# Patient Record
Sex: FEMALE | Race: WHITE | NOT HISPANIC OR LATINO | Employment: OTHER | URBAN - METROPOLITAN AREA
[De-identification: names, ages, dates, MRNs, and addresses within clinical notes are randomized per-mention and may not be internally consistent; named-entity substitution may affect disease eponyms.]

---

## 2017-01-04 ENCOUNTER — TRANSCRIBE ORDERS (OUTPATIENT)
Dept: ADMINISTRATIVE | Facility: HOSPITAL | Age: 67
End: 2017-01-04

## 2017-01-04 DIAGNOSIS — N95.9 POST MENOPAUSAL PROBLEMS: Primary | ICD-10-CM

## 2017-01-24 ENCOUNTER — APPOINTMENT (OUTPATIENT)
Dept: BONE DENSITY | Facility: HOSPITAL | Age: 67
End: 2017-01-24
Attending: INTERNAL MEDICINE

## 2017-01-26 ENCOUNTER — TRANSCRIBE ORDERS (OUTPATIENT)
Dept: ADMINISTRATIVE | Facility: HOSPITAL | Age: 67
End: 2017-01-26

## 2017-01-26 DIAGNOSIS — Z12.31 VISIT FOR SCREENING MAMMOGRAM: Primary | ICD-10-CM

## 2017-01-31 ENCOUNTER — HOSPITAL ENCOUNTER (OUTPATIENT)
Dept: BONE DENSITY | Facility: HOSPITAL | Age: 67
Discharge: HOME | End: 2017-01-31
Attending: INTERNAL MEDICINE | Admitting: INTERNAL MEDICINE

## 2017-01-31 DIAGNOSIS — N95.9 POST MENOPAUSAL PROBLEMS: ICD-10-CM

## 2017-01-31 PROCEDURE — 77080 DXA BONE DENSITY AXIAL: CPT

## 2017-01-31 PROCEDURE — 77080 DXA BONE DENSITY AXIAL: CPT | Performed by: RADIOLOGY

## 2017-03-17 ENCOUNTER — HOSPITAL ENCOUNTER (OUTPATIENT)
Dept: GENERAL RADIOLOGY | Facility: HOSPITAL | Age: 67
Discharge: HOME | End: 2017-03-17
Attending: INTERNAL MEDICINE | Admitting: INTERNAL MEDICINE

## 2017-03-17 ENCOUNTER — TRANSCRIBE ORDERS (OUTPATIENT)
Dept: GENERAL RADIOLOGY | Facility: HOSPITAL | Age: 67
End: 2017-03-17

## 2017-03-17 DIAGNOSIS — H32: ICD-10-CM

## 2017-03-17 DIAGNOSIS — M35.3 POLYMYALGIA RHEUMATICA (HCC): ICD-10-CM

## 2017-03-17 DIAGNOSIS — Z79.52 LONG TERM CURRENT USE OF SYSTEMIC STEROIDS: ICD-10-CM

## 2017-03-17 DIAGNOSIS — B39.1 HISTOPLASMOSIS, CHRONIC CAVITARY (HCC): ICD-10-CM

## 2017-03-17 DIAGNOSIS — M31.6 GIANT CELL ARTERITIS (HCC): ICD-10-CM

## 2017-03-17 DIAGNOSIS — B39.4: ICD-10-CM

## 2017-03-17 DIAGNOSIS — H32: Primary | ICD-10-CM

## 2017-03-17 DIAGNOSIS — B39.4: Primary | ICD-10-CM

## 2017-03-17 PROCEDURE — 71020 HC CHEST PA AND LATERAL: CPT

## 2017-05-08 ENCOUNTER — APPOINTMENT (OUTPATIENT)
Dept: GENERAL RADIOLOGY | Facility: HOSPITAL | Age: 67
End: 2017-05-08

## 2017-05-08 ENCOUNTER — HOSPITAL ENCOUNTER (EMERGENCY)
Facility: HOSPITAL | Age: 67
Discharge: HOME | End: 2017-05-08
Attending: EMERGENCY MEDICINE | Admitting: EMERGENCY MEDICINE

## 2017-05-08 VITALS
BODY MASS INDEX: 29.07 KG/M2 | TEMPERATURE: 97.6 F | HEIGHT: 61 IN | OXYGEN SATURATION: 94 % | WEIGHT: 154 LBS | RESPIRATION RATE: 18 BRPM | SYSTOLIC BLOOD PRESSURE: 134 MMHG | DIASTOLIC BLOOD PRESSURE: 69 MMHG | HEART RATE: 78 BPM
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
## 2017-05-08 DIAGNOSIS — S42.034A CLOSED NONDISPLACED FRACTURE OF ACROMIAL END OF RIGHT CLAVICLE, INITIAL ENCOUNTER: ICD-10-CM

## 2017-05-08 PROCEDURE — 73030 X-RAY EXAM OF SHOULDER: CPT

## 2017-05-08 PROCEDURE — 99283 EMERGENCY DEPT VISIT LOW MDM: CPT

## 2017-05-08 RX ORDER — ONDANSETRON 4 MG/1
4 TABLET, ORALLY DISINTEGRATING ORAL EVERY 8 HOURS PRN
Qty: 12 TABLET | Refills: 0 | Status: SHIPPED | OUTPATIENT
Start: 2017-05-08 | End: 2020-07-16

## 2017-05-08 RX ORDER — ONDANSETRON 4 MG/1
4 TABLET, ORALLY DISINTEGRATING ORAL ONCE
Status: COMPLETED | OUTPATIENT
Start: 2017-05-08 | End: 2017-05-08

## 2017-05-08 RX ORDER — HYDROCODONE BITARTRATE AND ACETAMINOPHEN 5; 325 MG/1; MG/1
1 TABLET ORAL ONCE
Status: COMPLETED | OUTPATIENT
Start: 2017-05-08 | End: 2017-05-08

## 2017-05-08 RX ORDER — HYDROCODONE BITARTRATE AND ACETAMINOPHEN 5; 325 MG/1; MG/1
1 TABLET ORAL EVERY 6 HOURS PRN
Qty: 15 TABLET | Refills: 0 | Status: SHIPPED | OUTPATIENT
Start: 2017-05-08 | End: 2019-03-05

## 2017-05-08 RX ADMIN — ONDANSETRON 4 MG: 4 TABLET, ORALLY DISINTEGRATING ORAL at 10:04

## 2017-05-08 RX ADMIN — HYDROCODONE BITARTRATE AND ACETAMINOPHEN 1 TABLET: 5; 325 TABLET ORAL at 10:05

## 2017-07-27 ENCOUNTER — TRANSCRIBE ORDERS (OUTPATIENT)
Dept: ADMINISTRATIVE | Facility: HOSPITAL | Age: 67
End: 2017-07-27

## 2017-07-27 DIAGNOSIS — Z12.31 VISIT FOR SCREENING MAMMOGRAM: Primary | ICD-10-CM

## 2017-08-11 ENCOUNTER — HOSPITAL ENCOUNTER (OUTPATIENT)
Dept: MAMMOGRAPHY | Facility: HOSPITAL | Age: 67
Discharge: HOME | End: 2017-08-11
Attending: INTERNAL MEDICINE | Admitting: INTERNAL MEDICINE

## 2017-08-11 DIAGNOSIS — Z12.31 VISIT FOR SCREENING MAMMOGRAM: ICD-10-CM

## 2017-08-11 PROCEDURE — G0202 SCR MAMMO BI INCL CAD: HCPCS

## 2017-08-11 PROCEDURE — 77063 BREAST TOMOSYNTHESIS BI: CPT

## 2017-08-11 PROCEDURE — G0202 SCR MAMMO BI INCL CAD: HCPCS | Performed by: RADIOLOGY

## 2017-08-11 PROCEDURE — 77063 BREAST TOMOSYNTHESIS BI: CPT | Performed by: RADIOLOGY

## 2018-10-15 ENCOUNTER — TRANSCRIBE ORDERS (OUTPATIENT)
Dept: ADMINISTRATIVE | Facility: HOSPITAL | Age: 68
End: 2018-10-15

## 2018-10-15 DIAGNOSIS — Z12.31 VISIT FOR SCREENING MAMMOGRAM: Primary | ICD-10-CM

## 2018-12-17 ENCOUNTER — HOSPITAL ENCOUNTER (OUTPATIENT)
Dept: MAMMOGRAPHY | Facility: HOSPITAL | Age: 68
Discharge: HOME | End: 2018-12-17
Attending: INTERNAL MEDICINE | Admitting: INTERNAL MEDICINE

## 2018-12-17 DIAGNOSIS — Z12.31 VISIT FOR SCREENING MAMMOGRAM: ICD-10-CM

## 2018-12-17 PROCEDURE — 77063 BREAST TOMOSYNTHESIS BI: CPT | Performed by: RADIOLOGY

## 2018-12-17 PROCEDURE — 77067 SCR MAMMO BI INCL CAD: CPT | Performed by: RADIOLOGY

## 2018-12-17 PROCEDURE — 77063 BREAST TOMOSYNTHESIS BI: CPT

## 2018-12-17 PROCEDURE — 77067 SCR MAMMO BI INCL CAD: CPT

## 2018-12-29 PROBLEM — F41.1 GENERALIZED ANXIETY DISORDER: Status: ACTIVE | Noted: 2018-01-24

## 2018-12-31 ENCOUNTER — LAB REQUISITION (OUTPATIENT)
Dept: LAB | Facility: HOSPITAL | Age: 68
End: 2018-12-31

## 2018-12-31 DIAGNOSIS — Z00.00 ROUTINE GENERAL MEDICAL EXAMINATION AT A HEALTH CARE FACILITY: ICD-10-CM

## 2018-12-31 LAB
BACTERIA UR QL AUTO: NORMAL /HPF
BILIRUB UR QL STRIP: NEGATIVE
CLARITY UR: CLEAR
COLOR UR: YELLOW
GLUCOSE UR STRIP-MCNC: NEGATIVE MG/DL
HGB UR QL STRIP.AUTO: NEGATIVE
HYALINE CASTS UR QL AUTO: NORMAL /LPF
KETONES UR QL STRIP: NEGATIVE
LEUKOCYTE ESTERASE UR QL STRIP.AUTO: NEGATIVE
NITRITE UR QL STRIP: NEGATIVE
PH UR STRIP.AUTO: 5.5 [PH] (ref 5–8)
PROT UR QL STRIP: NEGATIVE
RBC # UR: NORMAL /HPF
REF LAB TEST METHOD: NORMAL
SP GR UR STRIP: 1.02 (ref 1–1.03)
SQUAMOUS #/AREA URNS HPF: NORMAL /HPF
UROBILINOGEN UR QL STRIP: NORMAL
WBC UR QL AUTO: NORMAL /HPF

## 2018-12-31 PROCEDURE — 81001 URINALYSIS AUTO W/SCOPE: CPT | Performed by: NURSE PRACTITIONER

## 2018-12-31 PROCEDURE — 36415 COLL VENOUS BLD VENIPUNCTURE: CPT | Performed by: NURSE PRACTITIONER

## 2019-01-08 ENCOUNTER — LAB (OUTPATIENT)
Dept: LAB | Facility: HOSPITAL | Age: 69
End: 2019-01-08

## 2019-01-08 ENCOUNTER — OFFICE VISIT (OUTPATIENT)
Dept: INTERNAL MEDICINE | Facility: CLINIC | Age: 69
End: 2019-01-08

## 2019-01-08 VITALS
WEIGHT: 163 LBS | DIASTOLIC BLOOD PRESSURE: 70 MMHG | BODY MASS INDEX: 30 KG/M2 | HEIGHT: 62 IN | TEMPERATURE: 98.7 F | HEART RATE: 80 BPM | SYSTOLIC BLOOD PRESSURE: 144 MMHG

## 2019-01-08 DIAGNOSIS — E78.2 MIXED HYPERLIPIDEMIA: Primary | ICD-10-CM

## 2019-01-08 DIAGNOSIS — E55.9 VITAMIN D DEFICIENCY: ICD-10-CM

## 2019-01-08 DIAGNOSIS — N39.0 URINARY TRACT INFECTION WITHOUT HEMATURIA, SITE UNSPECIFIED: ICD-10-CM

## 2019-01-08 DIAGNOSIS — F41.1 GENERALIZED ANXIETY DISORDER: ICD-10-CM

## 2019-01-08 DIAGNOSIS — M81.0 SENILE OSTEOPOROSIS: ICD-10-CM

## 2019-01-08 DIAGNOSIS — E55.9 VITAMIN D DEFICIENCY: Chronic | ICD-10-CM

## 2019-01-08 DIAGNOSIS — E78.2 MIXED HYPERLIPIDEMIA: ICD-10-CM

## 2019-01-08 LAB
ALBUMIN SERPL-MCNC: 4.85 G/DL (ref 3.2–4.8)
ALBUMIN/GLOB SERPL: 3.6 G/DL (ref 1.5–2.5)
ALP SERPL-CCNC: 55 U/L (ref 25–100)
ALT SERPL W P-5'-P-CCNC: 31 U/L (ref 7–40)
ANION GAP SERPL CALCULATED.3IONS-SCNC: 7 MMOL/L (ref 3–11)
ARTICHOKE IGE QN: 150 MG/DL (ref 0–130)
AST SERPL-CCNC: 26 U/L (ref 0–33)
BASOPHILS # BLD AUTO: 0.02 10*3/MM3 (ref 0–0.2)
BASOPHILS NFR BLD AUTO: 0.2 % (ref 0–1)
BILIRUB SERPL-MCNC: 0.7 MG/DL (ref 0.3–1.2)
BILIRUB UR QL STRIP: NEGATIVE
BUN BLD-MCNC: 22 MG/DL (ref 9–23)
BUN/CREAT SERPL: 22.9 (ref 7–25)
CALCIUM SPEC-SCNC: 9.6 MG/DL (ref 8.7–10.4)
CHLORIDE SERPL-SCNC: 100 MMOL/L (ref 99–109)
CHOLEST SERPL-MCNC: 220 MG/DL (ref 0–200)
CLARITY UR: CLEAR
CO2 SERPL-SCNC: 32 MMOL/L (ref 20–31)
COLOR UR: YELLOW
CREAT BLD-MCNC: 0.96 MG/DL (ref 0.6–1.3)
DEPRECATED RDW RBC AUTO: 42.8 FL (ref 37–54)
EOSINOPHIL # BLD AUTO: 0.09 10*3/MM3 (ref 0–0.3)
EOSINOPHIL NFR BLD AUTO: 1 % (ref 0–3)
ERYTHROCYTE [DISTWIDTH] IN BLOOD BY AUTOMATED COUNT: 12.1 % (ref 11.3–14.5)
ERYTHROCYTE [SEDIMENTATION RATE] IN BLOOD: <1 MM/HR (ref 0–30)
GFR SERPL CREATININE-BSD FRML MDRD: 58 ML/MIN/1.73
GLOBULIN UR ELPH-MCNC: 1.4 GM/DL
GLUCOSE BLD-MCNC: 94 MG/DL (ref 70–100)
GLUCOSE UR STRIP-MCNC: NEGATIVE MG/DL
HCT VFR BLD AUTO: 44 % (ref 34.5–44)
HDLC SERPL-MCNC: 51 MG/DL (ref 40–60)
HGB BLD-MCNC: 15 G/DL (ref 11.5–15.5)
HGB UR QL STRIP.AUTO: NEGATIVE
IMM GRANULOCYTES # BLD AUTO: 0.02 10*3/MM3 (ref 0–0.03)
IMM GRANULOCYTES NFR BLD AUTO: 0.2 % (ref 0–0.6)
KETONES UR QL STRIP: ABNORMAL
LEUKOCYTE ESTERASE UR QL STRIP.AUTO: NEGATIVE
LYMPHOCYTES # BLD AUTO: 2.84 10*3/MM3 (ref 0.6–4.8)
LYMPHOCYTES NFR BLD AUTO: 32.3 % (ref 24–44)
MCH RBC QN AUTO: 33.3 PG (ref 27–31)
MCHC RBC AUTO-ENTMCNC: 34.1 G/DL (ref 32–36)
MCV RBC AUTO: 97.8 FL (ref 80–99)
MONOCYTES # BLD AUTO: 0.65 10*3/MM3 (ref 0–1)
MONOCYTES NFR BLD AUTO: 7.4 % (ref 0–12)
NEUTROPHILS # BLD AUTO: 5.18 10*3/MM3 (ref 1.5–8.3)
NEUTROPHILS NFR BLD AUTO: 59.1 % (ref 41–71)
NITRITE UR QL STRIP: NEGATIVE
PH UR STRIP.AUTO: 6.5 [PH] (ref 5–8)
PLATELET # BLD AUTO: 194 10*3/MM3 (ref 150–450)
PMV BLD AUTO: 10.9 FL (ref 6–12)
POTASSIUM BLD-SCNC: 2.9 MMOL/L (ref 3.5–5.5)
PROT SERPL-MCNC: 6.2 G/DL (ref 5.7–8.2)
PROT UR QL STRIP: NEGATIVE
RBC # BLD AUTO: 4.5 10*6/MM3 (ref 3.89–5.14)
SODIUM BLD-SCNC: 139 MMOL/L (ref 132–146)
SP GR UR STRIP: 1.02 (ref 1–1.03)
TRIGL SERPL-MCNC: 192 MG/DL (ref 0–150)
TSH SERPL DL<=0.05 MIU/L-ACNC: 1.28 MIU/ML (ref 0.35–5.35)
UROBILINOGEN UR QL STRIP: ABNORMAL
WBC NRBC COR # BLD: 8.78 10*3/MM3 (ref 3.5–10.8)

## 2019-01-08 PROCEDURE — 80053 COMPREHEN METABOLIC PANEL: CPT

## 2019-01-08 PROCEDURE — 85652 RBC SED RATE AUTOMATED: CPT

## 2019-01-08 PROCEDURE — G0439 PPPS, SUBSEQ VISIT: HCPCS | Performed by: PHYSICIAN ASSISTANT

## 2019-01-08 PROCEDURE — 85025 COMPLETE CBC W/AUTO DIFF WBC: CPT

## 2019-01-08 PROCEDURE — 81003 URINALYSIS AUTO W/O SCOPE: CPT

## 2019-01-08 PROCEDURE — 80061 LIPID PANEL: CPT

## 2019-01-08 PROCEDURE — 96160 PT-FOCUSED HLTH RISK ASSMT: CPT | Performed by: PHYSICIAN ASSISTANT

## 2019-01-08 PROCEDURE — 84443 ASSAY THYROID STIM HORMONE: CPT

## 2019-01-08 PROCEDURE — 36415 COLL VENOUS BLD VENIPUNCTURE: CPT

## 2019-01-08 RX ORDER — BENZONATATE 200 MG/1
CAPSULE ORAL
Refills: 0 | COMMUNITY
Start: 2018-10-03 | End: 2019-01-16 | Stop reason: SDUPTHER

## 2019-01-08 RX ORDER — AZELASTINE HYDROCHLORIDE, FLUTICASONE PROPIONATE 137; 50 UG/1; UG/1
2 SPRAY, METERED NASAL EVERY 12 HOURS SCHEDULED
COMMUNITY
Start: 2018-09-24

## 2019-01-08 RX ORDER — PRAVASTATIN SODIUM 80 MG/1
80 TABLET ORAL DAILY
COMMUNITY
Start: 2018-05-30 | End: 2021-02-01 | Stop reason: SDUPTHER

## 2019-01-08 RX ORDER — MAGNESIUM GLUCONATE 30 MG(550)
1 TABLET ORAL DAILY
COMMUNITY
End: 2022-08-03

## 2019-01-08 RX ORDER — RANITIDINE 150 MG/1
CAPSULE ORAL
COMMUNITY
Start: 2018-10-30 | End: 2019-11-08 | Stop reason: SDUPTHER

## 2019-01-08 RX ORDER — ALBUTEROL SULFATE 90 UG/1
AEROSOL, METERED RESPIRATORY (INHALATION)
COMMUNITY
Start: 2016-11-07 | End: 2019-11-16 | Stop reason: SDUPTHER

## 2019-01-08 RX ORDER — MELOXICAM 7.5 MG/1
TABLET ORAL
Refills: 1 | COMMUNITY
Start: 2018-11-10 | End: 2019-12-27

## 2019-01-08 RX ORDER — HYDROCHLOROTHIAZIDE 25 MG/1
25 TABLET ORAL DAILY
Refills: 0 | COMMUNITY
Start: 2018-12-11 | End: 2019-05-24 | Stop reason: SDUPTHER

## 2019-01-08 RX ORDER — ACYCLOVIR 400 MG/1
400 TABLET ORAL AS NEEDED
COMMUNITY
Start: 2018-01-05

## 2019-01-08 RX ORDER — ACETAMINOPHEN 160 MG
2000 TABLET,DISINTEGRATING ORAL DAILY
COMMUNITY
Start: 2015-11-25 | End: 2021-02-06

## 2019-01-08 RX ORDER — DEXLANSOPRAZOLE 60 MG/1
CAPSULE, DELAYED RELEASE ORAL
Refills: 0 | COMMUNITY
Start: 2018-12-09 | End: 2019-11-16 | Stop reason: SDUPTHER

## 2019-01-08 RX ORDER — PREDNISONE 1 MG/1
TABLET ORAL
Refills: 0 | COMMUNITY
Start: 2018-12-11 | End: 2019-07-19 | Stop reason: SDUPTHER

## 2019-01-08 RX ORDER — BUSPIRONE HYDROCHLORIDE 5 MG/1
TABLET ORAL
Refills: 0 | COMMUNITY
Start: 2019-01-02 | End: 2019-07-19 | Stop reason: SDUPTHER

## 2019-01-08 NOTE — PROGRESS NOTES
QUICK REFERENCE INFORMATION:  The ABCs of the Annual Wellness Visit    Subsequent Medicare Wellness Visit    HEALTH RISK ASSESSMENT    1950    Recent Hospitalizations:  No hospitalization(s) within the last year..        Current Medical Providers:  Patient Care Team:  Eve Navarrete MD as PCP - General (Internal Medicine)        Smoking Status:  Social History     Tobacco Use   Smoking Status Current Every Day Smoker   • Packs/day: 0.50   • Types: Cigarettes   Smokeless Tobacco Never Used   Tobacco Comment    patient refused smoking literature       Alcohol Consumption:  Social History     Substance and Sexual Activity   Alcohol Use Yes    Comment: 4 -5 days a week       Depression Screen:   PHQ-2/PHQ-9 Depression Screening 1/8/2019   Little interest or pleasure in doing things 0   Feeling down, depressed, or hopeless 0   Total Score 0       Health Habits and Functional and Cognitive Screening:  Functional & Cognitive Status 1/8/2019   Do you have difficulty preparing food and eating? No   Do you have difficulty bathing yourself, getting dressed or grooming yourself? No   Do you have difficulty using the toilet? No   Do you have difficulty moving around from place to place? No   Do you have trouble with steps or getting out of a bed or a chair? No   In the past year have you fallen or experienced a near fall? No   Current Diet Well Balanced Diet   Dental Exam Up to date   Eye Exam Up to date   Exercise (times per week) 4 times per week   Current Exercise Activities Include Walking   Do you need help using the phone?  No   Are you deaf or do you have serious difficulty hearing?  No   Do you need help with transportation? No   Do you need help shopping? No   Do you need help preparing meals?  No   Do you need help with housework?  No   Do you need help with laundry? No   Do you need help taking your medications? No   Do you need help managing money? No   Do you ever drive or ride in a car without wearing a  seat belt? No           Does the patient have evidence of cognitive impairment? No    Aspirin use counseling: Does not need ASA (and currently is not on it)      Recent Lab Results:  CMP:     Lipid Panel:     HbA1c:       Visual Acuity:  No exam data present    Age-appropriate Screening Schedule:  Refer to the list below for future screening recommendations based on patient's age, sex and/or medical conditions. Orders for these recommended tests are listed in the plan section. The patient has been provided with a written plan.    Health Maintenance   Topic Date Due   • ZOSTER VACCINE (2 of 3) 04/08/2013   • LIPID PANEL  01/08/2019   • DXA SCAN  01/31/2019   • MAMMOGRAM  12/17/2020   • TDAP/TD VACCINES (2 - Td) 04/11/2022   • COLONOSCOPY  03/09/2025   • INFLUENZA VACCINE  Completed   • PNEUMOCOCCAL VACCINES (65+ LOW/MEDIUM RISK)  Completed        Subjective   History of Present Illness    Irma Pedraza is a 68 y.o. female who presents for an Subsequent Wellness Visit.    The following portions of the patient's history were reviewed and updated as appropriate: allergies, current medications, past family history, past medical history, past social history, past surgical history and problem list.    Outpatient Medications Prior to Visit   Medication Sig Dispense Refill   • acyclovir (ZOVIRAX) 400 MG tablet Take  by mouth.     • albuterol sulfate HFA (VENTOLIN HFA) 108 (90 Base) MCG/ACT inhaler Inhale.     • Azelastine-Fluticasone 137-50 MCG/ACT suspension 2 sprays into the nostril(s) as directed by provider Every 12 (Twelve) Hours.     • Cholecalciferol (VITAMIN D3) 2000 units capsule Take  by mouth Daily.     • Coenzyme Q10 (COQ-10) 200 MG capsule Take 1 tablet by mouth Daily.     • Fluticasone Furoate-Vilanterol (BREO ELLIPTA) 200-25 MCG/INH aerosol powder  inhaler Inhale 1 puff Every 12 (Twelve) Hours.     • HYDROcodone-acetaminophen (NORCO) 5-325 MG per tablet Take 1 tablet by mouth Every 6 (Six) Hours As Needed  for Severe Pain (7-10). 15 tablet 0   • pravastatin (PRAVACHOL) 80 MG tablet Take  by mouth.     • Probiotic Product ([employer] PO) Take 1 capsule by mouth Daily.     • ranitidine (ZANTAC) 150 MG capsule Take  by mouth.     • tocilizumab (ACTEMRA) 200 MG/10ML solution injection Infuse 8 mg/kg into a venous catheter.     • benzonatate (TESSALON) 200 MG capsule   0   • busPIRone (BUSPAR) 5 MG tablet Take as needed  0   • DEXILANT 60 MG capsule   0   • hydrochlorothiazide (HYDRODIURIL) 25 MG tablet Take 25 mg by mouth Daily.  0   • Lactobacillus-Inulin ([employer] PO) Take  by mouth. Take one daily     • Magnesium Gluconate 550 MG tablet Take  by mouth.     • meloxicam (MOBIC) 7.5 MG tablet   1   • ondansetron ODT (ZOFRAN-ODT) 4 MG disintegrating tablet Take 1 tablet by mouth Every 8 (Eight) Hours As Needed for Nausea. 12 tablet 0   • predniSONE (DELTASONE) 5 MG tablet   0     No facility-administered medications prior to visit.        Patient Active Problem List   Diagnosis   • Cervicalgia   • Panic disorder   • Generalized anxiety disorder   • Mixed hyperlipidemia   • Mitral valve disorder   • Tinnitus   • Tobacco use disorder   • Vitamin D deficiency   • Senile osteoporosis   • Dyspnea       Advance Care Planning:  has NO advance directive - not interested in additional information    Identification of Risk Factors:  Risk factors include: weight .    Review of Systems   Constitutional: Negative for chills, fatigue and fever.   Respiratory: Negative for cough, shortness of breath and wheezing.    Cardiovascular: Negative for chest pain and palpitations.   Gastrointestinal: Negative for abdominal pain.       Compared to one year ago, the patient feels her physical health is the same.  Compared to one year ago, the patient feels her mental health is the same.    Objective     Physical Exam    Vitals:    01/08/19 0910   BP: 144/70   BP Location: Left arm   Patient Position: Sitting   Cuff  "Size: Adult   Pulse: 80   Temp: 98.7 °F (37.1 °C)   TempSrc: Temporal   Weight: 73.9 kg (163 lb)   Height: 157 cm (61.81\")   PainSc: 0-No pain       Patient's Body mass index is 30 kg/m². BMI is above normal parameters. Recommendations include: nutrition counseling.      Assessment/Plan   Patient Self-Management and Personalized Health Advice  The patient has been provided with information about: weight management and preventive services including:   · Counseling for cardiovascular disease risk reduction.    Visit Diagnoses:  No diagnosis found.    No orders of the defined types were placed in this encounter.      Outpatient Encounter Medications as of 1/8/2019   Medication Sig Dispense Refill   • acyclovir (ZOVIRAX) 400 MG tablet Take  by mouth.     • albuterol sulfate HFA (VENTOLIN HFA) 108 (90 Base) MCG/ACT inhaler Inhale.     • Azelastine-Fluticasone 137-50 MCG/ACT suspension 2 sprays into the nostril(s) as directed by provider Every 12 (Twelve) Hours.     • Cholecalciferol (VITAMIN D3) 2000 units capsule Take  by mouth Daily.     • Coenzyme Q10 (COQ-10) 200 MG capsule Take 1 tablet by mouth Daily.     • Fluticasone Furoate-Vilanterol (BREO ELLIPTA) 200-25 MCG/INH aerosol powder  inhaler Inhale 1 puff Every 12 (Twelve) Hours.     • HYDROcodone-acetaminophen (NORCO) 5-325 MG per tablet Take 1 tablet by mouth Every 6 (Six) Hours As Needed for Severe Pain (7-10). 15 tablet 0   • pravastatin (PRAVACHOL) 80 MG tablet Take  by mouth.     • Probiotic Product ([employer] PO) Take 1 capsule by mouth Daily.     • ranitidine (ZANTAC) 150 MG capsule Take  by mouth.     • tocilizumab (ACTEMRA) 200 MG/10ML solution injection Infuse 8 mg/kg into a venous catheter.     • benzonatate (TESSALON) 200 MG capsule   0   • busPIRone (BUSPAR) 5 MG tablet Take as needed  0   • DEXILANT 60 MG capsule   0   • hydrochlorothiazide (HYDRODIURIL) 25 MG tablet Take 25 mg by mouth Daily.  0   • Lactobacillus-Inulin ([facility] " [facility] PO) Take  by mouth. Take one daily     • Magnesium Gluconate 550 MG tablet Take  by mouth.     • meloxicam (MOBIC) 7.5 MG tablet   1   • ondansetron ODT (ZOFRAN-ODT) 4 MG disintegrating tablet Take 1 tablet by mouth Every 8 (Eight) Hours As Needed for Nausea. 12 tablet 0   • predniSONE (DELTASONE) 5 MG tablet   0     No facility-administered encounter medications on file as of 1/8/2019.        Reviewed use of high risk medication in the elderly: no  Reviewed for potential of harmful drug interactions in the elderly: yes    Follow Up:  No Follow-up on file.     An After Visit Summary and PPPS with all of these plans were given to the patient.

## 2019-01-14 ENCOUNTER — OFFICE VISIT (OUTPATIENT)
Dept: INTERNAL MEDICINE | Facility: CLINIC | Age: 69
End: 2019-01-14

## 2019-01-14 VITALS
TEMPERATURE: 98 F | WEIGHT: 164 LBS | DIASTOLIC BLOOD PRESSURE: 78 MMHG | SYSTOLIC BLOOD PRESSURE: 142 MMHG | HEART RATE: 70 BPM | HEIGHT: 62 IN | BODY MASS INDEX: 30.18 KG/M2

## 2019-01-14 DIAGNOSIS — E87.6 DRUG-INDUCED HYPOKALEMIA: ICD-10-CM

## 2019-01-14 DIAGNOSIS — M35.3 POLYMYALGIA RHEUMATICA (HCC): ICD-10-CM

## 2019-01-14 DIAGNOSIS — T50.905A DRUG-INDUCED HYPOKALEMIA: ICD-10-CM

## 2019-01-14 DIAGNOSIS — F17.200 TOBACCO USE DISORDER: Chronic | ICD-10-CM

## 2019-01-14 DIAGNOSIS — F41.1 GENERALIZED ANXIETY DISORDER: ICD-10-CM

## 2019-01-14 DIAGNOSIS — F51.01 PRIMARY INSOMNIA: ICD-10-CM

## 2019-01-14 DIAGNOSIS — M54.9 CHRONIC UPPER BACK PAIN: ICD-10-CM

## 2019-01-14 DIAGNOSIS — R03.0 ELEVATED HEART RATE WITH ELEVATED BLOOD PRESSURE WITHOUT DIAGNOSIS OF HYPERTENSION: ICD-10-CM

## 2019-01-14 DIAGNOSIS — G89.29 CHRONIC PAIN OF BOTH SHOULDERS: ICD-10-CM

## 2019-01-14 DIAGNOSIS — G89.29 CHRONIC UPPER BACK PAIN: ICD-10-CM

## 2019-01-14 DIAGNOSIS — R00.9 ELEVATED HEART RATE WITH ELEVATED BLOOD PRESSURE WITHOUT DIAGNOSIS OF HYPERTENSION: ICD-10-CM

## 2019-01-14 DIAGNOSIS — M81.0 SENILE OSTEOPOROSIS: ICD-10-CM

## 2019-01-14 DIAGNOSIS — M54.2 CERVICALGIA: Chronic | ICD-10-CM

## 2019-01-14 DIAGNOSIS — L81.9 ATYPICAL PIGMENTED SKIN LESION: ICD-10-CM

## 2019-01-14 DIAGNOSIS — M25.511 CHRONIC PAIN OF BOTH SHOULDERS: ICD-10-CM

## 2019-01-14 DIAGNOSIS — M25.512 CHRONIC PAIN OF BOTH SHOULDERS: ICD-10-CM

## 2019-01-14 DIAGNOSIS — E78.2 MIXED HYPERLIPIDEMIA: Primary | ICD-10-CM

## 2019-01-14 DIAGNOSIS — E55.9 VITAMIN D DEFICIENCY: Chronic | ICD-10-CM

## 2019-01-14 PROCEDURE — 94060 EVALUATION OF WHEEZING: CPT | Performed by: INTERNAL MEDICINE

## 2019-01-14 PROCEDURE — 99215 OFFICE O/P EST HI 40 MIN: CPT | Performed by: INTERNAL MEDICINE

## 2019-01-14 RX ORDER — POTASSIUM CHLORIDE 20 MEQ/1
20 TABLET, EXTENDED RELEASE ORAL 2 TIMES DAILY
Qty: 28 TABLET | Refills: 0 | Status: SHIPPED | OUTPATIENT
Start: 2019-01-14 | End: 2019-01-28

## 2019-01-14 NOTE — PROGRESS NOTES
[facility]     Irma Pedraza  1950   [id number]      Patient Care Team:  Eve Navarrete MD as PCP - General (Internal Medicine)    Chief Complaint;:   Chief Complaint   Patient presents with   • Annual Exam      CC:pain upper back and shoulders,hypertension, smoking,RA,sleep.      HPI  Patient is a 68 y.o. female presents for f/u hypertension,RA,sleep, smoking.    Also presents with pain in upper back and shoulder for years. She feels it is related to very heavy large breasts. Better with rest. worse with standing, working.She does not take anything for pain.     CHRONIC CONDITIONS  Smokes about 1/2 pack per day. She has tried to quit in past.She is trying to eat smaller portions and less fats and carbs and lose weight.Trying to walk some.  BPs wellcontrolled. Taking meds as prescribed. No side effects.  Sees rheumatologist regularly and actemra is helping joint pains.  Takes meloxicam once a day and 5 mg prednisone, too. Occasionally takes hydrocodone GERD controlled by current meds.   She has decreased number of cigarettes per day.  Trouble going to sleep and staying asleep.    Past Medical History:   Diagnosis Date   • Adenomyomatosis of gallbladder 2009    by ultrasound, Dr. 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   • PMR (polymyalgia rheumatica) (CMS/[facility])        Past Surgical History:   Procedure Laterality Date   • COLONOSCOPY  02/2009   • COSMETIC SURGERY     • ELBOW PROCEDURE         Family History   Problem Relation Age of Onset   • Breast cancer Maternal Grandmother 90   • Osteoporosis Mother    • Osteoporosis Father    • Migraines Father    • Brain cancer Sister    • Stroke Brother    • Stroke  Paternal Grandmother    • Ovarian cancer Neg Hx    • Endometrial cancer Neg Hx        Social History     Socioeconomic History   • Marital status:      Spouse name: Not on file   • Number of children: Not on file   • Years of education: Not on file   • Highest education level: Not on file   Social Needs   • Financial resource strain: Not on file   • Food insecurity - worry: Not on file   • Food insecurity - inability: Not on file   • Transportation needs - medical: Not on file   • Transportation needs - non-medical: Not on file   Occupational History   • Not on file   Tobacco Use   • Smoking status: Current Every Day Smoker     Packs/day: 0.50     Types: Cigarettes   • Smokeless tobacco: Never Used   • Tobacco comment: patient refused smoking literature   Substance and Sexual Activity   • Alcohol use: Yes     Comment: 4 -5 days a week   • Drug use: No   • Sexual activity: Defer   Other Topics Concern   • Not on file   Social History Narrative   • Not on file       Allergies   Allergen Reactions   • Atorvastatin Unknown (See Comments)     Lipitor     • Bupropion Hcl [Bupropion] Unknown (See Comments)     wellbutrin   • Calcium Unknown (See Comments)     unknown       Review of Systems:     Review of Systems   Constitutional: Negative for chills, fatigue, fever, unexpected weight gain and unexpected weight loss.   HENT: Negative for sore throat and trouble swallowing.    Eyes: Negative for visual disturbance.   Respiratory: Positive for cough, shortness of breath and wheezing.    Cardiovascular: Negative for chest pain, palpitations and leg swelling.   Gastrointestinal: Negative for abdominal pain, blood in stool and diarrhea.   Endocrine: Negative for cold intolerance and heat intolerance.   Genitourinary: Negative for urinary incontinence and dysuria.   Musculoskeletal: Positive for arthralgias and back pain. Negative for gait problem and joint swelling.   Skin: Negative for rash.   Neurological: Negative for  "dizziness and headache.   Hematological: Negative for adenopathy. Does not bruise/bleed easily.   Psychiatric/Behavioral: Negative for sleep disturbance and depressed mood.       Vital Signs  Vitals:    01/14/19 1255   BP: 142/78   BP Location: Left arm   Patient Position: Sitting   Cuff Size: Adult   Pulse: 70   Temp: 98 °F (36.7 °C)   TempSrc: Temporal   Weight: 74.4 kg (164 lb)   Height: 157 cm (61.81\")   Body mass index is 30.18 kg/m².      ECG 12 Lead  Date/Time: 1/19/2019 2:13 PM  Performed by: Eve Navarrete MD  Authorized by: Eve Navarrete MD   Comparison: compared with previous ECG from 1/19/2018  Similar to previous ECG  Rhythm: sinus rhythm  Rate: normal  BPM: 69  Conduction: conduction normal  ST Segments: ST segments normal  T Waves: T waves normal  Clinical impression: normal ECG        PFTs today show normal volumes. FEV1/FVC is 66 and does improve to 75 after bronchodilator.  FEF 25-75 also improved from 49% to 79% after bronchodilator. Mild airway obstruction improved after bronchodilator.    Current Outpatient Medications:   •  acyclovir (ZOVIRAX) 400 MG tablet, Take  by mouth., Disp: , Rfl:   •  albuterol sulfate HFA (VENTOLIN HFA) 108 (90 Base) MCG/ACT inhaler, Inhale., Disp: , Rfl:   •  Azelastine-Fluticasone 137-50 MCG/ACT suspension, 2 sprays into the nostril(s) as directed by provider Every 12 (Twelve) Hours., Disp: , Rfl:   •  benzonatate (TESSALON) 200 MG capsule, take 1 capsule by mouth three times a day if needed, Disp: 30 capsule, Rfl: 2  •  busPIRone (BUSPAR) 5 MG tablet, Take as needed, Disp: , Rfl: 0  •  Cholecalciferol (VITAMIN D3) 2000 units capsule, Take  by mouth Daily., Disp: , Rfl:   •  Coenzyme Q10 (COQ-10) 200 MG capsule, Take 1 tablet by mouth Daily., Disp: , Rfl:   •  DEXILANT 60 MG capsule, , Disp: , Rfl: 0  •  Fluticasone Furoate-Vilanterol (BREO ELLIPTA) 200-25 MCG/INH aerosol powder  inhaler, Inhale 1 puff Every 12 (Twelve) Hours., Disp: , Rfl:   •  " hydrochlorothiazide (HYDRODIURIL) 25 MG tablet, Take 25 mg by mouth Daily., Disp: , Rfl: 0  •  HYDROcodone-acetaminophen (NORCO) 5-325 MG per tablet, Take 1 tablet by mouth Every 6 (Six) Hours As Needed for Severe Pain (7-10)., Disp: 15 tablet, Rfl: 0  •  Lactobacillus-Inulin ([facility] & "[employer] PO), Take  by mouth. Take one daily, Disp: , Rfl:   •  Magnesium Gluconate 550 MG tablet, Take  by mouth., Disp: , Rfl:   •  meloxicam (MOBIC) 7.5 MG tablet, , Disp: , Rfl: 1  •  ondansetron ODT (ZOFRAN-ODT) 4 MG disintegrating tablet, Take 1 tablet by mouth Every 8 (Eight) Hours As Needed for Nausea., Disp: 12 tablet, Rfl: 0  •  potassium chloride (K-DUR,KLOR-CON) 20 MEQ CR tablet, Take 1 tablet by mouth 2 (Two) Times a Day for 14 days., Disp: 28 tablet, Rfl: 0  •  pravastatin (PRAVACHOL) 80 MG tablet, Take  by mouth., Disp: , Rfl:   •  predniSONE (DELTASONE) 5 MG tablet, , Disp: , Rfl: 0  •  Probiotic Product ([employer] PO), Take 1 capsule by mouth Daily., Disp: , Rfl:   •  ranitidine (ZANTAC) 150 MG capsule, Take  by mouth., Disp: , Rfl:   •  tocilizumab (ACTEMRA) 200 MG/10ML solution injection, Infuse 8 mg/kg into a venous catheter., Disp: , Rfl:     Physical Exam:    Physical Exam   Constitutional: She is oriented to person, place, and time. She appears well-developed and well-nourished. She is obese.  HENT:   Head: Normocephalic.   Eyes: Conjunctivae and EOM are normal. Pupils are equal, round, and reactive to light.   Neck: Normal range of motion. Neck supple. No hepatojugular reflux present. Carotid bruit is not present. No thyromegaly present.   Cardiovascular: Normal rate, regular rhythm, normal heart sounds and intact distal pulses.   Pulmonary/Chest: Effort normal. She has wheezes. Right breast exhibits no inverted nipple, no mass, no nipple discharge, no skin change and no tenderness. Left breast exhibits no inverted nipple, no mass, no nipple discharge, no skin change and no tenderness.  "  Mild expiratory wheezes.  Bilateral breaSts are large and pendulous.   Abdominal: Soft. Bowel sounds are normal. There is no hepatosplenomegaly.   Musculoskeletal: Normal range of motion. She exhibits no edema.        Cervical back: She exhibits spasm.   \"Dowager's hump\". Cervical muscle spasms bilateral. Some bilateral upper thoracic back spasm.   Lymphadenopathy:     She has no cervical adenopathy.   Neurological: She is alert and oriented to person, place, and time.   Skin: Skin is warm and dry.   Psychiatric: She has a normal mood and affect. Judgment and thought content normal.   Nursing note and vitals reviewed.       Results Review:     None  She did have EGD by Dr. Flores and we will obtain report.    Medication Review: Medications reviewed and noted    Assessment/Plan:    [place] was seen today for annual exam.    Diagnoses and all orders for this visit:    Mixed hyperlipidemia  Comments:  Take continue your low fat diet.    Vitamin D deficiency    Cervicalgia  Comments:  Exacerbated by large pendulous heavy breasts.    Senile osteoporosis    Chronic upper back pain    Chronic pain of both shoulders    Generalized anxiety disorder    Tobacco use disorder    Atypical pigmented skin lesion  -     Ambulatory Referral to Dermatology    Primary insomnia    Elevated heart rate with elevated blood pressure without diagnosis of hypertension    Polymyalgia rheumatica (CMS/HCC)    Drug-induced hypokalemia    Other orders  -     potassium chloride (K-DUR,KLOR-CON) 20 MEQ CR tablet; Take 1 tablet by mouth 2 (Two) Times a Day for 14 days.  -     ECG 12 Lead      She will gradually decrease cigarettes to 9 per day then to 8 per day then 7 pr day ,etc. She will try to keep her hands busy.  Bilateral upper back and neck pain and spasm is due to large pendulous breasts. I answered her questions about  recommended breast reduction and she would like to proceed. She will continue moist heat to relax muscles and tylenol or " meloxicam for pan. Continue hydrocodone for severe pain. She will do neck stretches.    She asked for phone numbers for plastic surgeons and the numbers for Dr. Shahla Fields and Dr. Shoaib Wilde were given.    Continue to improve low fat diet and get more exercise and walking. Continue working on weight loss.    Continue current inhalers.  Continue current medications for depression/anxiety.Continue getting out and doing fun things with friends and family.   She will contact her dermatologist to look at warty mole on her forearm    Low potassium after she took bactrim for a recent UTI. We will avoid using that in the future.    Plan of care reviewed with patient at the conclusion of today's visit. Education was provided regarding diagnosis, management, and any prescribed or recommended OTC medications.Patient verbalizes understanding of and agreement with management plan.         Eve Navarrete MD

## 2019-01-16 RX ORDER — BENZONATATE 200 MG/1
CAPSULE ORAL
Qty: 30 CAPSULE | Refills: 2 | Status: SHIPPED | OUTPATIENT
Start: 2019-01-16 | End: 2019-11-16 | Stop reason: SDUPTHER

## 2019-01-19 PROCEDURE — 93000 ELECTROCARDIOGRAM COMPLETE: CPT | Performed by: INTERNAL MEDICINE

## 2019-02-28 PROBLEM — M31.6 TEMPORAL ARTERITIS (HCC): Status: ACTIVE | Noted: 2019-02-28

## 2019-02-28 PROBLEM — M62.838 MUSCLE SPASMS OF BOTH LOWER EXTREMITIES: Status: ACTIVE | Noted: 2019-02-28

## 2019-02-28 PROBLEM — K57.92 DIVERTICULITIS: Status: ACTIVE | Noted: 2019-02-28

## 2019-02-28 PROBLEM — J44.9 CHRONIC OBSTRUCTIVE LUNG DISEASE: Status: ACTIVE | Noted: 2019-02-28

## 2019-02-28 PROBLEM — J02.9 PHARYNGITIS: Status: ACTIVE | Noted: 2019-02-28

## 2019-02-28 PROBLEM — M54.50 CHRONIC LOW BACK PAIN: Status: ACTIVE | Noted: 2019-02-28

## 2019-02-28 PROBLEM — H81.10 BENIGN PAROXYSMAL POSITIONAL VERTIGO: Status: ACTIVE | Noted: 2019-02-28

## 2019-02-28 PROBLEM — R39.15 URINARY URGENCY: Status: ACTIVE | Noted: 2019-02-28

## 2019-02-28 PROBLEM — I10 BENIGN ESSENTIAL HYPERTENSION: Status: ACTIVE | Noted: 2019-02-28

## 2019-02-28 PROBLEM — R14.0 ABDOMINAL BLOATING: Status: ACTIVE | Noted: 2019-02-28

## 2019-02-28 PROBLEM — F41.0 PANIC DISORDER: Status: ACTIVE | Noted: 2019-02-28

## 2019-02-28 PROBLEM — F17.200 TOBACCO DEPENDENCE SYNDROME: Status: ACTIVE | Noted: 2019-02-28

## 2019-02-28 PROBLEM — G89.29 CHRONIC LOW BACK PAIN: Status: ACTIVE | Noted: 2019-02-28

## 2019-02-28 PROBLEM — K21.9 GERD WITHOUT ESOPHAGITIS: Status: ACTIVE | Noted: 2019-02-28

## 2019-03-05 ENCOUNTER — HOSPITAL ENCOUNTER (OUTPATIENT)
Dept: GENERAL RADIOLOGY | Facility: HOSPITAL | Age: 69
Discharge: HOME | End: 2019-03-05
Admitting: INTERNAL MEDICINE

## 2019-03-05 ENCOUNTER — OFFICE VISIT (OUTPATIENT)
Dept: INTERNAL MEDICINE | Facility: CLINIC | Age: 69
End: 2019-03-05

## 2019-03-05 VITALS
BODY MASS INDEX: 30.36 KG/M2 | WEIGHT: 165 LBS | TEMPERATURE: 98.4 F | DIASTOLIC BLOOD PRESSURE: 72 MMHG | HEART RATE: 74 BPM | SYSTOLIC BLOOD PRESSURE: 112 MMHG | HEIGHT: 62 IN

## 2019-03-05 DIAGNOSIS — K76.0 FATTY LIVER: ICD-10-CM

## 2019-03-05 DIAGNOSIS — R06.02 SHORTNESS OF BREATH: ICD-10-CM

## 2019-03-05 DIAGNOSIS — R05.9 COUGH: ICD-10-CM

## 2019-03-05 DIAGNOSIS — F17.200 TOBACCO USE DISORDER: Chronic | ICD-10-CM

## 2019-03-05 DIAGNOSIS — E87.6 HYPOKALEMIA: ICD-10-CM

## 2019-03-05 DIAGNOSIS — M62.830 SPASM OF THORACIC BACK MUSCLE: ICD-10-CM

## 2019-03-05 DIAGNOSIS — J44.1 CHRONIC OBSTRUCTIVE PULMONARY DISEASE WITH ACUTE EXACERBATION (HCC): ICD-10-CM

## 2019-03-05 PROCEDURE — 71046 X-RAY EXAM CHEST 2 VIEWS: CPT

## 2019-03-05 PROCEDURE — 99214 OFFICE O/P EST MOD 30 MIN: CPT | Performed by: INTERNAL MEDICINE

## 2019-03-05 PROCEDURE — 96372 THER/PROPH/DIAG INJ SC/IM: CPT | Performed by: INTERNAL MEDICINE

## 2019-03-05 PROCEDURE — 36415 COLL VENOUS BLD VENIPUNCTURE: CPT | Performed by: INTERNAL MEDICINE

## 2019-03-05 RX ORDER — TRIAMCINOLONE ACETONIDE 40 MG/ML
80 INJECTION, SUSPENSION INTRA-ARTICULAR; INTRAMUSCULAR ONCE
Status: COMPLETED | OUTPATIENT
Start: 2019-03-05 | End: 2019-03-05

## 2019-03-05 RX ORDER — TRIAMCINOLONE ACETONIDE 40 MG/ML
40 INJECTION, SUSPENSION INTRA-ARTICULAR; INTRAMUSCULAR ONCE
Status: DISCONTINUED | OUTPATIENT
Start: 2019-03-05 | End: 2019-03-05

## 2019-03-05 RX ADMIN — TRIAMCINOLONE ACETONIDE 80 MG: 40 INJECTION, SUSPENSION INTRA-ARTICULAR; INTRAMUSCULAR at 16:20

## 2019-03-05 NOTE — PATIENT INSTRUCTIONS
For cough and shortness of breath, this is most likely an acute exacerbation of COPD and bronchitis.  Will do a chest x-ray today to rule out pneumonia.  Check labs today to see if the white blood cell count is elevated.  Kenalog shot is given in the office today to decrease inflammation and wheezing.  She will take Augmentin antibiotic twice a day for 7 days.  She will continue her Brio inhaler twice a day and continue using the albuterol inhaler as needed for cough, wheeze, congestion.    She recently had hypokalemia and decrease in renal function after taking Bactrim, she was again noted to have hypokalemia at the ER on 227.  She took 2 days of replacement.  We will recheck the potassium level and the kidney function today.    For the spasm of the thoracic back muscle, she will use moist heat and do back stretches every day.  Walking may also help.    She will continue trying to decrease the number of cigarettes smoked per day.  She will gradually wean off hopefully over the next few months.    We discussed the treatment for fatty liver which is weight loss.  She will continue to improve her low-fat diet and lose more weight.

## 2019-03-05 NOTE — PROGRESS NOTES
[facility]     Irma Pedraza  1950   [id number]      Patient Care Team:  Eve Navarrete MD as PCP - General (Internal Medicine)    Chief Complaint;:   Chief Complaint   Patient presents with   • URI     chest congestion   • Follow-up     Was at [facility] ER Friday a week ago            HPI  Patient is a 69 y.o. female presents with cough. Onset of symptoms was gradual starting 1 week ago.  Chronicity acute. Severity moderate to severe.  Symptoms are associated with shortness of breath,yellow sputum, chest congestion,wheezing.. Pertinent negatives no fever or chills, no head congestion, no nausea or diarrhea.   Symptoms are aggravated by lying down.   Symptoms improve with albuterol rescue inhaler helps some.  Context she has a long history of COPD and chronic bronchitis and is still smoking.    HPI #2  Right flank pain was the reason for her visit to the [facility] ER on 227.  She had a CT of the abdomen and kidney stone was ruled out.  She was found to have fatty liver.  Her potassium was low again at 3.1.  GFR was 49.  Her blood pressure was elevated at 175/78.  The pain was determined to be muscular spasm.  The pain is now more centered in the low thoracic area on the right.  It is improving but still hurts.  It is made worse by moving and bending.  It is helped by lying down and resting.    HPI #3  The patient was found to have fatty liver at ER on CT scan.  She asked questions about the diagnosis and its significance.      CHRONIC CONDITIONS  For COPD, she is taking Brio inhaler twice a day and albuterol inhaler as needed.    She is still smoking every day.  She has not decreased any recently.    Past Medical History:   Diagnosis Date   • Adenomyomatosis of gallbladder 2009    by ultrasound, Dr. 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   • PMR (polymyalgia rheumatica) (CMS/[facility])        Past Surgical History:   Procedure Laterality Date   • COLONOSCOPY  02/2009   • COSMETIC SURGERY  1994    face lift   • COSMETIC SURGERY  1993    TUMMY TUCK   • COSMETIC SURGERY  1980    Birth kenny removal   • ELBOW PROCEDURE Left     x2   • THROAT SURGERY      remote removal of growth from throat       Family History   Problem Relation Age of Onset   • Breast cancer Maternal Grandmother 90   • Osteoporosis Mother    • Osteoporosis Father    • Migraines Father    • Brain cancer Sister    • Stroke Brother    • Stroke Paternal Grandmother    • Ovarian cancer Neg Hx    • Endometrial cancer Neg Hx        Social History     Socioeconomic History   • Marital status:      Spouse name: Not on file   • Number of children: Not on file   • Years of education: Not on file   • Highest education level: Not on file   Social Needs   • Financial resource strain: Not on file   • Food insecurity - worry: Not on file   • Food insecurity - inability: Not on file   • Transportation needs - medical: Not on file   • Transportation needs - non-medical: Not on file   Occupational History   • Not on file   Tobacco Use   • Smoking status: Current Every Day Smoker     Packs/day: 0.50     Types: Cigarettes   • Smokeless tobacco: Never Used   • Tobacco comment: patient refused smoking literature   Substance and Sexual Activity   • Alcohol use: Yes     Comment: 4 -5 days a week   • Drug use: No   • Sexual activity: Defer   Other Topics Concern   • Not on file   Social History Narrative   • Not on file       Allergies   Allergen Reactions   • Bactrim [Sulfamethoxazole-Trimethoprim] Other (See Comments)     Hypokalemia, hyponatremia   • Atorvastatin Unknown (See Comments)     Lipitor     • Bupropion Hcl [Bupropion] Unknown (See Comments)      "wellbutrin   • Calcium Unknown (See Comments)     unknown       Review of Systems:     Review of Systems   Constitutional: Positive for fatigue. Negative for chills and fever.   HENT: Negative for congestion, postnasal drip and sinus pressure.    Respiratory: Positive for cough, chest tightness, shortness of breath and wheezing.    Cardiovascular: Negative for chest pain, palpitations and leg swelling.   Gastrointestinal: Negative for abdominal pain, diarrhea, nausea and GERD.   Endocrine: Negative for cold intolerance and heat intolerance.   Genitourinary: Negative for dysuria and frequency.   Musculoskeletal: Positive for arthralgias and back pain.   Skin: Negative for color change and rash.   Allergic/Immunologic: Positive for environmental allergies. Negative for food allergies and immunocompromised state.       Vital Signs  Vitals:    03/05/19 1501   BP: 112/72   BP Location: Left arm   Patient Position: Sitting   Cuff Size: Adult   Pulse: 74   Temp: 98.4 °F (36.9 °C)   TempSrc: Temporal   Weight: 74.8 kg (165 lb)   Height: 157 cm (61.81\")         Current Outpatient Medications:   •  acyclovir (ZOVIRAX) 400 MG tablet, Take  by mouth., Disp: , Rfl:   •  albuterol sulfate HFA (VENTOLIN HFA) 108 (90 Base) MCG/ACT inhaler, Inhale., Disp: , Rfl:   •  Azelastine-Fluticasone 137-50 MCG/ACT suspension, 2 sprays into the nostril(s) as directed by provider Every 12 (Twelve) Hours., Disp: , Rfl:   •  benzonatate (TESSALON) 200 MG capsule, take 1 capsule by mouth three times a day if needed, Disp: 30 capsule, Rfl: 2  •  busPIRone (BUSPAR) 5 MG tablet, Take as needed, Disp: , Rfl: 0  •  Cholecalciferol (VITAMIN D3) 2000 units capsule, Take  by mouth Daily., Disp: , Rfl:   •  Coenzyme Q10 (COQ-10) 200 MG capsule, Take 1 tablet by mouth Daily., Disp: , Rfl:   •  DEXILANT 60 MG capsule, , Disp: , Rfl: 0  •  Fluticasone Furoate-Vilanterol (BREO ELLIPTA) 200-25 MCG/INH aerosol powder  inhaler, Inhale 1 puff Every 12 (Twelve) " Hours., Disp: , Rfl:   •  hydrochlorothiazide (HYDRODIURIL) 25 MG tablet, Take 25 mg by mouth Daily., Disp: , Rfl: 0  •  Lactobacillus-Inulin ([employer] PO), Take  by mouth. Take one daily, Disp: , Rfl:   •  Magnesium Gluconate 550 MG tablet, Take  by mouth., Disp: , Rfl:   •  meloxicam (MOBIC) 7.5 MG tablet, , Disp: , Rfl: 1  •  mupirocin (BACTROBAN) 2 % ointment, APPLY TO AFFECTED AREA ON THE BIOPSY SITE TWICE DAILY, USE IN PLACE OF VASELINE WITH WOUND CARE, Disp: , Rfl: 0  •  ondansetron ODT (ZOFRAN-ODT) 4 MG disintegrating tablet, Take 1 tablet by mouth Every 8 (Eight) Hours As Needed for Nausea., Disp: 12 tablet, Rfl: 0  •  pravastatin (PRAVACHOL) 80 MG tablet, Take  by mouth., Disp: , Rfl:   •  predniSONE (DELTASONE) 5 MG tablet, , Disp: , Rfl: 0  •  Probiotic Product ([employer] PO), Take 1 capsule by mouth Daily., Disp: , Rfl:   •  ranitidine (ZANTAC) 150 MG capsule, Take  by mouth., Disp: , Rfl:   •  tocilizumab (ACTEMRA) 200 MG/10ML solution injection, Infuse 8 mg/kg into a venous catheter., Disp: , Rfl:     Current Facility-Administered Medications:   •  triamcinolone acetonide (KENALOG) injection 80 mg, 80 mg, Intramuscular, Once, Eve Navarrete MD    Physical Exam:    Physical Exam   Constitutional: She is oriented to person, place, and time. She appears well-developed and well-nourished.   HENT:   Head: Normocephalic.   Eyes: Conjunctivae and EOM are normal. Pupils are equal, round, and reactive to light.   Neck: Normal range of motion. Neck supple. No thyromegaly present.   Cardiovascular: Normal rate, regular rhythm, normal heart sounds and intact distal pulses.   Pulmonary/Chest: Effort normal. She has wheezes. She has no rales. She exhibits no tenderness.   Musculoskeletal: Normal range of motion. She exhibits no edema.        Thoracic back: She exhibits tenderness and spasm.   Tender muscle spasm in the lower right thoracic back area.   Lymphadenopathy:     She  has no cervical adenopathy.   Neurological: She is alert and oriented to person, place, and time.   Psychiatric: She has a normal mood and affect. Thought content normal.   Nursing note and vitals reviewed.       Results Review:    I reviewed the patient's new clinical results.  I reviewed the patient's new imaging results and agree with the interpretation.    CMP:  Lab Results   Component Value Date    BUN 22 01/08/2019    CREATININE 0.96 01/08/2019    EGFRIFNONA 58 (L) 01/08/2019    BCR 22.9 01/08/2019     01/08/2019    K 2.9 (L) 01/08/2019    CO2 32.0 (H) 01/08/2019    CALCIUM 9.6 01/08/2019    ALBUMIN 4.85 (H) 01/08/2019    BILITOT 0.7 01/08/2019    ALKPHOS 55 01/08/2019    AST 26 01/08/2019    ALT 31 01/08/2019     HbA1c:  No results found for: HGBA1C  Microalbumin:  No results found for: MICROALBUR, POCMALB, POCCREAT  Lipid Panel  Lab Results   Component Value Date    CHOL 220 (H) 01/08/2019    TRIG 192 (H) 01/08/2019    HDL 51 01/08/2019     (H) 01/08/2019    AST 26 01/08/2019    ALT 31 01/08/2019       Medication Review: Medications reviewed and noted    Assessment/Plan:    [place] was seen today for uri and follow-up.    Diagnoses and all orders for this visit:    Cough  -     XR Chest PA & Lateral; Future  -     CBC & Differential; Future  -     Comprehensive Metabolic Panel; Future  -     triamcinolone acetonide (KENALOG) injection 80 mg; Inject 8 mL into the appropriate muscle as directed by prescriber 1 (One) Time.    Chronic obstructive pulmonary disease with acute exacerbation (CMS/HCC)    Shortness of breath  -     XR Chest PA & Lateral; Future  -     CBC & Differential; Future  -     Comprehensive Metabolic Panel; Future  -     triamcinolone acetonide (KENALOG) injection 80 mg; Inject 8 mL into the appropriate muscle as directed by prescriber 1 (One) Time.    Hypokalemia  -     Comprehensive Metabolic Panel; Future    Spasm of thoracic back muscle    Tobacco use disorder    Fatty  liver        Patient Instructions   For cough and shortness of breath, this is most likely an acute exacerbation of COPD and bronchitis.  Will do a chest x-ray today to rule out pneumonia.  Check labs today to see if the white blood cell count is elevated.  Kenalog shot is given in the office today to decrease inflammation and wheezing.  She will take Augmentin antibiotic twice a day for 7 days.  She will continue her Brio inhaler twice a day and continue using the albuterol inhaler as needed for cough, wheeze, congestion.    She recently had hypokalemia and decrease in renal function after taking Bactrim, she was again noted to have hypokalemia at the ER on 227.  She took 2 days of replacement.  We will recheck the potassium level and the kidney function today.    For the spasm of the thoracic back muscle, she will use moist heat and do back stretches every day.  Walking may also help.    She will continue trying to decrease the number of cigarettes smoked per day.  She will gradually wean off hopefully over the next few months.    We discussed the treatment for fatty liver which is weight loss.  She will continue to improve her low-fat diet and lose more weight.      Plan of care reviewed with patient at the conclusion of today's visit. Education was provided regarding diagnosis, management, and any prescribed or recommended OTC medications.Patient verbalizes understanding of and agreement with management plan.         Eve Navarrete MD

## 2019-03-07 LAB
ALBUMIN SERPL-MCNC: 5.14 G/DL (ref 3.2–4.8)
ALBUMIN/GLOB SERPL: 3.1 G/DL (ref 1.5–2.5)
ALP SERPL-CCNC: 55 U/L (ref 25–100)
ALT SERPL-CCNC: 36 U/L (ref 7–40)
AST SERPL-CCNC: 22 U/L (ref 0–33)
BASOPHILS # BLD AUTO: 0.02 10*3/MM3 (ref 0–0.2)
BASOPHILS NFR BLD AUTO: 0.3 % (ref 0–1)
BILIRUB SERPL-MCNC: 0.9 MG/DL (ref 0.3–1.2)
BUN SERPL-MCNC: 18 MG/DL (ref 9–23)
BUN/CREAT SERPL: 17 (ref 7–25)
CALCIUM SERPL-MCNC: 10.3 MG/DL (ref 8.7–10.4)
CHLORIDE SERPL-SCNC: 102 MMOL/L (ref 99–109)
CO2 SERPL-SCNC: 28 MMOL/L (ref 20–31)
CREAT SERPL-MCNC: 1.06 MG/DL (ref 0.6–1.3)
EOSINOPHIL # BLD AUTO: 0.11 10*3/MM3 (ref 0–0.3)
EOSINOPHIL NFR BLD AUTO: 1.6 % (ref 0–3)
ERYTHROCYTE [DISTWIDTH] IN BLOOD BY AUTOMATED COUNT: 14 % (ref 11.3–14.5)
GLOBULIN SER CALC-MCNC: 1.7 GM/DL
GLUCOSE SERPL-MCNC: 96 MG/DL (ref 70–100)
HCT VFR BLD AUTO: 54.1 % (ref 34.5–44)
HGB BLD-MCNC: 17 G/DL (ref 11.5–15.5)
IMM GRANULOCYTES # BLD AUTO: 0.01 10*3/MM3 (ref 0–0.05)
IMM GRANULOCYTES NFR BLD AUTO: 0.1 % (ref 0–0.6)
LYMPHOCYTES # BLD AUTO: 2.38 10*3/MM3 (ref 0.6–4.8)
LYMPHOCYTES NFR BLD AUTO: 33.7 % (ref 24–44)
MCH RBC QN AUTO: 33.3 PG (ref 27–31)
MCHC RBC AUTO-ENTMCNC: 31.4 G/DL (ref 32–36)
MCV RBC AUTO: 105.9 FL (ref 80–99)
MONOCYTES # BLD AUTO: 0.54 10*3/MM3 (ref 0–1)
MONOCYTES NFR BLD AUTO: 7.6 % (ref 0–12)
NEUTROPHILS # BLD AUTO: 4.02 10*3/MM3 (ref 1.5–8.3)
NEUTROPHILS NFR BLD AUTO: 56.8 % (ref 41–71)
PLATELET # BLD AUTO: 220 10*3/MM3 (ref 150–450)
POTASSIUM SERPL-SCNC: 4 MMOL/L (ref 3.5–5.5)
PROT SERPL-MCNC: 6.8 G/DL (ref 5.7–8.2)
RBC # BLD AUTO: 5.11 10*6/MM3 (ref 3.89–5.14)
SODIUM SERPL-SCNC: 141 MMOL/L (ref 132–146)
WBC # BLD AUTO: 7.07 10*3/MM3 (ref 3.5–10.8)

## 2019-03-08 ENCOUNTER — TELEPHONE (OUTPATIENT)
Dept: INTERNAL MEDICINE | Facility: CLINIC | Age: 69
End: 2019-03-08

## 2019-03-08 RX ORDER — CYCLOBENZAPRINE HCL 10 MG
10 TABLET ORAL 2 TIMES DAILY PRN
Qty: 30 TABLET | Refills: 1 | Status: SHIPPED | OUTPATIENT
Start: 2019-03-08 | End: 2023-01-20 | Stop reason: SDUPTHER

## 2019-03-08 NOTE — TELEPHONE ENCOUNTER
Reason for Call:  HIP PAIN     Symptoms:    PAIN IN LEFT HIP     Onset (when it began):  IT WAS LIKE THIS   THIS MORNING  WHEN SHE WOKE UP     Have they tried anything?  HEATING PAD AND ICE PACK     Other pertinent info:   PT STATES SHE HAS WEDDING THINGS TO DO TONIGHT, TOMORROW AND Sunday, AND IN SO MUCH PAIN SHE CAN'T GET OFF THE COUCH TO COME IN TO SEE YOU  TODAY WANTS TO KNOW IF YOU'LL SEND IN A MUSCLE RELAXANT

## 2019-03-08 NOTE — TELEPHONE ENCOUNTER
I sent Flexeril to take twice a day as needed.  Warned her that it probably will make her very sleepy.  She may take 2 of her Mobic today instead of 1.  Tell her that she could also take 10 mg of prednisone today and go back to 5 mg tomorrow.  Also alternate heat and cold.

## 2019-04-02 ENCOUNTER — TELEPHONE (OUTPATIENT)
Dept: INTERNAL MEDICINE | Facility: CLINIC | Age: 69
End: 2019-04-02

## 2019-04-02 NOTE — TELEPHONE ENCOUNTER
CALLED STATING YOU HAD GIVEN YOU A FEMALE NAME FOR BREAST REDUCTION A COUPLE OF MOS. AGO  BUT CAN'T REMEMBER WHAT IT WAS I LOOKED IN YOUR PAST NOTES AND FOUND IT AND INFORMED HER OF THE NAME THAT YOU HAD GIVEN

## 2019-05-24 RX ORDER — HYDROCHLOROTHIAZIDE 25 MG/1
TABLET ORAL
Qty: 90 TABLET | Refills: 1 | Status: SHIPPED | OUTPATIENT
Start: 2019-05-24 | End: 2020-03-20

## 2019-07-19 ENCOUNTER — OFFICE VISIT (OUTPATIENT)
Dept: INTERNAL MEDICINE | Facility: CLINIC | Age: 69
End: 2019-07-19

## 2019-07-19 VITALS
BODY MASS INDEX: 28.16 KG/M2 | WEIGHT: 153 LBS | SYSTOLIC BLOOD PRESSURE: 132 MMHG | HEART RATE: 74 BPM | HEIGHT: 62 IN | DIASTOLIC BLOOD PRESSURE: 74 MMHG

## 2019-07-19 DIAGNOSIS — F17.200 TOBACCO USE DISORDER: Chronic | ICD-10-CM

## 2019-07-19 DIAGNOSIS — K21.9 GERD WITHOUT ESOPHAGITIS: Chronic | ICD-10-CM

## 2019-07-19 DIAGNOSIS — M35.3 POLYMYALGIA RHEUMATICA (HCC): Chronic | ICD-10-CM

## 2019-07-19 DIAGNOSIS — M81.0 SENILE OSTEOPOROSIS: Chronic | ICD-10-CM

## 2019-07-19 DIAGNOSIS — M62.838 MUSCLE SPASMS OF BOTH LOWER EXTREMITIES: Chronic | ICD-10-CM

## 2019-07-19 DIAGNOSIS — E78.2 MIXED HYPERLIPIDEMIA: Primary | Chronic | ICD-10-CM

## 2019-07-19 DIAGNOSIS — I10 BENIGN ESSENTIAL HYPERTENSION: Chronic | ICD-10-CM

## 2019-07-19 DIAGNOSIS — F41.1 GENERALIZED ANXIETY DISORDER: Chronic | ICD-10-CM

## 2019-07-19 DIAGNOSIS — K76.0 FATTY LIVER: Chronic | ICD-10-CM

## 2019-07-19 DIAGNOSIS — M31.6 TEMPORAL ARTERITIS (HCC): Chronic | ICD-10-CM

## 2019-07-19 PROCEDURE — 99214 OFFICE O/P EST MOD 30 MIN: CPT | Performed by: INTERNAL MEDICINE

## 2019-07-19 RX ORDER — PREDNISOLONE ACETATE 10 MG/ML
SUSPENSION/ DROPS OPHTHALMIC
Refills: 0 | COMMUNITY
Start: 2019-07-15 | End: 2022-02-03

## 2019-07-19 RX ORDER — PREDNISONE 2.5 MG
2.5 TABLET ORAL DAILY
Refills: 0 | COMMUNITY
Start: 2019-06-20 | End: 2019-11-18 | Stop reason: SDUPTHER

## 2019-07-19 RX ORDER — BUSPIRONE HYDROCHLORIDE 5 MG/1
5 TABLET ORAL 3 TIMES DAILY
Qty: 90 TABLET | Refills: 5 | Status: SHIPPED | OUTPATIENT
Start: 2019-07-19 | End: 2020-01-16 | Stop reason: SDUPTHER

## 2019-07-19 NOTE — PROGRESS NOTES
[facility]     Irma Pedraza  1950   [id number]      Patient Care Team:  Eve Navarrete MD as PCP - General (Internal Medicine)  Gold Swift DO as Consulting Physician (Rheumatology)  Sabrina Brownlee MD as Consulting Physician (Plastic Surgery)    Chief Complaint;:   Chief Complaint   Patient presents with   • Hypertension     f/u   • Hyperlipidemia            HPI  Patient is a 69 y.o. female presents with follow-up of hypertension and hyperlipidemia      CHRONIC CONDITIONS    Blood pressures are controlled at home.  She takes her medications regularly.    We reviewed her lipid results from Dr. Swift's labs recently.  Her numbers have improved with her weight loss and healthy diet.    Cough has improved.  She is taking Dexilant for GERD symptoms and acid reflux.  She saw Dr. Pedersen for follow-up recently.  He feels she is doing well.      PMR. Saw eye doctor recently with red eyes and was told it was from PMR with temporal arteritis. Given steroid eye drops to use. Has decreased oral prednisone to 2.5mg daily and joint pains still doing well.     Lost 12 lb over last 4 months by exercising more and eating less.    Trying to quit smoking.  Still smoking 1/2 to 1 pack daily. Does not smoke in car or house.    She saw Dr. Brownlee about breast reduction but she has to stop smoking and lose weight before Dr. Brownlee will consider surgery.  She is doing well with the weight part of it.    Past Medical History:   Diagnosis Date   • Adenomyomatosis of gallbladder 2009    by ultrasound, Dr. Yomi Tai 2009   • Arthritis    • Arthritis of lumbosacral spine    • Asthma    • Chronic hoarseness    • Closed displaced fracture of shaft of right clavicle with malunion    • GCA (giant cell arteritis) (CMS/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)         Past Surgical History:   Procedure Laterality Date   • COLONOSCOPY  02/2009   • COSMETIC SURGERY  1994    face lift   • COSMETIC SURGERY  1993    TUMMY TUCK   • COSMETIC SURGERY  1980    Birth kenny removal   • ELBOW PROCEDURE Left     x2   • THROAT SURGERY      remote removal of growth from throat       Family History   Problem Relation Age of Onset   • Breast cancer Maternal Grandmother 90   • Osteoporosis Mother    • Osteoporosis Father    • Migraines Father    • Brain cancer Sister    • Stroke Brother    • Stroke Paternal Grandmother    • Ovarian cancer Neg Hx    • Endometrial cancer Neg Hx        Social History     Socioeconomic History   • Marital status:      Spouse name: Not on file   • Number of children: Not on file   • Years of education: Not on file   • Highest education level: Not on file   Tobacco Use   • Smoking status: Current Every Day Smoker     Packs/day: 0.50     Types: Cigarettes   • Smokeless tobacco: Never Used   • Tobacco comment: patient refused smoking literature   Substance and Sexual Activity   • Alcohol use: Yes     Comment: 4 -5 days a week   • Drug use: No   • Sexual activity: Defer       Allergies   Allergen Reactions   • Bactrim [Sulfamethoxazole-Trimethoprim] Other (See Comments)     Hypokalemia, hyponatremia   • Atorvastatin Unknown (See Comments)     Lipitor     • Bupropion Hcl [Bupropion] Unknown (See Comments)     wellbutrin   • Calcium Unknown (See Comments)     unknown       Review of Systems:     Review of Systems   Constitutional: Negative for chills and fever.   Respiratory: Negative for cough, shortness of breath and wheezing.    Cardiovascular: Negative for chest pain, palpitations and leg swelling.   Gastrointestinal: Negative for abdominal pain, blood in stool, constipation and diarrhea.   Genitourinary: Negative for dysuria and frequency.   Musculoskeletal: Positive for arthralgias and myalgias. Negative for gait problem.        Muscle cramps lower  "extremities   Neurological: Negative for dizziness and headache.   Psychiatric/Behavioral: Negative for sleep disturbance. The patient is not nervous/anxious.        Vital Signs  Vitals:    07/19/19 1015   BP: 132/74   BP Location: Left arm   Patient Position: Sitting   Cuff Size: Adult   Pulse: 74   Weight: 69.4 kg (153 lb)   Height: 157 cm (61.81\")   PainSc: 0-No pain     Body mass index is 28.16 kg/m².      Current Outpatient Medications:   •  acyclovir (ZOVIRAX) 400 MG tablet, Take  by mouth., Disp: , Rfl:   •  albuterol sulfate HFA (VENTOLIN HFA) 108 (90 Base) MCG/ACT inhaler, Inhale., Disp: , Rfl:   •  Azelastine-Fluticasone 137-50 MCG/ACT suspension, 2 sprays into the nostril(s) as directed by provider Every 12 (Twelve) Hours., Disp: , Rfl:   •  benzonatate (TESSALON) 200 MG capsule, take 1 capsule by mouth three times a day if needed, Disp: 30 capsule, Rfl: 2  •  busPIRone (BUSPAR) 5 MG tablet, Take 1 tablet by mouth 3 (Three) Times a Day. Take as needed, Disp: 90 tablet, Rfl: 5  •  Cholecalciferol (VITAMIN D3) 2000 units capsule, Take  by mouth Daily., Disp: , Rfl:   •  Coenzyme Q10 (COQ-10) 200 MG capsule, Take 1 tablet by mouth Daily., Disp: , Rfl:   •  cyclobenzaprine (FLEXERIL) 10 MG tablet, Take 1 tablet by mouth 2 (Two) Times a Day As Needed for Muscle Spasms., Disp: 30 tablet, Rfl: 1  •  DEXILANT 60 MG capsule, , Disp: , Rfl: 0  •  Fluticasone Furoate-Vilanterol (BREO ELLIPTA) 200-25 MCG/INH aerosol powder  inhaler, Inhale 1 puff Every 12 (Twelve) Hours., Disp: , Rfl:   •  hydrochlorothiazide (HYDRODIURIL) 25 MG tablet, take 1 tablet by mouth once daily, Disp: 90 tablet, Rfl: 1  •  Lactobacillus-Inulin ([facility] [employer] PO), Take  by mouth. Take one daily, Disp: , Rfl:   •  Magnesium Gluconate 550 MG tablet, Take  by mouth., Disp: , Rfl:   •  meloxicam (MOBIC) 7.5 MG tablet, , Disp: , Rfl: 1  •  mupirocin (BACTROBAN) 2 % ointment, APPLY TO AFFECTED AREA ON THE BIOPSY SITE TWICE DAILY, USE " IN PLACE OF VASELINE WITH WOUND CARE, Disp: , Rfl: 0  •  ondansetron ODT (ZOFRAN-ODT) 4 MG disintegrating tablet, Take 1 tablet by mouth Every 8 (Eight) Hours As Needed for Nausea., Disp: 12 tablet, Rfl: 0  •  pravastatin (PRAVACHOL) 80 MG tablet, Take  by mouth., Disp: , Rfl:   •  prednisoLONE acetate (PRED FORTE) 1 % ophthalmic suspension, APPLY 1 DROP INTO BOTH EYES ONCE A DAY AS DIRECTED, Disp: , Rfl: 0  •  predniSONE (DELTASONE) 2.5 MG tablet, Take 2.5 mg by mouth Daily., Disp: , Rfl: 0  •  Probiotic Product ([employer] PO), Take 1 capsule by mouth Daily., Disp: , Rfl:   •  ranitidine (ZANTAC) 150 MG capsule, Take  by mouth., Disp: , Rfl:   •  tocilizumab (ACTEMRA) 200 MG/10ML solution injection, Infuse 8 mg/kg into a venous catheter., Disp: , Rfl:     Physical Exam:    Physical Exam   Constitutional: She is oriented to person, place, and time. She appears well-developed and well-nourished. She appears overweight.   HENT:   Head: Normocephalic.   Eyes: Conjunctivae and EOM are normal. Pupils are equal, round, and reactive to light.   Neck: Normal range of motion. Neck supple. No thyromegaly present.   Cardiovascular: Normal rate, regular rhythm, normal heart sounds and intact distal pulses.   Pulmonary/Chest: Effort normal and breath sounds normal.   Musculoskeletal: Normal range of motion. She exhibits no edema.   Lymphadenopathy:     She has no cervical adenopathy.   Neurological: She is alert and oriented to person, place, and time.   Psychiatric: She has a normal mood and affect. Thought content normal.   Nursing note and vitals reviewed.       ACE III MINI        Results Review:    We reviewed her recent labs ordered by Dr. Swift.  LDL (bad cholesterol) is now 136.  It was 150.  Triglycerides are now 186.  Were 192.    CMP:  Lab Results   Component Value Date    GLU 96 03/05/2019    BUN 18 03/05/2019    CREATININE 1.06 03/05/2019    EGFRIFNONA 51 (L) 03/05/2019    EGFRIFAFRI 62 03/05/2019     BCR 17.0 03/05/2019     03/05/2019    K 4.0 03/05/2019    CO2 28.0 03/05/2019    CALCIUM 10.3 03/05/2019    PROTENTOTREF 6.8 03/05/2019    ALBUMIN 5.14 (H) 03/05/2019    LABGLOBREF 1.7 03/05/2019    LABIL2 3.1 (H) 03/05/2019    BILITOT 0.9 03/05/2019    ALKPHOS 55 03/05/2019    AST 22 03/05/2019    ALT 36 03/05/2019     HbA1c:  No results found for: HGBA1C  Microalbumin:  No results found for: MICROALBUR, POCMALB, POCCREAT  Lipid Panel  Lab Results   Component Value Date    CHOL 220 (H) 01/08/2019    TRIG 192 (H) 01/08/2019    HDL 51 01/08/2019     (H) 01/08/2019    AST 22 03/05/2019    ALT 36 03/05/2019       Medication Review: Medications reviewed and noted    Problem List Items Addressed This Visit        Cardiovascular and Mediastinum    Mixed hyperlipidemia - Primary    Relevant Medications    pravastatin (PRAVACHOL) 80 MG tablet    Benign essential hypertension    Relevant Medications    hydrochlorothiazide (HYDRODIURIL) 25 MG tablet    Temporal arteritis (CMS/HCC)       Digestive    GERD without esophagitis    Relevant Medications    DEXILANT 60 MG capsule    ranitidine (ZANTAC) 150 MG capsule    Fatty liver       Nervous and Auditory    Polymyalgia rheumatica (CMS/HCC)       Musculoskeletal and Integument    Senile osteoporosis    Overview     Improved on 4/12 DEXA.  She has been on fosamax several years         Muscle spasms of both lower extremities       Other    Tobacco use disorder (Chronic)    Generalized anxiety disorder    Relevant Medications    busPIRone (BUSPAR) 5 MG tablet            Diagnosis Plan   1. Mixed hyperlipidemia      LDL (bad cholesterol) improved from 150-136.  Much better!  Continue low-fat diet and regular exercise and walking.  Continue pravastatin every evening.   2. Benign essential hypertension      Continue taking hydrochlorothiazide daily.  Avoid salt in the diet.  Continue weight loss and exercise.   3. Tobacco use disorder      Make a list of reasons you want  to quit.  Make a list of things to do instead of smoking at particular times of the day.  Stay busy.  Keep your hands busy.   4. Generalized anxiety disorder  busPIRone (BUSPAR) 5 MG tablet    Increase buspirone to 1 tablet 3 times a day to decrease overall stress and anxiety level.  I think that will help you quit smoking as well.Exercise also helps.   5. Muscle spasms of both lower extremities      Drink more fluids, add 1 glass of Gatorade or fruit juice or V8 juice with electrolytes once a day.   6. Senile osteoporosis      Continue regular weightbearing exercises.  Continue calcium and vitamin D.   7. GERD without esophagitis      Continue taking Dexilant daily.Continue ranitidine.Continue to avoid eating close to bedtime and avoid large meals.Stopping smoking also decreases acid reflux   8. Polymyalgia rheumatica (CMS/HCC)      Last sed rate (inflammatory marker) was normal at 2.  Continue prednisone and Actemra and follow-up with Dr. Swift as planned.   9. Temporal arteritis (CMS/HCC)      See above.  Continue close follow-up with Dr. Rogers.   10. Fatty liver      Thanks continue your weight loss.  That decreases fat cells in the liver as well!       Patient Instructions   Health Risks of Smoking  Smoking cigarettes is very bad for your health. Tobacco smoke has over 200 known poisons in it. It contains the poisonous gases nitrogen oxide and carbon monoxide. There are over 60 chemicals in tobacco smoke that cause cancer.  Smoking is difficult to quit because a chemical in tobacco, called nicotine, causes addiction or dependence. When you smoke and inhale, nicotine is absorbed rapidly into the bloodstream through your lungs. Both inhaled and non-inhaled nicotine may be addictive.  What are the risks of cigarette smoke?  Cigarette smokers have an increased risk of many serious medical problems, including:  · Lung cancer.  · Lung disease, such as pneumonia, bronchitis, and emphysema.  · Chest pain (angina)  and heart attack because the heart is not getting enough oxygen.  · Heart disease and peripheral blood vessel disease.  · High blood pressure (hypertension).  · Stroke.  · Oral cancer, including cancer of the lip, mouth, or voice box.  · Bladder cancer.  · Pancreatic cancer.  · Cervical cancer.  · Pregnancy complications, including premature birth.  · Stillbirths and smaller  babies, birth defects, and genetic damage to sperm.  · Early menopause.  · Lower estrogen level for women.  · Infertility.  · Facial wrinkles.  · Blindness.  · Increased risk of broken bones (fractures).  · Senile dementia.  · Stomach ulcers and internal bleeding.  · Delayed wound healing and increased risk of complications during surgery.  · Even smoking lightly shortens your life expectancy by several years.    Because of secondhand smoke exposure, children of smokers have an increased risk of the following:  · Sudden infant death syndrome (SIDS).  · Respiratory infections.  · Lung cancer.  · Heart disease.  · Ear infections.    What are the benefits of quitting?  There are many health benefits of quitting smoking. Here are some of them:  · Within days of quitting smoking, your risk of having a heart attack decreases, your blood flow improves, and your lung capacity improves. Blood pressure, pulse rate, and breathing patterns start returning to normal soon after quitting.  · Within months, your lungs may clear up completely.  · Quitting for 10 years reduces your risk of developing lung cancer and heart disease to almost that of a nonsmoker.  · People who quit may see an improvement in their overall quality of life.    How do I quit smoking?  Smoking is an addiction with both physical and psychological effects, and longtime habits can be hard to change. Your health care provider can recommend:  · Programs and community resources, which may include group support, education, or talk therapy.  · Prescription medicines to help reduce  cravings.  · Nicotine replacement products, such as patches, gum, and nasal sprays. Use these products only as directed. Do not replace cigarette smoking with electronic cigarettes, which are commonly called e-cigarettes. The safety of e-cigarettes is not known, and some may contain harmful chemicals.  · A combination of two or more of these methods.    Where to find more information  · American Lung Association: www.lung.org  · American Cancer Society: www.cancer.org  Summary  · Smoking cigarettes is very bad for your health. Cigarette smokers have an increased risk of many serious medical problems, including several cancers, heart disease, and stroke.  · Smoking is an addiction with both physical and psychological effects, and longtime habits can be hard to change.  · By stopping right away, you can greatly reduce the risk of medical problems for you and your family.  · To help you quit smoking, your health care provider can recommend programs, community resources, prescription medicines, and nicotine replacement products such as patches, gum, and nasal sprays.  This information is not intended to replace advice given to you by your health care provider. Make sure you discuss any questions you have with your health care provider.  Document Released: 01/25/2006 Document Revised: 12/22/2017 Document Reviewed: 12/22/2017  [employer] Interactive Patient Education © 2019 [employer]        Plan of care reviewed with patient at the conclusion of today's visit. Education was provided regarding diagnosis, management, and any prescribed or recommended OTC medications.Patient verbalizes understanding of and agreement with management plan.         Eve Navarrete MD

## 2019-07-19 NOTE — PATIENT INSTRUCTIONS
Health Risks of Smoking  Smoking cigarettes is very bad for your health. Tobacco smoke has over 200 known poisons in it. It contains the poisonous gases nitrogen oxide and carbon monoxide. There are over 60 chemicals in tobacco smoke that cause cancer.  Smoking is difficult to quit because a chemical in tobacco, called nicotine, causes addiction or dependence. When you smoke and inhale, nicotine is absorbed rapidly into the bloodstream through your lungs. Both inhaled and non-inhaled nicotine may be addictive.  What are the risks of cigarette smoke?  Cigarette smokers have an increased risk of many serious medical problems, including:  · Lung cancer.  · Lung disease, such as pneumonia, bronchitis, and emphysema.  · Chest pain (angina) and heart attack because the heart is not getting enough oxygen.  · Heart disease and peripheral blood vessel disease.  · High blood pressure (hypertension).  · Stroke.  · Oral cancer, including cancer of the lip, mouth, or voice box.  · Bladder cancer.  · Pancreatic cancer.  · Cervical cancer.  · Pregnancy complications, including premature birth.  · Stillbirths and smaller  babies, birth defects, and genetic damage to sperm.  · Early menopause.  · Lower estrogen level for women.  · Infertility.  · Facial wrinkles.  · Blindness.  · Increased risk of broken bones (fractures).  · Senile dementia.  · Stomach ulcers and internal bleeding.  · Delayed wound healing and increased risk of complications during surgery.  · Even smoking lightly shortens your life expectancy by several years.    Because of secondhand smoke exposure, children of smokers have an increased risk of the following:  · Sudden infant death syndrome (SIDS).  · Respiratory infections.  · Lung cancer.  · Heart disease.  · Ear infections.    What are the benefits of quitting?  There are many health benefits of quitting smoking. Here are some of them:  · Within days of quitting smoking, your risk of having a heart  attack decreases, your blood flow improves, and your lung capacity improves. Blood pressure, pulse rate, and breathing patterns start returning to normal soon after quitting.  · Within months, your lungs may clear up completely.  · Quitting for 10 years reduces your risk of developing lung cancer and heart disease to almost that of a nonsmoker.  · People who quit may see an improvement in their overall quality of life.    How do I quit smoking?  Smoking is an addiction with both physical and psychological effects, and longtime habits can be hard to change. Your health care provider can recommend:  · Programs and community resources, which may include group support, education, or talk therapy.  · Prescription medicines to help reduce cravings.  · Nicotine replacement products, such as patches, gum, and nasal sprays. Use these products only as directed. Do not replace cigarette smoking with electronic cigarettes, which are commonly called e-cigarettes. The safety of e-cigarettes is not known, and some may contain harmful chemicals.  · A combination of two or more of these methods.    Where to find more information  · American Lung Association: www.lung.org  · American Cancer Society: www.cancer.org  Summary  · Smoking cigarettes is very bad for your health. Cigarette smokers have an increased risk of many serious medical problems, including several cancers, heart disease, and stroke.  · Smoking is an addiction with both physical and psychological effects, and longtime habits can be hard to change.  · By stopping right away, you can greatly reduce the risk of medical problems for you and your family.  · To help you quit smoking, your health care provider can recommend programs, community resources, prescription medicines, and nicotine replacement products such as patches, gum, and nasal sprays.  This information is not intended to replace advice given to you by your health care provider. Make sure you discuss any  questions you have with your health care provider.  Document Released: 01/25/2006 Document Revised: 12/22/2017 Document Reviewed: 12/22/2017  [employer] Interactive Patient Education © 2019 [employer]

## 2019-11-08 RX ORDER — RANITIDINE 150 MG/1
CAPSULE ORAL
Qty: 360 CAPSULE | Refills: 0 | Status: SHIPPED | OUTPATIENT
Start: 2019-11-08 | End: 2020-01-13

## 2019-11-18 ENCOUNTER — OFFICE VISIT (OUTPATIENT)
Dept: INTERNAL MEDICINE | Facility: CLINIC | Age: 69
End: 2019-11-18

## 2019-11-18 ENCOUNTER — HOSPITAL ENCOUNTER (OUTPATIENT)
Dept: GENERAL RADIOLOGY | Facility: HOSPITAL | Age: 69
Discharge: HOME | End: 2019-11-18
Admitting: INTERNAL MEDICINE

## 2019-11-18 VITALS
HEIGHT: 62 IN | SYSTOLIC BLOOD PRESSURE: 120 MMHG | HEART RATE: 70 BPM | DIASTOLIC BLOOD PRESSURE: 74 MMHG | WEIGHT: 151 LBS | BODY MASS INDEX: 27.79 KG/M2 | TEMPERATURE: 98.2 F

## 2019-11-18 DIAGNOSIS — R07.81 PLEURITIC PAIN: ICD-10-CM

## 2019-11-18 DIAGNOSIS — B37.31 YEAST VAGINITIS: ICD-10-CM

## 2019-11-18 DIAGNOSIS — I10 BENIGN ESSENTIAL HYPERTENSION: ICD-10-CM

## 2019-11-18 DIAGNOSIS — R06.2 INSPIRATORY WHEEZE ON EXAMINATION: ICD-10-CM

## 2019-11-18 DIAGNOSIS — R05.8 COUGH WITH SPUTUM: ICD-10-CM

## 2019-11-18 DIAGNOSIS — R05.8 COUGH WITH SPUTUM: Primary | ICD-10-CM

## 2019-11-18 PROBLEM — J02.9 THROAT SORENESS: Status: ACTIVE | Noted: 2019-11-18

## 2019-11-18 PROCEDURE — 99214 OFFICE O/P EST MOD 30 MIN: CPT | Performed by: INTERNAL MEDICINE

## 2019-11-18 PROCEDURE — 71046 X-RAY EXAM CHEST 2 VIEWS: CPT

## 2019-11-18 RX ORDER — PREDNISONE 1 MG/1
0.5 TABLET ORAL DAILY
Qty: 30 TABLET | Refills: 0
Start: 2019-11-18 | End: 2020-01-16 | Stop reason: SDUPTHER

## 2019-11-18 RX ORDER — ZOLEDRONIC ACID 5 MG/100ML
INJECTION, SOLUTION INTRAVENOUS
COMMUNITY
Start: 2019-06-20

## 2019-11-18 RX ORDER — BENZONATATE 200 MG/1
CAPSULE ORAL
Qty: 30 CAPSULE | Refills: 2 | Status: SHIPPED | OUTPATIENT
Start: 2019-11-18 | End: 2020-01-16 | Stop reason: SDUPTHER

## 2019-11-18 RX ORDER — AMOXICILLIN AND CLAVULANATE POTASSIUM 875; 125 MG/1; MG/1
1 TABLET, FILM COATED ORAL 2 TIMES DAILY
Qty: 14 TABLET | Refills: 0 | Status: SHIPPED | OUTPATIENT
Start: 2019-11-18 | End: 2019-12-27

## 2019-11-18 RX ORDER — DEXLANSOPRAZOLE 60 MG/1
CAPSULE, DELAYED RELEASE ORAL
Qty: 90 CAPSULE | Refills: 3 | Status: SHIPPED | OUTPATIENT
Start: 2019-11-18 | End: 2021-01-05 | Stop reason: SDUPTHER

## 2019-11-18 RX ORDER — FLUCONAZOLE 150 MG/1
150 TABLET ORAL ONCE
Qty: 1 TABLET | Refills: 0 | Status: SHIPPED | OUTPATIENT
Start: 2019-11-18 | End: 2019-11-18

## 2019-11-18 RX ORDER — ALBUTEROL SULFATE 90 UG/1
AEROSOL, METERED RESPIRATORY (INHALATION)
Qty: 18 G | Refills: 5 | Status: SHIPPED | OUTPATIENT
Start: 2019-11-18 | End: 2021-02-01 | Stop reason: SDUPTHER

## 2019-11-18 NOTE — PROGRESS NOTES
[facility]     Irma Pedraza  1950   [id number]      Patient Care Team:  Eve Navarrete MD as PCP - General (Internal Medicine)  Gold Swift DO as Consulting Physician (Rheumatology)  Sabrina Angelo MD as Consulting Physician (Plastic Surgery)    Chief Complaint   Patient presents with   • Cough     started Thurs or Fri last week   • Sore Throat   • Pain     L side in lung, may be pleursy or pneumonia worse when she inhales            HPI  Patient is a 69 y.o. female presents with cough. Onset of symptoms was abrupt starting 4 days ago.  Chronicity acute. Severity moderate to severe.  Symptoms are associated with Left upper back pain, worse with deep breath, scratchy throat. Pertinent negatives no ear pain,sinus congestion,wheezing, fever chills.   Symptoms are aggravated by lying down.   Symptoms improve with nothing.  Context no known sick contacts.    Out of breo inhaler for awhile      CHRONIC CONDITIONS  BP's well controlled.    Past Medical History:   Diagnosis Date   • Adenomyomatosis of gallbladder 2009    by ultrasound, Dr. 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   • PMR (polymyalgia rheumatica) (CMS/[facility])        Past Surgical History:   Procedure Laterality Date   • COLONOSCOPY  02/2009   • COSMETIC SURGERY  1994    face lift   • COSMETIC SURGERY  1993    DONYA TOBIAS   • COSMETIC SURGERY  1980    Birth kenny removal   • ELBOW PROCEDURE Left     x2   • THROAT SURGERY      remote removal of growth from throat       Family History   Problem Relation Age of Onset   • Breast cancer Maternal Grandmother 90   • Osteoporosis Mother    • Osteoporosis Father    •  "Migraines Father    • Brain cancer Sister    • Stroke Brother    • Stroke Paternal Grandmother    • Ovarian cancer Neg Hx    • Endometrial cancer Neg Hx        Social History     Socioeconomic History   • Marital status:      Spouse name: Not on file   • Number of children: Not on file   • Years of education: Not on file   • Highest education level: Not on file   Tobacco Use   • Smoking status: Current Every Day Smoker     Packs/day: 0.50     Types: Cigarettes   • Smokeless tobacco: Never Used   • Tobacco comment: patient refused smoking literature   Substance and Sexual Activity   • Alcohol use: Yes     Comment: 4 -5 days a week   • Drug use: No   • Sexual activity: Defer       Allergies   Allergen Reactions   • Bactrim [Sulfamethoxazole-Trimethoprim] Other (See Comments)     Hypokalemia, hyponatremia   • Atorvastatin Unknown (See Comments)     Lipitor     • Bupropion Hcl [Bupropion] Unknown (See Comments)     wellbutrin   • Calcium Unknown (See Comments)     unknown       Review of Systems:     Review of Systems   Constitutional: Positive for fatigue. Negative for chills and fever.   HENT: Positive for sore throat. Negative for ear pain, sinus pressure and swollen glands.    Respiratory: Positive for cough. Negative for shortness of breath and wheezing.    Cardiovascular: Negative for chest pain, palpitations and leg swelling.   Gastrointestinal: Negative for abdominal pain, blood in stool, constipation and diarrhea.   Musculoskeletal: Positive for back pain.       Vital Signs  Vitals:    11/18/19 1209   BP: 120/74   BP Location: Left arm   Patient Position: Sitting   Cuff Size: Adult   Pulse: 70   Temp: 98.2 °F (36.8 °C)   TempSrc: Temporal   Weight: 68.5 kg (151 lb)   Height: 157 cm (61.81\")   PainSc:   7     Body mass index is 27.79 kg/m².      Current Outpatient Medications:   •  acyclovir (ZOVIRAX) 400 MG tablet, Take  by mouth., Disp: , Rfl:   •  Azelastine-Fluticasone 137-50 MCG/ACT suspension, 2 " sprays into the nostril(s) as directed by provider Every 12 (Twelve) Hours., Disp: , Rfl:   •  benzonatate (TESSALON) 200 MG capsule, take 1 capsule by mouth three times a day if needed, Disp: 30 capsule, Rfl: 2  •  busPIRone (BUSPAR) 5 MG tablet, Take 1 tablet by mouth 3 (Three) Times a Day. Take as needed, Disp: 90 tablet, Rfl: 5  •  Cholecalciferol (VITAMIN D3) 2000 units capsule, Take  by mouth Daily., Disp: , Rfl:   •  Coenzyme Q10 (COQ-10) 200 MG capsule, Take 1 tablet by mouth Daily., Disp: , Rfl:   •  cyclobenzaprine (FLEXERIL) 10 MG tablet, Take 1 tablet by mouth 2 (Two) Times a Day As Needed for Muscle Spasms., Disp: 30 tablet, Rfl: 1  •  DEXILANT 60 MG capsule, take 1 capsule by mouth once daily, Disp: 90 capsule, Rfl: 3  •  Fluticasone Furoate-Vilanterol (BREO ELLIPTA) 200-25 MCG/INH inhaler, Inhale 1 puff 2 (Two) Times a Day., Disp: 60 each, Rfl: 11  •  hydrochlorothiazide (HYDRODIURIL) 25 MG tablet, take 1 tablet by mouth once daily, Disp: 90 tablet, Rfl: 1  •  Lactobacillus-Inulin ([facility] PO), Take  by mouth. Take one daily, Disp: , Rfl:   •  Magnesium Gluconate 550 MG tablet, Take  by mouth., Disp: , Rfl:   •  meloxicam (MOBIC) 7.5 MG tablet, , Disp: , Rfl: 1  •  mupirocin (BACTROBAN) 2 % ointment, APPLY TO AFFECTED AREA ON THE BIOPSY SITE TWICE DAILY, USE IN PLACE OF VASELINE WITH WOUND CARE, Disp: , Rfl: 0  •  ondansetron ODT (ZOFRAN-ODT) 4 MG disintegrating tablet, Take 1 tablet by mouth Every 8 (Eight) Hours As Needed for Nausea., Disp: 12 tablet, Rfl: 0  •  pravastatin (PRAVACHOL) 80 MG tablet, Take  by mouth., Disp: , Rfl:   •  prednisoLONE acetate (PRED FORTE) 1 % ophthalmic suspension, APPLY 1 DROP INTO BOTH EYES ONCE A DAY AS DIRECTED, Disp: , Rfl: 0  •  predniSONE (DELTASONE) 1 MG tablet, Take 0.5 tablets by mouth Daily., Disp: 30 tablet, Rfl: 0  •  Probiotic Product (Cover PO), Take 1 capsule by mouth Daily., Disp: , Rfl:   •  raNITIdine (ZANTAC) 150  MG capsule, take 2 capsules by mouth every evening, Disp: 360 capsule, Rfl: 0  •  tocilizumab (ACTEMRA) 200 MG/10ML solution injection, Infuse 8 mg/kg into a venous catheter., Disp: , Rfl:   •  VENTOLIN  (90 Base) MCG/ACT inhaler, INHALE 2 PUFFS EVERY 4 HOURS TO 6 HOURS AS NEEDED, Disp: 18 g, Rfl: 5  •  zoledronic acid (RECLAST) 5 MG/100ML solution infusion, Administer ZOLEDRONIC ACID 5mg IV once annually, Disp: , Rfl:   •  amoxicillin-clavulanate (AUGMENTIN) 875-125 MG per tablet, Take 1 tablet by mouth 2 (Two) Times a Day., Disp: 14 tablet, Rfl: 0    Physical Exam:    Physical Exam   Constitutional: She is oriented to person, place, and time. She appears well-developed and well-nourished. She appears ill.   HENT:   Head: Normocephalic.   Right Ear: Tympanic membrane, external ear and ear canal normal.   Left Ear: Tympanic membrane, external ear and ear canal normal.   Nose: Mucosal edema present.   Mouth/Throat: Uvula is midline and oropharynx is clear and moist.   Eyes: Conjunctivae and EOM are normal. Pupils are equal, round, and reactive to light.   Neck: Normal range of motion. Neck supple. No thyromegaly present.   Cardiovascular: Normal rate, regular rhythm, normal heart sounds and intact distal pulses.   Pulmonary/Chest: Effort normal. She has wheezes in the left upper field. She has rhonchi in the left upper field.   Musculoskeletal: Normal range of motion. She exhibits no edema.   Lymphadenopathy:     She has no cervical adenopathy.   Neurological: She is alert and oriented to person, place, and time.   Psychiatric: She has a normal mood and affect. Thought content normal.   Nursing note and vitals reviewed.       ACE III MINI        Results Review:    None    CMP:  Lab Results   Component Value Date    GLU 96 03/05/2019    BUN 18 03/05/2019    CREATININE 1.06 03/05/2019    EGFRIFNONA 51 (L) 03/05/2019    EGFRIFAFRI 62 03/05/2019    BCR 17.0 03/05/2019     03/05/2019    K 4.0 03/05/2019     CO2 28.0 03/05/2019    CALCIUM 10.3 03/05/2019    PROTENTOTREF 6.8 03/05/2019    ALBUMIN 5.14 (H) 03/05/2019    LABGLOBREF 1.7 03/05/2019    LABIL2 3.1 (H) 03/05/2019    BILITOT 0.9 03/05/2019    ALKPHOS 55 03/05/2019    AST 22 03/05/2019    ALT 36 03/05/2019     HbA1c:  No results found for: HGBA1C  Microalbumin:  No results found for: MICROALBUR, POCMALB, POCCREAT  Lipid Panel  Lab Results   Component Value Date    CHOL 220 (H) 01/08/2019    TRIG 192 (H) 01/08/2019    HDL 51 01/08/2019     (H) 01/08/2019    AST 22 03/05/2019    ALT 36 03/05/2019       Medication Review: Medications reviewed and noted  Patient Instructions   Problem List Items Addressed This Visit        Cardiovascular and Mediastinum    Benign essential hypertension    Overview     11/18/2019 Eve Navarrete MD    Continue hydrochlorothiazide daily.  Continue to avoid salt in the diet.         Relevant Medications    hydrochlorothiazide (HYDRODIURIL) 25 MG tablet      Other Visit Diagnoses     Cough with sputum    -  Primary    Chest x-ray today.  Take Augmentin twice a day.  Prednisone taper.  Resume Brio Ellipta inhaler twice daily.  Use rescue inhaler as needed.    Relevant Orders    XR Chest PA & Lateral (Completed)    Pleuritic pain        May take Tylenol as needed for pain.  Prednisone taper.    Relevant Orders    XR Chest PA & Lateral (Completed)    Inspiratory wheeze on examination        Chest x-ray today.  Take Augmentin twice a day.  Prednisone taper.  Resume Brio Ellipta inhaler twice daily.  Use rescue inhaler as needed.    Relevant Orders    XR Chest PA & Lateral (Completed)    Yeast vaginitis        Take fluconazole tablet as needed.             Diagnosis Plan   1. Cough with sputum  XR Chest PA & Lateral    Chest x-ray today.  Take Augmentin twice a day.  Prednisone taper.  Resume Brio Ellipta inhaler twice daily.  Use rescue inhaler as needed.   2. Benign essential hypertension     3. Pleuritic pain  XR Chest PA &  Lateral    May take Tylenol as needed for pain.  Prednisone taper.   4. Inspiratory wheeze on examination  XR Chest PA & Lateral    Chest x-ray today.  Take Augmentin twice a day.  Prednisone taper.  Resume Brio Ellipta inhaler twice daily.  Use rescue inhaler as needed.   5. Yeast vaginitis  fluconazole (DIFLUCAN) 150 MG tablet    Take fluconazole tablet as needed.       Patient Instructions     Patient Instructions   Problem List Items Addressed This Visit        Cardiovascular and Mediastinum    Benign essential hypertension    Overview     11/18/2019 Eve Navarrete MD    Continue hydrochlorothiazide daily.  Continue to avoid salt in the diet.         Relevant Medications    hydrochlorothiazide (HYDRODIURIL) 25 MG tablet      Other Visit Diagnoses     Cough with sputum    -  Primary    Chest x-ray today.  Take Augmentin twice a day.  Prednisone taper.  Resume Brio Ellipta inhaler twice daily.  Use rescue inhaler as needed.    Relevant Orders    XR Chest PA & Lateral (Completed)    Pleuritic pain        May take Tylenol as needed for pain.  Prednisone taper.    Relevant Orders    XR Chest PA & Lateral (Completed)    Inspiratory wheeze on examination        Chest x-ray today.  Take Augmentin twice a day.  Prednisone taper.  Resume Brio Ellipta inhaler twice daily.  Use rescue inhaler as needed.    Relevant Orders    XR Chest PA & Lateral (Completed)    Yeast vaginitis        Take fluconazole tablet as needed.                 Plan of care reviewed with patient at the conclusion of today's visit. Education was provided regarding diagnosis, management, and any prescribed or recommended OTC medications.Patient verbalizes understanding of and agreement with management plan.         Eve Navarrete MD

## 2019-11-19 ENCOUNTER — TELEPHONE (OUTPATIENT)
Dept: INTERNAL MEDICINE | Facility: CLINIC | Age: 69
End: 2019-11-19

## 2019-11-19 NOTE — TELEPHONE ENCOUNTER
Let her know there was no pneumonia on chest x-ray.  So probably bronchitis.  Finish all of the antibiotic.

## 2019-11-19 NOTE — TELEPHONE ENCOUNTER
Pt requesting a call back when chest xray results are ready.    Please call [phone] cell

## 2019-11-29 NOTE — PATIENT INSTRUCTIONS
Patient Instructions   Problem List Items Addressed This Visit        Cardiovascular and Mediastinum    Benign essential hypertension    Overview     11/18/2019 Eve Navarrete MD    Continue hydrochlorothiazide daily.  Continue to avoid salt in the diet.         Relevant Medications    hydrochlorothiazide (HYDRODIURIL) 25 MG tablet      Other Visit Diagnoses     Cough with sputum    -  Primary    Chest x-ray today.  Take Augmentin twice a day.  Prednisone taper.  Resume Brio Ellipta inhaler twice daily.  Use rescue inhaler as needed.    Relevant Orders    XR Chest PA & Lateral (Completed)    Pleuritic pain        May take Tylenol as needed for pain.  Prednisone taper.    Relevant Orders    XR Chest PA & Lateral (Completed)    Inspiratory wheeze on examination        Chest x-ray today.  Take Augmentin twice a day.  Prednisone taper.  Resume Brio Ellipta inhaler twice daily.  Use rescue inhaler as needed.    Relevant Orders    XR Chest PA & Lateral (Completed)    Yeast vaginitis        Take fluconazole tablet as needed.

## 2019-12-27 ENCOUNTER — OFFICE VISIT (OUTPATIENT)
Dept: INTERNAL MEDICINE | Facility: CLINIC | Age: 69
End: 2019-12-27

## 2019-12-27 ENCOUNTER — LAB (OUTPATIENT)
Dept: LAB | Facility: HOSPITAL | Age: 69
End: 2019-12-27

## 2019-12-27 VITALS
BODY MASS INDEX: 27.6 KG/M2 | TEMPERATURE: 96.8 F | WEIGHT: 150 LBS | DIASTOLIC BLOOD PRESSURE: 78 MMHG | SYSTOLIC BLOOD PRESSURE: 126 MMHG | HEART RATE: 68 BPM | HEIGHT: 62 IN

## 2019-12-27 DIAGNOSIS — N89.8 VAGINAL IRRITATION: ICD-10-CM

## 2019-12-27 DIAGNOSIS — R30.0 DYSURIA: ICD-10-CM

## 2019-12-27 DIAGNOSIS — R30.0 DYSURIA: Primary | ICD-10-CM

## 2019-12-27 LAB
BILIRUB BLD-MCNC: NEGATIVE MG/DL
CLARITY, POC: ABNORMAL
COLOR UR: YELLOW
GLUCOSE UR STRIP-MCNC: NEGATIVE MG/DL
KETONES UR QL: ABNORMAL
LEUKOCYTE EST, POC: ABNORMAL
NITRITE UR-MCNC: NEGATIVE MG/ML
PH UR: 6 [PH] (ref 5–8)
PROT UR STRIP-MCNC: ABNORMAL MG/DL
RBC # UR STRIP: ABNORMAL /UL
SP GR UR: 1.03 (ref 1–1.03)
UROBILINOGEN UR QL: NORMAL

## 2019-12-27 PROCEDURE — 87077 CULTURE AEROBIC IDENTIFY: CPT

## 2019-12-27 PROCEDURE — 87086 URINE CULTURE/COLONY COUNT: CPT

## 2019-12-27 PROCEDURE — 81003 URINALYSIS AUTO W/O SCOPE: CPT | Performed by: NURSE PRACTITIONER

## 2019-12-27 PROCEDURE — 99213 OFFICE O/P EST LOW 20 MIN: CPT | Performed by: NURSE PRACTITIONER

## 2019-12-27 PROCEDURE — 87186 SC STD MICRODIL/AGAR DIL: CPT

## 2019-12-27 RX ORDER — NITROFURANTOIN 25; 75 MG/1; MG/1
100 CAPSULE ORAL 2 TIMES DAILY
Qty: 10 CAPSULE | Refills: 0 | Status: SHIPPED | OUTPATIENT
Start: 2019-12-27 | End: 2020-01-13

## 2019-12-27 NOTE — PROGRESS NOTES
Irma Pedraza  1950  [id number]  Patient Care Team:  Eve Navarrete MD as PCP - General (Internal Medicine)  Gold Swift DO as Consulting Physician (Rheumatology)  Sabrina Angelo MD as Consulting Physician (Plastic Surgery)    Irma Pedraza is a pleasant 69 y.o. female who presents for evaluation of Urinary Tract Infection    Chief Complaint   Patient presents with   • Urinary Tract Infection       HPI:   Dysuria and external burning x 1 week. No hematuria. No abd pain, fever, night sweats, n/v. No vag d/c.  Uses acyclovir for herpes simplex 1, no genital sores.  Last abx Nov with abx.  Past Medical History:   Diagnosis Date   • Adenomyomatosis of gallbladder 2009    by ultrasound, Dr. 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   • PMR (polymyalgia rheumatica) (CMS/[facility])      Past Surgical History:   Procedure Laterality Date   • COLONOSCOPY  02/2009   • COSMETIC SURGERY  1994    face lift   • COSMETIC SURGERY  1993    TUMMY TUCK   • COSMETIC SURGERY  1980    Birth kenny removal   • ELBOW PROCEDURE Left     x2   • THROAT SURGERY      remote removal of growth from throat     Family History   Problem Relation Age of Onset   • Breast cancer Maternal Grandmother 90   • Osteoporosis Mother    • Osteoporosis Father    • Migraines Father    • Brain cancer Sister    • Stroke Brother    • Stroke Paternal Grandmother    • Ovarian cancer Neg Hx    • Endometrial cancer Neg Hx      Social History     Tobacco Use   Smoking Status Current Every Day Smoker   • Packs/day: 0.50   • Types: Cigarettes   Smokeless Tobacco Never Used   Tobacco Comment    patient refused smoking literature     Allergies    Allergen Reactions   • Bactrim [Sulfamethoxazole-Trimethoprim] Other (See Comments)     Hypokalemia, hyponatremia   • Atorvastatin Unknown (See Comments)     Lipitor     • Bupropion Hcl [Bupropion] Unknown (See Comments)     wellbutrin   • Calcium Unknown (See Comments)     unknown       Current Outpatient Medications:   •  acyclovir (ZOVIRAX) 400 MG tablet, Take 400 mg by mouth As Needed., Disp: , Rfl:   •  Azelastine-Fluticasone 137-50 MCG/ACT suspension, 2 sprays into the nostril(s) as directed by provider Every 12 (Twelve) Hours., Disp: , Rfl:   •  benzonatate (TESSALON) 200 MG capsule, take 1 capsule by mouth three times a day if needed, Disp: 30 capsule, Rfl: 2  •  busPIRone (BUSPAR) 5 MG tablet, Take 1 tablet by mouth 3 (Three) Times a Day. Take as needed (Patient taking differently: Take 5 mg by mouth 3 (Three) Times a Day As Needed.), Disp: 90 tablet, Rfl: 5  •  Cholecalciferol (VITAMIN D3) 2000 units capsule, Take 2,000 Units by mouth Daily., Disp: , Rfl:   •  Coenzyme Q10 (COQ-10) 200 MG capsule, Take 1 tablet by mouth Daily., Disp: , Rfl:   •  cyclobenzaprine (FLEXERIL) 10 MG tablet, Take 1 tablet by mouth 2 (Two) Times a Day As Needed for Muscle Spasms., Disp: 30 tablet, Rfl: 1  •  DEXILANT 60 MG capsule, take 1 capsule by mouth once daily, Disp: 90 capsule, Rfl: 3  •  Fluticasone Furoate-Vilanterol (BREO ELLIPTA) 200-25 MCG/INH inhaler, Inhale 1 puff 2 (Two) Times a Day., Disp: 60 each, Rfl: 11  •  hydrochlorothiazide (HYDRODIURIL) 25 MG tablet, take 1 tablet by mouth once daily, Disp: 90 tablet, Rfl: 1  •  Lactobacillus-Inulin ([facility] PO), Take 1 capsule by mouth Daily., Disp: , Rfl:   •  Magnesium Gluconate 550 MG tablet, Take 1 tablet by mouth Daily., Disp: , Rfl:   •  ondansetron ODT (ZOFRAN-ODT) 4 MG disintegrating tablet, Take 1 tablet by mouth Every 8 (Eight) Hours As Needed for Nausea., Disp: 12 tablet, Rfl: 0  •  pravastatin (PRAVACHOL) 80 MG tablet, Take 80 mg by mouth  "Daily., Disp: , Rfl:   •  prednisoLONE acetate (PRED FORTE) 1 % ophthalmic suspension, APPLY 1 DROP INTO BOTH EYES ONCE A DAY AS DIRECTED, Disp: , Rfl: 0  •  predniSONE (DELTASONE) 1 MG tablet, Take 0.5 tablets by mouth Daily. (Patient taking differently: Take 2.5 mg by mouth Daily.), Disp: 30 tablet, Rfl: 0  •  Probiotic Product ([facility] PO), Take 1 capsule by mouth Daily., Disp: , Rfl:   •  raNITIdine (ZANTAC) 150 MG capsule, take 2 capsules by mouth every evening, Disp: 360 capsule, Rfl: 0  •  tocilizumab (ACTEMRA) 200 MG/10ML solution injection, Infuse 8 mg/kg into a venous catheter., Disp: , Rfl:   •  VENTOLIN  (90 Base) MCG/ACT inhaler, INHALE 2 PUFFS EVERY 4 HOURS TO 6 HOURS AS NEEDED, Disp: 18 g, Rfl: 5  •  zoledronic acid (RECLAST) 5 MG/100ML solution infusion, Administer ZOLEDRONIC ACID 5mg IV once annually, Disp: , Rfl:   •  nitrofurantoin, macrocrystal-monohydrate, (MACROBID) 100 MG capsule, Take 1 capsule by mouth 2 (Two) Times a Day., Disp: 10 capsule, Rfl: 0    Review of Systems   Constitutional: Negative for chills, fatigue and fever.   HENT: Negative for congestion, ear pain and sinus pressure.    Respiratory: Negative for cough, chest tightness, shortness of breath and wheezing.    Cardiovascular: Negative for chest pain and palpitations.   Gastrointestinal: Negative for abdominal pain, blood in stool and constipation.   Skin: Negative for color change.   Allergic/Immunologic: Negative for environmental allergies.   Neurological: Negative for dizziness, speech difficulty and headache.   Psychiatric/Behavioral: Negative for decreased concentration. The patient is not nervous/anxious.      /78 (BP Location: Left arm, Patient Position: Sitting, Cuff Size: Adult)   Pulse 68   Temp 96.8 °F (36 °C) (Temporal)   Ht 157 cm (61.81\")   Wt 68 kg (150 lb)   BMI 27.60 kg/m²     Physical Exam   Constitutional: She appears well-developed and well-nourished.   Pulmonary/Chest: " Effort normal.   Genitourinary:   Genitourinary Comments: Atrophic vaginitis, microtears at posterior fourchette   Skin: Skin is warm and dry.   Psychiatric: She has a normal mood and affect. Her behavior is normal.   Vitals reviewed.      Results Review:  I reviewed the patient's new clinical results.    Assessment/Plan:  [place] was seen today for urinary tract infection.    Diagnoses and all orders for this visit:    Dysuria  Comments:  Macrobid twice daily x5, sending for culture.  Increase fluids  Orders:  -     POC Urinalysis Dipstick, Automated  -     Urine Culture - Urine, Urine, Clean Catch; Future  -     nitrofurantoin, macrocrystal-monohydrate, (MACROBID) 100 MG capsule; Take 1 capsule by mouth 2 (Two) Times a Day.    Vaginal irritation  Comments:  1 swab collected and sent  Orders:  -     OneSwab - Kit, Vagina; Future       Patient Instructions   Urinary Tract Infection, Adult  A urinary tract infection (UTI) is an infection of any part of the urinary tract. The urinary tract includes:  · The kidneys.  · The ureters.  · The bladder.  · The urethra.  These organs make, store, and get rid of pee (urine) in the body.  What are the causes?  This is caused by germs (bacteria) in your genital area. These germs grow and cause swelling (inflammation) of your urinary tract.  What increases the risk?  You are more likely to develop this condition if:  · You have a small, thin tube (catheter) to drain pee.  · You cannot control when you pee or poop (incontinence).  · You are female, and:  ? You use these methods to prevent pregnancy:  ? A medicine that kills sperm (spermicide).  ? A device that blocks sperm (diaphragm).  ? You have low levels of a female hormone (estrogen).  ? You are pregnant.  · You have genes that add to your risk.  · You are sexually active.  · You take antibiotic medicines.  · You have trouble peeing because of:  ? A prostate that is bigger than normal, if you are male.  ? A blockage in the  part of your body that drains pee from the bladder (urethra).  ? A kidney stone.  ? A nerve condition that affects your bladder (neurogenic bladder).  ? Not getting enough to drink.  ? Not peeing often enough.  · You have other conditions, such as:  ? Diabetes.  ? A weak disease-fighting system (immune system).  ? Sickle cell disease.  ? Gout.  ? Injury of the spine.  What are the signs or symptoms?  Symptoms of this condition include:  · Needing to pee right away (urgently).  · Peeing often.  · Peeing small amounts often.  · Pain or burning when peeing.  · Blood in the pee.  · Pee that smells bad or not like normal.  · Trouble peeing.  · Pee that is cloudy.  · Fluid coming from the vagina, if you are female.  · Pain in the belly or lower back.  Other symptoms include:  · Throwing up (vomiting).  · No urge to eat.  · Feeling mixed up (confused).  · Being tired and grouchy (irritable).  · A fever.  · Watery poop (diarrhea).  How is this treated?  This condition may be treated with:  · Antibiotic medicine.  · Other medicines.  · Drinking enough water.  Follow these instructions at home:    Medicines  · Take over-the-counter and prescription medicines only as told by your doctor.  · If you were prescribed an antibiotic medicine, take it as told by your doctor. Do not stop taking it even if you start to feel better.  General instructions  · Make sure you:  ? Pee until your bladder is empty.   ? Do not hold pee for a long time.  ? Empty your bladder after sex.  ? Wipe from front to back after pooping if you are a female. Use each tissue one time when you wipe.  · Drink enough fluid to keep your pee pale yellow.  · Keep all follow-up visits as told by your doctor. This is important.  Contact a doctor if:  · You do not get better after 1-2 days.  · Your symptoms go away and then come back.  Get help right away if:  · You have very bad back pain.  · You have very bad pain in your lower belly.  · You have a fever.  · You are  sick to your stomach (nauseous).  · You are throwing up.  Summary  · A urinary tract infection (UTI) is an infection of any part of the urinary tract.  · This condition is caused by germs in your genital area.  · There are many risk factors for a UTI. These include having a small, thin tube to drain pee and not being able to control when you pee or poop.  · Treatment includes antibiotic medicines for germs.  · Drink enough fluid to keep your pee pale yellow.  This information is not intended to replace advice given to you by your health care provider. Make sure you discuss any questions you have with your health care provider.  Document Released: 06/05/2009 Document Revised: 06/27/2019 Document Reviewed: 06/27/2019  [employer] Interactive Patient Education © 2019 [employer]      Plan of care reviewed with patient at the conclusion of today's visit. Education was provided regarding diagnosis, management and any prescribed or recommended OTC medications.  Patient verbalizes understanding of and agreement with management plan.    Return if symptoms worsen or fail to improve.    *Note that portions of this note were completed with a voice recognition program.  Efforts were made to edit the dictation but occasionally words are transcribed.    DEVI Bell

## 2019-12-29 LAB — BACTERIA SPEC AEROBE CULT: ABNORMAL

## 2019-12-29 RX ORDER — LEVOFLOXACIN 500 MG/1
500 TABLET, FILM COATED ORAL DAILY
Qty: 5 TABLET | Refills: 0 | Status: SHIPPED | OUTPATIENT
Start: 2019-12-29 | End: 2020-01-13

## 2019-12-31 ENCOUNTER — TELEPHONE (OUTPATIENT)
Dept: INTERNAL MEDICINE | Facility: CLINIC | Age: 69
End: 2019-12-31

## 2019-12-31 NOTE — TELEPHONE ENCOUNTER
Call and let her know swab was negative for infection.  If she feeling better with the UTI symptoms after starting antibiotics?

## 2019-12-31 NOTE — TELEPHONE ENCOUNTER
Called and advised patient. She started new RX yesterday and is feeling better. She will call if symptoms do not continue to improve.

## 2020-01-07 DIAGNOSIS — N89.8 VAGINAL IRRITATION: ICD-10-CM

## 2020-01-13 ENCOUNTER — OFFICE VISIT (OUTPATIENT)
Dept: INTERNAL MEDICINE | Facility: CLINIC | Age: 70
End: 2020-01-13

## 2020-01-13 VITALS
HEART RATE: 76 BPM | DIASTOLIC BLOOD PRESSURE: 76 MMHG | SYSTOLIC BLOOD PRESSURE: 116 MMHG | WEIGHT: 154 LBS | HEIGHT: 62 IN | BODY MASS INDEX: 28.34 KG/M2

## 2020-01-13 DIAGNOSIS — E78.2 MIXED HYPERLIPIDEMIA: Primary | ICD-10-CM

## 2020-01-13 DIAGNOSIS — I10 BENIGN ESSENTIAL HYPERTENSION: ICD-10-CM

## 2020-01-13 DIAGNOSIS — K21.9 GERD WITHOUT ESOPHAGITIS: ICD-10-CM

## 2020-01-13 DIAGNOSIS — Z12.11 SCREENING FOR COLON CANCER: ICD-10-CM

## 2020-01-13 DIAGNOSIS — J44.1 CHRONIC OBSTRUCTIVE PULMONARY DISEASE WITH ACUTE EXACERBATION (HCC): ICD-10-CM

## 2020-01-13 DIAGNOSIS — Z12.31 BREAST CANCER SCREENING BY MAMMOGRAM: ICD-10-CM

## 2020-01-13 DIAGNOSIS — M35.3 POLYMYALGIA RHEUMATICA (HCC): ICD-10-CM

## 2020-01-13 PROCEDURE — 96160 PT-FOCUSED HLTH RISK ASSMT: CPT | Performed by: PHYSICIAN ASSISTANT

## 2020-01-13 PROCEDURE — G0439 PPPS, SUBSEQ VISIT: HCPCS | Performed by: PHYSICIAN ASSISTANT

## 2020-01-13 PROCEDURE — G0008 ADMIN INFLUENZA VIRUS VAC: HCPCS | Performed by: PHYSICIAN ASSISTANT

## 2020-01-13 PROCEDURE — 90653 IIV ADJUVANT VACCINE IM: CPT | Performed by: PHYSICIAN ASSISTANT

## 2020-01-13 RX ORDER — FAMOTIDINE 20 MG/1
20 TABLET, FILM COATED ORAL 2 TIMES DAILY
Qty: 60 TABLET | Refills: 1 | Status: SHIPPED | OUTPATIENT
Start: 2020-01-13 | End: 2020-03-20

## 2020-01-13 RX ORDER — FAMOTIDINE 20 MG/1
TABLET, FILM COATED ORAL
Qty: 180 TABLET | OUTPATIENT
Start: 2020-01-13

## 2020-01-13 NOTE — PATIENT INSTRUCTIONS
"BMI for Adults    Body mass index (BMI) is a number that is calculated from a person's weight and height. BMI may help to estimate how much of a person's weight is composed of fat. BMI can help identify those who may be at higher risk for certain medical problems.  How is BMI used with adults?  BMI is used as a screening tool to identify possible weight problems. It is used to check whether a person is obese, overweight, healthy weight, or underweight.  How is BMI calculated?  BMI measures your weight and compares it to your height. This can be done either in English (U.S[place] or metric measurements. Note that charts are available to help you find your BMI quickly and easily without having to do these calculations yourself.  To calculate your BMI in English (U.S.) measurements, your health care provider will:  1. Measure your weight in pounds (lb).  2. Multiply the number of pounds by 703.  ? For example, for a person who weighs 180 lb, multiply that number by 703, which equals 126,540.  3. Measure your height in inches (in). Then multiply that number by itself to get a measurement called \"inches squared.\"  ? For example, for a person who is 70 in tall, the \"inches squared\" measurement is 70 in x 70 in, which equals 4900 inches squared.  4. Divide the total from Step 2 (number of lb x 703) by the total from Step 3 (inches squared): 126,540 ÷ 4900 = 25.8. This is your BMI.  To calculate your BMI in metric measurements, your health care provider will:  1. Measure your weight in kilograms (kg).  2. Measure your height in meters (m). Then multiply that number by itself to get a measurement called \"meters squared.\"  ? For example, for a person who is 1.75 m tall, the \"meters squared\" measurement is 1.75 m x 1.75 m, which is equal to 3.1 meters squared.  3. Divide the number of kilograms (your weight) by the meters squared number. In this example: 70 ÷ 3.1 = 22.6. This is your BMI.  How is BMI interpreted?  To interpret your " results, your health care provider will use BMI charts to identify whether you are underweight, normal weight, overweight, or obese. The following guidelines will be used:  · Underweight: BMI less than 18.5.  · Normal weight: BMI between 18.5 and 24.9.  · Overweight: BMI between 25 and 29.9.  · Obese: BMI of 30 and above.  Please note:  · Weight includes both fat and muscle, so someone with a muscular build, such as an athlete, may have a BMI that is higher than 24.9. In cases like these, BMI is not an accurate measure of body fat.  · To determine if excess body fat is the cause of a BMI of 25 or higher, further assessments may need to be done by a health care provider.  · BMI is usually interpreted in the same way for men and women.  Why is BMI a useful tool?  BMI is useful in two ways:  · Identifying a weight problem that may be related to a medical condition, or that may increase the risk for medical problems.  · Promoting lifestyle and diet changes in order to reach a healthy weight.  Summary  · Body mass index (BMI) is a number that is calculated from a person's weight and height.  · BMI may help to estimate how much of a person's weight is composed of fat. BMI can help identify those who may be at higher risk for certain medical problems.  · BMI can be measured using English measurements or metric measurements.  · To interpret your results, your health care provider will use BMI charts to identify whether you are underweight, normal weight, overweight, or obese.  This information is not intended to replace advice given to you by your health care provider. Make sure you discuss any questions you have with your health care provider.  Document Released: 08/29/2005 Document Revised: 10/31/2018 Document Reviewed: 10/31/2018  [employer] Interactive Patient Education © 2019 [employer]  Steps to Quit Smoking    Smoking tobacco can be harmful to your health and can affect almost every organ in your body. Smoking puts  you, and those around you, at risk for developing many serious chronic diseases. Quitting smoking is difficult, but it is one of the best things that you can do for your health. It is never too late to quit.  What are the benefits of quitting smoking?  When you quit smoking, you lower your risk of developing serious diseases and conditions, such as:  · Lung cancer or lung disease, such as COPD.  · Heart disease.  · Stroke.  · Heart attack.  · Infertility.  · Osteoporosis and bone fractures.  Additionally, symptoms such as coughing, wheezing, and shortness of breath may get better when you quit. You may also find that you get sick less often because your body is stronger at fighting off colds and infections. If you are pregnant, quitting smoking can help to reduce your chances of having a baby of low birth weight.  How do I get ready to quit?  When you decide to quit smoking, create a plan to make sure that you are successful. Before you quit:  · Pick a date to quit. Set a date within the next two weeks to give you time to prepare.  · Write down the reasons why you are quitting. Keep this list in places where you will see it often, such as on your bathroom mirror or in your car or wallet.  · Identify the people, places, things, and activities that make you want to smoke (triggers) and avoid them. Make sure to take these actions:  ? Throw away all cigarettes at home, at work, and in your car.  ? Throw away smoking accessories, such as ashtrays and lighters.  ? Clean your car and make sure to empty the ashtray.  ? Clean your home, including curtains and carpets.  · Tell your family, friends, and coworkers that you are quitting. Support from your loved ones can make quitting easier.  · Talk with your health care provider about your options for quitting smoking.  · Find out what treatment options are covered by your health insurance.  What strategies can I use to quit smoking?  Talk with your healthcare provider about  different strategies to quit smoking. Some strategies include:  · Quitting smoking altogether instead of gradually lessening how much you smoke over a period of time. Research shows that quitting “cold turkey” is more successful than gradually quitting.  · Attending in-person counseling to help you build problem-solving skills. You are more likely to have success in quitting if you attend several counseling sessions. Even short sessions of 10 minutes can be effective.  · Finding resources and support systems that can help you to quit smoking and remain smoke-free after you quit. These resources are most helpful when you use them often. They can include:  ? Online chats with a counselor.  ? Telephone quitlines.  ? Printed self-help materials.  ? Support groups or group counseling.  ? Text messaging programs.  ? Mobile phone applications.  · Taking medicines to help you quit smoking. (If you are pregnant or breastfeeding, talk with your health care provider first.) Some medicines contain nicotine and some do not. Both types of medicines help with cravings, but the medicines that include nicotine help to relieve withdrawal symptoms. Your health care provider may recommend:  ? Nicotine patches, gum, or lozenges.  ? Nicotine inhalers or sprays.  ? Non-nicotine medicine that is taken by mouth.  Talk with your health care provider about combining strategies, such as taking medicines while you are also receiving in-person counseling. Using these two strategies together makes you more likely to succeed in quitting than if you used either strategy on its own.  If you are pregnant or breastfeeding, talk with your health care provider about finding counseling or other support strategies to quit smoking. Do not take medicine to help you quit smoking unless told to do so by your health care provider.  What things can I do to make it easier to quit?  Quitting smoking might feel overwhelming at first, but there is a lot that you  can do to make it easier. Take these important actions:  · Reach out to your family and friends and ask that they support and encourage you during this time. Call telephone quitlines, reach out to support groups, or work with a counselor for support.  · Ask people who smoke to avoid smoking around you.  · Avoid places that trigger you to smoke, such as bars, parties, or smoke-break areas at work.  · Spend time around people who do not smoke.  · Lessen stress in your life, because stress can be a smoking trigger for some people. To lessen stress, try:  ? Exercising regularly.  ? Deep-breathing exercises.  ? Yoga.  ? Meditating.  ? Performing a body scan. This involves closing your eyes, scanning your body from head to toe, and noticing which parts of your body are particularly tense. Purposefully relax the muscles in those areas.  · Download or purchase mobile phone or tablet apps (applications) that can help you stick to your quit plan by providing reminders, tips, and encouragement. There are many free apps, such as QuitGuide from the CDC (Centers for Disease Control and Prevention). You can find other support for quitting smoking (smoking cessation) through smokefree.gov and other websites.  How will I feel when I quit smoking?  Within the first 24 hours of quitting smoking, you may start to feel some withdrawal symptoms. These symptoms are usually most noticeable 2-3 days after quitting, but they usually do not last beyond 2-3 weeks. Changes or symptoms that you might experience include:  · Mood swings.  · Restlessness, anxiety, or irritation.  · Difficulty concentrating.  · Dizziness.  · Strong cravings for sugary foods in addition to nicotine.  · Mild weight gain.  · Constipation.  · Nausea.  · Coughing or a sore throat.  · Changes in how your medicines work in your body.  · A depressed mood.  · Difficulty sleeping (insomnia).  After the first 2-3 weeks of quitting, you may start to notice more positive  results, such as:  · Improved sense of smell and taste.  · Decreased coughing and sore throat.  · Slower heart rate.  · Lower blood pressure.  · Clearer skin.  · The ability to breathe more easily.  · Fewer sick days.  Quitting smoking is very challenging for most people. Do not get discouraged if you are not successful the first time. Some people need to make many attempts to quit before they achieve long-term success. Do your best to stick to your quit plan, and talk with your health care provider if you have any questions or concerns.  This information is not intended to replace advice given to you by your health care provider. Make sure you discuss any questions you have with your health care provider.  Document Released: 2002 Document Revised: 2018 Document Reviewed: 2016  [employer] Interactive Patient Education © 2019 [employer]    Medicare Wellness  Personal Prevention Plan of Service     Date of Office Visit:  2020  Encounter Provider:  Jamilah Toledo PA-C  Place of Service:  [facility]  Patient Name: Irma Pedraza  :  1950    As part of the Medicare Wellness portion of your visit today, we are providing you with this personalized preventive plan of services (PPPS). This plan is based upon recommendations of the [place] Preventive Services Task Force (USPSTF) and the Advisory Committee on Immunization Practices (ACIP).    This lists the preventive care services that should be considered, and provides dates of when you are due. Items listed as completed are up-to-date and do not require any further intervention.    Health Maintenance   Topic Date Due   • ZOSTER VACCINE (2 of 3) 2013   • HEPATITIS C SCREENING  2017   • INFLUENZA VACCINE  2019   • MEDICARE ANNUAL WELLNESS  2020   • LIPID PANEL  2020   • COLONOSCOPY  2020   • MAMMOGRAM  2020   • DXA SCAN  2021   • TDAP/TD VACCINES  (2 - Td) 04/11/2022   • Pneumococcal Vaccine Once at 65 Years Old  Completed       Orders Placed This Encounter   Procedures   • Mammo Screening Digital Tomosynthesis Bilateral With CAD     Standing Status:   Future     Standing Expiration Date:   1/13/2021     Order Specific Question:   Reason for Exam:     Answer:   breast cancer screening   • Fluad Tri 65yr+   • Lipid Panel     Standing Status:   Future   • Comprehensive Metabolic Panel     Standing Status:   Future   • MicroAlbumin, Urine, Random - Urine, Clean Catch     Standing Status:   Future   • Ambulatory Referral For Screening Colonoscopy     Referral Priority:   Routine     Referral Type:   Diagnostic Medical     Referral Reason:   Specialty Services Required     Referred to Provider:   Kayy Flores MD     Requested Specialty:   Gastroenterology     Number of Visits Requested:   1   • CBC & Differential     Standing Status:   Future     Order Specific Question:   Manual Differential     Answer:   No       Return for Recheck.

## 2020-01-13 NOTE — PROGRESS NOTES
The ABCs of the Annual Wellness Visit  Subsequent Medicare Wellness Visit    Chief Complaint   Patient presents with   • Medicare Wellness-subsequent     She is not fasting       Subjective   History of Present Illness:  Irma Pedraza is a 69 y.o. female who presents for a Subsequent Medicare Wellness Visit.    HEALTH RISK ASSESSMENT    Recent Hospitalizations:  No hospitalization(s) within the last year.    Current Medical Providers:  Patient Care Team:  Eve Navarrete MD as PCP - General (Internal Medicine)  Gold Swift DO as Consulting Physician (Rheumatology)  Sabrina Angelo MD as Consulting Physician (Plastic Surgery)  Juarez Pedersen MD as Consulting Physician (Otolaryngology)    Smoking Status:  Social History     Tobacco Use   Smoking Status Current Every Day Smoker   • Packs/day: 0.50   • Types: Cigarettes   Smokeless Tobacco Never Used   Tobacco Comment    patient refused smoking literature       Alcohol Consumption:  Social History     Substance and Sexual Activity   Alcohol Use Yes    Comment: 4 -5 days a week       Depression Screen:   PHQ-2/PHQ-9 Depression Screening 1/13/2020   Little interest or pleasure in doing things 0   Feeling down, depressed, or hopeless 0   Total Score 0       Fall Risk Screen:  STEADI Fall Risk Assessment was completed, and patient is at LOW risk for falls.Assessment completed on:1/13/2020    Health Habits and Functional and Cognitive Screening:  Functional & Cognitive Status 1/13/2020   Do you have difficulty preparing food and eating? No   Do you have difficulty bathing yourself, getting dressed or grooming yourself? No   Do you have difficulty using the toilet? No   Do you have difficulty moving around from place to place? No   Do you have trouble with steps or getting out of a bed or a chair? No   Current Diet Well Balanced Diet   Dental Exam Up to date   Eye Exam Up to date   Exercise (times per week) 0 times per week   Current Exercise  Activities Include None   Do you need help using the phone?  No   Are you deaf or do you have serious difficulty hearing?  No   Do you need help with transportation? No   Do you need help shopping? No   Do you need help preparing meals?  No   Do you need help with housework?  No   Do you need help with laundry? No   Do you need help taking your medications? No   Do you need help managing money? No   Do you ever drive or ride in a car without wearing a seat belt? No   Have you felt unusual stress, anger or loneliness in the last month? No   Who do you live with? Spouse   If you need help, do you have trouble finding someone available to you? No   Have you been bothered in the last four weeks by sexual problems? No   Do you have difficulty concentrating, remembering or making decisions? No         Does the patient have evidence of cognitive impairment? No    Asprin use counseling:Does not need ASA (and currently is not on it)    Age-appropriate Screening Schedule:  Refer to the list below for future screening recommendations based on patient's age, sex and/or medical conditions. Orders for these recommended tests are listed in the plan section. The patient has been provided with a written plan.    Health Maintenance   Topic Date Due   • ZOSTER VACCINE (2 of 3) 04/08/2013   • INFLUENZA VACCINE  08/01/2019   • LIPID PANEL  01/08/2020   • COLONOSCOPY  03/09/2020   • MAMMOGRAM  12/17/2020   • DXA SCAN  02/13/2021   • TDAP/TD VACCINES (2 - Td) 04/11/2022          The following portions of the patient's history were reviewed and updated as appropriate: allergies, current medications, past family history, past medical history, past social history, past surgical history and problem list.    Outpatient Medications Prior to Visit   Medication Sig Dispense Refill   • acyclovir (ZOVIRAX) 400 MG tablet Take 400 mg by mouth As Needed.     • Azelastine-Fluticasone 137-50 MCG/ACT suspension 2 sprays into the nostril(s) as directed by  provider Every 12 (Twelve) Hours.     • benzonatate (TESSALON) 200 MG capsule take 1 capsule by mouth three times a day if needed 30 capsule 2   • busPIRone (BUSPAR) 5 MG tablet Take 1 tablet by mouth 3 (Three) Times a Day. Take as needed (Patient taking differently: Take 5 mg by mouth 3 (Three) Times a Day As Needed.) 90 tablet 5   • Cholecalciferol (VITAMIN D3) 2000 units capsule Take 2,000 Units by mouth Daily.     • Coenzyme Q10 (COQ-10) 200 MG capsule Take 1 tablet by mouth Daily.     • cyclobenzaprine (FLEXERIL) 10 MG tablet Take 1 tablet by mouth 2 (Two) Times a Day As Needed for Muscle Spasms. 30 tablet 1   • DEXILANT 60 MG capsule take 1 capsule by mouth once daily 90 capsule 3   • Fluticasone Furoate-Vilanterol (BREO ELLIPTA) 200-25 MCG/INH inhaler Inhale 1 puff 2 (Two) Times a Day. 60 each 11   • hydrochlorothiazide (HYDRODIURIL) 25 MG tablet take 1 tablet by mouth once daily 90 tablet 1   • Lactobacillus-Inulin (C[employer] PO) Take 1 capsule by mouth Daily.     • Magnesium Gluconate 550 MG tablet Take 1 tablet by mouth Daily.     • ondansetron ODT (ZOFRAN-ODT) 4 MG disintegrating tablet Take 1 tablet by mouth Every 8 (Eight) Hours As Needed for Nausea. 12 tablet 0   • pravastatin (PRAVACHOL) 80 MG tablet Take 80 mg by mouth Daily.     • prednisoLONE acetate (PRED FORTE) 1 % ophthalmic suspension APPLY 1 DROP INTO BOTH EYES ONCE A DAY AS DIRECTED  0   • predniSONE (DELTASONE) 1 MG tablet Take 0.5 tablets by mouth Daily. (Patient taking differently: Take 2.5 mg by mouth Daily.) 30 tablet 0   • Probiotic Product ([employer] PO) Take 1 capsule by mouth Daily.     • tocilizumab (ACTEMRA) 200 MG/10ML solution injection Infuse 8 mg/kg into a venous catheter.     • VENTOLIN  (90 Base) MCG/ACT inhaler INHALE 2 PUFFS EVERY 4 HOURS TO 6 HOURS AS NEEDED 18 g 5   • zoledronic acid (RECLAST) 5 MG/100ML solution infusion Administer ZOLEDRONIC ACID 5mg IV once annually     •  levoFLOXacin (LEVAQUIN) 500 MG tablet Take 1 tablet by mouth Daily. 5 tablet 0   • nitrofurantoin, macrocrystal-monohydrate, (MACROBID) 100 MG capsule Take 1 capsule by mouth 2 (Two) Times a Day. 10 capsule 0   • raNITIdine (ZANTAC) 150 MG capsule take 2 capsules by mouth every evening 360 capsule 0     No facility-administered medications prior to visit.        Patient Active Problem List   Diagnosis   • Cervicalgia   • Generalized anxiety disorder   • Mixed hyperlipidemia   • Mitral valve disorder   • Tinnitus   • Tobacco use disorder   • Vitamin D deficiency   • Senile osteoporosis   • Dyspnea   • Polymyalgia rheumatica (CMS/HCC)   • Primary insomnia   • Chronic pain of both shoulders   • Chronic upper back pain   • Drug-induced hypokalemia   • Abdominal bloating   • Benign essential hypertension   • Benign paroxysmal positional vertigo   • Chronic low back pain   • Chronic obstructive lung disease (CMS/HCC)   • GERD without esophagitis   • Diverticulitis   • Muscle spasms of both lower extremities   • Panic disorder   • Pharyngitis   • Temporal arteritis (CMS/HCC)   • Tobacco dependence syndrome   • Urinary urgency   • Fatty liver   • Spasm of thoracic back muscle   • Throat soreness       Advanced Care Planning:  Patient does not have an advance directive - not interested in additional information    Review of Systems   Constitutional: Negative for chills, fatigue and fever.   HENT: Negative for congestion, ear pain and sinus pressure.    Respiratory: Negative for cough, chest tightness, shortness of breath and wheezing.    Cardiovascular: Negative for chest pain and palpitations.   Gastrointestinal: Negative for abdominal pain, blood in stool and constipation.   Skin: Negative for color change.   Allergic/Immunologic: Negative for environmental allergies.   Neurological: Negative for dizziness, speech difficulty and headaches.   Psychiatric/Behavioral: Negative for confusion. The patient is not  "nervous/anxious.        Compared to one year ago, the patient feels her physical health is the same.  Compared to one year ago, the patient feels her mental health is the same.    Reviewed chart for potential of high risk medication in the elderly: yes  Reviewed chart for potential of harmful drug interactions in the elderly:yes    Objective         Vitals:    01/13/20 1406   BP: 116/76   BP Location: Left arm   Patient Position: Sitting   Cuff Size: Adult   Pulse: 76   Weight: 69.9 kg (154 lb)   Height: 157 cm (61.81\")   PainSc:   4   PainLoc: Throat       Body mass index is 28.34 kg/m².  Discussed the patient's BMI with her. The BMI is above average; BMI management plan is completed.    Physical Exam          Assessment/Plan   Medicare Risks and Personalized Health Plan  CMS Preventative Services Quick Reference  Inactivity/Sedentary    The above risks/problems have been discussed with the patient.  Pertinent information has been shared with the patient in the After Visit Summary.  Follow up plans and orders are seen below in the Assessment/Plan Section.    Diagnoses and all orders for this visit:    1. Mixed hyperlipidemia (Primary)  -     Lipid Panel; Future  -     Comprehensive Metabolic Panel; Future    2. Benign essential hypertension  -     CBC & Differential; Future  -     MicroAlbumin, Urine, Random - Urine, Clean Catch; Future    3. Screening for colon cancer  -     Ambulatory Referral For Screening Colonoscopy    4. Breast cancer screening by mammogram  -     Mammo Screening Digital Tomosynthesis Bilateral With CAD; Future    5. Polymyalgia rheumatica (CMS/HCC)  Comments:  Managed by Dr. Swift.    6. Chronic obstructive pulmonary disease with acute exacerbation (CMS/formerly [facility])  Assessment & Plan:  COPD is unchanged.  STOP SMOKING!          7. GERD without esophagitis  -     famotidine (PEPCID) 20 MG tablet; Take 1 tablet by mouth 2 (Two) Times a Day.  Dispense: 60 tablet; Refill: 1    Other orders  -     " Lisa Jackson 65yr+    Follow Up:  Return for Annual physical with Dr. Navarrete.     An After Visit Summary and PPPS were given to the patient.

## 2020-01-14 ENCOUNTER — TELEPHONE (OUTPATIENT)
Dept: INTERNAL MEDICINE | Facility: CLINIC | Age: 70
End: 2020-01-14

## 2020-01-14 NOTE — TELEPHONE ENCOUNTER
Rep from [facility] calling, states that the referral to Kayy Flores for Gastro was sent to the wrong office and she went ahead and forwarded it to the appropriate office. She states that the person you when need to speak with when calling back is Johanna at [phone]. She is in Dr. Flores' office at the [facility] office.

## 2020-01-16 ENCOUNTER — OFFICE VISIT (OUTPATIENT)
Dept: INTERNAL MEDICINE | Facility: CLINIC | Age: 70
End: 2020-01-16

## 2020-01-16 VITALS
WEIGHT: 154 LBS | HEART RATE: 80 BPM | TEMPERATURE: 98.1 F | SYSTOLIC BLOOD PRESSURE: 136 MMHG | DIASTOLIC BLOOD PRESSURE: 74 MMHG | BODY MASS INDEX: 28.34 KG/M2 | HEIGHT: 62 IN

## 2020-01-16 DIAGNOSIS — I10 BENIGN ESSENTIAL HYPERTENSION: Primary | ICD-10-CM

## 2020-01-16 DIAGNOSIS — M35.3 POLYMYALGIA RHEUMATICA (HCC): ICD-10-CM

## 2020-01-16 DIAGNOSIS — K21.9 GERD WITHOUT ESOPHAGITIS: ICD-10-CM

## 2020-01-16 DIAGNOSIS — K76.0 FATTY LIVER: ICD-10-CM

## 2020-01-16 DIAGNOSIS — R06.02 SHORTNESS OF BREATH: Chronic | ICD-10-CM

## 2020-01-16 DIAGNOSIS — E78.2 MIXED HYPERLIPIDEMIA: ICD-10-CM

## 2020-01-16 DIAGNOSIS — J41.0 SIMPLE CHRONIC BRONCHITIS (HCC): ICD-10-CM

## 2020-01-16 DIAGNOSIS — F41.1 GENERALIZED ANXIETY DISORDER: Chronic | ICD-10-CM

## 2020-01-16 DIAGNOSIS — E66.3 OVERWEIGHT (BMI 25.0-29.9): Chronic | ICD-10-CM

## 2020-01-16 DIAGNOSIS — F17.200 TOBACCO DEPENDENCE SYNDROME: ICD-10-CM

## 2020-01-16 PROBLEM — F41.0 PANIC DISORDER: Status: RESOLVED | Noted: 2019-02-28 | Resolved: 2020-01-16

## 2020-01-16 PROBLEM — K57.92 DIVERTICULITIS: Status: RESOLVED | Noted: 2019-02-28 | Resolved: 2020-01-16

## 2020-01-16 PROCEDURE — 94060 EVALUATION OF WHEEZING: CPT | Performed by: INTERNAL MEDICINE

## 2020-01-16 PROCEDURE — 99214 OFFICE O/P EST MOD 30 MIN: CPT | Performed by: INTERNAL MEDICINE

## 2020-01-16 PROCEDURE — 93000 ELECTROCARDIOGRAM COMPLETE: CPT | Performed by: INTERNAL MEDICINE

## 2020-01-16 RX ORDER — BUSPIRONE HYDROCHLORIDE 5 MG/1
5 TABLET ORAL 3 TIMES DAILY PRN
Qty: 90 TABLET | Refills: 1
Start: 2020-01-16 | End: 2020-01-21 | Stop reason: SDUPTHER

## 2020-01-16 RX ORDER — PREDNISONE 2.5 MG
2.5 TABLET ORAL DAILY
Qty: 90 TABLET | Refills: 1
Start: 2020-01-16 | End: 2022-02-03 | Stop reason: SDUPTHER

## 2020-01-16 RX ORDER — BENZONATATE 200 MG/1
200 CAPSULE ORAL 3 TIMES DAILY PRN
Qty: 30 CAPSULE | Refills: 2 | Status: SHIPPED | OUTPATIENT
Start: 2020-01-16 | End: 2020-07-16 | Stop reason: SDUPTHER

## 2020-01-16 NOTE — PROGRESS NOTES
[facility]     Irma Pedraza  1950   [id number]      Patient Care Team:  Eve Navarrete MD as PCP - General (Internal Medicine)  Gold Swift DO as Consulting Physician (Rheumatology)  Sabrina Angelo MD as Consulting Physician (Plastic Surgery)  Juarez Pedersen MD as Consulting Physician (Otolaryngology)    Chief Complaint   Patient presents with   • Hyperlipidemia   • Hypertension            HPI  Patient is a 69 y.o. female presents with follow up hypertension and hyperlipidemia      CHRONIC CONDITIONS  Some wheezing and shortness of breath with exertion although it is much better than when she had bronchitis about 2 months ago.  She has not been using the Brio Ellipta inhaler.  She ran out of it and had not replaced it.  She is taking her rescue inhaler 1-2 times every day.  It does help.    BP's at home run 120/70's.  Taking medication regularly.    Hyperlipidemia -takes pravastatin.    GERD has not been controlled on Nexium since she stopped ranitidine in the evenings.    Past Medical History:   Diagnosis Date   • Adenomyomatosis of gallbladder 2009    by ultrasound, Dr. 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   • Panic disorder 2/28/2019   • PMR (polymyalgia rheumatica) (CMS/Formerly [facility])        Past Surgical History:   Procedure Laterality Date   • COLONOSCOPY  02/2009   • COSMETIC SURGERY  1994    face lift   • COSMETIC SURGERY  1993    DONYA TOBIAS   • COSMETIC SURGERY  1980    Birth kenny removal   • ELBOW PROCEDURE Left     x2   • THROAT SURGERY      remote removal of growth from throat       Family History   Problem Relation Age of Onset    • Breast cancer Maternal Grandmother 90   • Osteoporosis Mother    • Osteoporosis Father    • Migraines Father    • Brain cancer Sister    • Stroke Brother    • Stroke Paternal Grandmother    • Ovarian cancer Neg Hx    • Endometrial cancer Neg Hx        Social History     Socioeconomic History   • Marital status:      Spouse name: Not on file   • Number of children: Not on file   • Years of education: Not on file   • Highest education level: Not on file   Tobacco Use   • Smoking status: Current Every Day Smoker     Packs/day: 0.50     Types: Cigarettes   • Smokeless tobacco: Never Used   • Tobacco comment: patient refused smoking literature   Substance and Sexual Activity   • Alcohol use: Yes     Comment: 4 -5 days a week   • Drug use: No   • Sexual activity: Defer       Allergies   Allergen Reactions   • Bactrim [Sulfamethoxazole-Trimethoprim] Other (See Comments)     Hypokalemia, hyponatremia   • Atorvastatin Unknown (See Comments)     Lipitor     • Bupropion Hcl [Bupropion] Unknown (See Comments)     wellbutrin   • Calcium Unknown (See Comments)     unknown       Review of Systems:     Review of Systems   Constitutional: Negative for chills, fatigue and fever.   HENT: Negative for congestion, ear pain and sinus pressure.    Eyes: Negative for visual disturbance.   Respiratory: Positive for cough, shortness of breath and wheezing. Negative for chest tightness.    Cardiovascular: Negative for chest pain and palpitations.   Gastrointestinal: Negative for abdominal pain, blood in stool and constipation.   Endocrine: Negative for cold intolerance and heat intolerance.   Genitourinary: Negative for dysuria and frequency.   Musculoskeletal: Positive for arthralgias. Negative for back pain and gait problem.   Skin: Negative for color change.   Allergic/Immunologic: Negative for environmental allergies.   Neurological: Negative for dizziness, speech difficulty and headache.   Psychiatric/Behavioral: Negative for  "decreased concentration and depressed mood. The patient is not nervous/anxious.        Vital Signs  Vitals:    01/16/20 1006   BP: 136/74   BP Location: Left arm   Patient Position: Sitting   Cuff Size: Adult   Pulse: 80   Temp: 98.1 °F (36.7 °C)   TempSrc: Temporal   Weight: 69.9 kg (154 lb)   Height: 157 cm (61.81\")   PainSc:   3   PainLoc: Back  Comment: L shoulder blade     Body mass index is 28.34 kg/m².      Current Outpatient Medications:   •  acyclovir (ZOVIRAX) 400 MG tablet, Take 400 mg by mouth As Needed., Disp: , Rfl:   •  Azelastine-Fluticasone 137-50 MCG/ACT suspension, 2 sprays into the nostril(s) as directed by provider Every 12 (Twelve) Hours., Disp: , Rfl:   •  benzonatate (TESSALON) 200 MG capsule, Take 1 capsule by mouth 3 (Three) Times a Day As Needed for Cough., Disp: 30 capsule, Rfl: 2  •  busPIRone (BUSPAR) 5 MG tablet, Take 1 tablet by mouth 3 (Three) Times a Day As Needed (anxiety)., Disp: 90 tablet, Rfl: 1  •  Cholecalciferol (VITAMIN D3) 2000 units capsule, Take 2,000 Units by mouth Daily., Disp: , Rfl:   •  Coenzyme Q10 (COQ-10) 200 MG capsule, Take 1 tablet by mouth Daily., Disp: , Rfl:   •  cyclobenzaprine (FLEXERIL) 10 MG tablet, Take 1 tablet by mouth 2 (Two) Times a Day As Needed for Muscle Spasms., Disp: 30 tablet, Rfl: 1  •  DEXILANT 60 MG capsule, take 1 capsule by mouth once daily, Disp: 90 capsule, Rfl: 3  •  famotidine (PEPCID) 20 MG tablet, Take 1 tablet by mouth 2 (Two) Times a Day., Disp: 60 tablet, Rfl: 1  •  hydrochlorothiazide (HYDRODIURIL) 25 MG tablet, take 1 tablet by mouth once daily, Disp: 90 tablet, Rfl: 1  •  Lactobacillus-Inulin ([facility] PO), Take 1 capsule by mouth Daily., Disp: , Rfl:   •  Magnesium Gluconate 550 MG tablet, Take 1 tablet by mouth Daily., Disp: , Rfl:   •  ondansetron ODT (ZOFRAN-ODT) 4 MG disintegrating tablet, Take 1 tablet by mouth Every 8 (Eight) Hours As Needed for Nausea., Disp: 12 tablet, Rfl: 0  •  pravastatin " (PRAVACHOL) 80 MG tablet, Take 80 mg by mouth Daily., Disp: , Rfl:   •  prednisoLONE acetate (PRED FORTE) 1 % ophthalmic suspension, APPLY 1 DROP INTO BOTH EYES ONCE A DAY AS DIRECTED, Disp: , Rfl: 0  •  predniSONE (DELTASONE) 2.5 MG tablet, Take 1 tablet by mouth Daily., Disp: 90 tablet, Rfl: 1  •  Probiotic Product ([employer] PO), Take 1 capsule by mouth Daily., Disp: , Rfl:   •  tocilizumab (ACTEMRA) 200 MG/10ML solution injection, Infuse 8 mg/kg into a venous catheter., Disp: , Rfl:   •  VENTOLIN  (90 Base) MCG/ACT inhaler, INHALE 2 PUFFS EVERY 4 HOURS TO 6 HOURS AS NEEDED, Disp: 18 g, Rfl: 5  •  zoledronic acid (RECLAST) 5 MG/100ML solution infusion, Administer ZOLEDRONIC ACID 5mg IV once annually, Disp: , Rfl:   •  Fluticasone-Umeclidin-Vilant (TRELEGY ELLIPTA) 100-62.5-25 MCG/INH aerosol powder , Inhale 1 puff Daily., Disp: 1 each, Rfl: 5    Physical Exam:    Physical Exam   Constitutional: She is oriented to person, place, and time. She appears well-developed and well-nourished. She appears overweight.   Eyes: Pupils are equal, round, and reactive to light. Conjunctivae and EOM are normal.   Neck: Normal range of motion. Neck supple. No thyromegaly present.   Cardiovascular: Normal rate, regular rhythm, normal heart sounds and intact distal pulses.   No murmur heard.  Pulmonary/Chest: Effort normal and breath sounds normal. She has no wheezes. Right breast exhibits no inverted nipple, no mass, no nipple discharge, no skin change and no tenderness. Left breast exhibits no inverted nipple, no mass, no nipple discharge, no skin change and no tenderness.   Abdominal: Soft. Bowel sounds are normal. She exhibits no distension and no mass. There is no tenderness.   Musculoskeletal: Normal range of motion. She exhibits no edema or tenderness.   Lymphadenopathy:     She has no cervical adenopathy.     She has no axillary adenopathy.   Neurological: She is alert and oriented to person, place, and  time. She has normal strength. No cranial nerve deficit or sensory deficit. Coordination and gait normal.   Skin: Skin is warm and dry. No rash noted.   Psychiatric: She has a normal mood and affect. Her speech is normal and behavior is normal. Judgment and thought content normal. Cognition and memory are normal.   Nursing note and vitals reviewed.           ECG 12 Lead  Date/Time: 1/16/2020 2:56 PM  Performed by: Eve Navarrete MD  Authorized by: Eve Navarrete MD   Comparison: compared with previous ECG   Similar to previous ECG  Rhythm: sinus rhythm  Rate: normal  BPM: 67  Conduction: conduction normal  ST Segments: ST segments normal  T Waves: T waves normal  QRS axis: normal    Clinical impression: normal ECG        Spirometry today shows  FVC prebronchodilator 70% predicted and postbronchodilator 69% predicted.  FEV1  prebronchodilator 60% predicted and postbronchodilator 61% predicted.  FEV1 over FVC prebronchodilator 85% predicted and postbronchodilator 87% predicted.  FEF 25-75 prebronchodilator was 41% predicted and post bronchodilator was 41% predicted  This is consistent with moderate obstruction without improvement after bronchodilator.    ACE III MINI        Results Review:    None    CMP:  Lab Results   Component Value Date    GLU 96 03/05/2019    BUN 18 03/05/2019    CREATININE 1.06 03/05/2019    EGFRIFNONA 51 (L) 03/05/2019    EGFRIFAFRI 62 03/05/2019    BCR 17.0 03/05/2019     03/05/2019    K 4.0 03/05/2019    CO2 28.0 03/05/2019    CALCIUM 10.3 03/05/2019    PROTENTOTREF 6.8 03/05/2019    ALBUMIN 5.14 (H) 03/05/2019    LABGLOBREF 1.7 03/05/2019    LABIL2 3.1 (H) 03/05/2019    BILITOT 0.9 03/05/2019    ALKPHOS 55 03/05/2019    AST 22 03/05/2019    ALT 36 03/05/2019     HbA1c:  No results found for: HGBA1C  Microalbumin:  No results found for: MICROALBUR, POCMALB, POCCREAT  Lipid Panel  Lab Results   Component Value Date    CHOL 220 (H) 01/08/2019    TRIG 192 (H) 01/08/2019    HDL 51  01/08/2019     (H) 01/08/2019    AST 22 03/05/2019    ALT 36 03/05/2019       Medication Review: Medications reviewed and noted  Patient Instructions   Problem List Items Addressed This Visit        Cardiovascular and Mediastinum    Mixed hyperlipidemia    Overview     1/16/2020 Eve Navarrete MD    LDL (bad cholesterol) is elevated at 152.  Goal is less than 100.  Triglycerides are elevated at 244.  Goal is less than 150.    Continue pravastatin 80 mg daily.  Increase exercise.  Go to the gym 3 days a week.  Also may try water exercise classes.      Continue to improve low-fat diet and eat smaller portions.    Continue to weigh at home to monitor progress.  Try to lose about 8 more pounds over the next 6 months.         Relevant Medications    pravastatin (PRAVACHOL) 80 MG tablet    Benign essential hypertension - Primary    Overview     1/16/2020 Eve Navarrete MD    Continue hydrochlorothiazide daily.  Continue to avoid salt in the diet.         Relevant Medications    hydrochlorothiazide (HYDRODIURIL) 25 MG tablet       Respiratory    Dyspnea (Chronic)    Overview     1/16/2020 Eve Navarrete MD    Use long-acting inhaler at all times.  Prescription for Trelegy sent to the pharmacy.  Continue short acting inhaler as needed.    Getting some regular exercise and physical activity also improves lung function.         Chronic obstructive lung disease (CMS/HCC)    Overview     1/16/2020 Eve Navarrete MD    Spirometry today shows moderate obstruction without improvement after bronchodilator.    We will try Trelegy Ellipta inhaler (3 medications in one inhaler) 1 puff daily.         Relevant Medications    Azelastine-Fluticasone 137-50 MCG/ACT suspension    VENTOLIN  (90 Base) MCG/ACT inhaler    predniSONE (DELTASONE) 2.5 MG tablet    Fluticasone-Umeclidin-Vilant (TRELEGY ELLIPTA) 100-62.5-25 MCG/INH aerosol powder     benzonatate (TESSALON) 200 MG capsule       Digestive    GERD without  esophagitis    Overview     1/16/2020 Eve Navarrete MD    Continue Dexilant daily.  Continue famotidine twice a day as needed.    Continue to avoid eating close to bedtime and avoid large meals.  Weight loss also helps.         Relevant Medications    DEXILANT 60 MG capsule    famotidine (PEPCID) 20 MG tablet    Fatty liver    Overview     1/16/2020 Eve Navarrete MD    Continue to improve low-fat diet and lose more weight.            Nervous and Auditory    Polymyalgia rheumatica (CMS/HCC)    Overview     1/16/2020 Eve Navarrete MD    Continue low-dose prednisone daily.  May take Tylenol as needed for pain.      Try to get some mild to moderate exercise and physical activity every day.            Other    Overweight (BMI 25.0-29.9) (Chronic)    Overview     1/16/2020 Eve Navarrete MD    BMI is now 28.34.  Goal is 25.    Continue pravastatin 80 mg daily.  Increase exercise.  Go to the gym 3 days a week.  Also may try water exercise classes.      Continue to improve low-fat diet and eat smaller portions.    Continue to weigh at home to monitor progress.  Try to lose about 8 more pounds over the next 6 months.         Generalized anxiety disorder    Overview     1/16/2020 Eve Navarrete MD    Continue buspirone 3 times a day as needed.  Exercise and physical activity also help.         Relevant Medications    busPIRone (BUSPAR) 5 MG tablet    Tobacco dependence syndrome    Overview     1/16/2020 Eve Navarrete MD    We discussed mamta smoking cessation strategies.                  Diagnosis Plan   1. Benign essential hypertension     2. Mixed hyperlipidemia     3. Shortness of breath     4. Simple chronic bronchitis (CMS/HCC)     5. GERD without esophagitis     6. Fatty liver     7. Polymyalgia rheumatica (CMS/HCC)     8. Tobacco dependence syndrome     9. Generalized anxiety disorder  busPIRone (BUSPAR) 5 MG tablet    Increase buspirone to 1 tablet 3 times a day to decrease overall stress and  anxiety level.  I think that will help you quit smoking as well.Exercise also helps.   10. Overweight (BMI 25.0-29.9)         Patient Instructions   Coping with Quitting Smoking    Quitting smoking is a physical and mental challenge. You will face cravings, withdrawal symptoms, and temptation. Before quitting, work with your health care provider to make a plan that can help you cope. Preparation can help you quit and keep you from giving in.  How can I cope with cravings?  Cravings usually last for 5-10 minutes. If you get through it, the craving will pass. Consider taking the following actions to help you cope with cravings:  · Keep your mouth busy:  ? Chew sugar-free gum.  ? Suck on hard candies or a straw.  ? Brush your teeth.  · Keep your hands and body busy:  ? Immediately change to a different activity when you feel a craving.  ? Squeeze or play with a ball.  ? Do an activity or a hobby, like making bead jewelry, practicing needlepoint, or working with wood.  ? Mix up your normal routine.  ? Take a short exercise break. Go for a quick walk or run up and down stairs.  ? Spend time in public places where smoking is not allowed.  · Focus on doing something kind or helpful for someone else.  · Call a friend or family member to talk during a craving.  · Join a support group.  · Call a quit line, such as [employer].  · Talk with your health care provider about medicines that might help you cope with cravings and make quitting easier for you.  How can I deal with withdrawal symptoms?  Your body may experience negative effects as it tries to get used to not having nicotine in the system. These effects are called withdrawal symptoms. They may include:  · Feeling hungrier than normal.  · Trouble concentrating.  · Irritability.  · Trouble sleeping.  · Feeling depressed.  · Restlessness and agitation.  · Craving a cigarette.  To manage withdrawal symptoms:  · Avoid places, people, and activities that trigger your  cravings.  · Remember why you want to quit.  · Get plenty of sleep.  · Avoid coffee and other caffeinated drinks. These may worsen some of your symptoms.  How can I handle social situations?  Social situations can be difficult when you are quitting smoking, especially in the first few weeks. To manage this, you can:  · Avoid parties, bars, and other social situations where people might be smoking.  · Avoid alcohol.  · Leave right away if you have the urge to smoke.  · Explain to your family and friends that you are quitting smoking. Ask for understanding and support.  · Plan activities with friends or family where smoking is not an option.  What are some ways I can cope with stress?  Wanting to smoke may cause stress, and stress can make you want to smoke. Find ways to manage your stress. Relaxation techniques can help. For example:  · Breathe slowly and deeply, in through your nose and out through your mouth.  · Listen to soothing, relaxing music.  · Talk with a family member or friend about your stress.  · Light a candle.  · Soak in a bath or take a shower.  · Think about a peaceful place.  What are some ways I can prevent weight gain?  Be aware that many people gain weight after they quit smoking. However, not everyone does. To keep from gaining weight, have a plan in place before you quit and stick to the plan after you quit. Your plan should include:  · Having healthy snacks. When you have a craving, it may help to:  ? Eat plain popcorn, crunchy carrots, celery, or other cut vegetables.  ? Chew sugar-free gum.  · Changing how you eat:  ? Eat small portion sizes at meals.  ? Eat 4-6 small meals throughout the day instead of 1-2 large meals a day.  ? Be mindful when you eat. Do not watch television or do other things that might distract you as you eat.  · Exercising regularly:  ? Make time to exercise each day. If you do not have time for a long workout, do short bouts of exercise for 5-10 minutes several times a  day.  ? Do some form of strengthening exercise, like weight lifting, and some form of aerobic exercise, like running or swimming.  · Drinking plenty of water or other low-calorie or no-calorie drinks. Drink 6-8 glasses of water daily, or as much as instructed by your health care provider.  Summary  · Quitting smoking is a physical and mental challenge. You will face cravings, withdrawal symptoms, and temptation to smoke again. Preparation can help you as you go through these challenges.  · You can cope with cravings by keeping your mouth busy (such as by chewing gum), keeping your body and hands busy, and making calls to family, friends, or a helpline for people who want to quit smoking.  · You can cope with withdrawal symptoms by avoiding places where people smoke, avoiding drinks with caffeine, and getting plenty of rest.  · Ask your health care provider about the different ways to prevent weight gain, avoid stress, and handle social situations.  This information is not intended to replace advice given to you by your health care provider. Make sure you discuss any questions you have with your health care provider.  Document Released: 12/15/2017 Document Revised: 12/15/2017 Document Reviewed: 12/15/2017  [employer] Interactive Patient Education © 2019 [employer]        Plan of care reviewed with patient at the conclusion of today's visit. Education was provided regarding diagnosis, management, and any prescribed or recommended OTC medications.Patient verbalizes understanding of and agreement with management plan.         Eve Navarrete MD

## 2020-01-16 NOTE — PATIENT INSTRUCTIONS
Coping with Quitting Smoking    Quitting smoking is a physical and mental challenge. You will face cravings, withdrawal symptoms, and temptation. Before quitting, work with your health care provider to make a plan that can help you cope. Preparation can help you quit and keep you from giving in.  How can I cope with cravings?  Cravings usually last for 5-10 minutes. If you get through it, the craving will pass. Consider taking the following actions to help you cope with cravings:  · Keep your mouth busy:  ? Chew sugar-free gum.  ? Suck on hard candies or a straw.  ? Brush your teeth.  · Keep your hands and body busy:  ? Immediately change to a different activity when you feel a craving.  ? Squeeze or play with a ball.  ? Do an activity or a hobby, like making bead jewelry, practicing needlepoint, or working with wood.  ? Mix up your normal routine.  ? Take a short exercise break. Go for a quick walk or run up and down stairs.  ? Spend time in public places where smoking is not allowed.  · Focus on doing something kind or helpful for someone else.  · Call a friend or family member to talk during a craving.  · Join a support group.  · Call a quit line, such as [employer]inContactQUITinContactNOW.  · Talk with your health care provider about medicines that might help you cope with cravings and make quitting easier for you.  How can I deal with withdrawal symptoms?  Your body may experience negative effects as it tries to get used to not having nicotine in the system. These effects are called withdrawal symptoms. They may include:  · Feeling hungrier than normal.  · Trouble concentrating.  · Irritability.  · Trouble sleeping.  · Feeling depressed.  · Restlessness and agitation.  · Craving a cigarette.  To manage withdrawal symptoms:  · Avoid places, people, and activities that trigger your cravings.  · Remember why you want to quit.  · Get plenty of sleep.  · Avoid coffee and other caffeinated drinks. These may worsen some of your  symptoms.  How can I handle social situations?  Social situations can be difficult when you are quitting smoking, especially in the first few weeks. To manage this, you can:  · Avoid parties, bars, and other social situations where people might be smoking.  · Avoid alcohol.  · Leave right away if you have the urge to smoke.  · Explain to your family and friends that you are quitting smoking. Ask for understanding and support.  · Plan activities with friends or family where smoking is not an option.  What are some ways I can cope with stress?  Wanting to smoke may cause stress, and stress can make you want to smoke. Find ways to manage your stress. Relaxation techniques can help. For example:  · Breathe slowly and deeply, in through your nose and out through your mouth.  · Listen to soothing, relaxing music.  · Talk with a family member or friend about your stress.  · Light a candle.  · Soak in a bath or take a shower.  · Think about a peaceful place.  What are some ways I can prevent weight gain?  Be aware that many people gain weight after they quit smoking. However, not everyone does. To keep from gaining weight, have a plan in place before you quit and stick to the plan after you quit. Your plan should include:  · Having healthy snacks. When you have a craving, it may help to:  ? Eat plain popcorn, crunchy carrots, celery, or other cut vegetables.  ? Chew sugar-free gum.  · Changing how you eat:  ? Eat small portion sizes at meals.  ? Eat 4-6 small meals throughout the day instead of 1-2 large meals a day.  ? Be mindful when you eat. Do not watch television or do other things that might distract you as you eat.  · Exercising regularly:  ? Make time to exercise each day. If you do not have time for a long workout, do short bouts of exercise for 5-10 minutes several times a day.  ? Do some form of strengthening exercise, like weight lifting, and some form of aerobic exercise, like running or swimming.  · Drinking  plenty of water or other low-calorie or no-calorie drinks. Drink 6-8 glasses of water daily, or as much as instructed by your health care provider.  Summary  · Quitting smoking is a physical and mental challenge. You will face cravings, withdrawal symptoms, and temptation to smoke again. Preparation can help you as you go through these challenges.  · You can cope with cravings by keeping your mouth busy (such as by chewing gum), keeping your body and hands busy, and making calls to family, friends, or a helpline for people who want to quit smoking.  · You can cope with withdrawal symptoms by avoiding places where people smoke, avoiding drinks with caffeine, and getting plenty of rest.  · Ask your health care provider about the different ways to prevent weight gain, avoid stress, and handle social situations.  This information is not intended to replace advice given to you by your health care provider. Make sure you discuss any questions you have with your health care provider.  Document Released: 12/15/2017 Document Revised: 12/15/2017 Document Reviewed: 12/15/2017  [employer] Interactive Patient Education © 2019 [employer]

## 2020-01-20 ENCOUNTER — HOSPITAL ENCOUNTER (OUTPATIENT)
Dept: MAMMOGRAPHY | Facility: HOSPITAL | Age: 70
Discharge: HOME | End: 2020-01-20
Admitting: PHYSICIAN ASSISTANT

## 2020-01-20 DIAGNOSIS — Z12.31 BREAST CANCER SCREENING BY MAMMOGRAM: ICD-10-CM

## 2020-01-20 PROCEDURE — 77067 SCR MAMMO BI INCL CAD: CPT | Performed by: RADIOLOGY

## 2020-01-20 PROCEDURE — 77063 BREAST TOMOSYNTHESIS BI: CPT | Performed by: RADIOLOGY

## 2020-01-20 PROCEDURE — 77067 SCR MAMMO BI INCL CAD: CPT

## 2020-01-20 PROCEDURE — 77063 BREAST TOMOSYNTHESIS BI: CPT

## 2020-01-21 DIAGNOSIS — F41.1 GENERALIZED ANXIETY DISORDER: Chronic | ICD-10-CM

## 2020-01-21 RX ORDER — BUSPIRONE HYDROCHLORIDE 5 MG/1
5 TABLET ORAL 3 TIMES DAILY PRN
Qty: 90 TABLET | Refills: 1
Start: 2020-01-21 | End: 2020-07-16 | Stop reason: SDUPTHER

## 2020-01-27 ENCOUNTER — TELEPHONE (OUTPATIENT)
Dept: INTERNAL MEDICINE | Facility: CLINIC | Age: 70
End: 2020-01-27

## 2020-01-27 DIAGNOSIS — N89.8 VAGINAL IRRITATION: Primary | ICD-10-CM

## 2020-01-27 NOTE — TELEPHONE ENCOUNTER
PT CALLED AND REQUESTED TO GET A CALL BACK FROM [facility] OR HER NURSE IN REGARDS TO HER 12/27/2019 APPOINTMENT SHE HAD WITH HER . PT STATES THAT SHE IS STILL HAVING SOME OF THE SAME ISSUES THAT SHE WAS HAVING AT THAT APPOINTMENT (BURNING WHILE URINATING) AND WOULD LIKE TO GET A CALL BACK [phone].

## 2020-01-27 NOTE — TELEPHONE ENCOUNTER
Pt advised per Dr. Navarrete, she verbalized understanding. She is requesting a referral to a GYN and would like it to be a women, asap.

## 2020-01-27 NOTE — TELEPHONE ENCOUNTER
"I reviewed the urine culture from 12/27/2019.  It grew Klebsiella.  It was appropriate to switch the antibiotic from Macrobid to Levaquin.  I assume she finished all of the Levaquin.  The vaginal swab done that day was negative for bacteria and for yeast.    For the vaginal discomfort and \"white patches\", the best thing at this point is to see the gynecologist.  If she does not have 1 to call, we can do a referral.    To address the urinary discomfort, she needs to do I need another urinalysis.  I already ordered some labs when I saw her earlier in January.  Advise her to go next-door or to any [facility] and do those.    If she prefers to have the urinalysis results immediately, she can do a urinalysis in our office and do the blood work at the [facility].  "

## 2020-01-27 NOTE — TELEPHONE ENCOUNTER
Pt saw Evangelina on 12/27 and was treated for a UTI. Macrobid was given originally but once culture came back she was switched to Levaquin. Pt states she is still having burning and c/o large white patches in her vaginal area.

## 2020-01-29 LAB — MICROALBUMIN UR-MCNC: 14.1 UG/ML

## 2020-01-31 ENCOUNTER — TELEPHONE (OUTPATIENT)
Dept: INTERNAL MEDICINE | Facility: CLINIC | Age: 70
End: 2020-01-31

## 2020-03-19 DIAGNOSIS — K21.9 GERD WITHOUT ESOPHAGITIS: ICD-10-CM

## 2020-03-20 RX ORDER — HYDROCHLOROTHIAZIDE 25 MG/1
TABLET ORAL
Qty: 90 TABLET | Refills: 1 | Status: SHIPPED | OUTPATIENT
Start: 2020-03-20 | End: 2020-10-27

## 2020-03-20 RX ORDER — FAMOTIDINE 20 MG/1
TABLET, FILM COATED ORAL
Qty: 60 TABLET | Refills: 1 | Status: SHIPPED | OUTPATIENT
Start: 2020-03-20 | End: 2020-06-15

## 2020-06-14 DIAGNOSIS — K21.9 GERD WITHOUT ESOPHAGITIS: ICD-10-CM

## 2020-06-15 RX ORDER — FAMOTIDINE 20 MG/1
TABLET, FILM COATED ORAL
Qty: 60 TABLET | Refills: 5 | Status: SHIPPED | OUTPATIENT
Start: 2020-06-15 | End: 2021-02-01 | Stop reason: SDUPTHER

## 2020-07-16 ENCOUNTER — OFFICE VISIT (OUTPATIENT)
Dept: INTERNAL MEDICINE | Facility: CLINIC | Age: 70
End: 2020-07-16

## 2020-07-16 ENCOUNTER — LAB REQUISITION (OUTPATIENT)
Dept: LAB | Facility: HOSPITAL | Age: 70
End: 2020-07-16

## 2020-07-16 VITALS
HEART RATE: 68 BPM | SYSTOLIC BLOOD PRESSURE: 130 MMHG | DIASTOLIC BLOOD PRESSURE: 72 MMHG | WEIGHT: 154.2 LBS | HEIGHT: 62 IN | TEMPERATURE: 98.4 F | BODY MASS INDEX: 28.37 KG/M2

## 2020-07-16 DIAGNOSIS — M54.50 CHRONIC RIGHT-SIDED LOW BACK PAIN WITHOUT SCIATICA: Primary | ICD-10-CM

## 2020-07-16 DIAGNOSIS — F17.200 TOBACCO DEPENDENCE SYNDROME: ICD-10-CM

## 2020-07-16 DIAGNOSIS — E78.2 MIXED HYPERLIPIDEMIA: ICD-10-CM

## 2020-07-16 DIAGNOSIS — M81.0 SENILE OSTEOPOROSIS: ICD-10-CM

## 2020-07-16 DIAGNOSIS — I10 BENIGN ESSENTIAL HYPERTENSION: ICD-10-CM

## 2020-07-16 DIAGNOSIS — F41.1 GENERALIZED ANXIETY DISORDER: Chronic | ICD-10-CM

## 2020-07-16 DIAGNOSIS — E66.3 OVERWEIGHT WITH BODY MASS INDEX (BMI) OF 28 TO 28.9 IN ADULT: Chronic | ICD-10-CM

## 2020-07-16 DIAGNOSIS — J41.0 SIMPLE CHRONIC BRONCHITIS (HCC): ICD-10-CM

## 2020-07-16 DIAGNOSIS — Z00.00 ROUTINE GENERAL MEDICAL EXAMINATION AT A HEALTH CARE FACILITY: ICD-10-CM

## 2020-07-16 DIAGNOSIS — G89.29 CHRONIC RIGHT-SIDED LOW BACK PAIN WITHOUT SCIATICA: Primary | ICD-10-CM

## 2020-07-16 LAB
CHOLEST SERPL-MCNC: 270 MG/DL (ref 0–200)
HDLC SERPL-MCNC: 57 MG/DL (ref 40–60)
LDLC SERPL CALC-MCNC: 173 MG/DL (ref 0–100)
TRIGL SERPL-MCNC: 200 MG/DL (ref 0–150)
VLDLC SERPL CALC-MCNC: 40 MG/DL

## 2020-07-16 PROCEDURE — 36415 COLL VENOUS BLD VENIPUNCTURE: CPT

## 2020-07-16 PROCEDURE — 99214 OFFICE O/P EST MOD 30 MIN: CPT | Performed by: INTERNAL MEDICINE

## 2020-07-16 RX ORDER — BENZONATATE 200 MG/1
200 CAPSULE ORAL 3 TIMES DAILY PRN
Qty: 30 CAPSULE | Refills: 2 | Status: SHIPPED | OUTPATIENT
Start: 2020-07-16 | End: 2020-11-30 | Stop reason: SDUPTHER

## 2020-07-16 RX ORDER — MONTELUKAST SODIUM 10 MG/1
10 TABLET ORAL NIGHTLY
Qty: 30 TABLET | Refills: 5 | Status: SHIPPED | OUTPATIENT
Start: 2020-07-16 | End: 2021-02-01 | Stop reason: SDUPTHER

## 2020-07-16 RX ORDER — BUSPIRONE HYDROCHLORIDE 5 MG/1
5 TABLET ORAL 3 TIMES DAILY PRN
Qty: 90 TABLET | Refills: 1 | Status: SHIPPED | OUTPATIENT
Start: 2020-07-16 | End: 2021-11-15

## 2020-07-16 NOTE — PATIENT INSTRUCTIONS
Patient Instructions   Problem List Items Addressed This Visit        Cardiovascular and Mediastinum    Mixed hyperlipidemia    Overview     7/16/2020 Eve Navarrete MD    LDL (bad cholesterol) was elevated at 152.  Goal is less than 100.  Triglycerides are elevated at 244.  Goal is less than 150.    Continue pravastatin 80 mg daily.  Increase exercise.  Try to walk some every day.    Continue to improve low-fat diet and eat smaller portions at meals.    Continue to weigh at home to monitor progress.  Try to lose about 8 more pounds over the next 6 months.         Relevant Medications    pravastatin (PRAVACHOL) 80 MG tablet    Other Relevant Orders    Lipid Panel    Benign essential hypertension    Overview     7/16/2020 Eve Navarrete MD    Continue hydrochlorothiazide daily.  Continue to avoid salt in the diet.         Relevant Medications    hydroCHLOROthiazide (HYDRODIURIL) 25 MG tablet       Respiratory    Chronic obstructive lung disease (CMS/HCC)    Overview     7/16/2020 Eve Navarrete MD    Spirometry  shows moderate obstruction without improvement after bronchodilator.    Continue Trelegy Ellipta inhaler (3 medications in one inhaler) 1 puff daily.  Continue rescue inhaler as needed for cough wheeze or lung congestion.    Add montelukast (Singulair) tablet every evening to see if that helps with allergies and breathing.    New medication added today. Benefits and possible side effects discussed. Patient verbalized understanding.           Relevant Medications    Azelastine-Fluticasone 137-50 MCG/ACT suspension    VENTOLIN  (90 Base) MCG/ACT inhaler    predniSONE (DELTASONE) 2.5 MG tablet    benzonatate (TESSALON) 200 MG capsule    Fluticasone-Umeclidin-Vilant (Trelegy Ellipta) 100-62.5-25 MCG/INH aerosol powder     montelukast (SINGULAIR) 10 MG tablet       Nervous and Auditory    Chronic low back pain - Primary    Overview     7/16/2020 Eve Navarrete MD    Do some back stretches every  morning.  Use moist heat to relax tight back muscles.  Walk some every day.            Musculoskeletal and Integument    Senile osteoporosis    Overview     7/16/2020 Eve Navarrete MD    T-scores  on 2/12/19 DEXA showed osteoporosis of the spine and osteopenia of the hips.      Continue Reclast injections yearly.    Follow-up with Dr. Swift.            Other    Overweight with body mass index (BMI) of 28 to 28.9 in adult (Chronic)    Overview     7/16/2020 Eve Navarrete MD    Start walking some every day.  Use hand weights 5 to 10 minutes a day.  Eat smaller portions at mealtime and walk less.      Weigh every Monday morning to monitor progress.  Try to lose some weight over the next few months.         Generalized anxiety disorder    Overview     7/16/2020 Eve Navarrete MD    Continue buspirone 3 times a day as needed.  Walking, exercise and physical activity also help.         Relevant Medications    busPIRone (BUSPAR) 5 MG tablet    Tobacco dependence syndrome    Overview     7/16/2020 Eve Navarrete MD    We discussed some smoking cessation strategies.

## 2020-07-16 NOTE — PROGRESS NOTES
[facility]     Irma Pedraza  1950   [id number]      Patient Care Team:  Eve Navarrete MD as PCP - General (Internal Medicine)  Gold Swift DO as Consulting Physician (Rheumatology)  Sabrina Angelo MD as Consulting Physician (Plastic Surgery)  Juarez Pedersen MD as Consulting Physician (Otolaryngology)    Chief Complaint   Patient presents with   • Hypertension     f/u   • Hyperlipidemia     she's fasting            HPI  Patient is a 70 y.o. female presents with follow-up hypertension, hyperlipidemia, chronic cough    CHRONIC CONDITIONS  Chronic cough and shortness of breath are stable.  She does get short of breath with exercise.  No worse than usual.    Hasn't walked much.  She has been taking care of grandchildren and home schooling.  She is back in her own home now.    BP's at home about 128/70 usually.  Takes hydrochlorothiazide daily.    Hyperlipidemia -takes pravastatin. Eats low fat diet.    Joint pain doing pretty well on 2.5mg prednisone and actemra.     Osteoporosis -doing reclast once a year.  Last DEXA was 2/2019.    Past Medical History:   Diagnosis Date   • Adenomyomatosis of gallbladder 2009    by ultrasound, Dr. 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   • Panic disorder 2/28/2019   • PMR (polymyalgia rheumatica) (CMS/[facility])        Past Surgical History:   Procedure Laterality Date   • COLONOSCOPY  02/2009   • COSMETIC SURGERY  1994    face lift   • COSMETIC SURGERY  1993    TUMMY TUCK   • COSMETIC SURGERY  1980    Birth kenny removal   • ELBOW PROCEDURE Left     x2   • THROAT SURGERY      remote removal of growth from  throat       Family History   Problem Relation Age of Onset   • Breast cancer Maternal Grandmother 90   • Osteoporosis Mother    • Osteoporosis Father    • Migraines Father    • Brain cancer Sister    • Stroke Brother    • Stroke Paternal Grandmother    • Ovarian cancer Neg Hx    • Endometrial cancer Neg Hx        Social History     Socioeconomic History   • Marital status:      Spouse name: Not on file   • Number of children: Not on file   • Years of education: Not on file   • Highest education level: Not on file   Tobacco Use   • Smoking status: Current Every Day Smoker     Packs/day: 0.50     Types: Cigarettes   • Smokeless tobacco: Never Used   • Tobacco comment: patient refused smoking literature   Substance and Sexual Activity   • Alcohol use: Yes     Comment: 4 -5 days a week   • Drug use: No   • Sexual activity: Defer       Allergies   Allergen Reactions   • Bactrim [Sulfamethoxazole-Trimethoprim] Other (See Comments)     Hypokalemia, hyponatremia   • Atorvastatin Unknown (See Comments)     Lipitor     • Bupropion Hcl [Bupropion] Unknown (See Comments)     wellbutrin   • Calcium Unknown (See Comments)     unknown       Review of Systems:     Review of Systems   Constitutional: Negative for chills, fatigue and fever.   Respiratory: Positive for cough and shortness of breath. Negative for wheezing.    Cardiovascular: Negative for chest pain, palpitations and leg swelling.   Gastrointestinal: Positive for GERD. Negative for abdominal pain, blood in stool, constipation and diarrhea.   Genitourinary: Negative for dysuria and frequency.   Musculoskeletal: Positive for arthralgias and back pain.   Psychiatric/Behavioral: Negative for dysphoric mood. The patient is not nervous/anxious.        Vital Signs  Vitals:    07/16/20 1024   BP: 130/72   BP Location: Right arm   Patient Position: Sitting   Cuff Size: Adult   Pulse: 68   Temp: 98.4 °F (36.9 °C)   TempSrc: Temporal   Weight: 69.9 kg (154 lb 3.2 oz)  "  Height: 157 cm (61.81\")   PainSc: 0-No pain     Body mass index is 28.38 kg/m².      Current Outpatient Medications:   •  acyclovir (ZOVIRAX) 400 MG tablet, Take 400 mg by mouth As Needed., Disp: , Rfl:   •  Azelastine-Fluticasone 137-50 MCG/ACT suspension, 2 sprays into the nostril(s) as directed by provider Every 12 (Twelve) Hours., Disp: , Rfl:   •  benzonatate (TESSALON) 200 MG capsule, Take 1 capsule by mouth 3 (Three) Times a Day As Needed for Cough., Disp: 30 capsule, Rfl: 2  •  busPIRone (BUSPAR) 5 MG tablet, Take 1 tablet by mouth 3 (Three) Times a Day As Needed (anxiety)., Disp: 90 tablet, Rfl: 1  •  Cholecalciferol (VITAMIN D3) 2000 units capsule, Take 2,000 Units by mouth Daily., Disp: , Rfl:   •  Coenzyme Q10 (COQ-10) 200 MG capsule, Take 1 tablet by mouth Daily., Disp: , Rfl:   •  cyclobenzaprine (FLEXERIL) 10 MG tablet, Take 1 tablet by mouth 2 (Two) Times a Day As Needed for Muscle Spasms., Disp: 30 tablet, Rfl: 1  •  DEXILANT 60 MG capsule, take 1 capsule by mouth once daily, Disp: 90 capsule, Rfl: 3  •  famotidine (PEPCID) 20 MG tablet, TAKE 1 TABLET BY MOUTH TWICE DAILY, Disp: 60 tablet, Rfl: 5  •  Fluticasone-Umeclidin-Vilant (Trelegy Ellipta) 100-62.5-25 MCG/INH aerosol powder , Inhale 1 puff Daily., Disp: 1 each, Rfl: 5  •  Lactobacillus-Inulin ([facility] PO), Take 1 capsule by mouth Daily., Disp: , Rfl:   •  Magnesium Gluconate 550 MG tablet, Take 1 tablet by mouth Daily., Disp: , Rfl:   •  pravastatin (PRAVACHOL) 80 MG tablet, Take 80 mg by mouth Daily., Disp: , Rfl:   •  prednisoLONE acetate (PRED FORTE) 1 % ophthalmic suspension, APPLY 1 DROP INTO BOTH EYES ONCE A DAY AS DIRECTED, Disp: , Rfl: 0  •  predniSONE (DELTASONE) 2.5 MG tablet, Take 1 tablet by mouth Daily., Disp: 90 tablet, Rfl: 1  •  Probiotic Product ([employer] PO), Take 1 capsule by mouth Daily., Disp: , Rfl:   •  tocilizumab (ACTEMRA) 200 MG/10ML solution injection, Infuse 8 mg/kg into a venous " catheter., Disp: , Rfl:   •  VENTOLIN  (90 Base) MCG/ACT inhaler, INHALE 2 PUFFS EVERY 4 HOURS TO 6 HOURS AS NEEDED, Disp: 18 g, Rfl: 5  •  zoledronic acid (RECLAST) 5 MG/100ML solution infusion, Administer ZOLEDRONIC ACID 5mg IV once annually, Disp: , Rfl:   •  hydroCHLOROthiazide (HYDRODIURIL) 25 MG tablet, TAKE 1 TABLET BY MOUTH ONCE DAILY., Disp: 90 tablet, Rfl: 1  •  montelukast (SINGULAIR) 10 MG tablet, Take 1 tablet by mouth Every Night., Disp: 30 tablet, Rfl: 5    Physical Exam:    Physical Exam   Constitutional: She is oriented to person, place, and time. She appears well-developed and well-nourished. She appears overweight.   HENT:   Head: Normocephalic.   Eyes: Pupils are equal, round, and reactive to light. Conjunctivae and EOM are normal.   Neck: Normal range of motion. Neck supple. No thyromegaly present.   Cardiovascular: Normal rate, regular rhythm, normal heart sounds and intact distal pulses.   Pulmonary/Chest: Effort normal and breath sounds normal.   Musculoskeletal: Normal range of motion. She exhibits no edema.   Lymphadenopathy:     She has no cervical adenopathy.   Neurological: She is alert and oriented to person, place, and time.   Psychiatric: She has a normal mood and affect. Thought content normal.   Nursing note and vitals reviewed.       ACE III MINI        Results Review:    None    CMP:  Lab Results   Component Value Date    GLU 96 03/05/2019    BUN 18 03/05/2019    CREATININE 1.06 03/05/2019    EGFRIFNONA 51 (L) 03/05/2019    EGFRIFAFRI 62 03/05/2019    BCR 17.0 03/05/2019     03/05/2019    K 4.0 03/05/2019    CO2 28.0 03/05/2019    CALCIUM 10.3 03/05/2019    PROTENTOTREF 6.8 03/05/2019    ALBUMIN 5.14 (H) 03/05/2019    LABGLOBREF 1.7 03/05/2019    LABIL2 3.1 (H) 03/05/2019    BILITOT 0.9 03/05/2019    ALKPHOS 55 03/05/2019    AST 22 03/05/2019    ALT 36 03/05/2019     HbA1c:  No results found for: HGBA1C  Microalbumin:  Lab Results   Component Value Date    MICROALBUR  14.1 01/28/2020     Lipid Panel  Lab Results   Component Value Date    CHOL 220 (H) 01/08/2019    TRIG 192 (H) 01/08/2019    HDL 51 01/08/2019     (H) 01/08/2019    AST 22 03/05/2019    ALT 36 03/05/2019       Medication Review: Medications reviewed and noted  Patient Instructions   Problem List Items Addressed This Visit        Cardiovascular and Mediastinum    Mixed hyperlipidemia    Overview     7/16/2020 Eve Navarrete MD    LDL (bad cholesterol) was elevated at 152.  Goal is less than 100.  Triglycerides are elevated at 244.  Goal is less than 150.    Continue pravastatin 80 mg daily.  Increase exercise.  Try to walk some every day.    Continue to improve low-fat diet and eat smaller portions at meals.    Continue to weigh at home to monitor progress.  Try to lose about 8 more pounds over the next 6 months.         Relevant Medications    pravastatin (PRAVACHOL) 80 MG tablet    Other Relevant Orders    Lipid Panel    Benign essential hypertension    Overview     7/16/2020 Eve Navarrete MD    Continue hydrochlorothiazide daily.  Continue to avoid salt in the diet.         Relevant Medications    hydroCHLOROthiazide (HYDRODIURIL) 25 MG tablet       Respiratory    Chronic obstructive lung disease (CMS/HCC)    Overview     7/16/2020 Eve Navarrete MD    Spirometry  shows moderate obstruction without improvement after bronchodilator.    Continue Trelegy Ellipta inhaler (3 medications in one inhaler) 1 puff daily.  Continue rescue inhaler as needed for cough wheeze or lung congestion.    Add montelukast (Singulair) tablet every evening to see if that helps with allergies and breathing.    New medication added today. Benefits and possible side effects discussed. Patient verbalized understanding.           Relevant Medications    Azelastine-Fluticasone 137-50 MCG/ACT suspension    VENTOLIN  (90 Base) MCG/ACT inhaler    predniSONE (DELTASONE) 2.5 MG tablet    benzonatate (TESSALON) 200 MG capsule     Fluticasone-Umeclidin-Vilant (Trelegy Ellipta) 100-62.5-25 MCG/INH aerosol powder     montelukast (SINGULAIR) 10 MG tablet       Nervous and Auditory    Chronic low back pain - Primary    Overview     7/16/2020 Eve Navarrete MD    Do some back stretches every morning.  Use moist heat to relax tight back muscles.  Walk some every day.            Musculoskeletal and Integument    Senile osteoporosis    Overview     7/16/2020 Eve Navarrete MD    T-scores  on 2/12/19 DEXA showed osteoporosis of the spine and osteopenia of the hips.      Continue Reclast injections yearly.    Follow-up with Dr. Swift.            Other    Overweight with body mass index (BMI) of 28 to 28.9 in adult (Chronic)    Overview     7/16/2020 Eve Navarrete MD    Start walking some every day.  Use hand weights 5 to 10 minutes a day.  Eat smaller portions at mealtime and walk less.      Weigh every Monday morning to monitor progress.  Try to lose some weight over the next few months.         Generalized anxiety disorder    Overview     7/16/2020 Eve Navarrete MD    Continue buspirone 3 times a day as needed.  Walking, exercise and physical activity also help.         Relevant Medications    busPIRone (BUSPAR) 5 MG tablet    Tobacco dependence syndrome    Overview     7/16/2020 Eve Navarrete MD    We discussed some smoking cessation strategies.                  Diagnosis Plan   1. Chronic right-sided low back pain without sciatica     2. Mixed hyperlipidemia  Lipid Panel    Lipid Panel   3. Benign essential hypertension     4. Simple chronic bronchitis (CMS/HCC)     5. Generalized anxiety disorder  busPIRone (BUSPAR) 5 MG tablet   6. Senile osteoporosis     7. Overweight with body mass index (BMI) of 28 to 28.9 in adult     8. Tobacco dependence syndrome         Plan of care reviewed with patient at the conclusion of today's visit. Education was provided regarding diagnosis, management, and any prescribed or recommended OTC  medications.Patient verbalizes understanding of and agreement with management plan.         Eve Navarrete MD

## 2020-10-27 RX ORDER — HYDROCHLOROTHIAZIDE 25 MG/1
TABLET ORAL
Qty: 90 TABLET | Refills: 1 | Status: SHIPPED | OUTPATIENT
Start: 2020-10-27 | End: 2021-02-01 | Stop reason: SDUPTHER

## 2020-12-07 RX ORDER — BENZONATATE 200 MG/1
200 CAPSULE ORAL 3 TIMES DAILY PRN
Qty: 30 CAPSULE | Refills: 2 | Status: SHIPPED | OUTPATIENT
Start: 2020-12-07 | End: 2021-06-30

## 2021-01-05 RX ORDER — DEXLANSOPRAZOLE 60 MG/1
1 CAPSULE, DELAYED RELEASE ORAL DAILY
Qty: 90 CAPSULE | Refills: 3 | Status: SHIPPED | OUTPATIENT
Start: 2021-01-05 | End: 2021-02-01

## 2021-02-01 ENCOUNTER — OFFICE VISIT (OUTPATIENT)
Dept: INTERNAL MEDICINE | Facility: CLINIC | Age: 71
End: 2021-02-01

## 2021-02-01 VITALS
SYSTOLIC BLOOD PRESSURE: 136 MMHG | OXYGEN SATURATION: 98 % | TEMPERATURE: 97.5 F | DIASTOLIC BLOOD PRESSURE: 78 MMHG | HEIGHT: 62 IN | BODY MASS INDEX: 28.89 KG/M2 | HEART RATE: 72 BPM | WEIGHT: 157 LBS

## 2021-02-01 DIAGNOSIS — E66.3 OVERWEIGHT WITH BODY MASS INDEX (BMI) OF 28 TO 28.9 IN ADULT: Chronic | ICD-10-CM

## 2021-02-01 DIAGNOSIS — F41.1 GENERALIZED ANXIETY DISORDER: ICD-10-CM

## 2021-02-01 DIAGNOSIS — G89.29 CHRONIC UPPER BACK PAIN: ICD-10-CM

## 2021-02-01 DIAGNOSIS — Z00.00 MEDICARE ANNUAL WELLNESS VISIT, SUBSEQUENT: Primary | ICD-10-CM

## 2021-02-01 DIAGNOSIS — F17.200 TOBACCO DEPENDENCE SYNDROME: ICD-10-CM

## 2021-02-01 DIAGNOSIS — I10 BENIGN ESSENTIAL HYPERTENSION: ICD-10-CM

## 2021-02-01 DIAGNOSIS — E78.2 MIXED HYPERLIPIDEMIA: ICD-10-CM

## 2021-02-01 DIAGNOSIS — Z00.00 ANNUAL PHYSICAL EXAM: ICD-10-CM

## 2021-02-01 DIAGNOSIS — E55.9 VITAMIN D DEFICIENCY: Chronic | ICD-10-CM

## 2021-02-01 DIAGNOSIS — Z11.59 ENCOUNTER FOR HEPATITIS C SCREENING TEST FOR LOW RISK PATIENT: ICD-10-CM

## 2021-02-01 DIAGNOSIS — M31.6 TEMPORAL ARTERITIS (HCC): ICD-10-CM

## 2021-02-01 DIAGNOSIS — M81.0 SENILE OSTEOPOROSIS: ICD-10-CM

## 2021-02-01 DIAGNOSIS — K21.9 GERD WITHOUT ESOPHAGITIS: ICD-10-CM

## 2021-02-01 DIAGNOSIS — K76.0 FATTY LIVER: ICD-10-CM

## 2021-02-01 DIAGNOSIS — M05.9 RHEUMATOID ARTHRITIS, SEROPOSITIVE (HCC): Chronic | ICD-10-CM

## 2021-02-01 DIAGNOSIS — J41.0 SIMPLE CHRONIC BRONCHITIS (HCC): ICD-10-CM

## 2021-02-01 DIAGNOSIS — M54.9 CHRONIC UPPER BACK PAIN: ICD-10-CM

## 2021-02-01 DIAGNOSIS — M35.3 POLYMYALGIA RHEUMATICA (HCC): ICD-10-CM

## 2021-02-01 PROCEDURE — 99397 PER PM REEVAL EST PAT 65+ YR: CPT | Performed by: INTERNAL MEDICINE

## 2021-02-01 PROCEDURE — 99406 BEHAV CHNG SMOKING 3-10 MIN: CPT | Performed by: INTERNAL MEDICINE

## 2021-02-01 PROCEDURE — G0439 PPPS, SUBSEQ VISIT: HCPCS | Performed by: INTERNAL MEDICINE

## 2021-02-01 PROCEDURE — 1159F MED LIST DOCD IN RCRD: CPT | Performed by: INTERNAL MEDICINE

## 2021-02-01 PROCEDURE — 1170F FXNL STATUS ASSESSED: CPT | Performed by: INTERNAL MEDICINE

## 2021-02-01 PROCEDURE — 96160 PT-FOCUSED HLTH RISK ASSMT: CPT | Performed by: INTERNAL MEDICINE

## 2021-02-01 PROCEDURE — 90694 VACC AIIV4 NO PRSRV 0.5ML IM: CPT | Performed by: INTERNAL MEDICINE

## 2021-02-01 PROCEDURE — G0008 ADMIN INFLUENZA VIRUS VAC: HCPCS | Performed by: INTERNAL MEDICINE

## 2021-02-01 RX ORDER — HYDROCHLOROTHIAZIDE 25 MG/1
25 TABLET ORAL DAILY
Qty: 90 TABLET | Refills: 1 | Status: SHIPPED | OUTPATIENT
Start: 2021-02-01 | End: 2021-02-18

## 2021-02-01 RX ORDER — MONTELUKAST SODIUM 10 MG/1
10 TABLET ORAL NIGHTLY
Qty: 90 TABLET | Refills: 1 | Status: SHIPPED | OUTPATIENT
Start: 2021-02-01 | End: 2021-07-27

## 2021-02-01 RX ORDER — ALBUTEROL SULFATE 90 UG/1
2 AEROSOL, METERED RESPIRATORY (INHALATION) EVERY 4 HOURS PRN
Qty: 18 G | Refills: 5 | Status: SHIPPED | OUTPATIENT
Start: 2021-02-01 | End: 2022-02-03 | Stop reason: SDUPTHER

## 2021-02-01 RX ORDER — FAMOTIDINE 20 MG/1
20 TABLET, FILM COATED ORAL 2 TIMES DAILY
Qty: 180 TABLET | Refills: 1 | Status: SHIPPED | OUTPATIENT
Start: 2021-02-01 | End: 2021-02-23

## 2021-02-01 RX ORDER — PRAVASTATIN SODIUM 80 MG/1
80 TABLET ORAL DAILY
Qty: 90 TABLET | Refills: 1 | Status: SHIPPED | OUTPATIENT
Start: 2021-02-01 | End: 2021-02-06

## 2021-02-01 RX ORDER — BUPROPION HYDROCHLORIDE 150 MG/1
150 TABLET, EXTENDED RELEASE ORAL 2 TIMES DAILY
Qty: 180 TABLET | Refills: 1 | Status: SHIPPED | OUTPATIENT
Start: 2021-02-01 | End: 2021-07-27

## 2021-02-01 NOTE — PROGRESS NOTES
The ABCs of the Annual Wellness Visit  Initial Medicare Wellness Visit    Chief Complaint   Patient presents with   • Medicare Wellness-subsequent   • Hypertension     f/u   • Smoking cessation     wants to take bupropion       Subjective   History of Present Illness:  Irma Pedraza is a 70 y.o. female who presents for an Initial Medicare Wellness Visit, and smoking cessation, chronic cough and sputum production, and follow-up chronic conditions including hyperlipidemia, hypertension, osteoporosis, GERD, anxiety.    HPI  She states she is ready to quit smoking now.  She took 's bupropion SR 150mg twice a day  for 2-3 wks and it did decrease her cravings for cigarettes. Usually smokes over a pack a day and now down to 1/2 pack per day. No side effects with it. She wants her own RX. Had nightmares with chantix in the past.     HPI  Increased sputum production last few wks. Cream colored sputum. No increase in frequency of cough. No increase in usual shortness of breath with exercise.  No wheezing.    CHRONIC CONDITIONS:    Hyperlipidemia-trying to eat less fats and sugars. Not exercising much.     BPs at home 120s/70s. Takes med regularly.     GERD-ran out of dexilant about  4 wks ago and din't know we had refilled it. She had continued taking famotidine twice a day. No symptoms until last nite when woke up with some heartburn.    Anxiety doing pretty well. Only needs buspirone about once every 2 wks or so. It helps a lot when she does need it.     PMR-takes 2.5mg daily which is helping a lot.  Getting actemra infusions which have helped a lot. Takes tylenol occasionally. Helps some.     Upper back pain on and off.  It has been the most painful spot lately.  Muscles on both sides get tight, but especially on the left.  She has not been using moist heat on it but does use the heating pad sometimes.  That helps a bit.  Not doing any back exercises.    Osteoporosis-sees Dr. Swift and gets reclast  injections yearly. Not doing much weight bearing exercises.    HEALTH RISK ASSESSMENT    Recent Hospitalizations:  No hospitalization(s) within the last year.    Current Medical Providers:  Patient Care Team:  Eve Navarrete MD as PCP - General (Internal Medicine)  Gold Swift DO as Consulting Physician (Rheumatology)  Sabrina Angelo MD as Consulting Physician (Plastic Surgery)  Juarez Pedersen MD as Consulting Physician (Otolaryngology)    Smoking Status:  Social History     Tobacco Use   Smoking Status Current Every Day Smoker   • Packs/day: 0.50   • Years: 50.00   • Pack years: 25.00   • Types: Cigarettes   Smokeless Tobacco Never Used   Tobacco Comment    patient refused smoking literature       Alcohol Consumption:  Social History     Substance and Sexual Activity   Alcohol Use Yes   • Frequency: 4 or more times a week   • Drinks per session: 1 or 2   • Binge frequency: Never    Comment: 4 -5 days a week       Depression Screen:   PHQ-2/PHQ-9 Depression Screening 2/1/2021   Little interest or pleasure in doing things 0   Feeling down, depressed, or hopeless 0   Total Score 0       Fall Risk Screen:  STEADI Fall Risk Assessment was completed, and patient is at LOW risk for falls.Assessment completed on:2/1/2021    Health Habits and Functional and Cognitive Screening:  Functional & Cognitive Status 2/1/2021   Do you have difficulty preparing food and eating? No   Do you have difficulty bathing yourself, getting dressed or grooming yourself? No   Do you have difficulty using the toilet? No   Do you have difficulty moving around from place to place? No   Do you have trouble with steps or getting out of a bed or a chair? No   Current Diet Unhealthy Diet   Dental Exam Up to date   Eye Exam Up to date   Exercise (times per week) 3 times per week   Current Exercise Activities Include Walking   Do you need help using the phone?  No   Are you deaf or do you have serious difficulty hearing?  No    Do you need help with transportation? No   Do you need help shopping? No   Do you need help preparing meals?  No   Do you need help with housework?  No   Do you need help with laundry? No   Do you need help taking your medications? No   Do you need help managing money? No   Do you ever drive or ride in a car without wearing a seat belt? No   Have you felt unusual stress, anger or loneliness in the last month? No   Who do you live with? Spouse   If you need help, do you have trouble finding someone available to you? No   Have you been bothered in the last four weeks by sexual problems? No   Do you have difficulty concentrating, remembering or making decisions? No       Does the patient have evidence of cognitive impairment? No    Asprin use counseling:Does not need ASA (and currently is not on it)    Age-appropriate Screening Schedule:  Refer to the list below for future screening recommendations based on patient's age, sex and/or medical conditions. Orders for these recommended tests are listed in the plan section. The patient has been provided with a written plan.    Health Maintenance   Topic Date Due   • INFLUENZA VACCINE  08/01/2020   • DXA SCAN  02/13/2021   • LIPID PANEL  07/16/2021   • MAMMOGRAM  01/20/2022   • TDAP/TD VACCINES (2 - Td) 04/11/2022   • COLONOSCOPY  03/09/2025   • ZOSTER VACCINE  Completed        The following portions of the patient's history were reviewed and updated as appropriate: allergies, current medications, past family history, past medical history, past social history, past surgical history and problem list.    Outpatient Medications Prior to Visit   Medication Sig Dispense Refill   • acyclovir (ZOVIRAX) 400 MG tablet Take 400 mg by mouth As Needed.     • benzonatate (TESSALON) 200 MG capsule Take 1 capsule by mouth 3 (Three) Times a Day As Needed for Cough. 30 capsule 2   • busPIRone (BUSPAR) 5 MG tablet Take 1 tablet by mouth 3 (Three) Times a Day As Needed (anxiety). 90 tablet 1    • Cholecalciferol (VITAMIN D3) 2000 units capsule Take 2,000 Units by mouth Daily.     • Coenzyme Q10 (COQ-10) 200 MG capsule Take 1 tablet by mouth Daily.     • cyclobenzaprine (FLEXERIL) 10 MG tablet Take 1 tablet by mouth 2 (Two) Times a Day As Needed for Muscle Spasms. 30 tablet 1   • Lactobacillus-Inulin ([facility] [employer] PO) Take 1 capsule by mouth Daily.     • Magnesium Gluconate 550 MG tablet Take 1 tablet by mouth Daily.     • predniSONE (DELTASONE) 2.5 MG tablet Take 1 tablet by mouth Daily. 90 tablet 1   • Probiotic Product (Questli PO) Take 1 capsule by mouth Daily.     • tocilizumab (ACTEMRA) 200 MG/10ML solution injection Infuse 8 mg/kg into a venous catheter.     • zoledronic acid (RECLAST) 5 MG/100ML solution infusion Administer ZOLEDRONIC ACID 5mg IV once annually     • dexlansoprazole (Dexilant) 60 MG capsule Take 1 capsule by mouth Daily. 90 capsule 3   • famotidine (PEPCID) 20 MG tablet TAKE 1 TABLET BY MOUTH TWICE DAILY 60 tablet 5   • Fluticasone-Umeclidin-Vilant (Trelegy Ellipta) 100-62.5-25 MCG/INH aerosol powder  Inhale 1 puff Daily. 1 each 5   • hydroCHLOROthiazide (HYDRODIURIL) 25 MG tablet TAKE 1 TABLET BY MOUTH EVERY DAY 90 tablet 1   • pravastatin (PRAVACHOL) 80 MG tablet Take 80 mg by mouth Daily.     • VENTOLIN  (90 Base) MCG/ACT inhaler INHALE 2 PUFFS EVERY 4 HOURS TO 6 HOURS AS NEEDED 18 g 5   • Azelastine-Fluticasone 137-50 MCG/ACT suspension 2 sprays into the nostril(s) as directed by provider Every 12 (Twelve) Hours.     • prednisoLONE acetate (PRED FORTE) 1 % ophthalmic suspension APPLY 1 DROP INTO BOTH EYES ONCE A DAY AS DIRECTED  0   • montelukast (SINGULAIR) 10 MG tablet Take 1 tablet by mouth Every Night. 30 tablet 5     No facility-administered medications prior to visit.        Patient Active Problem List   Diagnosis   • Cervicalgia   • Generalized anxiety disorder   • Mixed hyperlipidemia   • Mitral valve disorder   • Tinnitus   •  Vitamin D deficiency   • Senile osteoporosis   • Dyspnea   • Polymyalgia rheumatica (CMS/HCC)   • Primary insomnia   • Chronic pain of both shoulders   • Chronic upper back pain   • Drug-induced hypokalemia   • Benign essential hypertension   • Benign paroxysmal positional vertigo   • Chronic low back pain   • Chronic obstructive lung disease (CMS/HCC)   • GERD without esophagitis   • Muscle spasms of both lower extremities   • Temporal arteritis (CMS/HCC)   • Tobacco dependence syndrome   • Fatty liver   • Spasm of thoracic back muscle   • Overweight with body mass index (BMI) of 28 to 28.9 in adult   • Annual physical exam   • Medicare annual wellness visit, subsequent   • Rheumatoid arthritis, seropositive (CMS/HCC)       Advanced Care Planning:  ACP discussion was held with the patient during this visit. Patient has an advance directive in EMR which is still valid.  Patient does not have an advance directive, information provided.    Review of Systems   Constitutional: Negative for chills, fatigue and fever.   HENT: Negative for congestion, ear pain, hearing loss and sinus pressure.    Eyes: Negative for visual disturbance.   Respiratory: Positive for cough and shortness of breath. Negative for chest tightness and wheezing.    Cardiovascular: Negative for chest pain, palpitations and leg swelling.   Gastrointestinal: Negative for abdominal pain, blood in stool, constipation, diarrhea and nausea.        Reflux, GERD   Endocrine: Negative for cold intolerance and heat intolerance.   Genitourinary: Positive for dysuria. Negative for frequency.   Musculoskeletal: Positive for arthralgias and back pain. Negative for gait problem.   Skin: Negative for color change and rash.   Allergic/Immunologic: Positive for environmental allergies. Negative for food allergies.   Neurological: Negative for dizziness and headaches.   Hematological: Negative for adenopathy. Does not bruise/bleed easily.   Psychiatric/Behavioral:  "Negative for dysphoric mood, sleep disturbance and suicidal ideas. The patient is nervous/anxious.        Compared to one year ago, the patient feels her physical health is the same.  Compared to one year ago, the patient feels her mental health is the same.    Reviewed chart for potential of high risk medication in the elderly: yes  Reviewed chart for potential of harmful drug interactions in the elderly:yes    Objective         Vitals:    02/01/21 0852   BP: 136/78   BP Location: Left arm   Patient Position: Sitting   Cuff Size: Adult   Pulse: 72   Temp: 97.5 °F (36.4 °C)   TempSrc: Infrared   SpO2: 98%   Weight: 71.2 kg (157 lb)   Height: 157 cm (61.81\")   PainSc: 0-No pain       Body mass index is 28.89 kg/m².  Discussed the patient's BMI with her. The BMI is above average; BMI management plan is completed.    Physical Exam  Vitals signs and nursing note reviewed.   Constitutional:       Appearance: She is well-developed and overweight.   HENT:      Head: Normocephalic.   Eyes:      Conjunctiva/sclera: Conjunctivae normal.      Pupils: Pupils are equal, round, and reactive to light.   Neck:      Musculoskeletal: Normal range of motion and neck supple.      Thyroid: No thyromegaly.   Cardiovascular:      Rate and Rhythm: Normal rate and regular rhythm.      Heart sounds: Normal heart sounds.   Pulmonary:      Effort: Pulmonary effort is normal.      Breath sounds: Normal breath sounds. No wheezing.   Chest:      Breasts:         Right: No inverted nipple, mass, nipple discharge, skin change or tenderness.         Left: No inverted nipple, mass, nipple discharge, skin change or tenderness.   Abdominal:      General: Bowel sounds are normal.      Palpations: Abdomen is soft.      Tenderness: There is no abdominal tenderness.   Musculoskeletal: Normal range of motion.         General: No tenderness.      Cervical back: She exhibits deformity.        Back:    Lymphadenopathy:      Cervical: No cervical adenopathy. "   Skin:     General: Skin is warm and dry.      Findings: No rash.   Neurological:      Mental Status: She is alert and oriented to person, place, and time.      Cranial Nerves: No cranial nerve deficit.      Sensory: No sensory deficit.      Coordination: Coordination normal.      Gait: Gait normal.   Psychiatric:         Speech: Speech normal.         Behavior: Behavior normal.         Thought Content: Thought content normal.         Judgment: Judgment normal.               Assessment/Plan   Medicare Risks and Personalized Health Plan  CMS Preventative Services Quick Reference  Cardiovascular risk  Fall Risk  Obesity/Overweight   Osteoprorosis Risk    The above risks/problems have been discussed with the patient.  Pertinent information has been shared with the patient in the After Visit Summary.  Follow up plans and orders are seen below in the Assessment/Plan Section.  Patient Instructions   Problem List Items Addressed This Visit        Cardiac and Vasculature    Mixed hyperlipidemia    Overview     2/1/2021 Eve Navarrete MD    LDL (bad cholesterol) July 2020 was elevated over 170.  Goal is less than 100.  Triglycerides were also elevated.  Goal is less than 150.    Continue pravastatin 80 mg daily.  Increase exercise.  Try to walk or do some other kind of exercise some every day.  Goal is to get at least 30 minutes a day 5 days a week.    Continue to improve low-fat diet and eat smaller portions at meals.    Continue to weigh at home to monitor progress.  Try to lose about 5 pounds over the next 6 months.         Relevant Medications    pravastatin (PRAVACHOL) 80 MG tablet    Other Relevant Orders    Lipid Panel    TSH    T4, Free    Benign essential hypertension    Overview     2/1/2021 Eve Navarrete MD    Continue hydrochlorothiazide daily.  Continue to avoid salt in the diet.  Regular exercise also helps control blood pressure.         Relevant Medications    hydroCHLOROthiazide (HYDRODIURIL) 25 MG  tablet    Other Relevant Orders    CBC & Differential    Comprehensive Metabolic Panel    Microalbumin / Creatinine Urine Ratio - Urine, Clean Catch    Urinalysis With Culture If Indicated -       Endocrine and Metabolic    Vitamin D deficiency (Chronic)    Overview     2/1/2021 Eve Navarrete MD    Continue 2000 units vitamin D3 daily and calcium daily.         Relevant Orders    Vitamin D 25 Hydroxy       Gastrointestinal Abdominal     GERD without esophagitis    Overview     2/1/2021 Eve Navarrete MD    Since there have not been many reflux symptoms over the last 4 weeks without Dexilant, we will continue without it for now.  Continue famotidine twice a day.  An extra famotidine may be taken if needed for occasional breakthrough symptoms.    Continue to avoid eating close to bedtime and avoid large meals.  Weight loss also helps.         Relevant Medications    famotidine (PEPCID) 20 MG tablet    Fatty liver    Overview     2/1/2021 Eve Navarrete MD    Continue to improve low-fat diet and lose more weight.    Recheck liver enzymes today.            Health Encounters    Annual physical exam    Medicare annual wellness visit, subsequent - Primary       Mental Health    Generalized anxiety disorder    Overview     2/1/2021 Eve Navarrete MD    Take bupropion  mg tablet twice a day.  Continue buspirone as needed.      Walking, exercise and physical activity also help decrease symptoms of stress, anxiety, and mood..         Relevant Medications    busPIRone (BUSPAR) 5 MG tablet    buPROPion SR (WELLBUTRIN SR) 150 MG 12 hr tablet       Multi-system (Lupus, Sarcoid...)    Polymyalgia rheumatica (CMS/HCC)    Overview     2/1/2021 Eve Navarrete MD    Continue Actemra infusions with Dr. Swift.  Continue low-dose prednisone daily.  May take Tylenol as needed for pain.      Try to get some mild to moderate exercise and physical activity every day.         Temporal arteritis (CMS/HCC)    Overview      2/1/2021 Eve Navarrete MD    Continue Actemra infusions with Dr. Swift.  Continue low-dose prednisone daily.  May take Tylenol as needed for pain.      Try to get some mild to moderate exercise and physical activity every day.              Musculoskeletal and Injuries    Rheumatoid arthritis, seropositive (CMS/HCC) (Chronic)    Overview     2/1/2021 Eve Navarrete MD    Very little evidence of rheumatoid arthritis in the hands at present.  Continue regular follow-up with Dr. Swift.         Relevant Medications    tocilizumab (ACTEMRA) 200 MG/10ML solution injection    predniSONE (DELTASONE) 2.5 MG tablet    Senile osteoporosis    Overview     2/1/2021 Eve Navarrete MD    T-scores  on 2/12/19 DEXA showed osteoporosis of the spine and osteopenia of the hips.      Continue Reclast injections yearly with Dr. Swift.    DEXA is due this month.    To keep your bones strong and healthy, would encourage weightbearing exercise 30 minutes 3 times a week at minimum.  Incorporate walking into daily activities, 30 minutes a day, 150 minutes a week, spread out over 5 days a week.     Do not smoke.  Maintain a normal body weight.    Eat a diet rich in calcium and vitamin D. Good sources of calcium are low-fat dairy products, dark green leafy vegetables, canned salmon or sardines, tofu, and calcium-fortified orange juice and cereals.    Avoid high protein diets. Protein is important, but too much animal protein can cause bone loss.    Limit caffeine, but moderate amounts of coffee and tea are fine. Avoid carbonated cola drinks as studies show drinking these puts you at greater risk for bone loss.    Recommend bone density study every 2-3 years.    Take calcium supplements and Vitamin D3 every day.             Chronic upper back pain    Overview     2/1/2021 Eve Navarrete MD    Use moist heat pack as needed for tight muscles and pain. Do some back stretches every day. Practice good posture. Take tylenol as  needed.            Pulmonary and Pneumonias    Chronic obstructive lung disease (CMS/HCC)    Overview     2/1/2021 Eve Navarrete MD    Last spirometry  showed moderate obstruction without improvement after bronchodilator.    Continue Trelegy Ellipta inhaler (3 medications in one inhaler) 1 puff daily.  Continue rescue inhaler as needed for cough wheeze or lung congestion.    Continue montelukast (Singulair) tablet every evening to help with allergies and breathing.             Relevant Medications    Azelastine-Fluticasone 137-50 MCG/ACT suspension    predniSONE (DELTASONE) 2.5 MG tablet    benzonatate (TESSALON) 200 MG capsule    albuterol sulfate HFA (Ventolin HFA) 108 (90 Base) MCG/ACT inhaler    montelukast (SINGULAIR) 10 MG tablet    Fluticasone-Umeclidin-Vilant (Trelegy Ellipta) 100-62.5-25 MCG/INH aerosol powder        Tobacco    Tobacco dependence syndrome    Overview     2/1/2021 Eve Navarrete MD    We discussed some smoking cessation strategies.Irma Pedraza  reports that she has been smoking cigarettes. She has a 25.00 pack-year smoking history. She has never used smokeless tobacco.. I have educated her on the risk of diseases from using tobacco products such as cancer, COPD and heart disease.     I advised her to quit and she is willing to quit. We have discussed the following method/s for tobacco cessation:  Education Material.  Together we have set a quit date for 3 months.  She will follow up with me in 6 months or sooner to check on her progress.    I spent 5.5 minutes counseling the patient.                   Other    Overweight with body mass index (BMI) of 28 to 28.9 in adult (Chronic)    Overview     2/1/2021 Eve Navarrete MD    Start walking or doing some other type of physical activity some every day.  Goal is to get 30 minutes a day 5 days a week of exercise.  Use hand weights 5 to 10 minutes a day.      Eat smaller portions at mealtime and snack less.      Weigh every Monday  morning to monitor progress.  Try to lose about 5 pounds over the next 6 months.           Other Visit Diagnoses     Encounter for hepatitis C screening test for low risk patient        Relevant Orders    Hepatitis C Antibody           Follow Up:  No follow-ups on file.     An After Visit Summary and PPPS were given to the patient.

## 2021-02-01 NOTE — PATIENT INSTRUCTIONS
Patient Instructions   Problem List Items Addressed This Visit        Cardiac and Vasculature    Mixed hyperlipidemia    Overview     2/1/2021 Eve Navarrete MD    LDL (bad cholesterol) July 2020 was elevated over 170.  Goal is less than 100.  Triglycerides were also elevated.  Goal is less than 150.    Continue pravastatin 80 mg daily.  Increase exercise.  Try to walk or do some other kind of exercise some every day.  Goal is to get at least 30 minutes a day 5 days a week.    Continue to improve low-fat diet and eat smaller portions at meals.    Continue to weigh at home to monitor progress.  Try to lose about 5 pounds over the next 6 months.         Relevant Medications    pravastatin (PRAVACHOL) 80 MG tablet    Other Relevant Orders    Lipid Panel    TSH    T4, Free    Benign essential hypertension    Overview     2/1/2021 Eve Navarrete MD    Continue hydrochlorothiazide daily.  Continue to avoid salt in the diet.  Regular exercise also helps control blood pressure.         Relevant Medications    hydroCHLOROthiazide (HYDRODIURIL) 25 MG tablet    Other Relevant Orders    CBC & Differential    Comprehensive Metabolic Panel    Microalbumin / Creatinine Urine Ratio - Urine, Clean Catch    Urinalysis With Culture If Indicated -       Endocrine and Metabolic    Vitamin D deficiency (Chronic)    Overview     2/1/2021 Eve Navarrete MD    Continue 2000 units vitamin D3 daily and calcium daily.         Relevant Orders    Vitamin D 25 Hydroxy       Gastrointestinal Abdominal     GERD without esophagitis    Overview     2/1/2021 Eve Navarrete MD    Since there have not been many reflux symptoms over the last 4 weeks without Dexilant, we will continue without it for now.  Continue famotidine twice a day.  An extra famotidine may be taken if needed for occasional breakthrough symptoms.    Continue to avoid eating close to bedtime and avoid large meals.  Weight loss also helps.         Relevant Medications     famotidine (PEPCID) 20 MG tablet    Fatty liver    Overview     2/1/2021 Eve Navarrete MD    Continue to improve low-fat diet and lose more weight.    Recheck liver enzymes today.            Health Encounters    Annual physical exam    Medicare annual wellness visit, subsequent - Primary       Mental Health    Generalized anxiety disorder    Overview     2/1/2021 Eve Navarrete MD    Take bupropion  mg tablet twice a day.  Continue buspirone as needed.      Walking, exercise and physical activity also help decrease symptoms of stress, anxiety, and mood..         Relevant Medications    busPIRone (BUSPAR) 5 MG tablet    buPROPion SR (WELLBUTRIN SR) 150 MG 12 hr tablet       Multi-system (Lupus, Sarcoid...)    Polymyalgia rheumatica (CMS/HCC)    Overview     2/1/2021 Eve Navarrete MD    Continue Actemra infusions with Dr. Swift.  Continue low-dose prednisone daily.  May take Tylenol as needed for pain.      Try to get some mild to moderate exercise and physical activity every day.         Temporal arteritis (CMS/HCC)    Overview     2/1/2021 Eve Navarrete MD    Continue Actemra infusions with Dr. Swift.  Continue low-dose prednisone daily.  May take Tylenol as needed for pain.      Try to get some mild to moderate exercise and physical activity every day.              Musculoskeletal and Injuries    Rheumatoid arthritis, seropositive (CMS/HCC) (Chronic)    Overview     2/1/2021 Eve Navarrete MD    Very little evidence of rheumatoid arthritis in the hands at present.  Continue regular follow-up with Dr. Swift.         Relevant Medications    tocilizumab (ACTEMRA) 200 MG/10ML solution injection    predniSONE (DELTASONE) 2.5 MG tablet    Senile osteoporosis    Overview     2/1/2021 Eve Navarrete MD    T-scores  on 2/12/19 DEXA showed osteoporosis of the spine and osteopenia of the hips.      Continue Reclast injections yearly with Dr. Swift.    DEXA is due this month.    To keep  your bones strong and healthy, would encourage weightbearing exercise 30 minutes 3 times a week at minimum.  Incorporate walking into daily activities, 30 minutes a day, 150 minutes a week, spread out over 5 days a week.     Do not smoke.  Maintain a normal body weight.    Eat a diet rich in calcium and vitamin D. Good sources of calcium are low-fat dairy products, dark green leafy vegetables, canned salmon or sardines, tofu, and calcium-fortified orange juice and cereals.    Avoid high protein diets. Protein is important, but too much animal protein can cause bone loss.    Limit caffeine, but moderate amounts of coffee and tea are fine. Avoid carbonated cola drinks as studies show drinking these puts you at greater risk for bone loss.    Recommend bone density study every 2-3 years.    Take calcium supplements and Vitamin D3 every day.             Chronic upper back pain    Overview     2/1/2021 Eve Navarrete MD    Use moist heat pack as needed for tight muscles and pain. Do some back stretches every day. Practice good posture. Take tylenol as needed.            Pulmonary and Pneumonias    Chronic obstructive lung disease (CMS/HCC)    Overview     2/1/2021 Eve Navarrete MD    Last spirometry  showed moderate obstruction without improvement after bronchodilator.    Continue Trelegy Ellipta inhaler (3 medications in one inhaler) 1 puff daily.  Continue rescue inhaler as needed for cough wheeze or lung congestion.    Continue montelukast (Singulair) tablet every evening to help with allergies and breathing.             Relevant Medications    Azelastine-Fluticasone 137-50 MCG/ACT suspension    predniSONE (DELTASONE) 2.5 MG tablet    benzonatate (TESSALON) 200 MG capsule    albuterol sulfate HFA (Ventolin HFA) 108 (90 Base) MCG/ACT inhaler    montelukast (SINGULAIR) 10 MG tablet    Fluticasone-Umeclidin-Vilant (Trelegy Ellipta) 100-62.5-25 MCG/INH aerosol powder        Tobacco    Tobacco dependence syndrome     Overview     2/1/2021 Eve Navarrete MD    We discussed some smoking cessation strategies.Irma Pedraza  reports that she has been smoking cigarettes. She has a 25.00 pack-year smoking history. She has never used smokeless tobacco.. I have educated her on the risk of diseases from using tobacco products such as cancer, COPD and heart disease.     I advised her to quit and she is willing to quit. We have discussed the following method/s for tobacco cessation:  Education Material.  Together we have set a quit date for 3 months.  She will follow up with me in 6 months or sooner to check on her progress.    I spent 5.5 minutes counseling the patient.                   Other    Overweight with body mass index (BMI) of 28 to 28.9 in adult (Chronic)    Overview     2/1/2021 Eve Navarrete MD    Start walking or doing some other type of physical activity some every day.  Goal is to get 30 minutes a day 5 days a week of exercise.  Use hand weights 5 to 10 minutes a day.      Eat smaller portions at mealtime and snack less.      Weigh every Monday morning to monitor progress.  Try to lose about 5 pounds over the next 6 months.           Other Visit Diagnoses     Encounter for hepatitis C screening test for low risk patient        Relevant Orders    Hepatitis C Antibody          Steps to Quit Smoking  Smoking tobacco is the leading cause of preventable death. It can affect almost every organ in the body. Smoking puts you and those around you at risk for developing many serious chronic diseases. Quitting smoking can be difficult, but it is one of the best things that you can do for your health. It is never too late to quit.  How do I get ready to quit?  When you decide to quit smoking, create a plan to help you succeed. Before you quit:  · Pick a date to quit. Set a date within the next 2 weeks to give you time to prepare.  · Write down the reasons why you are quitting. Keep this list in places where you will see it  often.  · Tell your family, friends, and co-workers that you are quitting. Support from your loved ones can make quitting easier.  · Talk with your health care provider about your options for quitting smoking.  · Find out what treatment options are covered by your health insurance.  · Identify people, places, things, and activities that make you want to smoke (triggers). Avoid them.  What first steps can I take to quit smoking?  · Throw away all cigarettes at home, at work, and in your car.  · Throw away smoking accessories, such as ashtrays and lighters.  · Clean your car. Make sure to empty the ashtray.  · Clean your home, including curtains and carpets.  What strategies can I use to quit smoking?  Talk with your health care provider about combining strategies, such as taking medicines while you are also receiving in-person counseling. Using these two strategies together makes you more likely to succeed in quitting than if you used either strategy on its own.  · If you are pregnant or breastfeeding, talk with your health care provider about finding counseling or other support strategies to quit smoking. Do not take medicine to help you quit smoking unless your health care provider tells you to do so.  To quit smoking:  Quit right away  · Quit smoking completely, instead of gradually reducing how much you smoke over a period of time. Research shows that stopping smoking right away is more successful than gradually quitting.  · Attend in-person counseling to help you build problem-solving skills. You are more likely to succeed in quitting if you attend counseling sessions regularly. Even short sessions of 10 minutes can be effective.  Take medicine  You may take medicines to help you quit smoking. Some medicines require a prescription and some you can purchase over-the-counter. Medicines may have nicotine in them to replace the nicotine in cigarettes. Medicines may:  · Help to stop cravings.  · Help to relieve  withdrawal symptoms.  Your health care provider may recommend:  · Nicotine patches, gum, or lozenges.  · Nicotine inhalers or sprays.  · Non-nicotine medicine that is taken by mouth.  Find resources  Find resources and support systems that can help you to quit smoking and remain smoke-free after you quit. These resources are most helpful when you use them often. They include:  · Online chats with a counselor.  · Telephone quitlines.  · Printed self-help materials.  · Support groups or group counseling.  · Text messaging programs.  · Mobile phone apps or applications. Use apps that can help you stick to your quit plan by providing reminders, tips, and encouragement. There are many free apps for mobile devices as well as websites. Examples include Quit Guide from the CDC and smokefree.gov  What things can I do to make it easier to quit?    · Reach out to your family and friends for support and encouragement. Call telephone quitlines (5-239-QUIT-NOW), reach out to support groups, or work with a counselor for support.  · Ask people who smoke to avoid smoking around you.  · Avoid places that trigger you to smoke, such as bars, parties, or smoke-break areas at work.  · Spend time with people who do not smoke.  · Lessen the stress in your life. Stress can be a smoking trigger for some people. To lessen stress, try:  ? Exercising regularly.  ? Doing deep-breathing exercises.  ? Doing yoga.  ? Meditating.  ? Performing a body scan. This involves closing your eyes, scanning your body from head to toe, and noticing which parts of your body are particularly tense. Try to relax the muscles in those areas.  How will I feel when I quit smoking?  Day 1 to 3 weeks  Within the first 24 hours of quitting smoking, you may start to feel withdrawal symptoms. These symptoms are usually most noticeable 2-3 days after quitting, but they usually do not last for more than 2-3 weeks. You may experience these symptoms:  · Mood  swings.  · Restlessness, anxiety, or irritability.  · Trouble concentrating.  · Dizziness.  · Strong cravings for sugary foods and nicotine.  · Mild weight gain.  · Constipation.  · Nausea.  · Coughing or a sore throat.  · Changes in how the medicines that you take for unrelated issues work in your body.  · Depression.  · Trouble sleeping (insomnia).  Week 3 and afterward  After the first 2-3 weeks of quitting, you may start to notice more positive results, such as:  · Improved sense of smell and taste.  · Decreased coughing and sore throat.  · Slower heart rate.  · Lower blood pressure.  · Clearer skin.  · The ability to breathe more easily.  · Fewer sick days.  Quitting smoking can be very challenging. Do not get discouraged if you are not successful the first time. Some people need to make many attempts to quit before they achieve long-term success. Do your best to stick to your quit plan, and talk with your health care provider if you have any questions or concerns.  Summary  · Smoking tobacco is the leading cause of preventable death. Quitting smoking is one of the best things that you can do for your health.  · When you decide to quit smoking, create a plan to help you succeed.  · Quit smoking right away, not slowly over a period of time.  · When you start quitting, seek help from your health care provider, family, or friends.  This information is not intended to replace advice given to you by your health care provider. Make sure you discuss any questions you have with your health care provider.  Document Revised: 09/11/2020 Document Reviewed: 03/07/2020  [employer] Patient Education © 2020 [employer]    Exercising to Stay Healthy  To become healthy and stay healthy, it is recommended that you do moderate-intensity and vigorous-intensity exercise. You can tell that you are exercising at a moderate intensity if your heart starts beating faster and you start breathing faster but can still hold a conversation.  You can tell that you are exercising at a vigorous intensity if you are breathing much harder and faster and cannot hold a conversation while exercising.  Exercising regularly is important. It has many health benefits, such as:  · Improving overall fitness, flexibility, and endurance.  · Increasing bone density.  · Helping with weight control.  · Decreasing body fat.  · Increasing muscle strength.  · Reducing stress and tension.  · Improving overall health.  How often should I exercise?  Choose an activity that you enjoy, and set realistic goals. Your health care provider can help you make an activity plan that works for you.  Exercise regularly as told by your health care provider. This may include:  · Doing strength training two times a week, such as:  ? Lifting weights.  ? Using resistance bands.  ? Push-ups.  ? Sit-ups.  ? Yoga.  · Doing a certain intensity of exercise for a given amount of time. Choose from these options:  ? A total of 150 minutes of moderate-intensity exercise every week.  ? A total of 75 minutes of vigorous-intensity exercise every week.  ? A mix of moderate-intensity and vigorous-intensity exercise every week.  Children, pregnant women, people who have not exercised regularly, people who are overweight, and older adults may need to talk with a health care provider about what activities are safe to do. If you have a medical condition, be sure to talk with your health care provider before you start a new exercise program.  What are some exercise ideas?  Moderate-intensity exercise ideas include:  · Walking 1 mile (1.6 km) in about 15 minutes.  · Biking.  · Hiking.  · Golfing.  · Dancing.  · Water aerobics.  Vigorous-intensity exercise ideas include:  · Walking 4.5 miles (7.2 km) or more in about 1 hour.  · Jogging or running 5 miles (8 km) in about 1 hour.  · Biking 10 miles (16.1 km) or more in about 1 hour.  · Lap swimming.  · Roller-skating or in-line skating.  · Cross-country  skiing.  · Vigorous competitive sports, such as football, basketball, and soccer.  · Jumping rope.  · Aerobic dancing.  What are some everyday activities that can help me to get exercise?  · Yard work, such as:  ? Pushing a .  ? Raking and bagging leaves.  · Washing your car.  · Pushing a stroller.  · Shoveling snow.  · Gardening.  · Washing windows or floors.  How can I be more active in my day-to-day activities?  · Use stairs instead of an elevator.  · Take a walk during your lunch break.  · If you drive, park your car farther away from your work or school.  · If you take public transportation, get off one stop early and walk the rest of the way.  · Stand up or walk around during all of your indoor phone calls.  · Get up, stretch, and walk around every 30 minutes throughout the day.  · Enjoy exercise with a friend. Support to continue exercising will help you keep a regular routine of activity.  What guidelines can I follow while exercising?  · Before you start a new exercise program, talk with your health care provider.  · Do not exercise so much that you hurt yourself, feel dizzy, or get very short of breath.  · Wear comfortable clothes and wear shoes with good support.  · Drink plenty of water while you exercise to prevent dehydration or heat stroke.  · Work out until your breathing and your heartbeat get faster.  Where to find more information  · [place] Department of Health and Human Services: www.hhs.gov  · Centers for Disease Control and Prevention (CDC): www.cdc.gov  Summary  · Exercising regularly is important. It will improve your overall fitness, flexibility, and endurance.  · Regular exercise also will improve your overall health. It can help you control your weight, reduce stress, and improve your bone density.  · Do not exercise so much that you hurt yourself, feel dizzy, or get very short of breath.  · Before you start a new exercise program, talk with your health care provider.  This  information is not intended to replace advice given to you by your health care provider. Make sure you discuss any questions you have with your health care provider.  Document Revised: 11/30/2018 Document Reviewed: 11/08/2018  [employer] Patient Education © 2020 [employer]    Thoracic Strain Rehab  Ask your health care provider which exercises are safe for you. Do exercises exactly as told by your health care provider and adjust them as directed. It is normal to feel mild stretching, pulling, tightness, or discomfort as you do these exercises. Stop right away if you feel sudden pain or your pain gets worse. Do not begin these exercises until told by your health care provider.  Stretching and range-of-motion exercise  This exercise warms up your muscles and joints and improves the movement and flexibility of your back and shoulders. This exercise also helps to relieve pain.  Chest and spine stretch    1. Lie down on your back on a firm surface.  2. Roll a towel or a small blanket so it is about 4 inches (10 cm) in diameter.  3. Put the towel lengthwise under the middle of your back so it is under your spine, but not under your shoulder blades.  4. Put your hands behind your head and let your elbows fall to your sides. This will increase your stretch.  5. Take a deep breath (inhale).  6. Hold for __________ seconds.  7. Relax after you breathe out (exhale).  Repeat __________ times. Complete this exercise __________ times a day.  Strengthening exercises  These exercises build strength and endurance in your back and your shoulder blade muscles. Endurance is the ability to use your muscles for a long time, even after they get tired.  Alternating arm and leg raises    1. Get on your hands and knees on a firm surface. If you are on a hard floor, you may want to use padding, such as an exercise mat, to cushion your knees.  2. Line up your arms and legs. Your hands should be directly below your shoulders, and your knees  should be directly below your hips.  3. Lift your left leg behind you. At the same time, raise your right arm and straighten it in front of you.  ? Do not lift your leg higher than your hip.  ? Do not lift your arm higher than your shoulder.  ? Keep your abdominal and back muscles tight.  ? Keep your hips facing the ground.  ? Do not arch your back.  ? Keep your balance carefully, and do not hold your breath.  4. Hold for __________ seconds.  5. Slowly return to the starting position and repeat with your right leg and your left arm.  Repeat __________ times. Complete this exercise __________ times a day.  Straight arm rows  This exercise is also called shoulder extension exercise.  1. Stand with your feet shoulder width apart.  2. Secure an exercise band to a stable object in front of you so the band is at or above shoulder height.  3. Hold one end of the exercise band in each hand.  4. Straighten your elbows and lift your hands up to shoulder height.  5. Step back, away from the secured end of the exercise band, until the band stretches.  6. Squeeze your shoulder blades together and pull your hands down to the sides of your thighs. Stop when your hands are straight down by your sides. This is shoulder extension. Do not let your hands go behind your body.  7. Hold for __________ seconds.  8. Slowly return to the starting position.  Repeat __________ times. Complete this exercise __________ times a day.  Prone shoulder external rotation  1. Lie on your abdomen on a firm bed so your left / right forearm hangs over the edge of the bed and your upper arm is on the bed, straight out from your body. This is the prone position.  ? Your elbow should be bent.  ? Your palm should be facing your feet.  2. If instructed, hold a __________ weight in your hand.  3. Squeeze your shoulder blade toward the middle of your back. Do not let your shoulder lift toward your ear.  4. Keep your elbow bent in a 90-degree angle (right angle)  while you slowly move your forearm up toward the ceiling. Move your forearm up to the height of the bed, toward your head. This is external rotation.  ? Your upper arm should not move.  ? At the top of the movement, your palm should face the floor.  5. Hold for __________ seconds.  6. Slowly return to the starting position and relax your muscles.  Repeat __________ times. Complete this exercise __________ times a day.  Rowing scapular retraction  This is an exercise in which the shoulder blades (scapulae) are pulled toward each other (retraction).  1. Sit in a stable chair without armrests, or stand up.  2. Secure an exercise band to a stable object in front of you so the band is at shoulder height.  3. Hold one end of the exercise band in each hand. Your palms should face down.  4. Bring your arms out straight in front of you.  5. Step back, away from the secured end of the exercise band, until the band stretches.  6. Pull the band backward. As you do this, bend your elbows and squeeze your shoulder blades together, but avoid letting the rest of your body move. Do not shrug your shoulders upward while you do this.  7. Stop when your elbows are at your sides or slightly behind your body.  8. Hold for __________ seconds.  9. Slowly straighten your arms to return to the starting position.  Repeat __________ times. Complete this exercise __________ times a day.  Posture and body mechanics  Good posture and healthy body mechanics can help to relieve stress in your body's tissues and joints. Body mechanics refers to the movements and positions of your body while you do your daily activities. Posture is part of body mechanics. Good posture means:  · Your spine is in its natural S-curve position (neutral).  · Your shoulders are pulled back slightly.  · Your head is not tipped forward.  Follow these guidelines to improve your posture and body mechanics in your everyday activities.  Standing    · When standing, keep your  spine neutral and your feet about hip width apart. Keep a slight bend in your knees. Your ears, shoulders, and hips should line up with each other.  · When you do a task in which you lean forward while standing in one place for a long time, place one foot up on a stable object that is 2-4 inches (5-10 cm) high, such as a footstool. This helps keep your spine neutral.  Sitting    · When sitting, keep your spine neutral and keep your feet flat on the floor. Use a footrest, if necessary, and keep your thighs parallel to the floor. Avoid rounding your shoulders, and avoid tilting your head forward.  · When working at a desk or a computer, keep your desk at a height where your hands are slightly lower than your elbows. Slide your chair under your desk so you are close enough to maintain good posture.  · When working at a computer, place your monitor at a height where you are looking straight ahead and you do not have to tilt your head forward or downward to look at the screen.  Resting  When lying down and resting, avoid positions that are most painful for you.  · If you have pain with activities such as sitting, bending, stooping, or squatting (flexion-basedactivities), lie in a position in which your body does not bend very much. For example, avoid curling up on your side with your arms and knees near your chest (fetal position).  · If you have pain with activities such as standing for a long time or reaching with your arms (extension-basedactivities), lie with your spine in a neutral position and bend your knees slightly. Try the following positions:  ? Lie on your side with a pillow between your knees.  ? Lie on your back with a pillow under your knees.    Lifting    · When lifting objects, keep your feet at least shoulder width apart and tighten your abdominal muscles.  · Bend your knees and hips and keep your spine neutral. It is important to lift using the strength of your legs, not your back. Do not lock your  knees straight out.  · Always ask for help to lift heavy or awkward objects.  This information is not intended to replace advice given to you by your health care provider. Make sure you discuss any questions you have with your health care provider.  Document Revised: 04/10/2020 Document Reviewed: 2020  Elsevier Patient Education ©  [employer]    Back Exercises  The following exercises strengthen the muscles that help to support the trunk and back. They also help to keep the lower back flexible. Doing these exercises can help to prevent back pain or lessen existing pain.  · If you have back pain or discomfort, try doing these exercises 2-3 times each day or as told by your health care provider.  · As your pain improves, do them once each day, but increase the number of times that you repeat the steps for each exercise (do more repetitions).  · To prevent the recurrence of back pain, continue to do these exercises once each day or as told by your health care provider.  Do exercises exactly as told by your health care provider and adjust them as directed. It is normal to feel mild stretching, pulling, tightness, or discomfort as you do these exercises, but you should stop right away if you feel sudden pain or your pain gets worse.  Exercises  Single knee to chest  Repeat these steps 3-5 times for each le. Lie on your back on a firm bed or the floor with your legs extended.  7. Bring one knee to your chest. Your other leg should stay extended and in contact with the floor.  8. Hold your knee in place by grabbing your knee or thigh with both hands and hold.  9. Pull on your knee until you feel a gentle stretch in your lower back or buttocks.  10. Hold the stretch for 10-30 seconds.  11. Slowly release and straighten your leg.  Pelvic tilt  Repeat these steps 5-10 times:  9. Lie on your back on a firm bed or the floor with your legs extended.  10. Bend your knees so they are pointing toward the ceiling  and your feet are flat on the floor.  11. Tighten your lower abdominal muscles to press your lower back against the floor. This motion will tilt your pelvis so your tailbone points up toward the ceiling instead of pointing to your feet or the floor.  12. With gentle tension and even breathing, hold this position for 5-10 seconds.  Cat-cow  Repeat these steps until your lower back becomes more flexible:  7. Get into a hands-and-knees position on a firm surface. Keep your hands under your shoulders, and keep your knees under your hips. You may place padding under your knees for comfort.  8. Let your head hang down toward your chest. Contract your abdominal muscles and point your tailbone toward the floor so your lower back becomes rounded like the back of a cat.  9. Hold this position for 5 seconds.  10. Slowly lift your head, let your abdominal muscles relax and point your tailbone up toward the ceiling so your back forms a sagging arch like the back of a cow.  11. Hold this position for 5 seconds.    Press-ups  Repeat these steps 5-10 times:  10. Lie on your abdomen (face-down) on the floor.  11. Place your palms near your head, about shoulder-width apart.  12. Keeping your back as relaxed as possible and keeping your hips on the floor, slowly straighten your arms to raise the top half of your body and lift your shoulders. Do not use your back muscles to raise your upper torso. You may adjust the placement of your hands to make yourself more comfortable.  13. Hold this position for 5 seconds while you keep your back relaxed.  14. Slowly return to lying flat on the floor.    Bridges  Repeat these steps 10 times:  1. Lie on your back on a firm surface.  2. Bend your knees so they are pointing toward the ceiling and your feet are flat on the floor. Your arms should be flat at your sides, next to your body.  3. Tighten your buttocks muscles and lift your buttocks off the floor until your waist is at almost the same  height as your knees. You should feel the muscles working in your buttocks and the back of your thighs. If you do not feel these muscles, slide your feet 1-2 inches farther away from your buttocks.  4. Hold this position for 3-5 seconds.  5. Slowly lower your hips to the starting position, and allow your buttocks muscles to relax completely.  If this exercise is too easy, try doing it with your arms crossed over your chest.  Abdominal crunches  Repeat these steps 5-10 times:  1. Lie on your back on a firm bed or the floor with your legs extended.  2. Bend your knees so they are pointing toward the ceiling and your feet are flat on the floor.  3. Cross your arms over your chest.  4. Tip your chin slightly toward your chest without bending your neck.  5. Tighten your abdominal muscles and slowly raise your trunk (torso) high enough to lift your shoulder blades a tiny bit off the floor. Avoid raising your torso higher than that because it can put too much stress on your low back and does not help to strengthen your abdominal muscles.  6. Slowly return to your starting position.  Back lifts  Repeat these steps 5-10 times:  1. Lie on your abdomen (face-down) with your arms at your sides, and rest your forehead on the floor.  2. Tighten the muscles in your legs and your buttocks.  3. Slowly lift your chest off the floor while you keep your hips pressed to the floor. Keep the back of your head in line with the curve in your back. Your eyes should be looking at the floor.  4. Hold this position for 3-5 seconds.  5. Slowly return to your starting position.  Contact a health care provider if:  · Your back pain or discomfort gets much worse when you do an exercise.  · Your worsening back pain or discomfort does not lessen within 2 hours after you exercise.  If you have any of these problems, stop doing these exercises right away. Do not do them again unless your health care provider says that you can.  Get help right away  if:  · You develop sudden, severe back pain. If this happens, stop doing the exercises right away. Do not do them again unless your health care provider says that you can.  This information is not intended to replace advice given to you by your health care provider. Make sure you discuss any questions you have with your health care provider.  Document Revised: 04/23/2020 Document Reviewed: 09/19/2019  Elsevier Patient Education © 2020 [employer]    BMI for Adults  What is BMI?  Body mass index (BMI) is a number that is calculated from a person's weight and height. BMI can help estimate how much of a person's weight is composed of fat. BMI does not measure body fat directly. Rather, it is an alternative to procedures that directly measure body fat, which can be difficult and expensive.  BMI can help identify people who may be at higher risk for certain medical problems.  What are BMI measurements used for?  BMI is used as a screening tool to identify possible weight problems. It helps determine whether a person is obese, overweight, a healthy weight, or underweight.  BMI is useful for:  · Identifying a weight problem that may be related to a medical condition or may increase the risk for medical problems.  · Promoting changes, such as changes in diet and exercise, to help reach a healthy weight. BMI screening can be repeated to see if these changes are working.  How is BMI calculated?  BMI involves measuring your weight in relation to your height. Both height and weight are measured, and the BMI is calculated from those numbers. This can be done either in English ([place] or metric measurements. Note that charts and online BMI calculators are available to help you find your BMI quickly and easily without having to do these calculations yourself.  To calculate your BMI in English ([place] measurements:    1. Measure your weight in pounds (lb).  2. Multiply the number of pounds by 703.  ? For example, for a person who  "weighs 180 lb, multiply that number by 703, which equals 126,540.  3. Measure your height in inches. Then multiply that number by itself to get a measurement called \"inches squared.\"  ? For example, for a person who is 70 inches tall, the \"inches squared\" measurement is 70 inches x 70 inches, which equals 4,900 inches squared.  4. Divide the total from step 2 (number of lb x 703) by the total from step 3 (inches squared): 126,540 ÷ 4,900 = 25.8. This is your BMI.  To calculate your BMI in metric measurements:  1. Measure your weight in kilograms (kg).  2. Measure your height in meters (m). Then multiply that number by itself to get a measurement called \"meters squared.\"  ? For example, for a person who is 1.75 m tall, the \"meters squared\" measurement is 1.75 m x 1.75 m, which is equal to 3.1 meters squared.  3. Divide the number of kilograms (your weight) by the meters squared number. In this example: 70 ÷ 3.1 = 22.6. This is your BMI.  What do the results mean?  BMI charts are used to identify whether you are underweight, normal weight, overweight, or obese. The following guidelines will be used:  · Underweight: BMI less than 18.5.  · Normal weight: BMI between 18.5 and 24.9.  · Overweight: BMI between 25 and 29.9.  · Obese: BMI of 30 or above.  Keep these notes in mind:  · Weight includes both fat and muscle, so someone with a muscular build, such as an athlete, may have a BMI that is higher than 24.9. In cases like these, BMI is not an accurate measure of body fat.  · To determine if excess body fat is the cause of a BMI of 25 or higher, further assessments may need to be done by a health care provider.  · BMI is usually interpreted in the same way for men and women.  Where to find more information  For more information about BMI, including tools to quickly calculate your BMI, go to these websites:  · Centers for Disease Control and Prevention: www.cdc.gov  · American Heart Association: www.heart.org  · National " Heart, Lung, and Blood Walker: www.nhlbi.nih.gov  Summary  · Body mass index (BMI) is a number that is calculated from a person's weight and height.  · BMI may help estimate how much of a person's weight is composed of fat. BMI can help identify those who may be at higher risk for certain medical problems.  · BMI can be measured using English measurements or metric measurements.  · BMI charts are used to identify whether you are underweight, normal weight, overweight, or obese.  This information is not intended to replace advice given to you by your health care provider. Make sure you discuss any questions you have with your health care provider.  Document Revised: 09/09/2020 Document Reviewed: 07/17/2020  Elsevier Patient Education © 2020 [employer] Inc.

## 2021-02-02 ENCOUNTER — TELEPHONE (OUTPATIENT)
Dept: INTERNAL MEDICINE | Facility: CLINIC | Age: 71
End: 2021-02-02

## 2021-02-02 NOTE — TELEPHONE ENCOUNTER
Called and lvm that Dr. Navarrete will take her niece as a NP. Advised to have her niece call and schedule an appointment.

## 2021-02-02 NOTE — TELEPHONE ENCOUNTER
PATIENT SEES DR. VIDAL AND IS ASKING IF DR. VIDAL WILL TAKE HER NIECE AS A NP. SHE WAS TOLD THAT SHE IS NOT TAKING ON NP. SHE STILL WANTED TO CHECK.     CATHY HERNANDEZ

## 2021-02-04 LAB
25(OH)D3+25(OH)D2 SERPL-MCNC: 56.7 NG/ML (ref 30–100)
ALBUMIN SERPL-MCNC: 4.7 G/DL (ref 3.5–5.2)
ALBUMIN/CREAT UR: 15 MG/G CREAT (ref 0–29)
ALBUMIN/GLOB SERPL: 2.1 G/DL
ALP SERPL-CCNC: 67 U/L (ref 39–117)
ALT SERPL-CCNC: 43 U/L (ref 1–33)
APPEARANCE UR: CLEAR
AST SERPL-CCNC: 35 U/L (ref 1–32)
BACTERIA #/AREA URNS HPF: ABNORMAL /HPF
BACTERIA UR CULT: ABNORMAL
BACTERIA UR CULT: ABNORMAL
BASOPHILS # BLD AUTO: 0.02 10*3/MM3 (ref 0–0.2)
BASOPHILS NFR BLD AUTO: 0.3 % (ref 0–1.5)
BILIRUB SERPL-MCNC: 0.6 MG/DL (ref 0–1.2)
BILIRUB UR QL STRIP: NEGATIVE
BUN SERPL-MCNC: 17 MG/DL (ref 8–23)
BUN/CREAT SERPL: 14.8 (ref 7–25)
CALCIUM SERPL-MCNC: 10.7 MG/DL (ref 8.6–10.5)
CASTS URNS MICRO: ABNORMAL
CASTS URNS QL MICRO: PRESENT /LPF
CHLORIDE SERPL-SCNC: 96 MMOL/L (ref 98–107)
CHOLEST SERPL-MCNC: 310 MG/DL (ref 0–200)
CO2 SERPL-SCNC: 30.7 MMOL/L (ref 22–29)
COLOR UR: YELLOW
CREAT SERPL-MCNC: 1.15 MG/DL (ref 0.57–1)
CREAT UR-MCNC: 230.3 MG/DL
EOSINOPHIL # BLD AUTO: 0.1 10*3/MM3 (ref 0–0.4)
EOSINOPHIL NFR BLD AUTO: 1.4 % (ref 0.3–6.2)
EPI CELLS #/AREA URNS HPF: ABNORMAL /HPF (ref 0–10)
ERYTHROCYTE [DISTWIDTH] IN BLOOD BY AUTOMATED COUNT: 13.2 % (ref 12.3–15.4)
GLOBULIN SER CALC-MCNC: 2.2 GM/DL
GLUCOSE SERPL-MCNC: 93 MG/DL (ref 65–99)
GLUCOSE UR QL: NEGATIVE
HCT VFR BLD AUTO: 43.8 % (ref 34–46.6)
HCV AB S/CO SERPL IA: <0.1 S/CO RATIO (ref 0–0.9)
HDLC SERPL-MCNC: 58 MG/DL (ref 40–60)
HGB BLD-MCNC: 15.4 G/DL (ref 12–15.9)
HGB UR QL STRIP: NEGATIVE
IMM GRANULOCYTES # BLD AUTO: 0.02 10*3/MM3 (ref 0–0.05)
IMM GRANULOCYTES NFR BLD AUTO: 0.3 % (ref 0–0.5)
KETONES UR QL STRIP: ABNORMAL
LDLC SERPL CALC-MCNC: 200 MG/DL (ref 0–100)
LEUKOCYTE ESTERASE UR QL STRIP: ABNORMAL
LYMPHOCYTES # BLD AUTO: 2.33 10*3/MM3 (ref 0.7–3.1)
LYMPHOCYTES NFR BLD AUTO: 33.4 % (ref 19.6–45.3)
MCH RBC QN AUTO: 34.5 PG (ref 26.6–33)
MCHC RBC AUTO-ENTMCNC: 35.2 G/DL (ref 31.5–35.7)
MCV RBC AUTO: 98.2 FL (ref 79–97)
MICRO URNS: ABNORMAL
MICROALBUMIN UR-MCNC: 33.8 UG/ML
MONOCYTES # BLD AUTO: 0.57 10*3/MM3 (ref 0.1–0.9)
MONOCYTES NFR BLD AUTO: 8.2 % (ref 5–12)
MUCOUS THREADS URNS QL MICRO: PRESENT /HPF
NEUTROPHILS # BLD AUTO: 3.94 10*3/MM3 (ref 1.7–7)
NEUTROPHILS NFR BLD AUTO: 56.4 % (ref 42.7–76)
NITRITE UR QL STRIP: NEGATIVE
NRBC BLD AUTO-RTO: 0 /100 WBC (ref 0–0.2)
OTHER ANTIBIOTIC SUSC ISLT: ABNORMAL
PH UR STRIP: 7 [PH] (ref 5–7.5)
PLATELET # BLD AUTO: 201 10*3/MM3 (ref 140–450)
POTASSIUM SERPL-SCNC: 3.6 MMOL/L (ref 3.5–5.2)
PROT SERPL-MCNC: 6.9 G/DL (ref 6–8.5)
PROT UR QL STRIP: ABNORMAL
RBC # BLD AUTO: 4.46 10*6/MM3 (ref 3.77–5.28)
RBC #/AREA URNS HPF: ABNORMAL /HPF (ref 0–2)
SODIUM SERPL-SCNC: 140 MMOL/L (ref 136–145)
SP GR UR: 1.02 (ref 1–1.03)
T4 FREE SERPL-MCNC: 1.2 NG/DL (ref 0.93–1.7)
TRIGL SERPL-MCNC: 264 MG/DL (ref 0–150)
TSH SERPL DL<=0.005 MIU/L-ACNC: 1.82 UIU/ML (ref 0.27–4.2)
URINALYSIS REFLEX: ABNORMAL
UROBILINOGEN UR STRIP-MCNC: 1 MG/DL (ref 0.2–1)
VLDLC SERPL CALC-MCNC: 52 MG/DL (ref 5–40)
WBC # BLD AUTO: 6.98 10*3/MM3 (ref 3.4–10.8)
WBC #/AREA URNS HPF: ABNORMAL /HPF (ref 0–5)

## 2021-02-06 RX ORDER — MELATONIN
1000 DAILY
Qty: 60 TABLET | Refills: 5
Start: 2021-02-06 | End: 2021-02-18

## 2021-02-06 RX ORDER — CIPROFLOXACIN 500 MG/1
500 TABLET, FILM COATED ORAL 2 TIMES DAILY
Qty: 14 TABLET | Refills: 0 | Status: SHIPPED | OUTPATIENT
Start: 2021-02-06 | End: 2021-02-23

## 2021-02-06 RX ORDER — ROSUVASTATIN CALCIUM 20 MG/1
20 TABLET, COATED ORAL NIGHTLY
Qty: 90 TABLET | Refills: 1 | Status: SHIPPED | OUTPATIENT
Start: 2021-02-06 | End: 2021-08-09 | Stop reason: SDUPTHER

## 2021-02-07 ENCOUNTER — TELEPHONE (OUTPATIENT)
Dept: INTERNAL MEDICINE | Facility: CLINIC | Age: 71
End: 2021-02-07

## 2021-02-07 DIAGNOSIS — E78.2 MIXED HYPERLIPIDEMIA: ICD-10-CM

## 2021-02-07 DIAGNOSIS — N28.9 RENAL INSUFFICIENCY: ICD-10-CM

## 2021-02-07 DIAGNOSIS — E83.52 HYPERCALCEMIA: Primary | ICD-10-CM

## 2021-02-07 NOTE — TELEPHONE ENCOUNTER
When you call patient about lab letter, tell her I also ordered a referral to [facility] to make sure we are not missing anything as far as the kidneys are concerned

## 2021-02-18 ENCOUNTER — TELEPHONE (OUTPATIENT)
Dept: INTERNAL MEDICINE | Facility: CLINIC | Age: 71
End: 2021-02-18

## 2021-02-18 RX ORDER — AMLODIPINE BESYLATE 5 MG/1
5 TABLET ORAL DAILY
Qty: 30 TABLET | Refills: 5
Start: 2021-02-18 | End: 2021-08-02 | Stop reason: SDUPTHER

## 2021-02-18 NOTE — TELEPHONE ENCOUNTER
Yes she should follow the directions of the nephrology PA.  Stop hydrochlorothiazide as she instructed.  She was given a prescription for amlodipine 5 mg tablet once a day.  She should start that and monitor blood pressures and write them all down.  It probably was sent directly to her pharmacy.    She should also stop the vitamin D3 tablet as instructed.    Ask her to call us in about 2 weeks with blood pressure readings and what time of day it is

## 2021-02-18 NOTE — TELEPHONE ENCOUNTER
Patient called to see if dr short wants to put her on anther medication she said that she stopped her hydrochlorothiazide ? And should she continuing taking the vit d3. She said that the PA with Nephrology told her to stop taking it .

## 2021-02-22 PROBLEM — N90.4 LICHEN SCLEROSUS OF FEMALE GENITALIA: Status: ACTIVE | Noted: 2021-02-22

## 2021-02-23 ENCOUNTER — OFFICE VISIT (OUTPATIENT)
Dept: OBSTETRICS AND GYNECOLOGY | Facility: CLINIC | Age: 71
End: 2021-02-23

## 2021-02-23 VITALS
DIASTOLIC BLOOD PRESSURE: 64 MMHG | BODY MASS INDEX: 29.45 KG/M2 | WEIGHT: 156 LBS | HEIGHT: 61 IN | SYSTOLIC BLOOD PRESSURE: 124 MMHG

## 2021-02-23 DIAGNOSIS — Z12.31 BREAST CANCER SCREENING BY MAMMOGRAM: ICD-10-CM

## 2021-02-23 DIAGNOSIS — L90.0 LICHEN SCLEROSUS: ICD-10-CM

## 2021-02-23 DIAGNOSIS — Z01.419 WOMEN'S ANNUAL ROUTINE GYNECOLOGICAL EXAMINATION: Primary | ICD-10-CM

## 2021-02-23 DIAGNOSIS — Z78.0 MENOPAUSE: ICD-10-CM

## 2021-02-23 PROCEDURE — 99213 OFFICE O/P EST LOW 20 MIN: CPT | Performed by: OBSTETRICS & GYNECOLOGY

## 2021-02-23 PROCEDURE — G0101 CA SCREEN;PELVIC/BREAST EXAM: HCPCS | Performed by: OBSTETRICS & GYNECOLOGY

## 2021-02-23 RX ORDER — CLOBETASOL PROPIONATE 0.5 MG/G
CREAM TOPICAL DAILY
Qty: 15 G | Refills: 2 | Status: SHIPPED | OUTPATIENT
Start: 2021-02-23 | End: 2022-08-03

## 2021-02-23 NOTE — PROGRESS NOTES
GYN Annual Exam     CC - Here for annual exam.        HPI  Irma Pedraza is a 71 y.o. female, , who presents for annual well woman exam.  She is postmenopausal.  Patient denies vaginal bleeding. ..  Patient reports problems with: Lichen sclerosis..  Patient reports she has not had much vaginal itching or burning.  She is not very compliant on treating with clobetasol. Pt. reports no urinary incontinence. States she has something that her kidney's are not functioning right but unsure of the name, going the see a specialist. Partner Status: Marital Status: .  New Partners since last visit: no.      Additional OB/GYN History     On HRT? No  Last Pap :   Last Completed Pap Smear       Status Date      PAP SMEAR Done 2020 negative        History of abnormal Pap smear: no  Family history of uterine, colon, breast, or ovarian cancer: no  Performs monthly Self-Breast Exam: yes  Last mammogram:   Last Completed Mammogram       Status Date      MAMMOGRAM Done 2020 MAMMO SCREENING DIGITAL TOMOSYNTHESIS BILATERAL W CAD     Patient has more history with this topic...        Last colonoscopy:   Last Completed Colonoscopy       Status Date      COLONOSCOPY Done 3/9/2020 SCANNED - COLONOSCOPY     Patient has more history with this topic...        Last DEXA: On 2019 and results were Osteopenia, Osteoporosis and this was done with her PCP  Exercises Regularly: no  Feelings of Anxiety or Depression: no      Tobacco Usage?: Yes Irma Pedraza  reports that she has been smoking cigarettes. She has a 25.00 pack-year smoking history. She has never used smokeless tobacco.. I have educated her on the risk of diseases from using tobacco products such as cancer, COPD and heart disease.     I advised her to quit and she is not willing to quit.    I spent 3  minutes counseling the patient.        OB History        2    Para   1    Term   1            AB   1    Living   1       SAB        TAB      "   Ectopic        Molar        Multiple        Live Births                    Health Maintenance   Topic Date Due   • DXA SCAN  02/13/2021   • MAMMOGRAM  01/20/2022   • ANNUAL WELLNESS VISIT  02/01/2022   • LIPID PANEL  02/01/2022   • PAP SMEAR  02/20/2022   • TDAP/TD VACCINES (2 - Td) 04/11/2022   • COLONOSCOPY  03/09/2025   • HEPATITIS C SCREENING  Completed   • INFLUENZA VACCINE  Completed   • Pneumococcal Vaccine 65+  Completed   • ZOSTER VACCINE  Completed   • MENINGOCOCCAL VACCINE  Aged Out       The additional following portions of the patient's history were reviewed and updated as appropriate: allergies, current medications, past family history, past medical history, past social history, past surgical history and problem list.    Review of Systems   Constitutional: Negative.    HENT: Negative.    Eyes: Negative.    Respiratory: Negative.    Cardiovascular: Negative.    Gastrointestinal: Negative.    Endocrine: Negative.    Genitourinary: Negative.    Musculoskeletal: Negative.    Skin: Negative.    Allergic/Immunologic: Negative.    Neurological: Negative.    Hematological: Negative.    Psychiatric/Behavioral: Negative.      All other systems reviewed and are negative.     I have reviewed and agree with the HPI, ROS, and historical information as entered above. Maida Martinez MD    Objective   /64   Ht 154.9 cm (61\")   Wt 70.8 kg (156 lb)   BMI 29.48 kg/m²     Physical Exam  Vitals signs and nursing note reviewed. Exam conducted with a chaperone present.   Constitutional:       Appearance: Normal appearance. She is well-developed.   HENT:      Head: Normocephalic and atraumatic.   Neck:      Musculoskeletal: Normal range of motion. No muscular tenderness.      Thyroid: No thyroid mass or thyromegaly.   Cardiovascular:      Rate and Rhythm: Normal rate and regular rhythm.      Heart sounds: No murmur.   Pulmonary:      Effort: Pulmonary effort is normal. No retractions.      Breath sounds: " Normal breath sounds. No wheezing, rhonchi or rales.   Chest:      Chest wall: No mass or tenderness.      Breasts:         Right: Normal. No mass, nipple discharge, skin change or tenderness.         Left: Normal. No mass, nipple discharge, skin change or tenderness.   Abdominal:      General: Bowel sounds are normal. There is no distension.      Palpations: Abdomen is soft. Abdomen is not rigid. There is no mass.      Tenderness: There is no abdominal tenderness. There is no guarding or rebound.      Hernia: No hernia is present. There is no hernia in the left inguinal area or right inguinal area.   Genitourinary:     General: Normal vulva.      Exam position: Lithotomy position.      Pubic Area: No rash.       Labia:         Right: No rash, tenderness or lesion.         Left: No rash, tenderness or lesion.       Urethra: No urethral pain or urethral swelling.      Vagina: Normal. No vaginal discharge or lesions.      Cervix: No cervical motion tenderness, discharge, lesion or cervical bleeding.      Uterus: Normal. Not enlarged, not fixed and not tender.       Adnexa:         Right: No mass, tenderness or fullness.          Left: No mass, tenderness or fullness.        Rectum: No external hemorrhoid.          Comments: Noted lichen sclerosis with thinning and white patches around clitoral yan bilateral labia and perineum.  Demonstrated to patient where to place clobetasol cream for treatment.  Bimanual exam limited by body habitus  Skin:     General: Skin is warm and dry.   Neurological:      Mental Status: She is alert and oriented to person, place, and time.   Psychiatric:         Mood and Affect: Mood normal.         Behavior: Behavior normal.            Assessment and Plan    Problem List Items Addressed This Visit        Skin    Lichen sclerosus    Overview     Biopsy on 1/31/2020 demonstrated lichen sclerosis without evidence of malignancy.  Prescribing clobetasol for disease and symptom control.  We  discussed the importance of tobacco cessation to help with the lichen sclerosus.  We discussed worsening symptoms and when to do a biopsy again.  We also discussed the importance of clobetasol therapy.         Relevant Medications    clobetasol (TEMOVATE) 0.05 % cream      Other Visit Diagnoses     Women's annual routine gynecological examination    -  Primary    Menopause        Breast cancer screening by mammogram        Relevant Orders    Mammo Screening Digital Tomosynthesis Bilateral With CAD      Patient reports she is decreasing her cigarette intake.  She is down to 10 cigarettes a day.    1. GYN annual well woman exam.   2. Reviewed monthly self breast exams.  Instructed to call with lumps, pain, or breast discharge.  Yearly mammograms ordered.  3. Ordered mammogram today.  4. Recommended use of Vitamin D and getting adequate calcium in her diet. (1500mg)  5. Reviewed exercise as a preventative health measures.   6. Reccommended Flu Vaccine in Fall of each year.  7. RTC in 1 year or PRN with problems.    Maida Martinez MD  02/23/2021

## 2021-04-12 ENCOUNTER — TELEPHONE (OUTPATIENT)
Dept: INTERNAL MEDICINE | Facility: CLINIC | Age: 71
End: 2021-04-12

## 2021-04-12 NOTE — TELEPHONE ENCOUNTER
Do not cancel that lab order.  She is supposed to have those drawn this month to follow-up on the change in her statin.  Call her to remind her to get the labs done.

## 2021-04-16 ENCOUNTER — APPOINTMENT (OUTPATIENT)
Dept: MAMMOGRAPHY | Facility: HOSPITAL | Age: 71
End: 2021-04-16

## 2021-04-21 ENCOUNTER — HOSPITAL ENCOUNTER (OUTPATIENT)
Dept: MAMMOGRAPHY | Facility: HOSPITAL | Age: 71
Discharge: HOME | End: 2021-04-21
Admitting: OBSTETRICS & GYNECOLOGY

## 2021-04-21 DIAGNOSIS — Z12.31 BREAST CANCER SCREENING BY MAMMOGRAM: ICD-10-CM

## 2021-04-21 PROCEDURE — 77063 BREAST TOMOSYNTHESIS BI: CPT

## 2021-04-21 PROCEDURE — 77067 SCR MAMMO BI INCL CAD: CPT | Performed by: RADIOLOGY

## 2021-04-21 PROCEDURE — 77067 SCR MAMMO BI INCL CAD: CPT

## 2021-04-21 PROCEDURE — 77063 BREAST TOMOSYNTHESIS BI: CPT | Performed by: RADIOLOGY

## 2021-05-14 ENCOUNTER — LAB (OUTPATIENT)
Dept: LAB | Facility: HOSPITAL | Age: 71
End: 2021-05-14

## 2021-05-14 DIAGNOSIS — E78.2 MIXED HYPERLIPIDEMIA: ICD-10-CM

## 2021-05-14 DIAGNOSIS — E83.52 HYPERCALCEMIA: ICD-10-CM

## 2021-05-15 LAB
CA-I SERPL ISE-MCNC: 5.3 MG/DL (ref 4.5–5.6)
CHOLEST SERPL-MCNC: 206 MG/DL (ref 0–200)
HDLC SERPL-MCNC: 62 MG/DL (ref 40–60)
LDLC SERPL CALC-MCNC: 113 MG/DL (ref 0–100)
PTH-INTACT SERPL-MCNC: 37 PG/ML (ref 15–65)
TRIGL SERPL-MCNC: 181 MG/DL (ref 0–150)
VLDLC SERPL CALC-MCNC: 31 MG/DL (ref 5–40)

## 2021-06-30 RX ORDER — BENZONATATE 200 MG/1
CAPSULE ORAL
Qty: 30 CAPSULE | Refills: 2 | Status: SHIPPED | OUTPATIENT
Start: 2021-06-30 | End: 2021-11-15

## 2021-07-27 RX ORDER — MONTELUKAST SODIUM 10 MG/1
10 TABLET ORAL NIGHTLY
Qty: 90 TABLET | Refills: 1 | Status: SHIPPED | OUTPATIENT
Start: 2021-07-27 | End: 2022-03-22

## 2021-07-27 RX ORDER — BUPROPION HYDROCHLORIDE 150 MG/1
TABLET, EXTENDED RELEASE ORAL
Qty: 180 TABLET | Refills: 1 | Status: SHIPPED | OUTPATIENT
Start: 2021-07-27 | End: 2021-08-02

## 2021-08-02 ENCOUNTER — OFFICE VISIT (OUTPATIENT)
Dept: INTERNAL MEDICINE | Facility: CLINIC | Age: 71
End: 2021-08-02

## 2021-08-02 VITALS
BODY MASS INDEX: 29.76 KG/M2 | OXYGEN SATURATION: 94 % | SYSTOLIC BLOOD PRESSURE: 134 MMHG | DIASTOLIC BLOOD PRESSURE: 82 MMHG | TEMPERATURE: 98 F | HEART RATE: 67 BPM | HEIGHT: 61 IN | RESPIRATION RATE: 18 BRPM | WEIGHT: 157.6 LBS

## 2021-08-02 DIAGNOSIS — I10 BENIGN ESSENTIAL HYPERTENSION: Primary | ICD-10-CM

## 2021-08-02 DIAGNOSIS — M05.9 RHEUMATOID ARTHRITIS, SEROPOSITIVE (HCC): Chronic | ICD-10-CM

## 2021-08-02 DIAGNOSIS — M81.0 SENILE OSTEOPOROSIS: ICD-10-CM

## 2021-08-02 DIAGNOSIS — E78.2 MIXED HYPERLIPIDEMIA: ICD-10-CM

## 2021-08-02 DIAGNOSIS — E55.9 VITAMIN D DEFICIENCY: ICD-10-CM

## 2021-08-02 DIAGNOSIS — S63.621A SPRAIN OF INTERPHALANGEAL JOINT OF RIGHT THUMB, INITIAL ENCOUNTER: Chronic | ICD-10-CM

## 2021-08-02 PROCEDURE — 99214 OFFICE O/P EST MOD 30 MIN: CPT | Performed by: INTERNAL MEDICINE

## 2021-08-02 RX ORDER — AMLODIPINE BESYLATE 5 MG/1
5 TABLET ORAL DAILY
Qty: 90 TABLET | Refills: 3 | Status: SHIPPED | OUTPATIENT
Start: 2021-08-02 | End: 2022-08-03 | Stop reason: SDUPTHER

## 2021-08-02 NOTE — PROGRESS NOTES
[facility]     Irma Pedraza  1950   [id number]      Patient Care Team:  Eve Navarrete MD as PCP - General (Internal Medicine)  Gold Swift DO as Consulting Physician (Rheumatology)  Sabrina Angelo MD as Consulting Physician (Plastic Surgery)  Juarez Pedersen MD as Consulting Physician (Otolaryngology)    Chief Complaint   Patient presents with   • Hypertension     f/u   • Hyperlipidemia            HPI  Patient is a 71 y.o. female presents with fall in May coming down steps and looked to answer  and missed lower step. She reached for the banister and she swung around and her forehead hit the railing which caused black eyes.  They are finally improving.  She sprained her right.  It is improving a lot.  But now only hurts if she forgets and uses it to press on something.    HPI  Low back pain worse lately. Stretching daily. Has order from Dr. Swift for PT but hasn't gone yet.       CHRONIC CONDITIONS  BPs at home run <140 systolic although she does not remember the exact numbers.  Taking amlodipine,and eating low salt diet.    Taking rosuvastatin every evening for cholesterol. Trying to eat low fat diet.But  cooks and rarely fixes vegetables.     For osteoporosis, on reclast yearly. Taking calcium and D. Walking but not using handweights.     For anxiety, no longer taking welbutrin.  Only needs buspirone occasionally. It does help.    Past Medical History:   Diagnosis Date   • Adenomyomatosis of gallbladder 2009    by ultrasound, Dr. 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   • Panic disorder 2/28/2019   • PMR  (polymyalgia rheumatica) (CMS/[facility])        Past Surgical History:   Procedure Laterality Date   • COLONOSCOPY  02/2009   • COSMETIC SURGERY  1994    face lift   • COSMETIC SURGERY  1993    TUMMY TUCK   • COSMETIC SURGERY  1980    Birth kenny removal   • ELBOW PROCEDURE Left     x2   • THROAT SURGERY      remote removal of growth from throat       Family History   Problem Relation Age of Onset   • Breast cancer Maternal Grandmother 90   • Osteoporosis Mother    • Osteoporosis Father    • Migraines Father    • Brain cancer Sister    • Stroke Brother    • Stroke Paternal Grandmother    • Ovarian cancer Neg Hx    • Endometrial cancer Neg Hx        Social History     Socioeconomic History   • Marital status:      Spouse name: Not on file   • Number of children: Not on file   • Years of education: Not on file   • Highest education level: Not on file   Tobacco Use   • Smoking status: Current Every Day Smoker     Packs/day: 0.50     Years: 50.00     Pack years: 25.00     Types: Cigarettes   • Smokeless tobacco: Never Used   • Tobacco comment: patient refused smoking literature   Substance and Sexual Activity   • Alcohol use: Yes     Comment: 4 -5 days a week   • Drug use: No   • Sexual activity: Defer       Allergies   Allergen Reactions   • Bactrim [Sulfamethoxazole-Trimethoprim] Other (See Comments)     Hypokalemia, hyponatremia   • Atorvastatin Unknown (See Comments)     Lipitor     • Bupropion Hcl [Bupropion] Unknown (See Comments)     wellbutrin   • Calcium Unknown (See Comments)     unknown       Review of Systems:     Review of Systems   Constitutional: Negative for chills, fatigue and fever.   HENT: Negative for congestion, ear pain and sinus pressure.    Respiratory: Negative for cough, chest tightness, shortness of breath and wheezing.    Cardiovascular: Negative for chest pain, palpitations and leg swelling.   Gastrointestinal: Negative for abdominal pain, blood in stool and constipation.   Musculoskeletal:  "Positive for arthralgias and back pain.   Skin: Negative for color change.   Allergic/Immunologic: Negative for environmental allergies.   Neurological: Negative for dizziness and headache.   Psychiatric/Behavioral: Negative for depressed mood. The patient is not nervous/anxious.        Vital Signs  Vitals:    08/02/21 0901   BP: 134/82   BP Location: Left arm   Patient Position: Sitting   Cuff Size: Adult   Pulse: 67   Resp: 18   Temp: 98 °F (36.7 °C)   TempSrc: Infrared   SpO2: 94%   Weight: 71.5 kg (157 lb 9.6 oz)   Height: 154.9 cm (61\")   PainSc: 0-No pain     Body mass index is 29.78 kg/m².  Patient's Body mass index is 29.78 kg/m². indicating that she is overweight (BMI 25-29.9). Obesity-related health conditions include the following: hypertension, dyslipidemias and osteoarthritis. Obesity is unchanged. BMI is is above average; BMI management plan is completed. We discussed low calorie, low carb based diet program, portion control and increasing exercise..      Current Outpatient Medications:   •  acyclovir (ZOVIRAX) 400 MG tablet, Take 400 mg by mouth As Needed., Disp: , Rfl:   •  albuterol sulfate HFA (Ventolin HFA) 108 (90 Base) MCG/ACT inhaler, Inhale 2 puffs Every 4 (Four) Hours As Needed for Wheezing., Disp: 18 g, Rfl: 5  •  amLODIPine (NORVASC) 5 MG tablet, Take 1 tablet by mouth Daily., Disp: 90 tablet, Rfl: 3  •  Azelastine-Fluticasone 137-50 MCG/ACT suspension, 2 sprays into the nostril(s) as directed by provider Every 12 (Twelve) Hours., Disp: , Rfl:   •  benzonatate (TESSALON) 200 MG capsule, TAKE 1 CAPSULE BY MOUTH THREE TIMES DAILY AS NEEDED FOR COUGH, Disp: 30 capsule, Rfl: 2  •  busPIRone (BUSPAR) 5 MG tablet, Take 1 tablet by mouth 3 (Three) Times a Day As Needed (anxiety)., Disp: 90 tablet, Rfl: 1  •  clobetasol (TEMOVATE) 0.05 % cream, Apply  topically to the appropriate area as directed Daily., Disp: 15 g, Rfl: 2  •  Coenzyme Q10 (COQ-10) 200 MG capsule, Take 1 tablet by mouth Daily., " Disp: , Rfl:   •  cyclobenzaprine (FLEXERIL) 10 MG tablet, Take 1 tablet by mouth 2 (Two) Times a Day As Needed for Muscle Spasms., Disp: 30 tablet, Rfl: 1  •  Fluticasone-Umeclidin-Vilant (Trelegy Ellipta) 100-62.5-25 MCG/INH inhaler, Inhale 1 puff Daily., Disp: 3 each, Rfl: 3  •  Lactobacillus-Inulin ([email or web address] PO), Take 1 capsule by mouth Daily., Disp: , Rfl:   •  Magnesium Gluconate 550 MG tablet, Take 1 tablet by mouth Daily., Disp: , Rfl:   •  montelukast (SINGULAIR) 10 MG tablet, TAKE 1 TABLET BY MOUTH EVERY NIGHT, Disp: 90 tablet, Rfl: 1  •  prednisoLONE acetate (PRED FORTE) 1 % ophthalmic suspension, APPLY 1 DROP INTO BOTH EYES ONCE A DAY AS DIRECTED, Disp: , Rfl: 0  •  predniSONE (DELTASONE) 2.5 MG tablet, Take 1 tablet by mouth Daily., Disp: 90 tablet, Rfl: 1  •  Probiotic Product (achvr PO), Take 1 capsule by mouth Daily., Disp: , Rfl:   •  rosuvastatin (CRESTOR) 20 MG tablet, Take 1 tablet by mouth Every Night., Disp: 90 tablet, Rfl: 1  •  tocilizumab (ACTEMRA) 200 MG/10ML solution injection, Infuse 8 mg/kg into a venous catheter., Disp: , Rfl:   •  zoledronic acid (RECLAST) 5 MG/100ML solution infusion, Administer ZOLEDRONIC ACID 5mg IV once annually, Disp: , Rfl:     Physical Exam:    Physical Exam  Vitals and nursing note reviewed.   Constitutional:       Appearance: She is well-developed and overweight.   HENT:      Head: Normocephalic.   Eyes:      Conjunctiva/sclera: Conjunctivae normal.      Pupils: Pupils are equal, round, and reactive to light.   Neck:      Thyroid: No thyromegaly.   Cardiovascular:      Rate and Rhythm: Normal rate and regular rhythm.      Heart sounds: Normal heart sounds.   Pulmonary:      Effort: Pulmonary effort is normal.      Breath sounds: Normal breath sounds. No wheezing.   Musculoskeletal:         General: Normal range of motion.      Right hand: Deformity present. No swelling or tenderness.      Left hand: Deformity present. No  swelling or tenderness.        Arms:       Cervical back: Normal range of motion and neck supple.      Lumbar back: No spasms or tenderness.   Lymphadenopathy:      Cervical: No cervical adenopathy.   Skin:     General: Skin is warm and dry.   Neurological:      Mental Status: She is alert and oriented to person, place, and time.   Psychiatric:         Attention and Perception: Attention normal.         Mood and Affect: Mood normal.         Thought Content: Thought content normal.          ACE III MINI        Results Review:    None    CMP:  Lab Results   Component Value Date    GLU 93 02/01/2021    BUN 17 02/01/2021    CREATININE 1.15 (H) 02/01/2021    EGFRIFNONA 47 (L) 02/01/2021    EGFRIFAFRI 57 (L) 02/01/2021    BCR 14.8 02/01/2021     02/01/2021    K 3.6 02/01/2021    CO2 30.7 (H) 02/01/2021    CALCIUM 10.7 (H) 02/01/2021    PROTENTOTREF 6.9 02/01/2021    ALBUMIN 4.70 02/01/2021    LABGLOBREF 2.2 02/01/2021    LABIL2 2.1 02/01/2021    BILITOT 0.6 02/01/2021    ALKPHOS 67 02/01/2021    AST 35 (H) 02/01/2021    ALT 43 (H) 02/01/2021     HbA1c:  No results found for: HGBA1C  Microalbumin:  Lab Results   Component Value Date    MICROALBUR 33.8 02/01/2021     Lipid Panel  Lab Results   Component Value Date    CHOL 220 (H) 01/08/2019    TRIG 181 (H) 05/14/2021    HDL 62 (H) 05/14/2021     (H) 05/14/2021    AST 35 (H) 02/01/2021    ALT 43 (H) 02/01/2021       Medication Review: Medications reviewed and noted  Patient Instructions   Problem List Items Addressed This Visit        Cardiac and Vasculature    Mixed hyperlipidemia    Overview     2/1/2021 Eve Navarrete MD    LDL (bad cholesterol) July 2020 was elevated over 170.  Goal is less than 100.  Triglycerides were also elevated.  Goal is less than 150.    Continue pravastatin 80 mg daily.  Increase exercise.  Try to walk or do some other kind of exercise some every day.  Goal is to get at least 30 minutes a day 5 days a week.    Continue to improve  low-fat diet and eat smaller portions at meals.    Continue to weigh at home to monitor progress.  Try to lose about 5 pounds over the next 6 months.         Relevant Medications    rosuvastatin (CRESTOR) 20 MG tablet    Other Relevant Orders    Comprehensive Metabolic Panel    Lipid Panel    Benign essential hypertension - Primary    Overview     2/1/2021 Eve Navarrete MD    Continue hydrochlorothiazide daily.  Continue to avoid salt in the diet.  Regular exercise also helps control blood pressure.         Relevant Medications    amLODIPine (NORVASC) 5 MG tablet    Other Relevant Orders    Microalbumin / Creatinine Urine Ratio - Urine, Clean Catch       Endocrine and Metabolic    Vitamin D deficiency (Chronic)    Overview     2/1/2021 Eve Navarrete MD    Continue 2000 units vitamin D3 daily and calcium daily.         Relevant Orders    Vitamin D 25 Hydroxy       Musculoskeletal and Injuries    Rheumatoid arthritis, seropositive (CMS/HCC) (Chronic)    Overview     8/2/2021 Eve Navarrete MD    Very little evidence of rheumatoid arthritis in the hands at present.  Continue low-dose prednisone daily.  Continue regular follow-up with Dr. Swift.         Relevant Medications    tocilizumab (ACTEMRA) 200 MG/10ML solution injection    predniSONE (DELTASONE) 2.5 MG tablet    Sprain of interphalangeal joint of right thumb (Chronic)    Overview     8/2/2021 Eve Navarrete MD    Continue to avoid using and stressing the thumb to rest it.         Senile osteoporosis    Overview     8/2/2021 Eve Navarrete MD    T-scores  on 2/12/19 DEXA showed osteoporosis of the spine and osteopenia of the hips.    2021 DEXA revealed T-scores in the spine of -1.3 and in the femoral neck of -1.7.  There has been some improvement on Reclast.    Continue Reclast injections yearly with Dr. Swift.    To keep your bones strong and healthy, would encourage weightbearing exercise 30 minutes 3 times a week at minimum.   Incorporate walking into daily activities, 30 minutes a day, 150 minutes a week, spread out over 5 days a week.     Do not smoke.  Maintain a normal body weight.    Eat a diet rich in calcium and vitamin D. Good sources of calcium are low-fat dairy products, dark green leafy vegetables, canned salmon or sardines, tofu, and calcium-fortified orange juice and cereals.    Avoid high protein diets. Protein is important, but too much animal protein can cause bone loss.    Limit caffeine, but moderate amounts of coffee and tea are fine. Avoid carbonated cola drinks as studies show drinking these puts you at greater risk for bone loss.    Recommend bone density study every 2 years.    Take calcium supplements and Vitamin D3 every day.                      Diagnosis Plan   1. Benign essential hypertension  Microalbumin / Creatinine Urine Ratio - Urine, Clean Catch   2. Mixed hyperlipidemia  Comprehensive Metabolic Panel    Lipid Panel   3. Sprain of interphalangeal joint of right thumb, initial encounter     4. Senile osteoporosis     5. Vitamin D deficiency  Vitamin D 25 Hydroxy   6. Rheumatoid arthritis, seropositive (CMS/[facility])             Plan of care reviewed with patient at the conclusion of today's visit. Education was provided regarding diagnosis, management, and any prescribed or recommended OTC medications.Patient verbalizes understanding of and agreement with management plan.         Eve Navarrete MD

## 2021-08-02 NOTE — PATIENT INSTRUCTIONS
Patient Instructions   Problem List Items Addressed This Visit        Cardiac and Vasculature    Mixed hyperlipidemia    Overview     2/1/2021 Eve Navarrete MD    LDL (bad cholesterol) July 2020 was elevated over 170.  Goal is less than 100.  Triglycerides were also elevated.  Goal is less than 150.    Continue pravastatin 80 mg daily.  Increase exercise.  Try to walk or do some other kind of exercise some every day.  Goal is to get at least 30 minutes a day 5 days a week.    Continue to improve low-fat diet and eat smaller portions at meals.    Continue to weigh at home to monitor progress.  Try to lose about 5 pounds over the next 6 months.         Relevant Medications    rosuvastatin (CRESTOR) 20 MG tablet    Other Relevant Orders    Comprehensive Metabolic Panel    Lipid Panel    Benign essential hypertension - Primary    Overview     2/1/2021 Eve Navarrete MD    Continue hydrochlorothiazide daily.  Continue to avoid salt in the diet.  Regular exercise also helps control blood pressure.         Relevant Medications    amLODIPine (NORVASC) 5 MG tablet    Other Relevant Orders    Microalbumin / Creatinine Urine Ratio - Urine, Clean Catch       Endocrine and Metabolic    Vitamin D deficiency (Chronic)    Overview     2/1/2021 Eve Navarrete MD    Continue 2000 units vitamin D3 daily and calcium daily.         Relevant Orders    Vitamin D 25 Hydroxy       Musculoskeletal and Injuries    Rheumatoid arthritis, seropositive (CMS/HCC) (Chronic)    Overview     8/2/2021 Eve Navarrete MD    Very little evidence of rheumatoid arthritis in the hands at present.  Continue low-dose prednisone daily.  Continue regular follow-up with Dr. Swift.         Relevant Medications    tocilizumab (ACTEMRA) 200 MG/10ML solution injection    predniSONE (DELTASONE) 2.5 MG tablet    Sprain of interphalangeal joint of right thumb (Chronic)    Overview     8/2/2021 Eve Navarrete MD    Continue to avoid using and  stressing the thumb to rest it.         Senile osteoporosis    Overview     8/2/2021 Eve Navarrete MD    T-scores  on 2/12/19 DEXA showed osteoporosis of the spine and osteopenia of the hips.    2021 DEXA revealed T-scores in the spine of -1.3 and in the femoral neck of -1.7.  There has been some improvement on Reclast.    Continue Reclast injections yearly with Dr. Swift.    To keep your bones strong and healthy, would encourage weightbearing exercise 30 minutes 3 times a week at minimum.  Incorporate walking into daily activities, 30 minutes a day, 150 minutes a week, spread out over 5 days a week.     Do not smoke.  Maintain a normal body weight.    Eat a diet rich in calcium and vitamin D. Good sources of calcium are low-fat dairy products, dark green leafy vegetables, canned salmon or sardines, tofu, and calcium-fortified orange juice and cereals.    Avoid high protein diets. Protein is important, but too much animal protein can cause bone loss.    Limit caffeine, but moderate amounts of coffee and tea are fine. Avoid carbonated cola drinks as studies show drinking these puts you at greater risk for bone loss.    Recommend bone density study every 2 years.    Take calcium supplements and Vitamin D3 every day.                      BMI for Adults  What is BMI?  Body mass index (BMI) is a number that is calculated from a person's weight and height. BMI can help estimate how much of a person's weight is composed of fat. BMI does not measure body fat directly. Rather, it is an alternative to procedures that directly measure body fat, which can be difficult and expensive.  BMI can help identify people who may be at higher risk for certain medical problems.  What are BMI measurements used for?  BMI is used as a screening tool to identify possible weight problems. It helps determine whether a person is obese, overweight, a healthy weight, or underweight.  BMI is useful for:  · Identifying a weight problem that  "may be related to a medical condition or may increase the risk for medical problems.  · Promoting changes, such as changes in diet and exercise, to help reach a healthy weight. BMI screening can be repeated to see if these changes are working.  How is BMI calculated?  BMI involves measuring your weight in relation to your height. Both height and weight are measured, and the BMI is calculated from those numbers. This can be done either in English ([place] or metric measurements. Note that charts and online BMI calculators are available to help you find your BMI quickly and easily without having to do these calculations yourself.  To calculate your BMI in English ([place] measurements:    1. Measure your weight in pounds (lb).  2. Multiply the number of pounds by 703.  ? For example, for a person who weighs 180 lb, multiply that number by 703, which equals 126,540.  3. Measure your height in inches. Then multiply that number by itself to get a measurement called \"inches squared.\"  ? For example, for a person who is 70 inches tall, the \"inches squared\" measurement is 70 inches x 70 inches, which equals 4,900 inches squared.  4. Divide the total from step 2 (number of lb x 703) by the total from step 3 (inches squared): 126,540 ÷ 4,900 = 25.8. This is your BMI.  To calculate your BMI in metric measurements:  1. Measure your weight in kilograms (kg).  2. Measure your height in meters (m). Then multiply that number by itself to get a measurement called \"meters squared.\"  ? For example, for a person who is 1.75 m tall, the \"meters squared\" measurement is 1.75 m x 1.75 m, which is equal to 3.1 meters squared.  3. Divide the number of kilograms (your weight) by the meters squared number. In this example: 70 ÷ 3.1 = 22.6. This is your BMI.  What do the results mean?  BMI charts are used to identify whether you are underweight, normal weight, overweight, or obese. The following guidelines will be used:  · Underweight: BMI less " than 18.5.  · Normal weight: BMI between 18.5 and 24.9.  · Overweight: BMI between 25 and 29.9.  · Obese: BMI of 30 or above.  Keep these notes in mind:  · Weight includes both fat and muscle, so someone with a muscular build, such as an athlete, may have a BMI that is higher than 24.9. In cases like these, BMI is not an accurate measure of body fat.  · To determine if excess body fat is the cause of a BMI of 25 or higher, further assessments may need to be done by a health care provider.  · BMI is usually interpreted in the same way for men and women.  Where to find more information  For more information about BMI, including tools to quickly calculate your BMI, go to these websites:  · Centers for Disease Control and Prevention: www.cdc.gov  · American Heart Association: www.heart.org  · National Heart, Lung, and Blood [place]: www.nhlbi.nih.gov  Summary  · Body mass index (BMI) is a number that is calculated from a person's weight and height.  · BMI may help estimate how much of a person's weight is composed of fat. BMI can help identify those who may be at higher risk for certain medical problems.  · BMI can be measured using English measurements or metric measurements.  · BMI charts are used to identify whether you are underweight, normal weight, overweight, or obese.  This information is not intended to replace advice given to you by your health care provider. Make sure you discuss any questions you have with your health care provider.  Document Revised: 09/09/2020 Document Reviewed: 07/17/2020  [employer] Patient Education © 2021 [employer]    Heart-Healthy Eating Plan  Many factors influence your heart (coronary) health, including eating and exercise habits. Coronary risk increases with abnormal blood fat (lipid) levels. Heart-healthy meal planning includes limiting unhealthy fats, increasing healthy fats, and making other diet and lifestyle changes.  What is my plan?  Your health care provider may  recommend that you:  · Limit your fat intake to _________% or less of your total calories each day.  · Limit your saturated fat intake to _________% or less of your total calories each day.  · Limit the amount of cholesterol in your diet to less than _________ mg per day.  What are tips for following this plan?  Cooking  Cook foods using methods other than frying. Baking, boiling, grilling, and broiling are all good options. Other ways to reduce fat include:  · Removing the skin from poultry.  · Removing all visible fats from meats.  · Steaming vegetables in water or broth.  Meal planning    · At meals, imagine dividing your plate into fourths:  ? Fill one-half of your plate with vegetables and green salads.  ? Fill one-fourth of your plate with whole grains.  ? Fill one-fourth of your plate with lean protein foods.  · Eat 4-5 servings of vegetables per day. One serving equals 1 cup raw or cooked vegetable, or 2 cups raw leafy greens.  · Eat 4-5 servings of fruit per day. One serving equals 1 medium whole fruit, ¼ cup dried fruit, ½ cup fresh, frozen, or canned fruit, or ½ cup 100% fruit juice.  · Eat more foods that contain soluble fiber. Examples include apples, broccoli, carrots, beans, peas, and barley. Aim to get 25-30 g of fiber per day.  · Increase your consumption of legumes, nuts, and seeds to 4-5 servings per week. One serving of dried beans or legumes equals ½ cup cooked, 1 serving of nuts is ¼ cup, and 1 serving of seeds equals 1 tablespoon.  Fats  · Choose healthy fats more often. Choose monounsaturated and polyunsaturated fats, such as olive and canola oils, flaxseeds, walnuts, almonds, and seeds.  · Eat more omega-3 fats. Choose salmon, mackerel, sardines, tuna, flaxseed oil, and ground flaxseeds. Aim to eat fish at least 2 times each week.  · Check food labels carefully to identify foods with trans fats or high amounts of saturated fat.  · Limit saturated fats. These are found in animal products,  such as meats, butter, and cream. Plant sources of saturated fats include palm oil, palm kernel oil, and coconut oil.  · Avoid foods with partially hydrogenated oils in them. These contain trans fats. Examples are stick margarine, some tub margarines, cookies, crackers, and other baked goods.  · Avoid fried foods.  General information  · Eat more home-cooked food and less restaurant, buffet, and fast food.  · Limit or avoid alcohol.  · Limit foods that are high in starch and sugar.  · Lose weight if you are overweight. Losing just 5-10% of your body weight can help your overall health and prevent diseases such as diabetes and heart disease.  · Monitor your salt (sodium) intake, especially if you have high blood pressure. Talk with your health care provider about your sodium intake.  · Try to incorporate more vegetarian meals weekly.  What foods can I eat?  Fruits  All fresh, canned (in natural juice), or frozen fruits.  Vegetables  Fresh or frozen vegetables (raw, steamed, roasted, or grilled). Green salads.  Grains  Most grains. Choose whole wheat and whole grains most of the time. Rice and pasta, including brown rice and pastas made with whole wheat.  Meats and other proteins  Lean, well-trimmed beef, veal, pork, and lamb. Chicken and turkey without skin. All fish and shellfish. Wild duck, rabbit, pheasant, and venison. Egg whites or low-cholesterol egg substitutes. Dried beans, peas, lentils, and tofu. Seeds and most nuts.  Dairy  Low-fat or nonfat cheeses, including ricotta and mozzarella. Skim or 1% milk (liquid, powdered, or evaporated). Buttermilk made with low-fat milk. Nonfat or low-fat yogurt.  Fats and oils  Non-hydrogenated (trans-free) margarines. Vegetable oils, including soybean, sesame, sunflower, olive, peanut, safflower, corn, canola, and cottonseed. Salad dressings or mayonnaise made with a vegetable oil.  Beverages  Water (mineral or sparkling). Coffee and tea. Diet carbonated beverages.  Sweets  and desserts  Sherbet, gelatin, and fruit ice. Small amounts of dark chocolate.  Limit all sweets and desserts.  Seasonings and condiments  All seasonings and condiments.  The items listed above may not be a complete list of foods and beverages you can eat. Contact a dietitian for more options.  What foods are not recommended?  Fruits  Canned fruit in heavy syrup. Fruit in cream or butter sauce. Fried fruit. Limit coconut.  Vegetables  Vegetables cooked in cheese, cream, or butter sauce. Fried vegetables.  Grains  Breads made with saturated or trans fats, oils, or whole milk. Croissants. Sweet rolls. Donuts. High-fat crackers, such as cheese crackers.  Meats and other proteins  Fatty meats, such as hot dogs, ribs, sausage, herrera, rib-eye roast or steak. High-fat deli meats, such as salami and bologna. Caviar. Domestic duck and goose. Organ meats, such as liver.  Dairy  Cream, sour cream, cream cheese, and creamed cottage cheese. Whole milk cheeses. Whole or 2% milk (liquid, evaporated, or condensed). Whole buttermilk. Cream sauce or high-fat cheese sauce. Whole-milk yogurt.  Fats and oils  Meat fat, or shortening. Cocoa butter, hydrogenated oils, palm oil, coconut oil, palm kernel oil. Solid fats and shortenings, including herrera fat, salt pork, lard, and butter. Nondairy cream substitutes. Salad dressings with cheese or sour cream.  Beverages  Regular sodas and any drinks with added sugar.  Sweets and desserts  Frosting. Pudding. Cookies. Cakes. Pies. Milk chocolate or white chocolate. Buttered syrups. Full-fat ice cream or ice cream drinks.  The items listed above may not be a complete list of foods and beverages to avoid. Contact a dietitian for more information.  Summary  · Heart-healthy meal planning includes limiting unhealthy fats, increasing healthy fats, and making other diet and lifestyle changes.  · Lose weight if you are overweight. Losing just 5-10% of your body weight can help your overall health and  prevent diseases such as diabetes and heart disease.  · Focus on eating a balance of foods, including fruits and vegetables, low-fat or nonfat dairy, lean protein, nuts and legumes, whole grains, and heart-healthy oils and fats.  This information is not intended to replace advice given to you by your health care provider. Make sure you discuss any questions you have with your health care provider.  Document Revised: 01/25/2019 Document Reviewed: 01/25/2019  [employer] Patient Education © 2021 [employer]

## 2021-08-03 LAB
25(OH)D3+25(OH)D2 SERPL-MCNC: 44.3 NG/ML (ref 30–100)
ALBUMIN SERPL-MCNC: 4.9 G/DL (ref 3.5–5.2)
ALBUMIN/CREAT UR: 11 MG/G CREAT (ref 0–29)
ALBUMIN/GLOB SERPL: 2.6 G/DL
ALP SERPL-CCNC: 67 U/L (ref 39–117)
ALT SERPL-CCNC: 42 U/L (ref 1–33)
AST SERPL-CCNC: 30 U/L (ref 1–32)
BILIRUB SERPL-MCNC: 0.5 MG/DL (ref 0–1.2)
BUN SERPL-MCNC: 17 MG/DL (ref 8–23)
BUN/CREAT SERPL: 14.5 (ref 7–25)
CALCIUM SERPL-MCNC: 9.8 MG/DL (ref 8.6–10.5)
CHLORIDE SERPL-SCNC: 103 MMOL/L (ref 98–107)
CHOLEST SERPL-MCNC: 239 MG/DL (ref 0–200)
CO2 SERPL-SCNC: 28.1 MMOL/L (ref 22–29)
CREAT SERPL-MCNC: 1.17 MG/DL (ref 0.57–1)
CREAT UR-MCNC: 330.7 MG/DL
GLOBULIN SER CALC-MCNC: 1.9 GM/DL
GLUCOSE SERPL-MCNC: 85 MG/DL (ref 65–99)
HDLC SERPL-MCNC: 56 MG/DL (ref 40–60)
LDLC SERPL CALC-MCNC: 153 MG/DL (ref 0–100)
MICROALBUMIN UR-MCNC: 36 UG/ML
POTASSIUM SERPL-SCNC: 4.1 MMOL/L (ref 3.5–5.2)
PROT SERPL-MCNC: 6.8 G/DL (ref 6–8.5)
SODIUM SERPL-SCNC: 142 MMOL/L (ref 136–145)
TRIGL SERPL-MCNC: 167 MG/DL (ref 0–150)
VLDLC SERPL CALC-MCNC: 30 MG/DL (ref 5–40)

## 2021-08-09 DIAGNOSIS — E78.2 MIXED HYPERLIPIDEMIA: Primary | ICD-10-CM

## 2021-08-09 RX ORDER — ROSUVASTATIN CALCIUM 40 MG/1
40 TABLET, COATED ORAL NIGHTLY
Qty: 90 TABLET | Refills: 1 | Status: SHIPPED | OUTPATIENT
Start: 2021-08-09 | End: 2022-08-02 | Stop reason: SDUPTHER

## 2021-09-10 DIAGNOSIS — K21.9 GERD WITHOUT ESOPHAGITIS: ICD-10-CM

## 2021-09-16 RX ORDER — FAMOTIDINE 20 MG/1
TABLET, FILM COATED ORAL
Qty: 60 TABLET | Refills: 0 | Status: SHIPPED | OUTPATIENT
Start: 2021-09-16 | End: 2021-11-15

## 2021-09-16 NOTE — TELEPHONE ENCOUNTER
Caller: Irma Pedraza    Relationship: Self     Best call back number: [phone]    What medication are you requesting:     What are your current symptoms: FREQUENCY, BURNING WHEN URINATING, AND ONLY A DRIBBLE COMES OUT    How long have you been experiencing symptoms: 5 DAYS    Have you had these symptoms before:    [] Yes  [] No    Have you been treated for these symptoms before:   [] Yes  [] No    If a prescription is needed, what is your preferred pharmacy and phone number: [id number] [address] [facility] [address] - [phone] [facility] [phone]      Additional notes:

## 2021-09-17 ENCOUNTER — TELEPHONE (OUTPATIENT)
Dept: INTERNAL MEDICINE | Facility: CLINIC | Age: 71
End: 2021-09-17

## 2021-09-17 NOTE — TELEPHONE ENCOUNTER
Patient verbalized understanding. She was not happy with the response and she disconnected the phone call.

## 2021-09-17 NOTE — TELEPHONE ENCOUNTER
Patient needs to be seen with concern for infection.  I would recommend go to urgent care.  Please let her know.  Thanks

## 2021-09-17 NOTE — TELEPHONE ENCOUNTER
Patient called due to UTI x5days no f/c no n/v no straining no hematuria but increased frequency (hourly).  Patient also states she has mild urgency but no cloudy urine or odorous urine.     Patient states she using [employer]   Call back number for pt -[phone]

## 2021-09-20 ENCOUNTER — LAB (OUTPATIENT)
Dept: LAB | Facility: HOSPITAL | Age: 71
End: 2021-09-20

## 2021-09-20 DIAGNOSIS — R30.0 DYSURIA: Primary | ICD-10-CM

## 2021-09-20 DIAGNOSIS — R30.0 DYSURIA: ICD-10-CM

## 2021-09-20 NOTE — TELEPHONE ENCOUNTER
Pt said she is not much better. She said she called the [facility] on Fri and they couldn't see her. They said they could do a video visit but she wasn't able to figure out how to do that. She wanted to know if she could come by the  Center lab for a UA?

## 2021-09-21 ENCOUNTER — TELEPHONE (OUTPATIENT)
Dept: INTERNAL MEDICINE | Facility: CLINIC | Age: 71
End: 2021-09-21

## 2021-09-21 NOTE — TELEPHONE ENCOUNTER
Caller: Irma Pedraza    Relationship: Self    Best call back number:[phone]    Caller requesting test results: URINE TEST    What test was performed:   9/20/2021 Status: Arrived    Time: 3:15 PM Length: 5   Visit Type: LAB [1023] Copay: $0.00   Provider: JOSEPH SOLIS           When was the test performed:     Where was the test performed:     Additional notes: PATIENT REQUESTING TEST RESULTS.

## 2021-09-22 ENCOUNTER — TELEPHONE (OUTPATIENT)
Dept: INTERNAL MEDICINE | Facility: CLINIC | Age: 71
End: 2021-09-22

## 2021-09-22 NOTE — TELEPHONE ENCOUNTER
Went on [facility] site and still no report. When ordered UA with culture if needed, UA will not result until culture ready

## 2021-09-22 NOTE — TELEPHONE ENCOUNTER
Caller: Irma Pedraza    Relationship: Self    Best call back number: [phone]    What test was performed: URINALYSIS     When was the test performed: 9/20/2021    Where was the test performed: IN OFFICE

## 2021-09-24 LAB
APPEARANCE UR: ABNORMAL
BACTERIA #/AREA URNS HPF: ABNORMAL /[HPF]
BACTERIA UR CULT: ABNORMAL
BACTERIA UR CULT: ABNORMAL
BILIRUB UR QL STRIP: NEGATIVE
CASTS URNS QL MICRO: ABNORMAL /LPF
COLOR UR: YELLOW
EPI CELLS #/AREA URNS HPF: ABNORMAL /HPF (ref 0–10)
GLUCOSE UR QL: NEGATIVE
HGB UR QL STRIP: ABNORMAL
KETONES UR QL STRIP: ABNORMAL
LEUKOCYTE ESTERASE UR QL STRIP: ABNORMAL
MICRO URNS: ABNORMAL
NITRITE UR QL STRIP: POSITIVE
OTHER ANTIBIOTIC SUSC ISLT: ABNORMAL
PH UR STRIP: 6 [PH] (ref 5–7.5)
PROT UR QL STRIP: ABNORMAL
RBC #/AREA URNS HPF: >30 /HPF (ref 0–2)
SP GR UR: 1.02 (ref 1–1.03)
URINALYSIS REFLEX: ABNORMAL
UROBILINOGEN UR STRIP-MCNC: 1 MG/DL (ref 0.2–1)
WBC #/AREA URNS HPF: >30 /HPF (ref 0–5)

## 2021-09-24 RX ORDER — CIPROFLOXACIN 500 MG/1
500 TABLET, FILM COATED ORAL 2 TIMES DAILY
Qty: 14 TABLET | Refills: 0 | Status: SHIPPED | OUTPATIENT
Start: 2021-09-24 | End: 2021-10-01

## 2021-09-24 NOTE — TELEPHONE ENCOUNTER
We finally got her urine culture results. Grew Klebsiella. I sent Cipro to the pharmacy to take twice a day for 7 days.

## 2021-11-05 RX ORDER — BUPROPION HYDROCHLORIDE 150 MG/1
TABLET, EXTENDED RELEASE ORAL
Qty: 180 TABLET | Refills: 1 | Status: SHIPPED | OUTPATIENT
Start: 2021-11-05 | End: 2022-08-03 | Stop reason: SDUPTHER

## 2021-11-13 DIAGNOSIS — K21.9 GERD WITHOUT ESOPHAGITIS: ICD-10-CM

## 2021-11-13 DIAGNOSIS — F41.1 GENERALIZED ANXIETY DISORDER: Chronic | ICD-10-CM

## 2021-11-15 RX ORDER — BENZONATATE 200 MG/1
CAPSULE ORAL
Qty: 30 CAPSULE | Refills: 2 | Status: SHIPPED | OUTPATIENT
Start: 2021-11-15 | End: 2022-02-03

## 2021-11-15 RX ORDER — FAMOTIDINE 20 MG/1
TABLET, FILM COATED ORAL
Qty: 180 TABLET | Refills: 1 | Status: SHIPPED | OUTPATIENT
Start: 2021-11-15 | End: 2022-08-24

## 2021-11-15 RX ORDER — BUSPIRONE HYDROCHLORIDE 5 MG/1
TABLET ORAL
Qty: 90 TABLET | Refills: 1 | Status: SHIPPED | OUTPATIENT
Start: 2021-11-15 | End: 2022-11-22

## 2021-11-15 NOTE — TELEPHONE ENCOUNTER
Rx Refill Note  Requested Prescriptions     Pending Prescriptions Disp Refills   • famotidine (PEPCID) 20 MG tablet [Pharmacy Med Name: FAMOTIDINE 20MG TABLETS] 180 tablet      Sig: TAKE 1 TABLET BY MOUTH TWICE DAILY      Last office visit with prescribing clinician: 08/02/21      Next office visit with prescribing clinician: 02/03/22           Penelope Casey LPN  11/15/21, 10:47 EST

## 2021-11-15 NOTE — TELEPHONE ENCOUNTER
Rx Refill Note  Requested Prescriptions     Pending Prescriptions Disp Refills   • busPIRone (BUSPAR) 5 MG tablet [Pharmacy Med Name: BUSPIRONE 5MG TABLETS] 90 tablet 1     Sig: TAKE 1 TABLET BY MOUTH THREE TIMES DAILY AS NEEDED FOR ANXIETY   • benzonatate (TESSALON) 200 MG capsule [Pharmacy Med Name: BENZONATATE 200MG CAPSULES] 30 capsule 2     Sig: TAKE 1 CAPSULE BY MOUTH THREE TIMES DAILY AS NEEDED FOR COUGH      Last office visit with prescribing clinician: 8/2/2021      Next office visit with prescribing clinician: 2/3/2022            Penelope Casey LPN  11/15/21, 10:46 EST

## 2022-02-03 ENCOUNTER — LAB (OUTPATIENT)
Dept: LAB | Facility: HOSPITAL | Age: 72
End: 2022-02-03

## 2022-02-03 ENCOUNTER — OFFICE VISIT (OUTPATIENT)
Dept: INTERNAL MEDICINE | Facility: CLINIC | Age: 72
End: 2022-02-03

## 2022-02-03 VITALS
WEIGHT: 159.8 LBS | HEIGHT: 61 IN | SYSTOLIC BLOOD PRESSURE: 132 MMHG | TEMPERATURE: 98.4 F | HEART RATE: 70 BPM | DIASTOLIC BLOOD PRESSURE: 70 MMHG | BODY MASS INDEX: 30.17 KG/M2

## 2022-02-03 DIAGNOSIS — E55.9 VITAMIN D DEFICIENCY: Chronic | ICD-10-CM

## 2022-02-03 DIAGNOSIS — E78.2 MIXED HYPERLIPIDEMIA: ICD-10-CM

## 2022-02-03 DIAGNOSIS — I10 BENIGN ESSENTIAL HYPERTENSION: ICD-10-CM

## 2022-02-03 DIAGNOSIS — Z00.00 MEDICARE ANNUAL WELLNESS VISIT, SUBSEQUENT: Primary | ICD-10-CM

## 2022-02-03 DIAGNOSIS — K22.70 BARRETT'S ESOPHAGUS WITHOUT DYSPLASIA: Chronic | ICD-10-CM

## 2022-02-03 DIAGNOSIS — E66.09 CLASS 1 OBESITY DUE TO EXCESS CALORIES WITH SERIOUS COMORBIDITY AND BODY MASS INDEX (BMI) OF 30.0 TO 30.9 IN ADULT: Chronic | ICD-10-CM

## 2022-02-03 DIAGNOSIS — J41.0 SIMPLE CHRONIC BRONCHITIS: ICD-10-CM

## 2022-02-03 DIAGNOSIS — M54.50 CHRONIC RIGHT-SIDED LOW BACK PAIN WITHOUT SCIATICA: ICD-10-CM

## 2022-02-03 DIAGNOSIS — K21.9 GERD WITHOUT ESOPHAGITIS: ICD-10-CM

## 2022-02-03 DIAGNOSIS — M81.0 SENILE OSTEOPOROSIS: ICD-10-CM

## 2022-02-03 DIAGNOSIS — F17.200 TOBACCO DEPENDENCE SYNDROME: ICD-10-CM

## 2022-02-03 DIAGNOSIS — F41.1 GENERALIZED ANXIETY DISORDER: ICD-10-CM

## 2022-02-03 DIAGNOSIS — M35.3 POLYMYALGIA RHEUMATICA: ICD-10-CM

## 2022-02-03 DIAGNOSIS — Z00.00 ANNUAL PHYSICAL EXAM: ICD-10-CM

## 2022-02-03 DIAGNOSIS — M05.9 RHEUMATOID ARTHRITIS, SEROPOSITIVE: Chronic | ICD-10-CM

## 2022-02-03 DIAGNOSIS — G89.29 CHRONIC RIGHT-SIDED LOW BACK PAIN WITHOUT SCIATICA: ICD-10-CM

## 2022-02-03 PROBLEM — E66.811 CLASS 1 OBESITY DUE TO EXCESS CALORIES WITH SERIOUS COMORBIDITY AND BODY MASS INDEX (BMI) OF 30.0 TO 30.9 IN ADULT: Chronic | Status: ACTIVE | Noted: 2020-07-16

## 2022-02-03 PROCEDURE — 99397 PER PM REEVAL EST PAT 65+ YR: CPT | Performed by: INTERNAL MEDICINE

## 2022-02-03 PROCEDURE — 1159F MED LIST DOCD IN RCRD: CPT | Performed by: INTERNAL MEDICINE

## 2022-02-03 PROCEDURE — G0439 PPPS, SUBSEQ VISIT: HCPCS | Performed by: INTERNAL MEDICINE

## 2022-02-03 PROCEDURE — 96160 PT-FOCUSED HLTH RISK ASSMT: CPT | Performed by: INTERNAL MEDICINE

## 2022-02-03 PROCEDURE — 93000 ELECTROCARDIOGRAM COMPLETE: CPT | Performed by: INTERNAL MEDICINE

## 2022-02-03 RX ORDER — OMEPRAZOLE 40 MG/1
40 CAPSULE, DELAYED RELEASE ORAL DAILY
Qty: 90 CAPSULE | Refills: 1 | Status: SHIPPED | OUTPATIENT
Start: 2022-02-03 | End: 2022-08-03 | Stop reason: SDUPTHER

## 2022-02-03 RX ORDER — PREDNISONE 1 MG/1
2 TABLET ORAL DAILY
Qty: 180 TABLET | Refills: 1 | Status: SHIPPED | OUTPATIENT
Start: 2022-02-03 | End: 2022-08-03

## 2022-02-03 RX ORDER — ALBUTEROL SULFATE 90 UG/1
2 AEROSOL, METERED RESPIRATORY (INHALATION) EVERY 4 HOURS PRN
Qty: 18 G | Refills: 5 | Status: SHIPPED | OUTPATIENT
Start: 2022-02-03

## 2022-02-03 NOTE — PATIENT INSTRUCTIONS
Patient Instructions   Problem List Items Addressed This Visit        Cardiac and Vasculature    Mixed hyperlipidemia    Overview     2/3/2022 Eve Navarrete MD    Continue rosuvastatin 40 mg daily.  Increase exercise.  Try to walk or do some other kind of exercise  every day.  Goal is to get at least 30 minutes a day 5 days a week.    Continue to improve low-fat diet and eat smaller portions at meals.    Continue to weigh at home to monitor progress.  Try to lose about 5 pounds over the next 6 months.         Relevant Medications    rosuvastatin (CRESTOR) 40 MG tablet    Other Relevant Orders    Lipid Panel    Homocysteine    TSH    Benign essential hypertension    Overview     2/3/2022 Eve Navarrete MD    Taking amlodipine daily.  Continue to avoid salt in the diet.  Regular exercise also helps control blood pressure.         Relevant Medications    amLODIPine (NORVASC) 5 MG tablet    Other Relevant Orders    CBC & Differential    Comprehensive Metabolic Panel    Microalbumin / Creatinine Urine Ratio - Urine, Clean Catch    Urinalysis without microscopic (no culture) - Urine, Clean Catch       Endocrine and Metabolic    Vitamin D deficiency (Chronic)    Overview     2/3/2022 Eve Navarrete MD    Recheck vitamin D level.         Relevant Orders    Vitamin D 25 Hydroxy    Class 1 obesity due to excess calories with serious comorbidity and body mass index (BMI) of 30.0 to 30.9 in adult (Chronic)    Overview     2/1/2021 Eve Navarrete MD                Gastrointestinal Abdominal     Alas's esophagus without dysplasia (Chronic)    Overview     Noted on recent EGD 1/13/2022.    Add PPI. Continue famotidine.         Relevant Medications    famotidine (PEPCID) 20 MG tablet    omeprazole (priLOSEC) 40 MG capsule    GERD without esophagitis    Overview      See above.         Relevant Medications    famotidine (PEPCID) 20 MG tablet    omeprazole (priLOSEC) 40 MG capsule       Health Encounters    Annual  physical exam    Overview     Patient is up-to-date on all vaccinations including Covid booster.    She is up-to-date on colonoscopy, DEXA, mammogram.         Medicare annual wellness visit, subsequent - Primary    Overview     Patient is up-to-date on all vaccinations including Covid booster.    She is up-to-date on colonoscopy, DEXA, mammogram.              Mental Health    Generalized anxiety disorder    Overview      bupropion  mg tablet twice a day.   buspirone 5 mg 3 times a day as needed.      Walking, exercise and physical activity also help decrease symptoms of stress, anxiety, and mood..         Relevant Medications    buPROPion SR (WELLBUTRIN SR) 150 MG 12 hr tablet    busPIRone (BUSPAR) 5 MG tablet       Multi-system (Lupus, Sarcoid...)    Polymyalgia rheumatica (HCC)    Overview     2/3/2022 Eve Navarrete MD    Continue Actemra infusions with Dr. Swift. Decrease low-dose prednisone to 2 mg daily.  May take Tylenol as needed for pain.      Try to get some mild to moderate exercise and physical activity every day.            Musculoskeletal and Injuries    Rheumatoid arthritis, seropositive (HCC) (Chronic)    Overview     2/3/2022 Eve Navarrete MD    Continue regular follow-up with Dr. Swift.         Relevant Medications    tocilizumab (ACTEMRA) 200 MG/10ML solution injection    predniSONE (DELTASONE) 1 MG tablet    Other Relevant Orders    Vitamin B12    C-reactive Protein    Senile osteoporosis    Overview     2/3/2022 Eve Navarrtee MD    T-scores  on 2/12/19 DEXA showed osteoporosis of the spine and osteopenia of the hips.    2021 DEXA revealed T-scores in the spine of -1.3 and in the femoral neck of -1.7.  There has been some improvement on Reclast.    Continue follow-up with Dr. Swift.    To keep your bones strong and healthy, would encourage weightbearing exercise 30 minutes 3 times a week at minimum.  Incorporate walking into daily activities, 30 minutes a day, 150  minutes a week, spread out over 5 days a week.     Do not smoke.  Maintain a normal body weight.    Eat a diet rich in calcium and vitamin D. Good sources of calcium are low-fat dairy products, dark green leafy vegetables, canned salmon or sardines, tofu, and calcium-fortified orange juice and cereals.    Avoid high protein diets. Protein is important, but too much animal protein can cause bone loss.    Limit caffeine, but moderate amounts of coffee and tea are fine. Avoid carbonated cola drinks as studies show drinking these puts you at greater risk for bone loss.    Recommend bone density study every 2 years.    Take calcium supplements and Vitamin D3 every day.             Chronic low back pain    Overview     2/3/2022 Eve Navarrete MD    Do some back stretches every morning.  Use moist heat to relax tight back muscles.  Walk some every day.            Pulmonary and Pneumonias    Chronic obstructive lung disease (HCC)    Overview     2/3/2022 Eve Navarrete MD    Last spirometry  showed moderate obstruction without improvement after bronchodilator.    Continue Trelegy Ellipta inhaler (3 medications in one inhaler) 1 puff daily.  Continue rescue inhaler as needed for cough wheeze or lung congestion.    Continue montelukast (Singulair) tablet every evening to help with allergies and breathing.             Relevant Medications    Azelastine-Fluticasone 137-50 MCG/ACT suspension    montelukast (SINGULAIR) 10 MG tablet    Fluticasone-Umeclidin-Vilant (Trelegy Ellipta) 100-62.5-25 MCG/INH inhaler    predniSONE (DELTASONE) 1 MG tablet    albuterol sulfate HFA (Ventolin HFA) 108 (90 Base) MCG/ACT inhaler       Tobacco    Tobacco dependence syndrome    Overview     2/3/2022 Eve Navarrete MD                   BMI for Adults  What is BMI?  Body mass index (BMI) is a number that is calculated from a person's weight and height. BMI can help estimate how much of a person's weight is composed of fat. BMI does not  "measure body fat directly. Rather, it is an alternative to procedures that directly measure body fat, which can be difficult and expensive.  BMI can help identify people who may be at higher risk for certain medical problems.  What are BMI measurements used for?  BMI is used as a screening tool to identify possible weight problems. It helps determine whether a person is obese, overweight, a healthy weight, or underweight.  BMI is useful for:  · Identifying a weight problem that may be related to a medical condition or may increase the risk for medical problems.  · Promoting changes, such as changes in diet and exercise, to help reach a healthy weight. BMI screening can be repeated to see if these changes are working.  How is BMI calculated?  BMI involves measuring your weight in relation to your height. Both height and weight are measured, and the BMI is calculated from those numbers. This can be done either in English ([place] or metric measurements. Note that charts and online BMI calculators are available to help you find your BMI quickly and easily without having to do these calculations yourself.  To calculate your BMI in English ([place] measurements:    1. Measure your weight in pounds (lb).  2. Multiply the number of pounds by 703.  ? For example, for a person who weighs 180 lb, multiply that number by 703, which equals 126,540.  3. Measure your height in inches. Then multiply that number by itself to get a measurement called \"inches squared.\"  ? For example, for a person who is 70 inches tall, the \"inches squared\" measurement is 70 inches x 70 inches, which equals 4,900 inches squared.  4. Divide the total from step 2 (number of lb x 703) by the total from step 3 (inches squared): 126,540 ÷ 4,900 = 25.8. This is your BMI.    To calculate your BMI in metric measurements:  1. Measure your weight in kilograms (kg).  2. Measure your height in meters (m). Then multiply that number by itself to get a measurement " "called \"meters squared.\"  ? For example, for a person who is 1.75 m tall, the \"meters squared\" measurement is 1.75 m x 1.75 m, which is equal to 3.1 meters squared.  3. Divide the number of kilograms (your weight) by the meters squared number. In this example: 70 ÷ 3.1 = 22.6. This is your BMI.  What do the results mean?  BMI charts are used to identify whether you are underweight, normal weight, overweight, or obese. The following guidelines will be used:  · Underweight: BMI less than 18.5.  · Normal weight: BMI between 18.5 and 24.9.  · Overweight: BMI between 25 and 29.9.  · Obese: BMI of 30 or above.  Keep these notes in mind:  · Weight includes both fat and muscle, so someone with a muscular build, such as an athlete, may have a BMI that is higher than 24.9. In cases like these, BMI is not an accurate measure of body fat.  · To determine if excess body fat is the cause of a BMI of 25 or higher, further assessments may need to be done by a health care provider.  · BMI is usually interpreted in the same way for men and women.  Where to find more information  For more information about BMI, including tools to quickly calculate your BMI, go to these websites:  · Centers for Disease Control and Prevention: www.cdc.gov  · American Heart Association: www.heart.org  · National Heart, Lung, and Blood [place]: www.nhlbi.nih.gov  Summary  · Body mass index (BMI) is a number that is calculated from a person's weight and height.  · BMI may help estimate how much of a person's weight is composed of fat. BMI can help identify those who may be at higher risk for certain medical problems.  · BMI can be measured using English measurements or metric measurements.  · BMI charts are used to identify whether you are underweight, normal weight, overweight, or obese.  This information is not intended to replace advice given to you by your health care provider. Make sure you discuss any questions you have with your health care " provider.  Document Revised: 09/09/2020 Document Reviewed: 07/17/2020  Elsevier Patient Education © 2021 [employer]      Calorie Counting for Weight Loss  Calories are units of energy. Your body needs a certain number of calories from food to keep going throughout the day. When you eat or drink more calories than your body needs, your body stores the extra calories mostly as fat. When you eat or drink fewer calories than your body needs, your body burns fat to get the energy it needs.  Calorie counting means keeping track of how many calories you eat and drink each day. Calorie counting can be helpful if you need to lose weight. If you eat fewer calories than your body needs, you should lose weight. Ask your health care provider what a healthy weight is for you.  For calorie counting to work, you will need to eat the right number of calories each day to lose a healthy amount of weight per week. A dietitian can help you figure out how many calories you need in a day and will suggest ways to reach your calorie goal.  · A healthy amount of weight to lose each week is usually 1-2 lb (0.5-0.9 kg). This usually means that your daily calorie intake should be reduced by 500-750 calories.  · Eating 1,200-1,500 calories a day can help most women lose weight.  · Eating 1,500-1,800 calories a day can help most men lose weight.  What do I need to know about calorie counting?  Work with your health care provider or dietitian to determine how many calories you should get each day. To meet your daily calorie goal, you will need to:  · Find out how many calories are in each food that you would like to eat. Try to do this before you eat.  · Decide how much of the food you plan to eat.  · Keep a food log. Do this by writing down what you ate and how many calories it had.  To successfully lose weight, it is important to balance calorie counting with a healthy lifestyle that includes regular activity.  Where do I find calorie  information?    The number of calories in a food can be found on a Nutrition Facts label. If a food does not have a Nutrition Facts label, try to look up the calories online or ask your dietitian for help.  Remember that calories are listed per serving. If you choose to have more than one serving of a food, you will have to multiply the calories per serving by the number of servings you plan to eat. For example, the label on a package of bread might say that a serving size is 1 slice and that there are 90 calories in a serving. If you eat 1 slice, you will have eaten 90 calories. If you eat 2 slices, you will have eaten 180 calories.  How do I keep a food log?  After each time that you eat, record the following in your food log as soon as possible:  · What you ate. Be sure to include toppings, sauces, and other extras on the food.  · How much you ate. This can be measured in cups, ounces, or number of items.  · How many calories were in each food and drink.  · The total number of calories in the food you ate.  Keep your food log near you, such as in a pocket-sized notebook or on an mango or website on your mobile phone. Some programs will calculate calories for you and show you how many calories you have left to meet your daily goal.  What are some portion-control tips?  · Know how many calories are in a serving. This will help you know how many servings you can have of a certain food.  · Use a measuring cup to measure serving sizes. You could also try weighing out portions on a kitchen scale. With time, you will be able to estimate serving sizes for some foods.  · Take time to put servings of different foods on your favorite plates or in your favorite bowls and cups so you know what a serving looks like.  · Try not to eat straight from a food's packaging, such as from a bag or box. Eating straight from the package makes it hard to see how much you are eating and can lead to overeating. Put the amount you would like  to eat in a cup or on a plate to make sure you are eating the right portion.  · Use smaller plates, glasses, and bowls for smaller portions and to prevent overeating.  · Try not to multitask. For example, avoid watching TV or using your computer while eating. If it is time to eat, sit down at a table and enjoy your food. This will help you recognize when you are full. It will also help you be more mindful of what and how much you are eating.  What are tips for following this plan?  Reading food labels  · Check the calorie count compared with the serving size. The serving size may be smaller than what you are used to eating.  · Check the source of the calories. Try to choose foods that are high in protein, fiber, and vitamins, and low in saturated fat, trans fat, and sodium.  Shopping  · Read nutrition labels while you shop. This will help you make healthy decisions about which foods to buy.  · Pay attention to nutrition labels for low-fat or fat-free foods. These foods sometimes have the same number of calories or more calories than the full-fat versions. They also often have added sugar, starch, or salt to make up for flavor that was removed with the fat.  · Make a grocery list of lower-calorie foods and stick to it.  Cooking  · Try to cook your favorite foods in a healthier way. For example, try baking instead of frying.  · Use low-fat dairy products.  Meal planning  · Use more fruits and vegetables. One-half of your plate should be fruits and vegetables.  · Include lean proteins, such as chicken, turkey, and fish.  Lifestyle  Each week, aim to do one of the following:  · 150 minutes of moderate exercise, such as walking.  · 75 minutes of vigorous exercise, such as running.  General information  · Know how many calories are in the foods you eat most often. This will help you calculate calorie counts faster.  · Find a way of tracking calories that works for you. Get creative. Try different apps or programs if  writing down calories does not work for you.  What foods should I eat?    · Eat nutritious foods. It is better to have a nutritious, high-calorie food, such as an avocado, than a food with few nutrients, such as a bag of potato chips.  · Use your calories on foods and drinks that will fill you up and will not leave you hungry soon after eating.  ? Examples of foods that fill you up are nuts and nut butters, vegetables, lean proteins, and high-fiber foods such as whole grains. High-fiber foods are foods with more than 5 g of fiber per serving.  · Pay attention to calories in drinks. Low-calorie drinks include water and unsweetened drinks.  The items listed above may not be a complete list of foods and beverages you can eat. Contact a dietitian for more information.  What foods should I limit?  Limit foods or drinks that are not good sources of vitamins, minerals, or protein or that are high in unhealthy fats. These include:  · Candy.  · Other sweets.  · Sodas, specialty coffee drinks, alcohol, and juice.  The items listed above may not be a complete list of foods and beverages you should avoid. Contact a dietitian for more information.  How do I count calories when eating out?  · Pay attention to portions. Often, portions are much larger when eating out. Try these tips to keep portions smaller:  ? Consider sharing a meal instead of getting your own.  ? If you get your own meal, eat only half of it. Before you start eating, ask for a container and put half of your meal into it.  ? When available, consider ordering smaller portions from the menu instead of full portions.  · Pay attention to your food and drink choices. Knowing the way food is cooked and what is included with the meal can help you eat fewer calories.  ? If calories are listed on the menu, choose the lower-calorie options.  ? Choose dishes that include vegetables, fruits, whole grains, low-fat dairy products, and lean proteins.  ? Choose items that are  boiled, broiled, grilled, or steamed. Avoid items that are buttered, battered, fried, or served with cream sauce. Items labeled as crispy are usually fried, unless stated otherwise.  ? Choose water, low-fat milk, unsweetened iced tea, or other drinks without added sugar. If you want an alcoholic beverage, choose a lower-calorie option, such as a glass of wine or light beer.  ? Ask for dressings, sauces, and syrups on the side. These are usually high in calories, so you should limit the amount you eat.  ? If you want a salad, choose a garden salad and ask for grilled meats. Avoid extra toppings such as herrera, cheese, or fried items. Ask for the dressing on the side, or ask for olive oil and vinegar or lemon to use as dressing.  · Estimate how many servings of a food you are given. Knowing serving sizes will help you be aware of how much food you are eating at restaurants.  Where to find more information  · Centers for Disease Control and Prevention: www.cdc.gov  · [place] Department of Agriculture: myplate.gov  Summary  · Calorie counting means keeping track of how many calories you eat and drink each day. If you eat fewer calories than your body needs, you should lose weight.  · A healthy amount of weight to lose per week is usually 1-2 lb (0.5-0.9 kg). This usually means reducing your daily calorie intake by 500-750 calories.  · The number of calories in a food can be found on a Nutrition Facts label. If a food does not have a Nutrition Facts label, try to look up the calories online or ask your dietitian for help.  · Use smaller plates, glasses, and bowls for smaller portions and to prevent overeating.  · Use your calories on foods and drinks that will fill you up and not leave you hungry shortly after a meal.  This information is not intended to replace advice given to you by your health care provider. Make sure you discuss any questions you have with your health care provider.  Document Revised: 01/28/2021 Document  Reviewed: 01/28/2021  Elsevier Patient Education © 2021 [employer]

## 2022-02-03 NOTE — PROGRESS NOTES
The ABCs of the Annual Wellness Visit  Initial Medicare Wellness Visit    Chief Complaint   Patient presents with   • Annual Exam     patient is fasting       Subjective   History of Present Illness:  Irma Pedraza is a 71 y.o. female who presents for an Initial Medicare Wellness Visit.    Abdominal distention.  The patient reports abdominal distention; she has gained several pounds over the last 6 months or so. She denies abdominal pain, nausea, abdominal gas. She admits that she is not monitoring her diet closely and reports eating mostly fried foods. She notes that her  cooks for them and she has proposed healthier recipes. She is not doing much exercise.    Health Screenings.   The patient plans to schedule her annual eye exam. Her DEXA scan completed in 2021, showed improvements.     Social history.  She reports tobacco use of approximately 1/2 pack of cigarettes per day. The patient is not interested in smoking cessation at this time.     CHRONIC CONDITIONS:    GERD.   She underwent a GI scope, which revealed gastritis and Alas's esophagus. She complains of unchanged acid reflux and heartburn symptoms despite taking famotidine twice per day. The patient denies utilizing omeprazole or pantoprazole in the past. She has taken Dexilant previously, which was discontinued in 02/2021. Unsure why it was discontinued, possibly caused?    Joint pain and RA followed by Dr. Swift.   She continues taking prednisone 2.5 MG daily for her joint pain. She rates her joint pain a 2 to 3 out of 10 at this time. Additionally, she is utilizing Tylenol as needed for increased low back pain with some relief. She attended formal physical therapy in the past for back pain. The patient describes experiencing a burning sensation across her low back when she first wakes in the morning and begins ambulating. She reports relief with more movement and activity. She is performing recommended stretching exercises at home  daily. She denies any radiating pain down her extremities. She is exercising 3 days per week including walking.     Chronic obstructive lung disease.  The patient continues utilizing her inhalers as prescribed with noted improvement to her breathing. Additionally, she is taking montelukast at night. She complains of exertional dyspnea, for which she utilizes her inhaler for with relief. She is exercising 3 days per week including walking.     Anxiety.  She is taking bupropion and buspirone as prescribed for anxiety symptoms, which she feels are effective.    Hypertension.  She is checking her BP levels at home; however, has not checked recently and is unable to recall her recent reading. Taking amlodipine daily. No edema.    Osteoporosis.  The patient received a Reclast injection in 2019. She denies receiving any repeat injections since. She is following with Dr. Swift as directed.  She has not been doing hand weights lately. She is not taking vitamin D or calcium.    COPD  Using Trelegy inhaler daily. She feels the albuterol inhaler as needed does help. Taking montelucast every evening.      HEALTH RISK ASSESSMENT    Recent Hospitalizations:  No hospitalization(s) within the last year.    Current Medical Providers:  Patient Care Team:  Eve Navarrete MD as PCP - General (Internal Medicine)  Gold Swift DO as Consulting Physician (Rheumatology)  Sabrina Angelo MD as Consulting Physician (Plastic Surgery)  Juarez Pedersen MD as Consulting Physician (Otolaryngology)    Smoking Status:  Social History     Tobacco Use   Smoking Status Current Every Day Smoker   • Packs/day: 0.50   • Years: 50.00   • Pack years: 25.00   • Types: Cigarettes   Smokeless Tobacco Never Used   Tobacco Comment    patient refused smoking literature       Alcohol Consumption:  Social History     Substance and Sexual Activity   Alcohol Use Yes    Comment: 4 -5 days a week       Depression Screen:   PHQ-2/PHQ-9  Depression Screening 2/3/2022   Little interest or pleasure in doing things 0   Feeling down, depressed, or hopeless 0   Total Score 0       Fall Risk Screen:  ANA LAURA Fall Risk Assessment was completed, and patient is at MODERATE risk for falls. Assessment completed on:2/3/2022    Health Habits and Functional and Cognitive Screening:  Functional & Cognitive Status 2/3/2022   Do you have difficulty preparing food and eating? No   Do you have difficulty bathing yourself, getting dressed or grooming yourself? No   Do you have difficulty using the toilet? No   Do you have difficulty moving around from place to place? No   Do you have trouble with steps or getting out of a bed or a chair? No   Current Diet Well Balanced Diet   Dental Exam Up to date   Eye Exam Up to date   Exercise (times per week) 3 times per week   Current Exercises Include Walking   Current Exercise Activities Include -   Do you need help using the phone?  No   Are you deaf or do you have serious difficulty hearing?  No   Do you need help with transportation? No   Do you need help shopping? No   Do you need help preparing meals?  No   Do you need help with housework?  No   Do you need help with laundry? No   Do you need help taking your medications? No   Do you need help managing money? No   Do you ever drive or ride in a car without wearing a seat belt? No   Have you felt unusual stress, anger or loneliness in the last month? No   Who do you live with? Spouse   If you need help, do you have trouble finding someone available to you? No   Have you been bothered in the last four weeks by sexual problems? No   Do you have difficulty concentrating, remembering or making decisions? No       Does the patient have evidence of cognitive impairment? No    Asprin use counseling:Does not need ASA (and currently is not on it)    Age-appropriate Screening Schedule:  Refer to the list below for future screening recommendations based on patient's age, sex and/or  medical conditions. Orders for these recommended tests are listed in the plan section. The patient has been provided with a written plan.    Age appropriate preventive counseling done including age appropriate vaccines,regular  Mammogram and self breast exam,  colonoscopy, regular dental visits, mental health, injury prevention such as wearing seat belt and preventing falls, healthy  nutrition, healthy weight, regular physical exercise. Alcohol use is moderate.  Tobacco history-none. Drug use-none.  STD's-not at risk.          Health Maintenance   Topic Date Due   • PAP SMEAR  02/20/2022   • TDAP/TD VACCINES (2 - Td or Tdap) 04/11/2022   • LIPID PANEL  08/02/2022   • DXA SCAN  03/17/2023   • MAMMOGRAM  04/21/2023   • INFLUENZA VACCINE  Completed   • ZOSTER VACCINE  Completed        The following portions of the patient's history were reviewed and updated as appropriate: allergies, current medications, past family history, past medical history, past social history, past surgical history and problem list.    Outpatient Medications Prior to Visit   Medication Sig Dispense Refill   • acyclovir (ZOVIRAX) 400 MG tablet Take 400 mg by mouth As Needed.     • amLODIPine (NORVASC) 5 MG tablet Take 1 tablet by mouth Daily. 90 tablet 3   • Azelastine-Fluticasone 137-50 MCG/ACT suspension 2 sprays into the nostril(s) as directed by provider Every 12 (Twelve) Hours.     • buPROPion SR (WELLBUTRIN SR) 150 MG 12 hr tablet TAKE 1 TABLET BY MOUTH TWICE DAILY 180 tablet 1   • busPIRone (BUSPAR) 5 MG tablet TAKE 1 TABLET BY MOUTH THREE TIMES DAILY AS NEEDED FOR ANXIETY 90 tablet 1   • clobetasol (TEMOVATE) 0.05 % cream Apply  topically to the appropriate area as directed Daily. 15 g 2   • Coenzyme Q10 (COQ-10) 200 MG capsule Take 1 tablet by mouth Daily.     • cyclobenzaprine (FLEXERIL) 10 MG tablet Take 1 tablet by mouth 2 (Two) Times a Day As Needed for Muscle Spasms. 30 tablet 1   • famotidine (PEPCID) 20 MG tablet TAKE 1 TABLET BY  MOUTH TWICE DAILY 180 tablet 1   • Fluticasone-Umeclidin-Vilant (Trelegy Ellipta) 100-62.5-25 MCG/INH inhaler Inhale 1 puff Daily. 3 each 3   • Lactobacillus-Inulin ([employer] HEALTH [employer] WELLNESS PO) Take 1 capsule by mouth Daily.     • Magnesium Gluconate 550 MG tablet Take 1 tablet by mouth Daily.     • montelukast (SINGULAIR) 10 MG tablet TAKE 1 TABLET BY MOUTH EVERY NIGHT 90 tablet 1   • Probiotic Product (PaeDae PO) Take 1 capsule by mouth Daily.     • rosuvastatin (CRESTOR) 40 MG tablet Take 1 tablet by mouth Every Night. 90 tablet 1   • tocilizumab (ACTEMRA) 200 MG/10ML solution injection Infuse 8 mg/kg into a venous catheter.     • zoledronic acid (RECLAST) 5 MG/100ML solution infusion Administer ZOLEDRONIC ACID 5mg IV once annually     • albuterol sulfate HFA (Ventolin HFA) 108 (90 Base) MCG/ACT inhaler Inhale 2 puffs Every 4 (Four) Hours As Needed for Wheezing. 18 g 5   • predniSONE (DELTASONE) 2.5 MG tablet Take 1 tablet by mouth Daily. 90 tablet 1   • benzonatate (TESSALON) 200 MG capsule TAKE 1 CAPSULE BY MOUTH THREE TIMES DAILY AS NEEDED FOR COUGH 30 capsule 2   • prednisoLONE acetate (PRED FORTE) 1 % ophthalmic suspension APPLY 1 DROP INTO BOTH EYES ONCE A DAY AS DIRECTED  0     No facility-administered medications prior to visit.       Patient Active Problem List   Diagnosis   • Cervicalgia   • Generalized anxiety disorder   • Mixed hyperlipidemia   • Mitral valve disorder   • Tinnitus   • Vitamin D deficiency   • Senile osteoporosis   • Dyspnea   • Polymyalgia rheumatica (HCC)   • Primary insomnia   • Chronic pain of both shoulders   • Chronic upper back pain   • Drug-induced hypokalemia   • Benign essential hypertension   • Benign paroxysmal positional vertigo   • Chronic low back pain   • Chronic obstructive lung disease (HCC)   • GERD without esophagitis   • Muscle spasms of both lower extremities   • Temporal arteritis (HCC)   • Tobacco dependence syndrome   • Fatty liver   • Spasm  of thoracic back muscle   • Class 1 obesity due to excess calories with serious comorbidity and body mass index (BMI) of 30.0 to 30.9 in adult   • Annual physical exam   • Medicare annual wellness visit, subsequent   • Rheumatoid arthritis, seropositive (HCC)   • Lichen sclerosus of female genitalia   • Lichen sclerosus   • Sprain of interphalangeal joint of right thumb   • Alas's esophagus without dysplasia       Advanced Care Planning:  ACP discussion was held with the patient during this visit. Patient has an advance directive (not in EMR), copy requested.    Review of Systems   Constitutional: Negative for chills, fatigue and fever.   HENT: Negative for congestion, ear pain, hearing loss and sinus pressure.    Eyes: Negative for visual disturbance.   Respiratory: Negative for cough, chest tightness, shortness of breath and wheezing.    Cardiovascular: Negative for chest pain, palpitations and leg swelling.   Gastrointestinal: Negative for abdominal pain, blood in stool, constipation and nausea.        Heartburn and acid reflux   Endocrine: Negative for cold intolerance and heat intolerance.   Genitourinary: Negative for dysuria and frequency.   Musculoskeletal: Positive for arthralgias and back pain. Negative for gait problem.   Skin: Negative for color change and rash.   Allergic/Immunologic: Positive for environmental allergies.   Neurological: Negative for dizziness and headaches.   Hematological: Negative for adenopathy. Does not bruise/bleed easily.   Psychiatric/Behavioral: Negative for dysphoric mood, sleep disturbance and suicidal ideas. The patient is not nervous/anxious.        Compared to one year ago, the patient feels her physical health is the same.  Compared to one year ago, the patient feels her mental health is the same.    Reviewed chart for potential of high risk medication in the elderly: yes  Reviewed chart for potential of harmful drug interactions in the elderly:yes    Objective        "  Vitals:    02/03/22 0908   BP: 132/70   BP Location: Left arm   Patient Position: Sitting   Cuff Size: Adult   Pulse: 70   Temp: 98.4 °F (36.9 °C)   TempSrc: Infrared   Weight: 72.5 kg (159 lb 12.8 oz)   Height: 154.9 cm (61\")   PainSc: 0-No pain     Patient's Body mass index is 30.19 kg/m². indicating that she is obese (BMI >30). Obesity-related health conditions include the following: hypertension, dyslipidemias and GERD. Obesity is unchanged. BMI is is above average; BMI management plan is completed. We discussed low calorie, low carb based diet program, portion control and increasing exercise..    Body mass index is 30.19 kg/m².  Discussed the patient's BMI with her. The BMI is above average; BMI management plan is completed.    Physical Exam  Vitals and nursing note reviewed.   Constitutional:       Appearance: She is well-developed.   HENT:      Head: Normocephalic.   Eyes:      Conjunctiva/sclera: Conjunctivae normal.      Pupils: Pupils are equal, round, and reactive to light.   Neck:      Thyroid: No thyromegaly.   Cardiovascular:      Rate and Rhythm: Normal rate and regular rhythm.      Heart sounds: Normal heart sounds. No murmur heard.      Pulmonary:      Effort: Pulmonary effort is normal.      Breath sounds: Normal breath sounds. No wheezing.   Chest:   Breasts:      Right: No inverted nipple, mass, nipple discharge, skin change, tenderness, axillary adenopathy or supraclavicular adenopathy.      Left: No inverted nipple, mass, nipple discharge, skin change, tenderness, axillary adenopathy or supraclavicular adenopathy.       Abdominal:      General: Bowel sounds are normal. There is no distension.      Palpations: Abdomen is soft. There is no mass.      Tenderness: There is no abdominal tenderness.       Musculoskeletal:         General: No tenderness. Normal range of motion.      Cervical back: Normal range of motion and neck supple.   Lymphadenopathy:      Cervical: No cervical adenopathy.      " Upper Body:      Right upper body: No supraclavicular, axillary or pectoral adenopathy.      Left upper body: No supraclavicular, axillary or pectoral adenopathy.   Skin:     General: Skin is warm and dry.      Findings: No rash.   Neurological:      Mental Status: She is alert and oriented to person, place, and time.      Cranial Nerves: No cranial nerve deficit.      Sensory: No sensory deficit.      Coordination: Coordination normal.      Gait: Gait normal.   Psychiatric:         Attention and Perception: Attention normal.         Mood and Affect: Mood normal.         Speech: Speech normal.         Behavior: Behavior normal.         Thought Content: Thought content normal.         Cognition and Memory: Cognition normal.         Judgment: Judgment normal.         ECG 12 Lead    Date/Time: 2/3/2022 12:03 PM  Performed by: Eve Navarrete MD  Authorized by: Eve Navarrete MD   Comparison: compared with previous ECG from 1/16/2020  Similar to previous ECG  Rhythm: sinus rhythm  Rate: normal  BPM: 70  Conduction: conduction normal  ST Segments: ST segments normal  T Waves: T waves normal  QRS axis: normal    Clinical impression: normal ECG                  Assessment/Plan   Medicare Risks and Personalized Health Plan  CMS Preventative Services Quick Reference  Breast Cancer/Mammogram Screening  Cardiovascular risk  Chronic Pain   Fall Risk  Obesity/Overweight   Osteoporosis Risk  Tobacco Use/Dependance (use dotphrase .tobaccocessation for documentation)     1. GERD.  - History of gastritis and Alas's esophagus.  - Patient to begin taking omeprazole 40 MG, 1 tablet every morning.   - Continue taking famotidine twice a day.     2. Joint pain/ low back pain.   - History of chronic joint pain. The patient complains of low back pain.   - Patient decrease prednisone to 1 MG tablet, 2 tablets per day. A prescription was sent to her preferred pharmacy.   - Explained that prednisone can cause acid reflux symptoms and  decreased bone density. Discussed overall health benefits of decreasing use of prednisone.   -She will continue Actemra for RA and follow-up with Dr. Swift soon. At that time she will let him know if the mild decrease in prednisone is working out okay for her.  -Encouraged to increase daily back stretches and exercises from past PT.  - Encouraged that she apply heat to her low back for tight muscles.   - Will consider repeating formal physical therapy in the future if her low back pain persists.     3. Chronic obstructive lung disease.  - History of chronic obstructive lung disease. Patient complains of exertional dyspnea that is stable.   - Patient to continue utilizing her trelegy inhaler daily and the rescue albuterol inhaler as needed as prescribed.   - Discussed benefits to her breathing with smoking cessation.     4. Osteoporosis.   - History of osteoporosis. She  received a Reclast injection in 2019 and is following with Dr. Swift as directed.   - Recommended that she further discuss osteoporosis treatment  with Dr. Swift.   - Encouraged regular exercise including walking and use of small hand weights at home.     5. Anxiety.  - Controlled.   - Patient to continue taking medications as prescribed.     5. Tobacco use.  - Strongly encouraged smoking cessation and discussed overall health benefits and personal motivations to quit. Asked patient to make a list of reasons to quit including time with grandchildren. Asked patient to make a list of things to do and she wants a cigarette other than smoking.    6. Labs.   - History of elevated liver enzymes.   - Repeat liver enzyme labs.     7. Weight gain and obesity  Patient was encouraged to increase exercise and walking. She was encouraged to eat smaller portions at mealtime and to discuss healthier meals with her  who does the cooking. She was encouraged to avoid snacking.    The patient will return in 6 months for follow up.     The above  risks/problems have been discussed with the patient.  Pertinent information has been shared with the patient in the After Visit Summary.  Follow up plans and orders are seen below in the Assessment/Plan Section.  Patient Instructions   Problem List Items Addressed This Visit        Cardiac and Vasculature    Mixed hyperlipidemia    Overview     2/3/2022 Eve Navarrete MD    Continue rosuvastatin 40 mg daily.  Increase exercise.  Try to walk or do some other kind of exercise  every day.  Goal is to get at least 30 minutes a day 5 days a week.    Continue to improve low-fat diet and eat smaller portions at meals.    Continue to weigh at home to monitor progress.  Try to lose about 5 pounds over the next 6 months.         Relevant Medications    rosuvastatin (CRESTOR) 40 MG tablet    Other Relevant Orders    Lipid Panel    Homocysteine    TSH    Benign essential hypertension    Overview     2/3/2022 Eve Navarrete MD    Taking amlodipine daily.  Continue to avoid salt in the diet.  Regular exercise also helps control blood pressure.         Relevant Medications    amLODIPine (NORVASC) 5 MG tablet    Other Relevant Orders    CBC & Differential    Comprehensive Metabolic Panel    Microalbumin / Creatinine Urine Ratio - Urine, Clean Catch    Urinalysis without microscopic (no culture) - Urine, Clean Catch       Endocrine and Metabolic    Vitamin D deficiency (Chronic)    Overview     2/3/2022 Eve Navarrete MD    Recheck vitamin D level.         Relevant Orders    Vitamin D 25 Hydroxy    Class 1 obesity due to excess calories with serious comorbidity and body mass index (BMI) of 30.0 to 30.9 in adult (Chronic)    Overview     2/1/2021 Eve Navarrete MD                Gastrointestinal Abdominal     Alas's esophagus without dysplasia (Chronic)    Overview     Noted on recent EGD 1/13/2022.    Add PPI. Continue famotidine.         Relevant Medications    famotidine (PEPCID) 20 MG tablet    omeprazole  (priLOSEC) 40 MG capsule    GERD without esophagitis    Overview      See above.         Relevant Medications    famotidine (PEPCID) 20 MG tablet    omeprazole (priLOSEC) 40 MG capsule       Health Encounters    Annual physical exam    Overview     Patient is up-to-date on all vaccinations including Covid booster.    She is up-to-date on colonoscopy, DEXA, mammogram.         Medicare annual wellness visit, subsequent - Primary    Overview     Patient is up-to-date on all vaccinations including Covid booster.    She is up-to-date on colonoscopy, DEXA, mammogram.              Mental Health    Generalized anxiety disorder    Overview      bupropion  mg tablet twice a day.   buspirone 5 mg 3 times a day as needed.      Walking, exercise and physical activity also help decrease symptoms of stress, anxiety, and mood..         Relevant Medications    buPROPion SR (WELLBUTRIN SR) 150 MG 12 hr tablet    busPIRone (BUSPAR) 5 MG tablet       Multi-system (Lupus, Sarcoid...)    Polymyalgia rheumatica (HCC)    Overview     2/3/2022 Eve Navarrete MD    Continue Actemra infusions with Dr. Swift. Decrease low-dose prednisone to 2 mg daily.  May take Tylenol as needed for pain.      Try to get some mild to moderate exercise and physical activity every day.            Musculoskeletal and Injuries    Rheumatoid arthritis, seropositive (HCC) (Chronic)    Overview     2/3/2022 Eve Navarrete MD    Continue regular follow-up with Dr. Swift.         Relevant Medications    tocilizumab (ACTEMRA) 200 MG/10ML solution injection    predniSONE (DELTASONE) 1 MG tablet    Other Relevant Orders    Vitamin B12    C-reactive Protein    Senile osteoporosis    Overview     2/3/2022 Eve Navarrete MD    T-scores  on 2/12/19 DEXA showed osteoporosis of the spine and osteopenia of the hips.    2021 DEXA revealed T-scores in the spine of -1.3 and in the femoral neck of -1.7.  There has been some improvement on Reclast.    Continue  follow-up with Dr. Swift.    To keep your bones strong and healthy, would encourage weightbearing exercise 30 minutes 3 times a week at minimum.  Incorporate walking into daily activities, 30 minutes a day, 150 minutes a week, spread out over 5 days a week.     Do not smoke.  Maintain a normal body weight.    Eat a diet rich in calcium and vitamin D. Good sources of calcium are low-fat dairy products, dark green leafy vegetables, canned salmon or sardines, tofu, and calcium-fortified orange juice and cereals.    Avoid high protein diets. Protein is important, but too much animal protein can cause bone loss.    Limit caffeine, but moderate amounts of coffee and tea are fine. Avoid carbonated cola drinks as studies show drinking these puts you at greater risk for bone loss.    Recommend bone density study every 2 years.    Take calcium supplements and Vitamin D3 every day.             Chronic low back pain    Overview     2/3/2022 Eve Navarrete MD    Do some back stretches every morning.  Use moist heat to relax tight back muscles.  Walk some every day.            Pulmonary and Pneumonias    Chronic obstructive lung disease (HCC)    Overview     2/3/2022 Eve Navarrete MD    Last spirometry  showed moderate obstruction without improvement after bronchodilator.    Continue Trelegy Ellipta inhaler (3 medications in one inhaler) 1 puff daily.  Continue rescue inhaler as needed for cough wheeze or lung congestion.    Continue montelukast (Singulair) tablet every evening to help with allergies and breathing.             Relevant Medications    Azelastine-Fluticasone 137-50 MCG/ACT suspension    montelukast (SINGULAIR) 10 MG tablet    Fluticasone-Umeclidin-Vilant (Trelegy Ellipta) 100-62.5-25 MCG/INH inhaler    predniSONE (DELTASONE) 1 MG tablet    albuterol sulfate HFA (Ventolin HFA) 108 (90 Base) MCG/ACT inhaler       Tobacco    Tobacco dependence syndrome    Overview     2/3/2022 Eve Navarrete MD                     Follow Up:  Return in about 6 months (around 8/3/2022) for recheck fasting.     An After Visit Summary and PPPS were given to the patient.         Transcribed from ambient dictation for Eve Navarrete MD by Carolin Shelby.  02/03/22   11:41 EST    Patient verbalized consent to the visit recording.

## 2022-02-05 DIAGNOSIS — E78.2 MIXED HYPERLIPIDEMIA: Primary | ICD-10-CM

## 2022-02-05 LAB
25(OH)D3+25(OH)D2 SERPL-MCNC: 47.3 NG/ML (ref 30–100)
ALBUMIN SERPL-MCNC: 5.1 G/DL (ref 3.5–5.2)
ALBUMIN/CREAT UR: 11 MG/G CREAT (ref 0–29)
ALBUMIN/GLOB SERPL: 2.6 G/DL
ALP SERPL-CCNC: 66 U/L (ref 39–117)
ALT SERPL-CCNC: 30 U/L (ref 1–33)
APPEARANCE UR: CLEAR
AST SERPL-CCNC: 18 U/L (ref 1–32)
BASOPHILS # BLD AUTO: 0.01 10*3/MM3 (ref 0–0.2)
BASOPHILS NFR BLD AUTO: 0.2 % (ref 0–1.5)
BILIRUB SERPL-MCNC: 0.4 MG/DL (ref 0–1.2)
BILIRUB UR QL STRIP: NEGATIVE
BUN SERPL-MCNC: 15 MG/DL (ref 8–23)
BUN/CREAT SERPL: 15.2 (ref 7–25)
CALCIUM SERPL-MCNC: 10.1 MG/DL (ref 8.6–10.5)
CHLORIDE SERPL-SCNC: 103 MMOL/L (ref 98–107)
CHOLEST SERPL-MCNC: 309 MG/DL (ref 0–200)
CO2 SERPL-SCNC: 30 MMOL/L (ref 22–29)
COLOR UR: YELLOW
CREAT SERPL-MCNC: 0.99 MG/DL (ref 0.57–1)
CREAT UR-MCNC: 144 MG/DL
CRP SERPL-MCNC: <0.3 MG/DL (ref 0–0.5)
EOSINOPHIL # BLD AUTO: 0.13 10*3/MM3 (ref 0–0.4)
EOSINOPHIL NFR BLD AUTO: 2 % (ref 0.3–6.2)
ERYTHROCYTE [DISTWIDTH] IN BLOOD BY AUTOMATED COUNT: 12.2 % (ref 12.3–15.4)
GLOBULIN SER CALC-MCNC: 2 GM/DL
GLUCOSE SERPL-MCNC: 90 MG/DL (ref 65–99)
GLUCOSE UR QL STRIP: NEGATIVE
HCT VFR BLD AUTO: 49.2 % (ref 34–46.6)
HCYS SERPL-SCNC: 30.6 UMOL/L (ref 0–19.2)
HDLC SERPL-MCNC: 54 MG/DL (ref 40–60)
HGB BLD-MCNC: 16.2 G/DL (ref 12–15.9)
HGB UR QL STRIP: NEGATIVE
IMM GRANULOCYTES # BLD AUTO: 0.02 10*3/MM3 (ref 0–0.05)
IMM GRANULOCYTES NFR BLD AUTO: 0.3 % (ref 0–0.5)
KETONES UR QL STRIP: NEGATIVE
LDLC SERPL CALC-MCNC: 195 MG/DL (ref 0–100)
LEUKOCYTE ESTERASE UR QL STRIP: ABNORMAL
LYMPHOCYTES # BLD AUTO: 2.41 10*3/MM3 (ref 0.7–3.1)
LYMPHOCYTES NFR BLD AUTO: 37.9 % (ref 19.6–45.3)
MCH RBC QN AUTO: 32.9 PG (ref 26.6–33)
MCHC RBC AUTO-ENTMCNC: 32.9 G/DL (ref 31.5–35.7)
MCV RBC AUTO: 100 FL (ref 79–97)
MICROALBUMIN UR-MCNC: 16 UG/ML
MONOCYTES # BLD AUTO: 0.8 10*3/MM3 (ref 0.1–0.9)
MONOCYTES NFR BLD AUTO: 12.6 % (ref 5–12)
NEUTROPHILS # BLD AUTO: 2.99 10*3/MM3 (ref 1.7–7)
NEUTROPHILS NFR BLD AUTO: 47 % (ref 42.7–76)
NITRITE UR QL STRIP: NEGATIVE
NRBC BLD AUTO-RTO: 0 /100 WBC (ref 0–0.2)
PH UR STRIP: 5.5 [PH] (ref 5–8)
PLATELET # BLD AUTO: 211 10*3/MM3 (ref 140–450)
POTASSIUM SERPL-SCNC: 4 MMOL/L (ref 3.5–5.2)
PROT SERPL-MCNC: 7.1 G/DL (ref 6–8.5)
PROT UR QL STRIP: NEGATIVE
RBC # BLD AUTO: 4.92 10*6/MM3 (ref 3.77–5.28)
SODIUM SERPL-SCNC: 145 MMOL/L (ref 136–145)
SP GR UR STRIP: 1.02 (ref 1–1.03)
TRIGL SERPL-MCNC: 305 MG/DL (ref 0–150)
TSH SERPL DL<=0.005 MIU/L-ACNC: 1.47 UIU/ML (ref 0.27–4.2)
UROBILINOGEN UR STRIP-MCNC: ABNORMAL MG/DL
VIT B12 SERPL-MCNC: 250 PG/ML (ref 211–946)
VLDLC SERPL CALC-MCNC: 60 MG/DL (ref 5–40)
WBC # BLD AUTO: 6.36 10*3/MM3 (ref 3.4–10.8)

## 2022-02-05 RX ORDER — FOLIC ACID 1 MG/1
1 TABLET ORAL DAILY
Qty: 90 TABLET | Refills: 1 | Status: SHIPPED | OUTPATIENT
Start: 2022-02-05 | End: 2023-02-13

## 2022-02-05 RX ORDER — LANOLIN ALCOHOL/MO/W.PET/CERES
1000 CREAM (GRAM) TOPICAL DAILY
Qty: 90 TABLET | Refills: 1 | Status: SHIPPED | OUTPATIENT
Start: 2022-02-05 | End: 2022-10-17

## 2022-02-05 RX ORDER — EZETIMIBE 10 MG/1
10 TABLET ORAL NIGHTLY
Qty: 90 TABLET | Refills: 1 | Status: SHIPPED | OUTPATIENT
Start: 2022-02-05 | End: 2022-08-02 | Stop reason: SDUPTHER

## 2022-02-07 ENCOUNTER — TELEPHONE (OUTPATIENT)
Dept: INTERNAL MEDICINE | Facility: CLINIC | Age: 72
End: 2022-02-07

## 2022-02-07 NOTE — TELEPHONE ENCOUNTER
----- Message from Eve Navarrete MD sent at 2/5/2022  8:37 AM EST -----  Also tell patient to go by any [facility] in 2 months to recheck fasting lipids

## 2022-03-22 RX ORDER — MONTELUKAST SODIUM 10 MG/1
10 TABLET ORAL NIGHTLY
Qty: 90 TABLET | Refills: 1 | Status: SHIPPED | OUTPATIENT
Start: 2022-03-22 | End: 2022-08-03 | Stop reason: SDUPTHER

## 2022-04-13 ENCOUNTER — TELEMEDICINE (OUTPATIENT)
Dept: INTERNAL MEDICINE | Facility: CLINIC | Age: 72
End: 2022-04-13

## 2022-04-13 ENCOUNTER — TELEPHONE (OUTPATIENT)
Dept: INTERNAL MEDICINE | Facility: CLINIC | Age: 72
End: 2022-04-13

## 2022-04-13 DIAGNOSIS — J06.9 VIRAL UPPER RESPIRATORY TRACT INFECTION: Primary | ICD-10-CM

## 2022-04-13 PROCEDURE — 99213 OFFICE O/P EST LOW 20 MIN: CPT | Performed by: INTERNAL MEDICINE

## 2022-04-13 RX ORDER — AMOXICILLIN 500 MG/1
500 CAPSULE ORAL 2 TIMES DAILY
Qty: 14 CAPSULE | Refills: 0 | Status: SHIPPED | OUTPATIENT
Start: 2022-04-13 | End: 2023-02-13

## 2022-04-13 NOTE — PROGRESS NOTES
[facility]     Irma Pedraza  1950   [id number]      Patient Care Team:  Eve Navarrete MD as PCP - General (Internal Medicine)  Gold Swift DO as Consulting Physician (Rheumatology)  Sabrina Angelo MD as Consulting Physician (Plastic Surgery)  Juarez Pedersen MD as Consulting Physician (Otolaryngology)    Chief Complaint::   Chief Complaint   Patient presents with   • Sinusitis      You have chosen to receive care through a telehealth visit.  Do you consent to use a video/audio connection for your medical care today? Yes      HPI  The patient verbally consented to being recorded.     The patient is seen today via video visit for a 3-day history of headache, sinus pressure, cough, productive of yellow mucus. She has had no fever or chills. What prompted her to call was the fact this morning there was blood mixed in with the mucus, but she does not necessarily feel worse. She has no facial pain. She has taken ibuprofen for discomfort. The patient notes she was in a public gathering a week ago    Chronic Conditions:      Patient Active Problem List   Diagnosis   • Cervicalgia   • Generalized anxiety disorder   • Mixed hyperlipidemia   • Mitral valve disorder   • Tinnitus   • Vitamin D deficiency   • Senile osteoporosis   • Dyspnea   • Polymyalgia rheumatica (HCC)   • Primary insomnia   • Chronic pain of both shoulders   • Chronic upper back pain   • Drug-induced hypokalemia   • Benign essential hypertension   • Benign paroxysmal positional vertigo   • Chronic low back pain   • Chronic obstructive lung disease (HCC)   • GERD without esophagitis   • Muscle spasms of both lower extremities   • Temporal arteritis (HCC)   • Tobacco dependence syndrome   • Fatty liver   • Spasm of thoracic back muscle   • Class 1 obesity due to excess calories with serious comorbidity and body mass index (BMI) of 30.0 to 30.9 in adult   • Annual physical exam   • Medicare annual wellness  visit, subsequent   • Rheumatoid arthritis, seropositive ([facility])   • Lichen sclerosus of female genitalia   • Lichen sclerosus   • Sprain of interphalangeal joint of right thumb   • Alas's esophagus without dysplasia        Past Medical History:   Diagnosis Date   • Adenomyomatosis of gallbladder 2009    by ultrasound, Dr. Yomi Tai 2009   • Arthritis    • Arthritis of lumbosacral spine    • Asthma    • Chronic hoarseness    • Closed displaced fracture of shaft of right clavicle with malunion    • Diverticulitis 2/28/2019   • GCA (giant cell arteritis) ([facility])    • GERD (gastroesophageal reflux disease)    • Lloyd's neuroma of left foot     surgery   • Ocular histoplasmosis     BCC 2/6/2017   • Panic disorder 2/28/2019   • PMR (polymyalgia rheumatica) ([facility])        Past Surgical History:   Procedure Laterality Date   • COLONOSCOPY  02/2009   • COSMETIC SURGERY  1994    face lift   • COSMETIC SURGERY  1993    TUMMY TUCK   • COSMETIC SURGERY  1980    Birth kenny removal   • ELBOW PROCEDURE Left     x2   • THROAT SURGERY      remote removal of growth from throat       Family History   Problem Relation Age of Onset   • Breast cancer Maternal Grandmother 90   • Osteoporosis Mother    • Osteoporosis Father    • Migraines Father    • Brain cancer Sister    • Stroke Brother    • Stroke Paternal Grandmother    • Ovarian cancer Neg Hx    • Endometrial cancer Neg Hx        Social History     Socioeconomic History   • Marital status:    Tobacco Use   • Smoking status: Current Every Day Smoker     Packs/day: 0.50     Years: 50.00     Pack years: 25.00     Types: Cigarettes   • Smokeless tobacco: Never Used   • Tobacco comment: patient refused smoking literature   Substance and Sexual Activity   • Alcohol use: Yes     Comment: 4 -5 days a week   • Drug use: No   • Sexual activity: Defer       Allergies   Allergen Reactions   • Bactrim [Sulfamethoxazole-Trimethoprim] Other (See Comments)     Hypokalemia, hyponatremia   •  Atorvastatin Unknown (See Comments)     Lipitor     • Bupropion Hcl [Bupropion] Unknown (See Comments)     wellbutrin   • Calcium Unknown (See Comments)     unknown         Current Outpatient Medications:   •  acyclovir (ZOVIRAX) 400 MG tablet, Take 400 mg by mouth As Needed., Disp: , Rfl:   •  albuterol sulfate HFA (Ventolin HFA) 108 (90 Base) MCG/ACT inhaler, Inhale 2 puffs Every 4 (Four) Hours As Needed for Wheezing., Disp: 18 g, Rfl: 5  •  amLODIPine (NORVASC) 5 MG tablet, Take 1 tablet by mouth Daily., Disp: 90 tablet, Rfl: 3  •  amoxicillin (AMOXIL) 500 MG capsule, Take 1 capsule by mouth 2 (Two) Times a Day., Disp: 14 capsule, Rfl: 0  •  Azelastine-Fluticasone 137-50 MCG/ACT suspension, 2 sprays into the nostril(s) as directed by provider Every 12 (Twelve) Hours., Disp: , Rfl:   •  buPROPion SR (WELLBUTRIN SR) 150 MG 12 hr tablet, TAKE 1 TABLET BY MOUTH TWICE DAILY, Disp: 180 tablet, Rfl: 1  •  busPIRone (BUSPAR) 5 MG tablet, TAKE 1 TABLET BY MOUTH THREE TIMES DAILY AS NEEDED FOR ANXIETY, Disp: 90 tablet, Rfl: 1  •  clobetasol (TEMOVATE) 0.05 % cream, Apply  topically to the appropriate area as directed Daily., Disp: 15 g, Rfl: 2  •  Coenzyme Q10 (COQ-10) 200 MG capsule, Take 1 tablet by mouth Daily., Disp: , Rfl:   •  cyclobenzaprine (FLEXERIL) 10 MG tablet, Take 1 tablet by mouth 2 (Two) Times a Day As Needed for Muscle Spasms., Disp: 30 tablet, Rfl: 1  •  ezetimibe (ZETIA) 10 MG tablet, Take 1 tablet by mouth Every Night., Disp: 90 tablet, Rfl: 1  •  famotidine (PEPCID) 20 MG tablet, TAKE 1 TABLET BY MOUTH TWICE DAILY, Disp: 180 tablet, Rfl: 1  •  Fluticasone-Umeclidin-Vilant (Trelegy Ellipta) 100-62.5-25 MCG/INH inhaler, Inhale 1 puff Daily., Disp: 3 each, Rfl: 3  •  folic acid (FOLVITE) 1 MG tablet, Take 1 tablet by mouth Daily., Disp: 90 tablet, Rfl: 1  •  Lactobacillus-Inulin ([facility] PO), Take 1 capsule by mouth Daily., Disp: , Rfl:   •  Magnesium Gluconate 550 MG tablet, Take 1  tablet by mouth Daily., Disp: , Rfl:   •  montelukast (SINGULAIR) 10 MG tablet, TAKE 1 TABLET BY MOUTH EVERY NIGHT, Disp: 90 tablet, Rfl: 1  •  omeprazole (priLOSEC) 40 MG capsule, Take 1 capsule by mouth Daily., Disp: 90 capsule, Rfl: 1  •  predniSONE (DELTASONE) 1 MG tablet, Take 2 tablets by mouth Daily., Disp: 180 tablet, Rfl: 1  •  Probiotic Product ([employer] PO), Take 1 capsule by mouth Daily., Disp: , Rfl:   •  rosuvastatin (CRESTOR) 40 MG tablet, Take 1 tablet by mouth Every Night., Disp: 90 tablet, Rfl: 1  •  tocilizumab (ACTEMRA) 200 MG/10ML solution injection, Infuse 8 mg/kg into a venous catheter., Disp: , Rfl:   •  vitamin B-12 (CYANOCOBALAMIN) 1000 MCG tablet, Take 1 tablet by mouth Daily., Disp: 90 tablet, Rfl: 1  •  zoledronic acid (RECLAST) 5 MG/100ML solution infusion, Administer ZOLEDRONIC ACID 5mg IV once annually, Disp: , Rfl:     Review of Systems   Constitutional: Negative for chills, fatigue and fever.   HENT: Negative for congestion, ear pain and sinus pressure.    Respiratory: Negative for cough, chest tightness, shortness of breath and wheezing.    Cardiovascular: Negative for chest pain and palpitations.   Gastrointestinal: Negative for abdominal pain, blood in stool and constipation.   Skin: Negative for color change.   Allergic/Immunologic: Negative for environmental allergies.   Neurological: Negative for dizziness, speech difficulty and headache.   Psychiatric/Behavioral: Negative for decreased concentration. The patient is not nervous/anxious.         Vital Signs  There were no vitals filed for this visit.    Physical Exam  Constitutional:       General: She is not in acute distress.     Appearance: Normal appearance. She is not ill-appearing.   HENT:      Head: Normocephalic and atraumatic.   Eyes:      Extraocular Movements: Extraocular movements intact.      Pupils: Pupils are equal, round, and reactive to light.   Pulmonary:      Effort: Pulmonary effort is normal.  No respiratory distress.   Musculoskeletal:      Cervical back: Normal range of motion. No rigidity.          Procedures    ACE III MINI             Assessment/Plan:    Diagnoses and all orders for this visit:    1. Viral upper respiratory tract infection (Primary)    Other orders  -     amoxicillin (AMOXIL) 500 MG capsule; Take 1 capsule by mouth 2 (Two) Times a Day.  Dispense: 14 capsule; Refill: 0         1. Upper respiratory infection       -    Likely viral       -    She will continue over the counter measures.       -    I have given her amoxicillin if to take if it worsens.       -    I have also recommended that she get a home COVID-19 test since she was in a public gathering last week. She will let us know if that is positive.               Follow up as scheduled.    Transcribed from ambient dictation for Ar Horowitz MD by SALLY GONZALEZ.  04/13/22   15:26 EDT    Patient verbalized consent to the visit recording.          Plan of care reviewed with patient at the conclusion of today's visit. Education was provided regarding diagnosis, management, and any prescribed or recommended OTC medications.Patient verbalizes understanding of and agreement with management plan.         Ar Horowitz MD

## 2022-04-13 NOTE — TELEPHONE ENCOUNTER
She needs a Covid test if she has not already done 1.  She could do video visit with [facility] today if available

## 2022-04-13 NOTE — TELEPHONE ENCOUNTER
PATIENT WISHED TO CALL BACK IN AND STATE THE SHE HAS ACTUALLY HAD THESE SYMPTOMS SINCE 04/03, NOT FOR THREE DAYS

## 2022-04-13 NOTE — TELEPHONE ENCOUNTER
Caller: Irma Pedraza    Relationship to patient: Self    Best call back number: [phone] (M)    Characteristics of symptom/severity: HEADACHE, YELLOW MUCUS, SINUS PRESSURE, COUGH    How long have you been experiencing symptoms: 3 DAYS     When have you experienced or been treated for these symptoms before: YES    Have you had any recent surgeries, procedures or injections: [] Yes  [x] No   If yes, explain:     Is it the symptoms constant or intermittent: [x] Constant  [] Intermittent   What makes it worse: NO    What makes it better: NO    What therapies/medications have you tried: OTC MEDICATION NO REALLY HELPING     If a prescription is needed, what is your preferred pharmacy:   [id number] - [place], [place] - [address] AT [facility] [address] [facility] [address] [facility] [address] - [phone]  - [phone] [address]  [facility] [address]  Phone: [phone] Fax: [phone]

## 2022-04-14 ENCOUNTER — TELEPHONE (OUTPATIENT)
Dept: INTERNAL MEDICINE | Facility: CLINIC | Age: 72
End: 2022-04-14

## 2022-04-14 NOTE — TELEPHONE ENCOUNTER
Caller: Irma Pedraza    Relationship to patient: Self    Best call back number: [phone]    Patient is needing: PATIENT WAS ADVISED BY DR HARLEY TO TAKE A COVID TEST FROM THE PHARMACY. PATIENT STATES HER TEST RESULT WAS NEGATIVE

## 2022-04-29 ENCOUNTER — TELEPHONE (OUTPATIENT)
Dept: INTERNAL MEDICINE | Facility: CLINIC | Age: 72
End: 2022-04-29

## 2022-04-29 NOTE — TELEPHONE ENCOUNTER
He gave her amoxil for sinusitis. If other symptoms have improved, use saline nasal spray or gel 3 times a day and avoid blowing nose forcefully or picking.  Let us know if not improving in another week.

## 2022-04-29 NOTE — TELEPHONE ENCOUNTER
Caller: Irma Pedraza    Relationship to patient: Self    Best call back number:[phone] (     What is the call regarding:  PATIENT STATES THAT SHE SAW DR HARLEY ON 4/13/22 VIA VIDEO CHAT.  WHEN SHE WAS BLOWING HER NOSE, BLOOD WOULD COME OUT OF THE LEFT NOSTRIL.  HE PRESCRIBED ANTIBIOTICS AND IT WENT AWAY COMPLETELY, BUT NOW IT IT HAPPENING AGAIN.  PLEASE CALL AND LET HER KNOW ANY SUGGESTIONS.

## 2022-06-07 ENCOUNTER — TRANSCRIBE ORDERS (OUTPATIENT)
Dept: ADMINISTRATIVE | Facility: HOSPITAL | Age: 72
End: 2022-06-07

## 2022-06-07 DIAGNOSIS — Z12.31 VISIT FOR SCREENING MAMMOGRAM: Primary | ICD-10-CM

## 2022-06-29 ENCOUNTER — HOSPITAL ENCOUNTER (OUTPATIENT)
Dept: MAMMOGRAPHY | Facility: HOSPITAL | Age: 72
Discharge: HOME | End: 2022-06-29
Admitting: INTERNAL MEDICINE

## 2022-06-29 DIAGNOSIS — Z12.31 VISIT FOR SCREENING MAMMOGRAM: ICD-10-CM

## 2022-06-29 PROCEDURE — 77063 BREAST TOMOSYNTHESIS BI: CPT

## 2022-06-29 PROCEDURE — 77067 SCR MAMMO BI INCL CAD: CPT | Performed by: RADIOLOGY

## 2022-06-29 PROCEDURE — 77063 BREAST TOMOSYNTHESIS BI: CPT | Performed by: RADIOLOGY

## 2022-06-29 PROCEDURE — 77067 SCR MAMMO BI INCL CAD: CPT

## 2022-08-01 ENCOUNTER — TRANSCRIBE ORDERS (OUTPATIENT)
Dept: LAB | Facility: HOSPITAL | Age: 72
End: 2022-08-01

## 2022-08-02 LAB
CHOLEST SERPL-MCNC: 244 MG/DL (ref 100–199)
HDLC SERPL-MCNC: 63 MG/DL
LDLC SERPL CALC-MCNC: 139 MG/DL (ref 0–99)
TRIGL SERPL-MCNC: 237 MG/DL (ref 0–149)
VLDLC SERPL CALC-MCNC: 42 MG/DL (ref 5–40)

## 2022-08-02 RX ORDER — ROSUVASTATIN CALCIUM 40 MG/1
40 TABLET, COATED ORAL NIGHTLY
Qty: 90 TABLET | Refills: 1 | Status: SHIPPED | OUTPATIENT
Start: 2022-08-02 | End: 2023-02-13 | Stop reason: SDUPTHER

## 2022-08-02 RX ORDER — EZETIMIBE 10 MG/1
10 TABLET ORAL NIGHTLY
Qty: 90 TABLET | Refills: 1 | Status: SHIPPED | OUTPATIENT
Start: 2022-08-02 | End: 2023-02-13 | Stop reason: SDUPTHER

## 2022-08-03 ENCOUNTER — LAB (OUTPATIENT)
Dept: LAB | Facility: HOSPITAL | Age: 72
End: 2022-08-03

## 2022-08-03 ENCOUNTER — OFFICE VISIT (OUTPATIENT)
Dept: INTERNAL MEDICINE | Facility: CLINIC | Age: 72
End: 2022-08-03

## 2022-08-03 VITALS
SYSTOLIC BLOOD PRESSURE: 120 MMHG | BODY MASS INDEX: 25.3 KG/M2 | TEMPERATURE: 98.4 F | WEIGHT: 134 LBS | DIASTOLIC BLOOD PRESSURE: 62 MMHG | HEIGHT: 61 IN | HEART RATE: 70 BPM

## 2022-08-03 DIAGNOSIS — E78.2 MIXED HYPERLIPIDEMIA: ICD-10-CM

## 2022-08-03 DIAGNOSIS — M81.0 SENILE OSTEOPOROSIS: ICD-10-CM

## 2022-08-03 DIAGNOSIS — Z23 NEED FOR VACCINATION: ICD-10-CM

## 2022-08-03 DIAGNOSIS — F41.1 GENERALIZED ANXIETY DISORDER: ICD-10-CM

## 2022-08-03 DIAGNOSIS — I10 BENIGN ESSENTIAL HYPERTENSION: ICD-10-CM

## 2022-08-03 DIAGNOSIS — K76.0 FATTY LIVER: ICD-10-CM

## 2022-08-03 DIAGNOSIS — E66.3 OVERWEIGHT WITH BODY MASS INDEX (BMI) OF 25 TO 25.9 IN ADULT: Chronic | ICD-10-CM

## 2022-08-03 DIAGNOSIS — E55.9 VITAMIN D DEFICIENCY: ICD-10-CM

## 2022-08-03 DIAGNOSIS — Z87.891 PERSONAL HISTORY OF NICOTINE DEPENDENCE: ICD-10-CM

## 2022-08-03 DIAGNOSIS — I10 BENIGN ESSENTIAL HYPERTENSION: Primary | ICD-10-CM

## 2022-08-03 DIAGNOSIS — K22.70 BARRETT'S ESOPHAGUS WITHOUT DYSPLASIA: Chronic | ICD-10-CM

## 2022-08-03 DIAGNOSIS — M05.9 RHEUMATOID ARTHRITIS, SEROPOSITIVE: Chronic | ICD-10-CM

## 2022-08-03 DIAGNOSIS — F17.200 TOBACCO DEPENDENCE SYNDROME: ICD-10-CM

## 2022-08-03 DIAGNOSIS — J41.0 SIMPLE CHRONIC BRONCHITIS: ICD-10-CM

## 2022-08-03 PROBLEM — E66.09 CLASS 1 OBESITY DUE TO EXCESS CALORIES WITH SERIOUS COMORBIDITY AND BODY MASS INDEX (BMI) OF 30.0 TO 30.9 IN ADULT: Chronic | Status: RESOLVED | Noted: 2020-07-16 | Resolved: 2022-08-03

## 2022-08-03 PROBLEM — E66.811 CLASS 1 OBESITY DUE TO EXCESS CALORIES WITH SERIOUS COMORBIDITY AND BODY MASS INDEX (BMI) OF 30.0 TO 30.9 IN ADULT: Chronic | Status: RESOLVED | Noted: 2020-07-16 | Resolved: 2022-08-03

## 2022-08-03 PROCEDURE — 90677 PCV20 VACCINE IM: CPT | Performed by: INTERNAL MEDICINE

## 2022-08-03 PROCEDURE — 99214 OFFICE O/P EST MOD 30 MIN: CPT | Performed by: INTERNAL MEDICINE

## 2022-08-03 PROCEDURE — G0009 ADMIN PNEUMOCOCCAL VACCINE: HCPCS | Performed by: INTERNAL MEDICINE

## 2022-08-03 RX ORDER — OMEPRAZOLE 40 MG/1
40 CAPSULE, DELAYED RELEASE ORAL DAILY
Qty: 90 CAPSULE | Refills: 1 | Status: SHIPPED | OUTPATIENT
Start: 2022-08-03

## 2022-08-03 RX ORDER — AMLODIPINE BESYLATE 5 MG/1
5 TABLET ORAL DAILY
Qty: 90 TABLET | Refills: 3 | Status: SHIPPED | OUTPATIENT
Start: 2022-08-03

## 2022-08-03 RX ORDER — BUPROPION HYDROCHLORIDE 150 MG/1
150 TABLET, EXTENDED RELEASE ORAL 2 TIMES DAILY
Qty: 180 TABLET | Refills: 1 | Status: SHIPPED | OUTPATIENT
Start: 2022-08-03

## 2022-08-03 RX ORDER — MONTELUKAST SODIUM 10 MG/1
10 TABLET ORAL NIGHTLY
Qty: 90 TABLET | Refills: 1 | Status: SHIPPED | OUTPATIENT
Start: 2022-08-03

## 2022-08-03 NOTE — PROGRESS NOTES
[facility]     Irma Pedraza  1950   [id number]      Patient Care Team:  Eve Navarrete MD as PCP - General (Internal Medicine)  Gold Swift DO as Consulting Physician (Rheumatology)  Sabrina Angelo MD as Consulting Physician (Plastic Surgery)  Juarez Pedersen MD as Consulting Physician (Otolaryngology)    Chief Complaint   Patient presents with   • Hyperlipidemia            HPI  Patient is a 72 y.o. female presents for follow up.    Hypertension  The patient's blood pressure today is 120/62 mmHg.    Weight loss  The patient has lost 25 pounds within the last 6 months. She states that she has been eating differently. She states that she has been drinking a weight loss shake twice a day. She states that she  eats a healthy dinner. She states that she tries to eat smaller portions. She states that she does not eat a lot of vegetables.  Her  does the cooking so it is usually in meat and is soft.  She states that she is walking. Her BMI is currently 25.    COPD  The patient states that she as a cough because she still smokes. She states that she is smoking about 1 pack a day. She states that she has not had any chest pains. She states that she has cut down on her smoking. She states that she would like to try a low dose CT scan for lung cancer screening.    Anxiety and depression  The patient states that her mood and anxiety have been better. She states that she is currently taking bupropion twice a day and buspirone only as needed.     Allergies/COPD  The patient states that she is not using her nasal spray every day for allergies. She states that she using her nasal spray and albuterol inhaler use as needed. She states that she is currently taking Trelegy and states that it has helped. She states that she is also taking montelukast tablet that helps with allergies and asthma.    GERD  The patient is currently taking omeprazole.    Hyperlipidemia  The patient is  currently taking rosuvastatin for her cholesterol. She states that she does not feel like she has any trouble with her cholesterol. She states that she does not have any muscle aches.    Rheumatoid arthritis  The patient is currently taking Actemra once a month.    Osteoporosis  The patient states that she is doing Reclast injection once a year. She states that she is taking calcium and vitamin D.    Health maintenance  The patient states that it has been 5 years since her last pneumococcal vaccine. She states that she has never done a lung cancer screening. She states that she has never had any UTI symptoms lately. She states that she took an influenza vaccine in 01/2022.          CHRONIC CONDITIONS      Past Medical History:   Diagnosis Date   • Adenomyomatosis of gallbladder 2009    by ultrasound, Dr. Yomi Tai 2009   • Arthritis    • Arthritis of lumbosacral spine    • Asthma    • Chronic hoarseness    • Class 1 obesity due to excess calories with serious comorbidity and body mass index (BMI) of 30.0 to 30.9 in adult 7/16/2020 2/1/2021 Eve Navarrete MD     • Closed displaced fracture of shaft of right clavicle with malunion    • Diverticulitis 2/28/2019   • GCA (giant cell arteritis) (Formerly [facility])    • GERD (gastroesophageal reflux disease)    • Lloyd's neuroma of left foot     surgery   • Ocular histoplasmosis     BCC 2/6/2017   • Panic disorder 2/28/2019   • PMR (polymyalgia rheumatica) (Formerly [facility])        Past Surgical History:   Procedure Laterality Date   • COLONOSCOPY  02/2009   • COSMETIC SURGERY  1994    face lift   • COSMETIC SURGERY  1993    DONYA TOBIAS   • COSMETIC SURGERY  1980    Birth kenny removal   • ELBOW PROCEDURE Left     x2   • THROAT SURGERY      remote removal of growth from throat       Family History   Problem Relation Age of Onset   • Breast cancer Maternal Grandmother 90   • Osteoporosis Mother    • Osteoporosis Father    • Migraines Father    • Brain cancer Sister    • Stroke Brother    •  "Stroke Paternal Grandmother    • Ovarian cancer Neg Hx    • Endometrial cancer Neg Hx        Social History     Socioeconomic History   • Marital status:    Tobacco Use   • Smoking status: Current Every Day Smoker     Packs/day: 0.50     Years: 50.00     Pack years: 25.00     Types: Cigarettes   • Smokeless tobacco: Never Used   • Tobacco comment: patient refused smoking literature   Substance and Sexual Activity   • Alcohol use: Yes     Comment: 4 -5 days a week   • Drug use: No   • Sexual activity: Defer       Allergies   Allergen Reactions   • Bactrim [Sulfamethoxazole-Trimethoprim] Other (See Comments)     Hypokalemia, hyponatremia   • Atorvastatin Unknown (See Comments)     Lipitor     • Bupropion Hcl [Bupropion] Unknown (See Comments)     wellbutrin   • Calcium Unknown (See Comments)     unknown         Vital Signs  Vitals:    08/03/22 0940   BP: 120/62   BP Location: Left arm   Patient Position: Sitting   Cuff Size: Adult   Pulse: 70   Temp: 98.4 °F (36.9 °C)   TempSrc: Temporal   Weight: 60.8 kg (134 lb)   Height: 154.9 cm (60.98\")   PainSc: 0-No pain     Body mass index is 25.33 kg/m².  BMI is >= 25 and <30. (Overweight) The following options were offered after discussion;: weight loss educational material (shared in after visit summary), exercise counseling/recommendations and nutrition counseling/recommendations        Current Outpatient Medications:   •  acyclovir (ZOVIRAX) 400 MG tablet, Take 400 mg by mouth As Needed., Disp: , Rfl:   •  albuterol sulfate HFA (Ventolin HFA) 108 (90 Base) MCG/ACT inhaler, Inhale 2 puffs Every 4 (Four) Hours As Needed for Wheezing., Disp: 18 g, Rfl: 5  •  amLODIPine (NORVASC) 5 MG tablet, Take 1 tablet by mouth Daily., Disp: 90 tablet, Rfl: 3  •  Azelastine-Fluticasone 137-50 MCG/ACT suspension, 2 sprays into the nostril(s) as directed by provider Every 12 (Twelve) Hours., Disp: , Rfl:   •  buPROPion SR (WELLBUTRIN SR) 150 MG 12 hr tablet, Take 1 tablet by mouth 2 " (Two) Times a Day., Disp: 180 tablet, Rfl: 1  •  busPIRone (BUSPAR) 5 MG tablet, TAKE 1 TABLET BY MOUTH THREE TIMES DAILY AS NEEDED FOR ANXIETY, Disp: 90 tablet, Rfl: 1  •  Coenzyme Q10 (COQ-10) 200 MG capsule, Take 1 tablet by mouth Daily., Disp: , Rfl:   •  cyclobenzaprine (FLEXERIL) 10 MG tablet, Take 1 tablet by mouth 2 (Two) Times a Day As Needed for Muscle Spasms., Disp: 30 tablet, Rfl: 1  •  famotidine (PEPCID) 20 MG tablet, TAKE 1 TABLET BY MOUTH TWICE DAILY, Disp: 180 tablet, Rfl: 1  •  Fluticasone-Umeclidin-Vilant (Trelegy Ellipta) 100-62.5-25 MCG/INH inhaler, Inhale 1 puff Daily., Disp: 3 each, Rfl: 3  •  folic acid (FOLVITE) 1 MG tablet, Take 1 tablet by mouth Daily., Disp: 90 tablet, Rfl: 1  •  Lactobacillus-Inulin ([facility] PO), Take 1 capsule by mouth Daily., Disp: , Rfl:   •  montelukast (SINGULAIR) 10 MG tablet, Take 1 tablet by mouth Every Night., Disp: 90 tablet, Rfl: 1  •  omeprazole (priLOSEC) 40 MG capsule, Take 1 capsule by mouth Daily., Disp: 90 capsule, Rfl: 1  •  Probiotic Product ([employer] PO), Take 1 capsule by mouth Daily., Disp: , Rfl:   •  rosuvastatin (CRESTOR) 40 MG tablet, Take 1 tablet by mouth Every Night., Disp: 90 tablet, Rfl: 1  •  tocilizumab (ACTEMRA) 200 MG/10ML solution injection, Infuse 8 mg/kg into a venous catheter., Disp: , Rfl:   •  vitamin B-12 (CYANOCOBALAMIN) 1000 MCG tablet, Take 1 tablet by mouth Daily., Disp: 90 tablet, Rfl: 1  •  zoledronic acid (RECLAST) 5 MG/100ML solution infusion, Administer ZOLEDRONIC ACID 5mg IV once annually, Disp: , Rfl:   •  amoxicillin (AMOXIL) 500 MG capsule, Take 1 capsule by mouth 2 (Two) Times a Day., Disp: 14 capsule, Rfl: 0  •  ezetimibe (ZETIA) 10 MG tablet, Take 1 tablet by mouth Every Night., Disp: 90 tablet, Rfl: 1    Physical Exam:    Physical Exam  Vitals and nursing note reviewed.   Constitutional:       Appearance: She is well-developed.      Comments: Mildly overweight.   HENT:      Head:  Normocephalic.   Eyes:      Conjunctiva/sclera: Conjunctivae normal.      Pupils: Pupils are equal, round, and reactive to light.   Neck:      Thyroid: No thyromegaly.   Cardiovascular:      Rate and Rhythm: Normal rate and regular rhythm.      Heart sounds: Normal heart sounds.   Pulmonary:      Effort: Pulmonary effort is normal.      Breath sounds: Normal breath sounds. No wheezing.      Comments: Mildly decreased air movement. No wheeze.  Musculoskeletal:         General: Normal range of motion.      Cervical back: Normal range of motion and neck supple.   Lymphadenopathy:      Cervical: No cervical adenopathy.   Skin:     General: Skin is warm and dry.   Neurological:      Mental Status: She is alert and oriented to person, place, and time.   Psychiatric:         Thought Content: Thought content normal.          ACE III MINI        Results Review:    I reviewed the patient's new clinical results.    CMP:  Lab Results   Component Value Date    BUN 15 02/03/2022    CREATININE 0.99 02/03/2022    EGFRIFNONA 55 (L) 02/03/2022    EGFRIFAFRI 67 02/03/2022    BCR 15.2 02/03/2022     02/03/2022    K 4.0 02/03/2022    CO2 30.0 (H) 02/03/2022    CALCIUM 10.1 02/03/2022    PROTENTOTREF 7.1 02/03/2022    ALBUMIN 5.10 02/03/2022    LABGLOBREF 2.0 02/03/2022    LABIL2 2.6 02/03/2022    BILITOT 0.4 02/03/2022    ALKPHOS 66 02/03/2022    AST 18 02/03/2022    ALT 30 02/03/2022     HbA1c:  No results found for: HGBA1C  Microalbumin:  Lab Results   Component Value Date    MICROALBUR 16.0 02/03/2022     Lipid Panel  Lab Results   Component Value Date    CHOL 220 (H) 01/08/2019    TRIG 237 (H) 08/01/2022    HDL 63 08/01/2022     (H) 08/01/2022    AST 18 02/03/2022    ALT 30 02/03/2022       Medication Review: Medications reviewed and noted  Patient Instructions   Problem List Items Addressed This Visit        Cardiac and Vasculature    Mixed hyperlipidemia    Overview     Taking rosuvastatin 40 mg daily.           Current Assessment & Plan     Continue rosuvastatin every evening. Increase exercise and walking to 3 or 4 days a week. Continue to improve low-fat, low-sugar diet.         Relevant Medications    rosuvastatin (CRESTOR) 40 MG tablet    ezetimibe (ZETIA) 10 MG tablet    Other Relevant Orders    Lipid Panel    TSH    Benign essential hypertension - Primary    Overview     Taking amlodipine daily.           Current Assessment & Plan     Continue amlodipine daily. Continue to avoid salt in the diet.         Relevant Medications    amLODIPine (NORVASC) 5 MG tablet    Other Relevant Orders    Urinalysis With Microscopic - Urine, Clean Catch    Microalbumin / Creatinine Urine Ratio - Urine, Clean Catch       Endocrine and Metabolic    Vitamin D deficiency (Chronic)    Current Assessment & Plan     Recheck level today.         Relevant Orders    Vitamin D 25 Hydroxy       Gastrointestinal Abdominal     Alas's esophagus without dysplasia (Chronic)    Overview     Noted on recent EGD 1/13/2022.    Taking omeprazole daily. Continue famotidine.         Current Assessment & Plan     Continue omeprazole daily and famotidine. Continue to avoid eating close to bedtime. Continue to avoid large meals.         Relevant Medications    famotidine (PEPCID) 20 MG tablet    omeprazole (priLOSEC) 40 MG capsule    Other Relevant Orders    Vitamin B12    Fatty liver    Current Assessment & Plan     Continue to avoid fats and carbohydrates in the diet. Maintain weight loss. Recheck liver enzymes today.            Mental Health    Generalized anxiety disorder    Overview     Taking bupropion  mg tablet twice a day.   buspirone 5 mg 3 times a day as needed.               Current Assessment & Plan     Continue taking bupropion twice a day. Continue to add buspirone up to 3 times a day as needed. Walking and exercise also help decrease symptoms of stress, anxiety, and mood.         Relevant Medications    busPIRone (BUSPAR) 5 MG tablet     buPROPion SR (WELLBUTRIN SR) 150 MG 12 hr tablet       Musculoskeletal and Injuries    Rheumatoid arthritis, seropositive (HCC) (Chronic)    Overview     Seeing Dr. Swift.         Current Assessment & Plan     She will continue seeing Dr. Swift regularly. She will continue Actemra. She will continue Reclast infusions once a year.         Relevant Medications    tocilizumab (ACTEMRA) 200 MG/10ML solution injection    Senile osteoporosis    Overview     T-scores  on 2/12/19 DEXA showed osteoporosis of the spine and osteopenia of the hips.    2021 DEXA revealed T-scores in the spine of -1.3 and in the femoral neck of -1.7.  There has been some improvement on Reclast.  Sees Dr. Swift.    To keep your bones strong and healthy, would encourage weightbearing exercise 30 minutes 3 times a week at minimum.  Incorporate walking into daily activities, 30 minutes a day, 150 minutes a week, spread out over 5 days a week.     Do not smoke.  Maintain a normal body weight.    Eat a diet rich in calcium and vitamin D. Good sources of calcium are low-fat dairy products, dark green leafy vegetables, canned salmon or sardines, tofu, and calcium-fortified orange juice and cereals.    Avoid high protein diets. Protein is important, but too much animal protein can cause bone loss.    Limit caffeine, but moderate amounts of coffee and tea are fine. Avoid carbonated cola drinks as studies show drinking these puts you at greater risk for bone loss.    Recommend bone density study every 2 years.    Take calcium supplements and Vitamin D3 every day.             Current Assessment & Plan     She will continue Reclast infusions once per year with Dr. Swift. She was advised to increase walking and use small hand weights about 5 minutes a day at home while watching TV.               Pulmonary and Pneumonias    Chronic obstructive lung disease (HCC)    Overview     Last spirometry  showed moderate obstruction without improvement after  bronchodilator.    Using Trelegy Ellipta inhaler 1 puff daily.  Using albuterol rescue inhaler as needed for cough wheeze or lung congestion.  Taking montelukast (Singulair) tablet every evening to help with allergies and breathing.             Relevant Medications    Azelastine-Fluticasone 137-50 MCG/ACT suspension    albuterol sulfate HFA (Ventolin HFA) 108 (90 Base) MCG/ACT inhaler    Fluticasone-Umeclidin-Vilant (Trelegy Ellipta) 100-62.5-25 MCG/INH inhaler    montelukast (SINGULAIR) 10 MG tablet       Tobacco    Tobacco dependence syndrome    Overview                Current Assessment & Plan     Low dose CT scan ordered for lung cancer screening.         Relevant Orders     CT Chest Low Dose Cancer Screening WO       Other    Overweight with body mass index (BMI) of 25 to 25.9 in adult (Chronic)    Current Assessment & Plan     She has lost 25 pounds over the last 6 months. She will continue doing the protein shakes for breakfast, and lunch, and continue a healthy dinner with protein and vegetables. She may also use some raw veggie snacks. She will increase exercise and walking.           Other Visit Diagnoses     Personal history of nicotine dependence         Relevant Orders     CT Chest Low Dose Cancer Screening WO    Need for vaccination        Relevant Orders    Pneumococcal Conjugate Vaccine 20-Valent All (Completed)             Diagnosis Plan   1. Benign essential hypertension  Urinalysis With Microscopic - Urine, Clean Catch    Microalbumin / Creatinine Urine Ratio - Urine, Clean Catch   2. Mixed hyperlipidemia  Lipid Panel    TSH   3. Alas's esophagus without dysplasia  Vitamin B12   4. Overweight with body mass index (BMI) of 25 to 25.9 in adult     5. Simple chronic bronchitis (HCC)      She will continue Trelegy inhaler daily. She will continue the albuterol rescue inhaler as needed. She will continue taking montelukast every evening. She will    6. Fatty liver     7. Rheumatoid arthritis,  seropositive (HCC)     8. Personal history of nicotine dependence    CT Chest Low Dose Cancer Screening WO   9. Tobacco dependence syndrome   CT Chest Low Dose Cancer Screening WO   10. Generalized anxiety disorder     11. Senile osteoporosis     12. Vitamin D deficiency  Vitamin D 25 Hydroxy   13. Need for vaccination  Pneumococcal Conjugate Vaccine 20-Valent All         Plan of care reviewed with patient at the conclusion of today's visit. Education was provided regarding diagnosis, management, and any prescribed or recommended OTC medications.Patient verbalizes understanding of and agreement with management plan.         Transcribed from ambient dictation for Eve Navarrete MD by Savanna Hunt.  08/03/22   16:05 EDT    Patient verbalized consent to the visit recording.  I have personally performed the services described in this document as transcribed by the above individual, and it is both accurate and complete.  Eve Navarrete MD  8/3/2022  16:39 EDT

## 2022-08-03 NOTE — ASSESSMENT & PLAN NOTE
She will continue seeing Dr. Swift regularly. She will continue Actemra. She will continue Reclast infusions once a year.

## 2022-08-03 NOTE — ASSESSMENT & PLAN NOTE
Continue taking bupropion twice a day. Continue to add buspirone up to 3 times a day as needed. Walking and exercise also help decrease symptoms of stress, anxiety, and mood.

## 2022-08-03 NOTE — ASSESSMENT & PLAN NOTE
She has lost 25 pounds over the last 6 months. She will continue doing the protein shakes for breakfast, and lunch, and continue a healthy dinner with protein and vegetables. She may also use some raw veggie snacks. She will increase exercise and walking.

## 2022-08-03 NOTE — ASSESSMENT & PLAN NOTE
She will continue Reclast infusions once per year with Dr. Swift. She was advised to increase walking and use small hand weights about 5 minutes a day at home while watching TV.

## 2022-08-03 NOTE — PATIENT INSTRUCTIONS
Patient Instructions   Problem List Items Addressed This Visit          Cardiac and Vasculature    Mixed hyperlipidemia    Overview     Taking rosuvastatin 40 mg daily.          Current Assessment & Plan     Continue rosuvastatin every evening. Increase exercise and walking to 3 or 4 days a week. Continue to improve low-fat, low-sugar diet.         Relevant Medications    rosuvastatin (CRESTOR) 40 MG tablet    ezetimibe (ZETIA) 10 MG tablet    Other Relevant Orders    Lipid Panel    TSH    Benign essential hypertension - Primary    Overview     Taking amlodipine daily.           Current Assessment & Plan     Continue amlodipine daily. Continue to avoid salt in the diet.         Relevant Medications    amLODIPine (NORVASC) 5 MG tablet    Other Relevant Orders    Urinalysis With Microscopic - Urine, Clean Catch    Microalbumin / Creatinine Urine Ratio - Urine, Clean Catch       Endocrine and Metabolic    Vitamin D deficiency (Chronic)    Current Assessment & Plan     Recheck level today.         Relevant Orders    Vitamin D 25 Hydroxy       Gastrointestinal Abdominal     Alas's esophagus without dysplasia (Chronic)    Overview     Noted on recent EGD 1/13/2022.    Taking omeprazole daily. Continue famotidine.         Current Assessment & Plan     Continue omeprazole daily and famotidine. Continue to avoid eating close to bedtime. Continue to avoid large meals.         Relevant Medications    famotidine (PEPCID) 20 MG tablet    omeprazole (priLOSEC) 40 MG capsule    Other Relevant Orders    Vitamin B12    Fatty liver    Current Assessment & Plan     Continue to avoid fats and carbohydrates in the diet. Maintain weight loss. Recheck liver enzymes today.            Mental Health    Generalized anxiety disorder    Overview     Taking bupropion  mg tablet twice a day.   buspirone 5 mg 3 times a day as needed.               Current Assessment & Plan     Continue taking bupropion twice a day. Continue to add  buspirone up to 3 times a day as needed. Walking and exercise also help decrease symptoms of stress, anxiety, and mood.         Relevant Medications    busPIRone (BUSPAR) 5 MG tablet    buPROPion SR (WELLBUTRIN SR) 150 MG 12 hr tablet       Musculoskeletal and Injuries    Rheumatoid arthritis, seropositive (HCC) (Chronic)    Overview     Seeing Dr. Swift.         Current Assessment & Plan     She will continue seeing Dr. Swift regularly. She will continue Actemra. She will continue Reclast infusions once a year.         Relevant Medications    tocilizumab (ACTEMRA) 200 MG/10ML solution injection    Senile osteoporosis    Overview     T-scores  on 2/12/19 DEXA showed osteoporosis of the spine and osteopenia of the hips.    2021 DEXA revealed T-scores in the spine of -1.3 and in the femoral neck of -1.7.  There has been some improvement on Reclast.  Sees Dr. Swift.    To keep your bones strong and healthy, would encourage weightbearing exercise 30 minutes 3 times a week at minimum.  Incorporate walking into daily activities, 30 minutes a day, 150 minutes a week, spread out over 5 days a week.     Do not smoke.  Maintain a normal body weight.    Eat a diet rich in calcium and vitamin D. Good sources of calcium are low-fat dairy products, dark green leafy vegetables, canned salmon or sardines, tofu, and calcium-fortified orange juice and cereals.    Avoid high protein diets. Protein is important, but too much animal protein can cause bone loss.    Limit caffeine, but moderate amounts of coffee and tea are fine. Avoid carbonated cola drinks as studies show drinking these puts you at greater risk for bone loss.    Recommend bone density study every 2 years.    Take calcium supplements and Vitamin D3 every day.             Current Assessment & Plan     She will continue Reclast infusions once per year with Dr. Swift. She was advised to increase walking and use small hand weights about 5 minutes a day at home  while watching TV.               Pulmonary and Pneumonias    Chronic obstructive lung disease (HCC)    Overview     Last spirometry  showed moderate obstruction without improvement after bronchodilator.    Using Trelegy Ellipta inhaler 1 puff daily.  Using albuterol rescue inhaler as needed for cough wheeze or lung congestion.  Taking montelukast (Singulair) tablet every evening to help with allergies and breathing.             Relevant Medications    Azelastine-Fluticasone 137-50 MCG/ACT suspension    albuterol sulfate HFA (Ventolin HFA) 108 (90 Base) MCG/ACT inhaler    Fluticasone-Umeclidin-Vilant (Trelegy Ellipta) 100-62.5-25 MCG/INH inhaler    montelukast (SINGULAIR) 10 MG tablet       Tobacco    Tobacco dependence syndrome    Overview                Current Assessment & Plan     Low dose CT scan ordered for lung cancer screening.         Relevant Orders     CT Chest Low Dose Cancer Screening WO       Other    Overweight with body mass index (BMI) of 25 to 25.9 in adult (Chronic)    Current Assessment & Plan     She has lost 25 pounds over the last 6 months. She will continue doing the protein shakes for breakfast, and lunch, and continue a healthy dinner with protein and vegetables. She may also use some raw veggie snacks. She will increase exercise and walking.               Other Visit Diagnoses       Personal history of nicotine dependence         Relevant Orders     CT Chest Low Dose Cancer Screening WO    Need for vaccination        Relevant Orders    Pneumococcal Conjugate Vaccine 20-Valent All (Completed)          BMI for Adults  What is BMI?  Body mass index (BMI) is a number that is calculated from a person's weight and height. BMI can help estimate how much of a person's weight is composed of fat. BMI does not measure body fat directly. Rather, it is an alternative to procedures that directly measure body fat, which can be difficult and expensive.  BMI can help identify people who may be at higher  "risk for certain medical problems.  What are BMI measurements used for?  BMI is used as a screening tool to identify possible weight problems. It helps determine whether a person is obese, overweight, a healthy weight, or underweight.  BMI is useful for:  Identifying a weight problem that may be related to a medical condition or may increase the risk for medical problems.  Promoting changes, such as changes in diet and exercise, to help reach a healthy weight. BMI screening can be repeated to see if these changes are working.  How is BMI calculated?  BMI involves measuring your weight in relation to your height. Both height and weight are measured, and the BMI is calculated from those numbers. This can be done either in English (U[place] or metric measurements. Note that charts and online BMI calculators are available to help you find your BMI quickly and easily without having to do these calculations yourself.  To calculate your BMI in English (U.S[place] measurements:    Measure your weight in pounds (lb).  Multiply the number of pounds by 703.  For example, for a person who weighs 180 lb, multiply that number by 703, which equals 126,540.  Measure your height in inches. Then multiply that number by itself to get a measurement called \"inches squared.\"  For example, for a person who is 70 inches tall, the \"inches squared\" measurement is 70 inches x 70 inches, which equals 4,900 inches squared.  Divide the total from step 2 (number of lb x 703) by the total from step 3 (inches squared): 126,540 ÷ 4,900 = 25.8. This is your BMI.    To calculate your BMI in metric measurements:  Measure your weight in kilograms (kg).  Measure your height in meters (m). Then multiply that number by itself to get a measurement called \"meters squared.\"  For example, for a person who is 1.75 m tall, the \"meters squared\" measurement is 1.75 m x 1.75 m, which is equal to 3.1 meters squared.  Divide the number of kilograms (your weight) by the " meters squared number. In this example: 70 ÷ 3.1 = 22.6. This is your BMI.  What do the results mean?  BMI charts are used to identify whether you are underweight, normal weight, overweight, or obese. The following guidelines will be used:  Underweight: BMI less than 18.5.  Normal weight: BMI between 18.5 and 24.9.  Overweight: BMI between 25 and 29.9.  Obese: BMI of 30 or above.  Keep these notes in mind:  Weight includes both fat and muscle, so someone with a muscular build, such as an athlete, may have a BMI that is higher than 24.9. In cases like these, BMI is not an accurate measure of body fat.  To determine if excess body fat is the cause of a BMI of 25 or higher, further assessments may need to be done by a health care provider.  BMI is usually interpreted in the same way for men and women.  Where to find more information  For more information about BMI, including tools to quickly calculate your BMI, go to these websites:  Centers for Disease Control and Prevention: www.cdc.gov  American Heart Association: www.heart.org  National Heart, Lung, and Blood [place]: www.nhlbi.nih.gov  Summary  Body mass index (BMI) is a number that is calculated from a person's weight and height.  BMI may help estimate how much of a person's weight is composed of fat. BMI can help identify those who may be at higher risk for certain medical problems.  BMI can be measured using English measurements or metric measurements.  BMI charts are used to identify whether you are underweight, normal weight, overweight, or obese.  This information is not intended to replace advice given to you by your health care provider. Make sure you discuss any questions you have with your health care provider.  Document Revised: 09/09/2020 Document Reviewed: 07/17/2020  [employer] Patient Education © 2021 [employer]

## 2022-08-03 NOTE — ASSESSMENT & PLAN NOTE
Continue rosuvastatin every evening. Increase exercise and walking to 3 or 4 days a week. Continue to improve low-fat, low-sugar diet.

## 2022-08-03 NOTE — ASSESSMENT & PLAN NOTE
Continue to avoid fats and carbohydrates in the diet. Maintain weight loss. Recheck liver enzymes today.

## 2022-08-03 NOTE — ASSESSMENT & PLAN NOTE
Continue omeprazole daily and famotidine. Continue to avoid eating close to bedtime. Continue to avoid large meals.

## 2022-08-04 LAB
25(OH)D3+25(OH)D2 SERPL-MCNC: 53.9 NG/ML (ref 30–100)
ALBUMIN/CREAT UR: 17 MG/G CREAT (ref 0–29)
APPEARANCE UR: CLEAR
BACTERIA #/AREA URNS HPF: NORMAL /[HPF]
BILIRUB UR QL STRIP: NEGATIVE
CASTS URNS QL MICRO: NORMAL /LPF
CHOLEST SERPL-MCNC: 252 MG/DL (ref 0–200)
COLOR UR: YELLOW
CREAT UR-MCNC: 127.9 MG/DL
EPI CELLS #/AREA URNS HPF: NORMAL /HPF (ref 0–10)
GLUCOSE UR QL STRIP: NEGATIVE
HDLC SERPL-MCNC: 63 MG/DL (ref 40–60)
HGB UR QL STRIP: NEGATIVE
KETONES UR QL STRIP: NEGATIVE
LDLC SERPL CALC-MCNC: 146 MG/DL (ref 0–100)
LEUKOCYTE ESTERASE UR QL STRIP: ABNORMAL
MICRO URNS: ABNORMAL
MICROALBUMIN UR-MCNC: 21.9 UG/ML
NITRITE UR QL STRIP: NEGATIVE
PH UR STRIP: 5.5 [PH] (ref 5–7.5)
PROT UR QL STRIP: NEGATIVE
RBC #/AREA URNS HPF: NORMAL /HPF (ref 0–2)
SP GR UR STRIP: 1.01 (ref 1–1.03)
TRIGL SERPL-MCNC: 237 MG/DL (ref 0–150)
TSH SERPL DL<=0.005 MIU/L-ACNC: 0.91 UIU/ML (ref 0.27–4.2)
UROBILINOGEN UR STRIP-MCNC: 1 MG/DL (ref 0.2–1)
VIT B12 SERPL-MCNC: 1064 PG/ML (ref 211–946)
VLDLC SERPL CALC-MCNC: 43 MG/DL (ref 5–40)
WBC #/AREA URNS HPF: NORMAL /HPF (ref 0–5)

## 2022-08-09 ENCOUNTER — TELEPHONE (OUTPATIENT)
Dept: INTERNAL MEDICINE | Facility: CLINIC | Age: 72
End: 2022-08-09

## 2022-08-09 NOTE — TELEPHONE ENCOUNTER
Spoke with pt and advised that she would need to do a COVID test. She states that she hasn't been anywhere and has no other symptoms but will do it and let us know.

## 2022-08-09 NOTE — TELEPHONE ENCOUNTER
Caller: Irma Pedraza    Relationship: Self    Best call back number: [phone]      What was the call regarding: PATIENT STATED CONGESTION AND BAD COUGH.  REQUESTING IF DOCTOR MARCY CAN SEND A PRESCRIPTION    PHARMACY:    [id number] [address] [facility] [address] - [phone]  - [phone]   [phone]      Do you require a callback: YES

## 2022-08-24 DIAGNOSIS — K21.9 GERD WITHOUT ESOPHAGITIS: ICD-10-CM

## 2022-08-24 RX ORDER — BENZONATATE 200 MG/1
CAPSULE ORAL
Qty: 30 CAPSULE | Refills: 2 | OUTPATIENT
Start: 2022-08-24

## 2022-08-24 RX ORDER — FAMOTIDINE 20 MG/1
TABLET, FILM COATED ORAL
Qty: 180 TABLET | Refills: 1 | Status: SHIPPED | OUTPATIENT
Start: 2022-08-24

## 2022-10-17 RX ORDER — LANOLIN ALCOHOL/MO/W.PET/CERES
1000 CREAM (GRAM) TOPICAL DAILY
Qty: 90 TABLET | Refills: 1 | Status: SHIPPED | OUTPATIENT
Start: 2022-10-17

## 2022-11-22 DIAGNOSIS — F41.1 GENERALIZED ANXIETY DISORDER: Chronic | ICD-10-CM

## 2022-11-22 RX ORDER — BUSPIRONE HYDROCHLORIDE 5 MG/1
TABLET ORAL
Qty: 90 TABLET | Refills: 1 | Status: SHIPPED | OUTPATIENT
Start: 2022-11-22

## 2022-11-22 NOTE — TELEPHONE ENCOUNTER
Rx Refill Note  Requested Prescriptions     Pending Prescriptions Disp Refills   • busPIRone (BUSPAR) 5 MG tablet [Pharmacy Med Name: BUSPIRONE 5MG TABLETS] 90 tablet 1     Sig: TAKE 1 TABLET BY MOUTH THREE TIMES DAILY AS NEEDED FOR ANXIETY      Last office visit with prescribing clinician: 8/3/2022      Next office visit with prescribing clinician: 2/6/2023            Justine Noel RN  11/22/22, 14:58 EST

## 2022-11-29 ENCOUNTER — APPOINTMENT (OUTPATIENT)
Dept: CT IMAGING | Facility: HOSPITAL | Age: 72
End: 2022-11-29

## 2022-12-14 ENCOUNTER — HOSPITAL ENCOUNTER (OUTPATIENT)
Dept: CT IMAGING | Facility: HOSPITAL | Age: 72
Discharge: HOME | End: 2022-12-14
Admitting: INTERNAL MEDICINE

## 2022-12-14 DIAGNOSIS — Z87.891 PERSONAL HISTORY OF NICOTINE DEPENDENCE: ICD-10-CM

## 2022-12-14 DIAGNOSIS — F17.200 TOBACCO DEPENDENCE SYNDROME: ICD-10-CM

## 2022-12-14 PROCEDURE — 71271 CT THORAX LUNG CANCER SCR C-: CPT

## 2022-12-15 DIAGNOSIS — I25.10 ATHEROSCLEROSIS OF NATIVE CORONARY ARTERY OF NATIVE HEART WITHOUT ANGINA PECTORIS: ICD-10-CM

## 2022-12-15 DIAGNOSIS — E78.2 MIXED HYPERLIPIDEMIA: ICD-10-CM

## 2022-12-15 DIAGNOSIS — F17.200 TOBACCO DEPENDENCE SYNDROME: ICD-10-CM

## 2022-12-15 DIAGNOSIS — K76.0 FATTY LIVER: ICD-10-CM

## 2022-12-15 DIAGNOSIS — I10 BENIGN ESSENTIAL HYPERTENSION: Primary | ICD-10-CM

## 2022-12-16 ENCOUNTER — TELEPHONE (OUTPATIENT)
Dept: INTERNAL MEDICINE | Facility: CLINIC | Age: 72
End: 2022-12-16

## 2022-12-16 NOTE — TELEPHONE ENCOUNTER
Caller: Irma Pedraza    Relationship: Self    Best call back number: [phone]    What form or medical record are you requesting: COPY OF MOST RECENT CT SCAN    Who is requesting this form or medical record from you: SELF    How would you like to receive the form or medical records (pick-up, mail, fax): MAIL    If mail, what is the address:   [address] [place] [address]    Timeframe paperwork needed: ASAP    Additional notes:

## 2022-12-19 RX ORDER — BENZONATATE 200 MG/1
CAPSULE ORAL
Qty: 30 CAPSULE | Refills: 2 | Status: SHIPPED | OUTPATIENT
Start: 2022-12-19

## 2022-12-19 NOTE — TELEPHONE ENCOUNTER
Caller: Irma Pedraza    Relationship: Self    Best call back number: [phone]    Requested Prescriptions:   Requested Prescriptions     Pending Prescriptions Disp Refills   • benzonatate (TESSALON) 200 MG capsule [Pharmacy Med Name: BENZONATATE 200MG CAPSULES] 30 capsule 2     Sig: TAKE 1 CAPSULE BY MOUTH THREE TIMES DAILY AS NEEDED FOR COUGH        Pharmacy where request should be sent: [facility] #[id number] [facility] [address] AT [facility] & [address] - [phone]  - [phone]     Additional details provided by patient:     Does the patient have less than a 3 day supply:  [x] Yes  [] No    Would you like a call back once the refill request has been completed: [x] Yes [] No    If the office needs to give you a call back, can they leave a voicemail: [x] Yes [] No    Mary Zavala Rep   12/19/22 16:10 EST

## 2022-12-19 NOTE — TELEPHONE ENCOUNTER
The original prescription was discontinued on 2/3/2022 by Eve Navarrete MD. Renewing this prescription may not be appropriate.

## 2023-01-20 ENCOUNTER — TELEPHONE (OUTPATIENT)
Dept: INTERNAL MEDICINE | Facility: CLINIC | Age: 73
End: 2023-01-20

## 2023-01-20 RX ORDER — CYCLOBENZAPRINE HCL 10 MG
10 TABLET ORAL 2 TIMES DAILY PRN
Qty: 30 TABLET | Refills: 1 | Status: SHIPPED | OUTPATIENT
Start: 2023-01-20

## 2023-01-20 NOTE — TELEPHONE ENCOUNTER
Caller: Irma Pedraza    Relationship: Self    Best call back number: [phone]    What medication are you requesting: MUSCLE RELAXER    What are your current symptoms: SHARP BACK PAIN IN THE LOWER AND UPPER PART OF THE BACK     How long have you been experiencing symptoms: 12/20/22     Have you had these symptoms before:    [x] Yes  [] No    Have you been treated for these symptoms before:   [x] Yes  [] No    If a prescription is needed, what is your preferred pharmacy and phone number: [facility][id number] [facility] [address] [facility] [address] - [phone] [facility] [phone]      Additional notes: PATIENT NEED THIS SCRIPT TO BE FILLED ASAP.

## 2023-02-13 ENCOUNTER — OFFICE VISIT (OUTPATIENT)
Dept: INTERNAL MEDICINE | Facility: CLINIC | Age: 73
End: 2023-02-13
Payer: MEDICARE

## 2023-02-13 VITALS
HEIGHT: 62 IN | SYSTOLIC BLOOD PRESSURE: 128 MMHG | TEMPERATURE: 98.7 F | WEIGHT: 141.8 LBS | DIASTOLIC BLOOD PRESSURE: 60 MMHG | HEART RATE: 78 BPM | BODY MASS INDEX: 26.09 KG/M2 | OXYGEN SATURATION: 92 %

## 2023-02-13 DIAGNOSIS — M81.0 SENILE OSTEOPOROSIS: ICD-10-CM

## 2023-02-13 DIAGNOSIS — R41.3 MILD MEMORY DISTURBANCE: Chronic | ICD-10-CM

## 2023-02-13 DIAGNOSIS — R79.89 HIGH PLASMA HOMOCYSTINE: Chronic | ICD-10-CM

## 2023-02-13 DIAGNOSIS — E55.9 VITAMIN D DEFICIENCY: Chronic | ICD-10-CM

## 2023-02-13 DIAGNOSIS — Z00.00 MEDICARE ANNUAL WELLNESS VISIT, SUBSEQUENT: Primary | ICD-10-CM

## 2023-02-13 DIAGNOSIS — K22.70 BARRETT'S ESOPHAGUS WITHOUT DYSPLASIA: Chronic | ICD-10-CM

## 2023-02-13 DIAGNOSIS — J41.0 SIMPLE CHRONIC BRONCHITIS: ICD-10-CM

## 2023-02-13 DIAGNOSIS — E78.2 MIXED HYPERLIPIDEMIA: ICD-10-CM

## 2023-02-13 DIAGNOSIS — F17.200 TOBACCO DEPENDENCE SYNDROME: ICD-10-CM

## 2023-02-13 DIAGNOSIS — Z00.00 ANNUAL PHYSICAL EXAM: ICD-10-CM

## 2023-02-13 DIAGNOSIS — E66.3 OVERWEIGHT WITH BODY MASS INDEX (BMI) OF 26 TO 26.9 IN ADULT: Chronic | ICD-10-CM

## 2023-02-13 DIAGNOSIS — M35.3 POLYMYALGIA RHEUMATICA: ICD-10-CM

## 2023-02-13 DIAGNOSIS — M05.9 RHEUMATOID ARTHRITIS, SEROPOSITIVE: Chronic | ICD-10-CM

## 2023-02-13 DIAGNOSIS — I10 BENIGN ESSENTIAL HYPERTENSION: ICD-10-CM

## 2023-02-13 DIAGNOSIS — K76.0 FATTY LIVER: ICD-10-CM

## 2023-02-13 PROCEDURE — 1159F MED LIST DOCD IN RCRD: CPT | Performed by: INTERNAL MEDICINE

## 2023-02-13 PROCEDURE — 96160 PT-FOCUSED HLTH RISK ASSMT: CPT | Performed by: INTERNAL MEDICINE

## 2023-02-13 PROCEDURE — 99397 PER PM REEVAL EST PAT 65+ YR: CPT | Performed by: INTERNAL MEDICINE

## 2023-02-13 PROCEDURE — 1126F AMNT PAIN NOTED NONE PRSNT: CPT | Performed by: INTERNAL MEDICINE

## 2023-02-13 PROCEDURE — G0439 PPPS, SUBSEQ VISIT: HCPCS | Performed by: INTERNAL MEDICINE

## 2023-02-13 PROCEDURE — 1170F FXNL STATUS ASSESSED: CPT | Performed by: INTERNAL MEDICINE

## 2023-02-13 PROCEDURE — 93000 ELECTROCARDIOGRAM COMPLETE: CPT | Performed by: INTERNAL MEDICINE

## 2023-02-13 RX ORDER — EZETIMIBE 10 MG/1
10 TABLET ORAL NIGHTLY
Qty: 90 TABLET | Refills: 1 | Status: SHIPPED | OUTPATIENT
Start: 2023-02-13

## 2023-02-13 RX ORDER — ROSUVASTATIN CALCIUM 40 MG/1
40 TABLET, COATED ORAL NIGHTLY
Qty: 90 TABLET | Refills: 1 | Status: SHIPPED | OUTPATIENT
Start: 2023-02-13

## 2023-02-13 RX ORDER — PREDNISONE 2.5 MG
1 TABLET ORAL DAILY
COMMUNITY
Start: 2023-02-09

## 2023-02-13 RX ORDER — PREDNISONE 2.5 MG
1 TABLET ORAL DAILY
COMMUNITY
Start: 2022-11-09 | End: 2023-02-13

## 2023-02-13 RX ORDER — METHOCARBAMOL 500 MG/1
1 TABLET, FILM COATED ORAL EVERY 12 HOURS SCHEDULED
COMMUNITY
Start: 2023-02-09

## 2023-02-13 RX ORDER — FOLIC ACID 1 MG/1
1 TABLET ORAL DAILY
Qty: 90 TABLET | Refills: 3 | Status: SHIPPED | OUTPATIENT
Start: 2023-02-13

## 2023-02-13 RX ORDER — METHOCARBAMOL 500 MG/1
TABLET, FILM COATED ORAL EVERY 6 HOURS SCHEDULED
COMMUNITY
Start: 2022-11-09

## 2023-02-13 NOTE — PROGRESS NOTES
The ABCs of the Annual Wellness Visit  Initial Medicare Wellness Visit    Chief Complaint   Patient presents with   • Medicare Wellness-subsequent   • Memory Loss     Pt states her memory has noticeably worsened lately   • Hypertension       Subjective   History of Present Illness:  Irma Pedraza is a 73 y.o. female who presents for an Initial Medicare Wellness Visit.    CHRONIC CONDITIONS:    Memory loss  The patient complains of worsening memory loss. She forgets dates and names. Her long-term memory is good, but her short-term memory is worsening. She repeats stories often. Her family does not complain about her memory, but she has noticed that her short-term memory is worsening. She denies forgetting to pay her bills. The patient manages her checkbook online. She denies having difficulty remembering how to drive somewhere, but may have gone down the wrong street occasionally. The patient reads and does word puzzles. She has not been exercising much due to the cold weather. The patient does not go to the gym. There is a family history of brain tumors in her father, sister, and 2 cousins. Her sister had a glioblastoma and  from it. Her father had a benign brain tumor at 80 years old. Her paternal grandmother, aunt, and brother had a TIA. Her mother had Alzheimer's disease. Her aunt had a stroke and has passed away. She has not had a CT of her head. The patient has occasional headaches. She denies dizziness. The patient is agreeable to getting a CT of her head.    COPD  The patient has cut back on her smoking. She is smoking 1 pack or less per day. The patient had a chest CT in 2022.    Shortness of breath  She has been experiencing shortness of breath with exercise lately. Her shortness of breath is about the same. She uses an albuterol inhaler as needed, which works for her. The patient uses the Trelegy inhaler daily.    Osteoporosis  The patient receives Reclast injections yearly at Dr. Swift's  office. Her last bone density test was in 2021, which showed some improvement. She will be due to have another bone density test this year. Her last vitamin D level was 53. She is not taking a calcium or vitamin D supplement. The patient will see Dr. Swift's assistant in 04/2023.    Rheumatoid arthritis  She is taking prednisone 2.5 mg daily. The patient is also taking Actemra injections, and is followed by Dr. Swift.    Folate deficiency  She is not taking folic acid. The patient is taking B12 supplements.    Hypertension  Her blood pressure today is 128/60 mmHg. She is taking amlodipine. The patient denies much swelling in her ankles.    Hyperlipidemia  She is taking rosuvastatin and Zetia.    Health maintenance  The patient is taking Culturelle probiotic.     Immunizations  She is due for the Tdap vaccine.   The patient is due for the new COVID-19 booster vaccine.  Her DEXA scan is due this year.      HEALTH RISK ASSESSMENT    Recent Hospitalizations:  She was not admitted to the hospital during the last year.       Current Medical Providers:  Patient Care Team:  Eve Navarrete MD as PCP - General (Internal Medicine)  Gold Swift DO as Consulting Physician (Rheumatology)  Sabrina Angelo MD as Consulting Physician (Plastic Surgery)  Juarez Pedersen MD as Consulting Physician (Otolaryngology)    Smoking Status:  Social History     Tobacco Use   Smoking Status Every Day   • Packs/day: 0.50   • Years: 50.00   • Pack years: 25.00   • Types: Cigarettes   Smokeless Tobacco Never   Tobacco Comments    patient refused smoking literature       Alcohol Consumption:  Social History     Substance and Sexual Activity   Alcohol Use Yes    Comment: 3 times per week       Depression Screen:   PHQ-2/PHQ-9 Depression Screening 2/13/2023   Retired PHQ-9 Total Score -   Retired Total Score -   Little Interest or Pleasure in Doing Things 0-->not at all   Feeling Down, Depressed or Hopeless 0-->not at  all   PHQ-9: Brief Depression Severity Measure Score 0       Fall Risk Screen:  ANA LAURA Fall Risk Assessment was completed, and patient is at LOW risk for falls.Assessment completed on:2/13/2023    Health Habits and Functional and Cognitive Screening:  Functional & Cognitive Status 2/3/2022   Do you have difficulty preparing food and eating? No   Do you have difficulty bathing yourself, getting dressed or grooming yourself? No   Do you have difficulty using the toilet? No   Do you have difficulty moving around from place to place? No   Do you have trouble with steps or getting out of a bed or a chair? No   Current Diet Well Balanced Diet   Dental Exam Up to date   Eye Exam Up to date   Exercise (times per week) 3 times per week   Current Exercises Include Walking   Current Exercise Activities Include -   Do you need help using the phone?  No   Are you deaf or do you have serious difficulty hearing?  No   Do you need help with transportation? No   Do you need help shopping? No   Do you need help preparing meals?  No   Do you need help with housework?  No   Do you need help with laundry? No   Do you need help taking your medications? No   Do you need help managing money? No   Do you ever drive or ride in a car without wearing a seat belt? No   Have you felt unusual stress, anger or loneliness in the last month? No   Who do you live with? Spouse   If you need help, do you have trouble finding someone available to you? No   Have you been bothered in the last four weeks by sexual problems? No   Do you have difficulty concentrating, remembering or making decisions? No         Age-appropriate Screening Schedule:  Refer to the list below for future screening recommendations based on patient's age, sex and/or medical conditions. Orders for these recommended tests are listed in the plan section. The patient has been provided with a written plan.    Health Maintenance   Topic Date Due   • TDAP/TD VACCINES (2 - Td or Tdap)  04/11/2022   • INFLUENZA VACCINE  08/01/2022   • DXA SCAN  03/17/2023   • LIPID PANEL  08/03/2023   • MAMMOGRAM  06/29/2024   • ZOSTER VACCINE  Completed   • PAP SMEAR  Discontinued        The following portions of the patient's history were reviewed and updated as appropriate: allergies, current medications, past family history, past medical history, past social history, past surgical history and problem list.    Does the patient have evidence of cognitive impairment? No    Aspirin is not on active medication list.  Aspirin use is not indicated based on review of current medical condition/s. Risk of harm outweighs potential benefits.  .    Outpatient Medications Prior to Visit   Medication Sig Dispense Refill   • acyclovir (ZOVIRAX) 400 MG tablet Take 400 mg by mouth As Needed.     • albuterol sulfate HFA (Ventolin HFA) 108 (90 Base) MCG/ACT inhaler Inhale 2 puffs Every 4 (Four) Hours As Needed for Wheezing. 18 g 5   • amLODIPine (NORVASC) 5 MG tablet Take 1 tablet by mouth Daily. 90 tablet 3   • Azelastine-Fluticasone 137-50 MCG/ACT suspension 2 sprays into the nostril(s) as directed by provider Every 12 (Twelve) Hours.     • benzonatate (TESSALON) 200 MG capsule TAKE 1 CAPSULE BY MOUTH THREE TIMES DAILY AS NEEDED FOR COUGH 30 capsule 2   • buPROPion SR (WELLBUTRIN SR) 150 MG 12 hr tablet Take 1 tablet by mouth 2 (Two) Times a Day. 180 tablet 1   • busPIRone (BUSPAR) 5 MG tablet TAKE 1 TABLET BY MOUTH THREE TIMES DAILY AS NEEDED FOR ANXIETY 90 tablet 1   • Coenzyme Q10 (COQ-10) 200 MG capsule Take 1 tablet by mouth Daily.     • cyclobenzaprine (FLEXERIL) 10 MG tablet Take 1 tablet by mouth 2 (Two) Times a Day As Needed for Muscle Spasms. 30 tablet 1   • famotidine (PEPCID) 20 MG tablet TAKE 1 TABLET BY MOUTH TWICE DAILY (Patient taking differently: 20 mg 2 (Two) Times a Day As Needed.) 180 tablet 1   • Fluticasone-Umeclidin-Vilant (Trelegy Ellipta) 100-62.5-25 MCG/INH inhaler Inhale 1 puff Daily. 3 each 3   •  methocarbamol (ROBAXIN) 500 MG tablet Take 1 tablet by mouth Every 12 (Twelve) Hours.     • montelukast (SINGULAIR) 10 MG tablet Take 1 tablet by mouth Every Night. 90 tablet 1   • omeprazole (priLOSEC) 40 MG capsule Take 1 capsule by mouth Daily. 90 capsule 1   • predniSONE (DELTASONE) 2.5 MG tablet Take 1 tablet by mouth Daily.     • Probiotic Product ([employer] PO) Take 1 capsule by mouth Daily.     • tocilizumab (ACTEMRA) 200 MG/10ML solution injection Infuse 8 mg/kg into a venous catheter.     • vitamin B-12 (CYANOCOBALAMIN) 1000 MCG tablet TAKE 1 TABLET BY MOUTH DAILY 90 tablet 1   • zoledronic acid (RECLAST) 5 MG/100ML solution infusion Administer ZOLEDRONIC ACID 5mg IV once annually     • ezetimibe (ZETIA) 10 MG tablet Take 1 tablet by mouth Every Night. 90 tablet 1   • rosuvastatin (CRESTOR) 40 MG tablet Take 1 tablet by mouth Every Night. 90 tablet 1   • Lactobacillus-Inulin ([facility] [employer]ent[employer] PO) Take 1 capsule by mouth Daily.     • methocarbamol (ROBAXIN) 500 MG tablet Every 6 (Six) Hours.     • amoxicillin (AMOXIL) 500 MG capsule Take 1 capsule by mouth 2 (Two) Times a Day. 14 capsule 0   • folic acid (FOLVITE) 1 MG tablet Take 1 tablet by mouth Daily. 90 tablet 1   • predniSONE (DELTASONE) 2.5 MG tablet Take 1 tablet by mouth Daily.       No facility-administered medications prior to visit.       Patient Active Problem List   Diagnosis   • Cervicalgia   • Generalized anxiety disorder   • Mixed hyperlipidemia   • Mitral valve disorder   • Tinnitus   • Vitamin D deficiency   • Senile osteoporosis   • Dyspnea   • Polymyalgia rheumatica (HCC)   • Primary insomnia   • Chronic pain of both shoulders   • Chronic upper back pain   • Drug-induced hypokalemia   • Benign essential hypertension   • Benign paroxysmal positional vertigo   • Chronic low back pain   • Chronic obstructive lung disease (HCC)   • GERD without esophagitis   • Muscle spasms of both lower extremities   • Temporal  "arteritis (HCC)   • Tobacco dependence syndrome   • Fatty liver   • Spasm of thoracic back muscle   • Annual physical exam   • Medicare annual wellness visit, subsequent   • Rheumatoid arthritis, seropositive (HCC)   • Lichen sclerosus of female genitalia   • Lichen sclerosus   • Sprain of interphalangeal joint of right thumb   • Alas's esophagus without dysplasia   • Overweight with body mass index (BMI) of 26 to 26.9 in adult   • Mild memory disturbance   • High plasma homocystine       Advanced Care Planning:  Advance Directive is not on file.  ACP discussion was held with the patient during this visit. Patient does not have an advance directive, information provided.    Review of Systems    Compared to one year ago, the patient feels her physical health is the same.  Compared to one year ago, the patient feels her mental health is the same.    Reviewed chart for potential of high risk medication in the elderly: yes  Reviewed chart for potential of harmful drug interactions in the elderly:yes    Objective         Vitals:    02/13/23 1645   BP: 128/60   BP Location: Left arm   Patient Position: Sitting   Cuff Size: Adult   Pulse: 78   Temp: 98.7 °F (37.1 °C)   TempSrc: Infrared   SpO2: 92%   Weight: 64.3 kg (141 lb 12.8 oz)   Height: 156.4 cm (61.58\")   PainSc: 0-No pain     BMI is >= 25 and <30. (Overweight) The following options were offered after discussion;: weight loss educational material (shared in after visit summary), exercise counseling/recommendations and nutrition counseling/recommendations    Body mass index is 26.29 kg/m².  Discussed the patient's BMI with her. The BMI is above average; BMI management plan is completed.    Physical Exam  Vitals and nursing note reviewed.   Constitutional:       Appearance: She is well-developed.   Eyes:      Conjunctiva/sclera: Conjunctivae normal.      Pupils: Pupils are equal, round, and reactive to light.   Neck:      Thyroid: No thyromegaly.   Cardiovascular: "      Rate and Rhythm: Normal rate and regular rhythm.      Heart sounds: Normal heart sounds. No murmur heard.  Pulmonary:      Effort: Pulmonary effort is normal.      Breath sounds: Normal breath sounds. No wheezing.   Chest:   Breasts:     Right: No inverted nipple, mass, nipple discharge, skin change or tenderness.      Left: No inverted nipple, mass, nipple discharge, skin change or tenderness.   Abdominal:      General: Bowel sounds are normal. There is no distension.      Palpations: Abdomen is soft. There is no mass.      Tenderness: There is no abdominal tenderness.   Musculoskeletal:         General: No tenderness. Normal range of motion.      Cervical back: Normal range of motion and neck supple.      Comments: Moderate kyphosis and mild hand changes of rheumatoid arthritis.   Lymphadenopathy:      Cervical: No cervical adenopathy.   Skin:     General: Skin is warm and dry.      Findings: No rash.   Neurological:      Mental Status: She is alert and oriented to person, place, and time.      Cranial Nerves: No cranial nerve deficit.      Sensory: No sensory deficit.      Coordination: Coordination normal.      Gait: Gait normal.   Psychiatric:         Speech: Speech normal.         Behavior: Behavior normal.         Thought Content: Thought content normal.         Judgment: Judgment normal.           ECG 12 Lead    Date/Time: 2/13/2023 6:00 PM  Performed by: Eve Navarrete MD  Authorized by: Eve Navarrete MD   Comparison: compared with previous ECG   Similar to previous ECG  Rhythm: sinus rhythm  Rate: normal  BPM: 71  Conduction: conduction normal  ST Segments: ST segments normal  T Waves: T waves normal  QRS axis: normal    Clinical impression: normal ECG                  Assessment & Plan   Medicare Risks and Personalized Health Plan  CMS Preventative Services Quick Reference  Cardiovascular risk  Obesity/Overweight   Osteoporosis Risk    The above risks/problems have been discussed with the  patient.  Pertinent information has been shared with the patient in the After Visit Summary.  Follow up plans and orders are seen below in the Assessment/Plan Section.  Patient Instructions   Problem List Items Addressed This Visit        Cardiac and Vasculature    Mixed hyperlipidemia    Overview     Taking rosuvastatin 40 mg and zetia 10mg daily.          Current Assessment & Plan     She will continue rosuvastatin and Zetia. Increase walking and exercise. Decrease fats and sugars in the diet and lose some weight.         Relevant Medications    ezetimibe (ZETIA) 10 MG tablet    rosuvastatin (CRESTOR) 40 MG tablet    Other Relevant Orders    Homocysteine    Lipid Panel    TSH    ECG 12 Lead    Benign essential hypertension    Overview     Taking amlodipine daily.           Current Assessment & Plan     Continue amlodipine daily. Continue to avoid salt in the diet.         Relevant Medications    amLODIPine (NORVASC) 5 MG tablet    Other Relevant Orders    CBC & Differential    Comprehensive Metabolic Panel    Microalbumin / Creatinine Urine Ratio - Urine, Clean Catch    Urinalysis With Microscopic - Urine, Clean Catch       Endocrine and Metabolic    Vitamin D deficiency (Chronic)    Current Assessment & Plan     We will recheck vitamin D level this week         Relevant Orders    Vitamin D,25-Hydroxy       Gastrointestinal Abdominal     Alas's esophagus without dysplasia (Chronic)    Overview     Noted on EGD 1/13/2022.    Taking omeprazole daily. Taking famotidine as needed.         Current Assessment & Plan     The patient will continue taking omeprazole daily and famotidine twice per day as needed. Avoid eating close to bedtime and avoid large meals.         Relevant Medications    omeprazole (priLOSEC) 40 MG capsule    famotidine (PEPCID) 20 MG tablet    Fatty liver    Current Assessment & Plan     She is encouraged to eat less fats in the diet and continue taking Zetia and rosuvastatin and work on weight  loss.            Health Encounters    Annual physical exam    Current Assessment & Plan     She is encouraged to get the new COVID-19 booster and the Tdap injection. The patient will get a DEXA scan this year at Dr. Swift's office. She is up to date on lung cancer screening, low dose CT, which was done in 12/2022. The patient is up to date on mammogram.         Medicare annual wellness visit, subsequent - Primary    Current Assessment & Plan     She is encouraged to get the new COVID-19 booster and the Tdap injection. The patient will get a DEXA scan this year at Dr. Swift's office. She is up to date on lung cancer screening, low dose CT, which was done in 12/2022. The patient is up to date on mammogram.            Multi-system (Lupus, Sarcoid...)    Polymyalgia rheumatica (HCC)    Overview     Taking Actemra infusions with Dr. Swift and low-dose prednisone 2.5 mg daily.  Tylenol as needed for pain.               Current Assessment & Plan     She will continue Actemra and low-dose prednisone and continue close follow-up with Dr. Swift.            Musculoskeletal and Injuries    Rheumatoid arthritis, seropositive (HCC) (Chronic)    Overview     Seeing Dr. Swift.         Current Assessment & Plan     She will continue close follow-up with Dr. Swift. She will continue Actemra injections and 2.5 mg of prednisone daily and Tylenol as needed.         Relevant Medications    tocilizumab (ACTEMRA) 200 MG/10ML solution injection    predniSONE (DELTASONE) 2.5 MG tablet    Senile osteoporosis    Overview     T-scores  on 2/12/19 DEXA showed osteoporosis of the spine and osteopenia of the hips.    2021 DEXA revealed T-scores in the spine of -1.3 and in the femoral neck of -1.7.  There has been some improvement on Reclast.  Sees Dr. Swift.             Current Assessment & Plan     She will continue yearly Reclast injections at Dr. Swift's office. She is due for a DEXA this year. She should also take a  calcium with vitamin D tablet once per day.            Neuro    Mild memory disturbance (Chronic)    Current Assessment & Plan     We will do a CT of the head to rule out any tumors since glioblastoma and meningioma do run in her family. She was advised to continue reading a lot and doing word puzzles and number puzzles. Stopping smoking can protect the blood vessels in the brain. Physical exercise also helps improve memory and cognition.         Relevant Orders    CT Head With & Without Contrast    Vitamin B12    RPR       Pulmonary and Pneumonias    Chronic obstructive lung disease (HCC)    Overview     Last spirometry  showed moderate obstruction without improvement after bronchodilator.    Using Trelegy Ellipta inhaler 1 puff daily.  Using albuterol rescue inhaler as needed for cough wheeze or lung congestion.  Taking montelukast (Singulair) tablet every evening to help with allergies and breathing.             Current Assessment & Plan     She will continue using the Trelegy inhaler daily, the albuterol inhaler as needed, and taking montelukast tablet every evening.             Relevant Medications    Azelastine-Fluticasone 137-50 MCG/ACT suspension    albuterol sulfate HFA (Ventolin HFA) 108 (90 Base) MCG/ACT inhaler    Fluticasone-Umeclidin-Vilant (Trelegy Ellipta) 100-62.5-25 MCG/INH inhaler    montelukast (SINGULAIR) 10 MG tablet    benzonatate (TESSALON) 200 MG capsule    predniSONE (DELTASONE) 2.5 MG tablet       Symptoms and Signs    High plasma homocystine (Chronic)    Current Assessment & Plan     We will recheck level this week. She is advised to take folic acid 1 mg tablet daily.            Tobacco    Tobacco dependence syndrome    Overview     She has decreased cigarettes to 1 ppd.           Current Assessment & Plan     She is encouraged to gradually decrease how many cigarettes she allows herself per day. It is a good idea to make a list of reasons to quit smoking and also a list of things to do  instead of smoking.            Other    Overweight with body mass index (BMI) of 26 to 26.9 in adult (Chronic)    Current Assessment & Plan     She is encouraged to increase walking and exercise. Consider going to the [employer] or to another gym. Use small hand weights while watching TV at home. Decrease portion sizes at mealtime. Avoid snacking. Weigh once a week at home to monitor progress.             Follow Up:  Next Medicare Wellness visit to be scheduled in 1 year.    Return in about 6 months (around 8/13/2023) for recheck fasting.    An After Visit Summary and PPPS were given to the patient.   Follow Up   Return in about 6 months (around 8/13/2023) for recheck fasting.  Patient was given instructions and counseling regarding her condition or for health maintenance advice. Please see specific information pulled into the AVS if appropriate.       Transcribed from ambient dictation for Eve Navarrete MD by Karla Javier.  02/14/23   09:21 EST    Patient or patient representative verbalized consent to the visit recording.  I have personally performed the services described in this document as transcribed by the above individual, and it is both accurate and complete.

## 2023-02-14 NOTE — ASSESSMENT & PLAN NOTE
She is encouraged to increase walking and exercise. Consider going to the [employer] or to another gym. Use small hand weights while watching TV at home. Decrease portion sizes at mealtime. Avoid snacking. Weigh once a week at home to monitor progress.

## 2023-02-14 NOTE — ASSESSMENT & PLAN NOTE
We will do a CT of the head to rule out any tumors since glioblastoma and meningioma do run in her family. She was advised to continue reading a lot and doing word puzzles and number puzzles. Stopping smoking can protect the blood vessels in the brain. Physical exercise also helps improve memory and cognition.

## 2023-02-14 NOTE — ASSESSMENT & PLAN NOTE
She is encouraged to get the new COVID-19 booster and the Tdap injection. The patient will get a DEXA scan this year at Dr. Swift's office. She is up to date on lung cancer screening, low dose CT, which was done in 12/2022. The patient is up to date on mammogram.

## 2023-02-14 NOTE — ASSESSMENT & PLAN NOTE
She will continue yearly Reclast injections at Dr. Swift's office. She is due for a DEXA this year. She should also take a calcium with vitamin D tablet once per day.

## 2023-02-14 NOTE — ASSESSMENT & PLAN NOTE
The patient will continue taking omeprazole daily and famotidine twice per day as needed. Avoid eating close to bedtime and avoid large meals.

## 2023-02-14 NOTE — ASSESSMENT & PLAN NOTE
She will continue close follow-up with Dr. Swift. She will continue Actemra injections and 2.5 mg of prednisone daily and Tylenol as needed.

## 2023-02-14 NOTE — ASSESSMENT & PLAN NOTE
She is encouraged to eat less fats in the diet and continue taking Zetia and rosuvastatin and work on weight loss.

## 2023-02-14 NOTE — ASSESSMENT & PLAN NOTE
She will continue rosuvastatin and Zetia. Increase walking and exercise. Decrease fats and sugars in the diet and lose some weight.

## 2023-02-14 NOTE — ASSESSMENT & PLAN NOTE
She will continue using the Trelegy inhaler daily, the albuterol inhaler as needed, and taking montelukast tablet every evening.

## 2023-02-14 NOTE — ASSESSMENT & PLAN NOTE
She is encouraged to gradually decrease how many cigarettes she allows herself per day. It is a good idea to make a list of reasons to quit smoking and also a list of things to do instead of smoking.

## 2023-02-16 ENCOUNTER — TELEPHONE (OUTPATIENT)
Dept: INTERNAL MEDICINE | Facility: CLINIC | Age: 73
End: 2023-02-16
Payer: MEDICARE

## 2023-02-16 NOTE — TELEPHONE ENCOUNTER
Called, advised pt she needs to fast for her labs since she is having a lipid panel done.    Provided number to call to schedule time for CT scan: [phone]

## 2023-02-16 NOTE — TELEPHONE ENCOUNTER
Caller: Irma Pedraza    Relationship: Self    Best call back number: [phone]    What was the call regarding: PATIENT IS CALLING BACK AND IS WANTING TO KNOW ABOUT THE LABS ORDERED AND IF SHE NEEDS TO FAST BEFORE THE LABS.    Do you require a callback: YES

## 2023-02-20 NOTE — PATIENT INSTRUCTIONS
Patient Instructions   Problem List Items Addressed This Visit          Cardiac and Vasculature    Mixed hyperlipidemia    Overview     Taking rosuvastatin 40 mg and zetia 10mg daily.          Current Assessment & Plan     She will continue rosuvastatin and Zetia. Increase walking and exercise. Decrease fats and sugars in the diet and lose some weight.         Relevant Medications    ezetimibe (ZETIA) 10 MG tablet    rosuvastatin (CRESTOR) 40 MG tablet    Other Relevant Orders    Homocysteine    Lipid Panel    TSH    ECG 12 Lead    Benign essential hypertension    Overview     Taking amlodipine daily.           Current Assessment & Plan     Continue amlodipine daily. Continue to avoid salt in the diet.         Relevant Medications    amLODIPine (NORVASC) 5 MG tablet    Other Relevant Orders    CBC & Differential    Comprehensive Metabolic Panel    Microalbumin / Creatinine Urine Ratio - Urine, Clean Catch    Urinalysis With Microscopic - Urine, Clean Catch       Endocrine and Metabolic    Vitamin D deficiency (Chronic)    Current Assessment & Plan     We will recheck vitamin D level this week         Relevant Orders    Vitamin D,25-Hydroxy       Gastrointestinal Abdominal     Alas's esophagus without dysplasia (Chronic)    Overview     Noted on EGD 1/13/2022.    Taking omeprazole daily. Taking famotidine as needed.         Current Assessment & Plan     The patient will continue taking omeprazole daily and famotidine twice per day as needed. Avoid eating close to bedtime and avoid large meals.         Relevant Medications    omeprazole (priLOSEC) 40 MG capsule    famotidine (PEPCID) 20 MG tablet    Fatty liver    Current Assessment & Plan     She is encouraged to eat less fats in the diet and continue taking Zetia and rosuvastatin and work on weight loss.            Health Encounters    Annual physical exam    Current Assessment & Plan     She is encouraged to get the new COVID-19 booster and the Tdap injection.  The patient will get a DEXA scan this year at Dr. Swift's office. She is up to date on lung cancer screening, low dose CT, which was done in 12/2022. The patient is up to date on mammogram.         Medicare annual wellness visit, subsequent - Primary    Current Assessment & Plan     She is encouraged to get the new COVID-19 booster and the Tdap injection. The patient will get a DEXA scan this year at Dr. Swift's office. She is up to date on lung cancer screening, low dose CT, which was done in 12/2022. The patient is up to date on mammogram.            Multi-system (Lupus, Sarcoid...)    Polymyalgia rheumatica (HCC)    Overview     Taking Actemra infusions with Dr. Swift and low-dose prednisone 2.5 mg daily.  Tylenol as needed for pain.               Current Assessment & Plan     She will continue Actemra and low-dose prednisone and continue close follow-up with Dr. Swift.            Musculoskeletal and Injuries    Rheumatoid arthritis, seropositive (HCC) (Chronic)    Overview     Seeing Dr. Swift.         Current Assessment & Plan     She will continue close follow-up with Dr. Swift. She will continue Actemra injections and 2.5 mg of prednisone daily and Tylenol as needed.         Relevant Medications    tocilizumab (ACTEMRA) 200 MG/10ML solution injection    predniSONE (DELTASONE) 2.5 MG tablet    Senile osteoporosis    Overview     T-scores  on 2/12/19 DEXA showed osteoporosis of the spine and osteopenia of the hips.    2021 DEXA revealed T-scores in the spine of -1.3 and in the femoral neck of -1.7.  There has been some improvement on Reclast.  Sees Dr. Swift.             Current Assessment & Plan     She will continue yearly Reclast injections at Dr. Swift's office. She is due for a DEXA this year. She should also take a calcium with vitamin D tablet once per day.            Neuro    Mild memory disturbance (Chronic)    Current Assessment & Plan     We will do a CT of the head to rule  out any tumors since glioblastoma and meningioma do run in her family. She was advised to continue reading a lot and doing word puzzles and number puzzles. Stopping smoking can protect the blood vessels in the brain. Physical exercise also helps improve memory and cognition.         Relevant Orders    CT Head With & Without Contrast    Vitamin B12    RPR       Pulmonary and Pneumonias    Chronic obstructive lung disease (HCC)    Overview     Last spirometry  showed moderate obstruction without improvement after bronchodilator.    Using Trelegy Ellipta inhaler 1 puff daily.  Using albuterol rescue inhaler as needed for cough wheeze or lung congestion.  Taking montelukast (Singulair) tablet every evening to help with allergies and breathing.             Current Assessment & Plan     She will continue using the Trelegy inhaler daily, the albuterol inhaler as needed, and taking montelukast tablet every evening.             Relevant Medications    Azelastine-Fluticasone 137-50 MCG/ACT suspension    albuterol sulfate HFA (Ventolin HFA) 108 (90 Base) MCG/ACT inhaler    Fluticasone-Umeclidin-Vilant (Trelegy Ellipta) 100-62.5-25 MCG/INH inhaler    montelukast (SINGULAIR) 10 MG tablet    benzonatate (TESSALON) 200 MG capsule    predniSONE (DELTASONE) 2.5 MG tablet       Symptoms and Signs    High plasma homocystine (Chronic)    Current Assessment & Plan     We will recheck level this week. She is advised to take folic acid 1 mg tablet daily.            Tobacco    Tobacco dependence syndrome    Overview     She has decreased cigarettes to 1 ppd.           Current Assessment & Plan     She is encouraged to gradually decrease how many cigarettes she allows herself per day. It is a good idea to make a list of reasons to quit smoking and also a list of things to do instead of smoking.            Other    Overweight with body mass index (BMI) of 26 to 26.9 in adult (Chronic)    Current Assessment & Plan     She is encouraged to  "increase walking and exercise. Consider going to the [employer] or to another gym. Use small hand weights while watching TV at home. Decrease portion sizes at mealtime. Avoid snacking. Weigh once a week at home to monitor progress.           BMI for Adults  What is BMI?  Body mass index (BMI) is a number that is calculated from a person's weight and height. BMI can help estimate how much of a person's weight is composed of fat. BMI does not measure body fat directly. Rather, it is an alternative to procedures that directly measure body fat, which can be difficult and expensive.  BMI can help identify people who may be at higher risk for certain medical problems.  What are BMI measurements used for?  BMI is used as a screening tool to identify possible weight problems. It helps determine whether a person is obese, overweight, a healthy weight, or underweight.  BMI is useful for:  Identifying a weight problem that may be related to a medical condition or may increase the risk for medical problems.  Promoting changes, such as changes in diet and exercise, to help reach a healthy weight. BMI screening can be repeated to see if these changes are working.  How is BMI calculated?  BMI involves measuring your weight in relation to your height. Both height and weight are measured, and the BMI is calculated from those numbers. This can be done either in English ([place] or metric measurements. Note that charts and online BMI calculators are available to help you find your BMI quickly and easily without having to do these calculations yourself.  To calculate your BMI in English ([place] measurements:    Measure your weight in pounds (lb).  Multiply the number of pounds by 703.  For example, for a person who weighs 180 lb, multiply that number by 703, which equals 126,540.  Measure your height in inches. Then multiply that number by itself to get a measurement called \"inches squared.\"  For example, for a person who is 70 inches " "tall, the \"inches squared\" measurement is 70 inches x 70 inches, which equals 4,900 inches squared.  Divide the total from step 2 (number of lb x 703) by the total from step 3 (inches squared): 126,540 ÷ 4,900 = 25.8. This is your BMI.  To calculate your BMI in metric measurements:  Measure your weight in kilograms (kg).  Measure your height in meters (m). Then multiply that number by itself to get a measurement called \"meters squared.\"  For example, for a person who is 1.75 m tall, the \"meters squared\" measurement is 1.75 m x 1.75 m, which is equal to 3.1 meters squared.  Divide the number of kilograms (your weight) by the meters squared number. In this example: 70 ÷ 3.1 = 22.6. This is your BMI.  What do the results mean?  BMI charts are used to identify whether you are underweight, normal weight, overweight, or obese. The following guidelines will be used:  Underweight: BMI less than 18.5.  Normal weight: BMI between 18.5 and 24.9.  Overweight: BMI between 25 and 29.9.  Obese: BMI of 30 or above.  Keep these notes in mind:  Weight includes both fat and muscle, so someone with a muscular build, such as an athlete, may have a BMI that is higher than 24.9. In cases like these, BMI is not an accurate measure of body fat.  To determine if excess body fat is the cause of a BMI of 25 or higher, further assessments may need to be done by a health care provider.  BMI is usually interpreted in the same way for men and women.  Where to find more information  For more information about BMI, including tools to quickly calculate your BMI, go to these websites:  Centers for Disease Control and Prevention: www.cdc.gov  American Heart Association: www.heart.org  National Heart, Lung, and Blood Mon[employer]: www.nhlbi.nih.gov  Summary  Body mass index (BMI) is a number that is calculated from a person's weight and height.  BMI may help estimate how much of a person's weight is composed of fat. BMI can help identify those who may be " at higher risk for certain medical problems.  BMI can be measured using English measurements or metric measurements.  BMI charts are used to identify whether you are underweight, normal weight, overweight, or obese.  This information is not intended to replace advice given to you by your health care provider. Make sure you discuss any questions you have with your health care provider.  Document Revised: 09/09/2020 Document Reviewed: 07/17/2020  Elsevier Patient Education © 2022 [employer]

## 2023-03-02 ENCOUNTER — HOSPITAL ENCOUNTER (OUTPATIENT)
Dept: CT IMAGING | Facility: HOSPITAL | Age: 73
Discharge: HOME | End: 2023-03-02
Admitting: INTERNAL MEDICINE
Payer: MEDICARE

## 2023-03-02 DIAGNOSIS — R41.3 MILD MEMORY DISTURBANCE: Chronic | ICD-10-CM

## 2023-03-02 LAB — CREAT BLDA-MCNC: 0.9 MG/DL (ref 0.6–1.3)

## 2023-03-02 PROCEDURE — 82565 ASSAY OF CREATININE: CPT

## 2023-03-02 PROCEDURE — 25510000001 IOPAMIDOL 61 % SOLUTION: Performed by: INTERNAL MEDICINE

## 2023-03-02 PROCEDURE — 70470 CT HEAD/BRAIN W/O & W/DYE: CPT

## 2023-03-02 RX ADMIN — IOPAMIDOL 80 ML: 612 INJECTION, SOLUTION INTRAVENOUS at 11:14

## 2023-03-06 ENCOUNTER — TELEPHONE (OUTPATIENT)
Dept: INTERNAL MEDICINE | Facility: CLINIC | Age: 73
End: 2023-03-06
Payer: MEDICARE

## 2023-03-06 NOTE — TELEPHONE ENCOUNTER
Caller: Irma Pedraza    Relationship: Self    Best call back number: [phone]    What is the best time to reach you: ANYTIME    Who are you requesting to speak with (clinical staff, provider,  specific staff member): CLINICAL STAFF    Do you know the name of the person who called: SELF    What was the call regarding: PATIENT STATES THAT SHE WOULD LIKE A CALL A FROM THE NURSE ABOUT HER CT SCAN.    Do you require a callback: YES

## 2023-04-21 RX ORDER — BUPROPION HYDROCHLORIDE 150 MG/1
TABLET, EXTENDED RELEASE ORAL
Qty: 180 TABLET | Refills: 1 | Status: SHIPPED | OUTPATIENT
Start: 2023-04-21

## 2023-06-15 RX ORDER — BENZONATATE 200 MG/1
200 CAPSULE ORAL 3 TIMES DAILY PRN
Qty: 30 CAPSULE | Refills: 2 | Status: SHIPPED | OUTPATIENT
Start: 2023-06-15

## 2023-06-15 NOTE — TELEPHONE ENCOUNTER
Caller: Irma Pedraza    Relationship: Self    Best call back number: [phone]     Requested Prescriptions:   Requested Prescriptions     Pending Prescriptions Disp Refills    benzonatate (TESSALON) 200 MG capsule 30 capsule 2     Sig: Take 1 capsule by mouth 3 (Three) Times a Day As Needed for Cough.        Pharmacy where request should be sent: [id number] [address] [facility] [address] [facility] [address] - [phone] [facility] [phone]      Last office visit with prescribing clinician: 2/13/2023   Last telemedicine visit with prescribing clinician: Visit date not found   Next office visit with prescribing clinician: 8/14/2023     Additional details provided by patient: PATIENT HAS CALLED AND STATED SHE HAS A REALLY BAD COUGH AND IS REQUESTING IF SHE CAN GET A NEW PRESCRIPTION ON ABOVE MEDICATION    Does the patient have less than a 3 day supply:  [x] Yes  [] No    Would you like a call back once the refill request has been completed: [] Yes [x] No    If the office needs to give you a call back, can they leave a voicemail: [] Yes [x] No    Sajan Poon   06/15/23 11:57 EDT

## 2023-06-19 RX ORDER — EZETIMIBE 10 MG/1
10 TABLET ORAL NIGHTLY
Qty: 90 TABLET | Refills: 1 | Status: SHIPPED | OUTPATIENT
Start: 2023-06-19

## 2023-08-14 ENCOUNTER — LAB (OUTPATIENT)
Dept: LAB | Facility: HOSPITAL | Age: 73
End: 2023-08-14
Payer: MEDICARE

## 2023-08-14 DIAGNOSIS — E78.2 MIXED HYPERLIPIDEMIA: ICD-10-CM

## 2023-08-14 DIAGNOSIS — E55.9 VITAMIN D DEFICIENCY: Chronic | ICD-10-CM

## 2023-08-14 DIAGNOSIS — I10 BENIGN ESSENTIAL HYPERTENSION: ICD-10-CM

## 2023-08-14 DIAGNOSIS — R41.3 MILD MEMORY DISTURBANCE: Chronic | ICD-10-CM

## 2023-08-15 ENCOUNTER — TELEPHONE (OUTPATIENT)
Dept: INTERNAL MEDICINE | Facility: CLINIC | Age: 73
End: 2023-08-15
Payer: MEDICARE

## 2023-08-15 LAB
25(OH)D3+25(OH)D2 SERPL-MCNC: 55 NG/ML (ref 30–100)
ALBUMIN SERPL-MCNC: 4.3 G/DL (ref 3.5–5.2)
ALBUMIN/CREAT UR: 7 MG/G CREAT (ref 0–29)
ALBUMIN/GLOB SERPL: 2.2 G/DL
ALP SERPL-CCNC: 74 U/L (ref 39–117)
ALT SERPL-CCNC: 18 U/L (ref 1–33)
APPEARANCE UR: ABNORMAL
AST SERPL-CCNC: 16 U/L (ref 1–32)
BACTERIA #/AREA URNS HPF: ABNORMAL /HPF
BASOPHILS # BLD AUTO: 0.01 10*3/MM3 (ref 0–0.2)
BASOPHILS NFR BLD AUTO: 0.1 % (ref 0–1.5)
BILIRUB SERPL-MCNC: 0.3 MG/DL (ref 0–1.2)
BILIRUB UR QL STRIP: NEGATIVE
BUN SERPL-MCNC: 14 MG/DL (ref 8–23)
BUN/CREAT SERPL: 18.4 (ref 7–25)
CALCIUM SERPL-MCNC: 9.6 MG/DL (ref 8.6–10.5)
CASTS URNS MICRO: ABNORMAL
CHLORIDE SERPL-SCNC: 104 MMOL/L (ref 98–107)
CHOLEST SERPL-MCNC: 138 MG/DL (ref 0–200)
CO2 SERPL-SCNC: 31.2 MMOL/L (ref 22–29)
COLOR UR: YELLOW
CREAT SERPL-MCNC: 0.76 MG/DL (ref 0.57–1)
CREAT UR-MCNC: 233 MG/DL
EGFRCR SERPLBLD CKD-EPI 2021: 82.9 ML/MIN/1.73
EOSINOPHIL # BLD AUTO: 0.07 10*3/MM3 (ref 0–0.4)
EOSINOPHIL NFR BLD AUTO: 1 % (ref 0.3–6.2)
EPI CELLS #/AREA URNS HPF: ABNORMAL /HPF
ERYTHROCYTE [DISTWIDTH] IN BLOOD BY AUTOMATED COUNT: 12 % (ref 12.3–15.4)
GLOBULIN SER CALC-MCNC: 2 GM/DL
GLUCOSE SERPL-MCNC: 100 MG/DL (ref 65–99)
GLUCOSE UR QL STRIP: NEGATIVE
HCT VFR BLD AUTO: 40.3 % (ref 34–46.6)
HCYS SERPL-SCNC: 8.1 UMOL/L (ref 0–19.2)
HDLC SERPL-MCNC: 50 MG/DL (ref 40–60)
HGB BLD-MCNC: 14 G/DL (ref 12–15.9)
HGB UR QL STRIP: NEGATIVE
IMM GRANULOCYTES # BLD AUTO: 0.03 10*3/MM3 (ref 0–0.05)
IMM GRANULOCYTES NFR BLD AUTO: 0.4 % (ref 0–0.5)
KETONES UR QL STRIP: ABNORMAL
LDLC SERPL CALC-MCNC: 72 MG/DL (ref 0–100)
LEUKOCYTE ESTERASE UR QL STRIP: ABNORMAL
LYMPHOCYTES # BLD AUTO: 1.69 10*3/MM3 (ref 0.7–3.1)
LYMPHOCYTES NFR BLD AUTO: 23.1 % (ref 19.6–45.3)
MCH RBC QN AUTO: 31.7 PG (ref 26.6–33)
MCHC RBC AUTO-ENTMCNC: 34.7 G/DL (ref 31.5–35.7)
MCV RBC AUTO: 91.2 FL (ref 79–97)
MICROALBUMIN UR-MCNC: 15.6 UG/ML
MONOCYTES # BLD AUTO: 0.55 10*3/MM3 (ref 0.1–0.9)
MONOCYTES NFR BLD AUTO: 7.5 % (ref 5–12)
NEUTROPHILS # BLD AUTO: 4.98 10*3/MM3 (ref 1.7–7)
NEUTROPHILS NFR BLD AUTO: 67.9 % (ref 42.7–76)
NITRITE UR QL STRIP: NEGATIVE
NRBC BLD AUTO-RTO: 0 /100 WBC (ref 0–0.2)
PH UR STRIP: 6 [PH] (ref 5–8)
PLATELET # BLD AUTO: 212 10*3/MM3 (ref 140–450)
POTASSIUM SERPL-SCNC: 4.3 MMOL/L (ref 3.5–5.2)
PROT SERPL-MCNC: 6.3 G/DL (ref 6–8.5)
PROT UR QL STRIP: ABNORMAL
RBC # BLD AUTO: 4.42 10*6/MM3 (ref 3.77–5.28)
RBC #/AREA URNS HPF: ABNORMAL /HPF
RPR SER QL: NON REACTIVE
SODIUM SERPL-SCNC: 145 MMOL/L (ref 136–145)
SP GR UR STRIP: 1.03 (ref 1–1.03)
TRIGL SERPL-MCNC: 80 MG/DL (ref 0–150)
TSH SERPL DL<=0.005 MIU/L-ACNC: 0.74 UIU/ML (ref 0.27–4.2)
UROBILINOGEN UR STRIP-MCNC: ABNORMAL MG/DL
VIT B12 SERPL-MCNC: >2000 PG/ML (ref 211–946)
VLDLC SERPL CALC-MCNC: 16 MG/DL (ref 5–40)
WBC # BLD AUTO: 7.33 10*3/MM3 (ref 3.4–10.8)
WBC #/AREA URNS HPF: ABNORMAL /HPF

## 2023-08-16 ENCOUNTER — OFFICE VISIT (OUTPATIENT)
Dept: INTERNAL MEDICINE | Facility: CLINIC | Age: 73
End: 2023-08-16
Payer: MEDICARE

## 2023-08-16 VITALS
HEIGHT: 62 IN | OXYGEN SATURATION: 92 % | BODY MASS INDEX: 27.31 KG/M2 | DIASTOLIC BLOOD PRESSURE: 60 MMHG | WEIGHT: 148.4 LBS | HEART RATE: 70 BPM | TEMPERATURE: 98.2 F | SYSTOLIC BLOOD PRESSURE: 134 MMHG

## 2023-08-16 DIAGNOSIS — J44.9 COPD WITH ASTHMA: ICD-10-CM

## 2023-08-16 DIAGNOSIS — I10 BENIGN ESSENTIAL HYPERTENSION: Primary | ICD-10-CM

## 2023-08-16 DIAGNOSIS — K76.0 FATTY LIVER: ICD-10-CM

## 2023-08-16 DIAGNOSIS — E78.2 MIXED HYPERLIPIDEMIA: ICD-10-CM

## 2023-08-16 DIAGNOSIS — F17.200 TOBACCO DEPENDENCE SYNDROME: ICD-10-CM

## 2023-08-16 DIAGNOSIS — F41.1 GENERALIZED ANXIETY DISORDER: ICD-10-CM

## 2023-08-16 DIAGNOSIS — E66.3 OVERWEIGHT WITH BODY MASS INDEX (BMI) OF 27 TO 27.9 IN ADULT: Chronic | ICD-10-CM

## 2023-08-16 DIAGNOSIS — K22.70 BARRETT'S ESOPHAGUS WITHOUT DYSPLASIA: Chronic | ICD-10-CM

## 2023-08-16 PROBLEM — J44.89 COPD WITH ASTHMA: Status: ACTIVE | Noted: 2019-02-28

## 2023-08-16 PROCEDURE — 1160F RVW MEDS BY RX/DR IN RCRD: CPT | Performed by: INTERNAL MEDICINE

## 2023-08-16 PROCEDURE — 1159F MED LIST DOCD IN RCRD: CPT | Performed by: INTERNAL MEDICINE

## 2023-08-16 PROCEDURE — 3078F DIAST BP <80 MM HG: CPT | Performed by: INTERNAL MEDICINE

## 2023-08-16 PROCEDURE — 99214 OFFICE O/P EST MOD 30 MIN: CPT | Performed by: INTERNAL MEDICINE

## 2023-08-16 PROCEDURE — 3075F SYST BP GE 130 - 139MM HG: CPT | Performed by: INTERNAL MEDICINE

## 2023-08-16 RX ORDER — ALBUTEROL SULFATE 90 UG/1
2 AEROSOL, METERED RESPIRATORY (INHALATION) EVERY 4 HOURS PRN
Qty: 18 G | Refills: 5 | Status: SHIPPED | OUTPATIENT
Start: 2023-08-16

## 2023-08-16 RX ORDER — EZETIMIBE 10 MG/1
10 TABLET ORAL NIGHTLY
Qty: 90 TABLET | Refills: 1 | Status: SHIPPED | OUTPATIENT
Start: 2023-08-16

## 2023-08-16 RX ORDER — MONTELUKAST SODIUM 10 MG/1
10 TABLET ORAL NIGHTLY
Qty: 90 TABLET | Refills: 1 | Status: SHIPPED | OUTPATIENT
Start: 2023-08-16

## 2023-08-16 RX ORDER — ROSUVASTATIN CALCIUM 40 MG/1
40 TABLET, COATED ORAL NIGHTLY
Qty: 90 TABLET | Refills: 1 | Status: SHIPPED | OUTPATIENT
Start: 2023-08-16

## 2023-08-16 RX ORDER — AMLODIPINE BESYLATE 5 MG/1
5 TABLET ORAL DAILY
Qty: 90 TABLET | Refills: 3 | Status: SHIPPED | OUTPATIENT
Start: 2023-08-16

## 2023-08-16 RX ORDER — CYANOCOBALAMIN (VITAMIN B-12) 500 MCG
500 TABLET ORAL DAILY
Qty: 90 TABLET | Refills: 3 | Status: SHIPPED | OUTPATIENT
Start: 2023-08-16

## 2023-08-16 RX ORDER — OMEPRAZOLE 40 MG/1
40 CAPSULE, DELAYED RELEASE ORAL DAILY
Qty: 90 CAPSULE | Refills: 1 | Status: SHIPPED | OUTPATIENT
Start: 2023-08-16

## 2023-08-16 RX ORDER — BUPROPION HYDROCHLORIDE 150 MG/1
150 TABLET, EXTENDED RELEASE ORAL DAILY
Qty: 90 TABLET | Refills: 1 | Status: SHIPPED | OUTPATIENT
Start: 2023-08-16

## 2023-08-16 NOTE — ASSESSMENT & PLAN NOTE
Encouraged the patient to quit smoking. She will try to spread the cigarettes out more. She will find something to keep herself, especially her hands busy, to distract the urge of smoking, like walking, gardening, crossword puzzles. She will make a list of reasons to quit smoking.

## 2023-08-16 NOTE — ASSESSMENT & PLAN NOTE
Continue rosuvastatin and Zetia nightly. Continue to increase walking and decrease the fats and the carbohydrates in the diet.

## 2023-08-16 NOTE — ASSESSMENT & PLAN NOTE
She will try to walk more often. Decrease the sugars and the carbohydrates in the diet. Weigh once a week at home to monitor progress.

## 2023-08-16 NOTE — ASSESSMENT & PLAN NOTE
She is encouraged to use the Trelegy inhaler every day. Add albuterol inhaler as needed. Continue to work on allergy control. Add nasal spray when needed.

## 2023-08-16 NOTE — PROGRESS NOTES
[facility]     Irma Pedraza  1950   [id number]      Patient Care Team:  Eve Navarrete MD as PCP - General (Internal Medicine)  Gold Swift DO as Consulting Physician (Rheumatology)  Sabrina Angelo MD as Consulting Physician (Plastic Surgery)  Juarez Pedersen MD as Consulting Physician (Otolaryngology)    Chief Complaint   Patient presents with    Hypertension            HPI  Patient is a 73 y.o. female presents today for a follow-up.    Hypertension  The patient's blood pressure today is slightly elevated at 134/60 mmHg. She monitors her blood pressure periodically at home, but she does not remember her readings. She is currently taking amlodipine and needs refills. She is not avoiding salt in her diet.    Hyperlipidemia  The patient is taking rosuvastatin 40 mg and Zetia. She denies any side effects from the medications. She  walks 2 to 3 days per week. She does not use hand weights at home. Her triglycerides improved from 237 mg/dL in 2022 to 80 mg/dL. Her HDL is within normal range. Her LDL improved from 146 mg/dL to 72 mg/dL. She has reduced the amount of red meat and carbohydrates in her diet.    GERD  She denies any recent indigestion or heartburn. She is taking omeprazole every day and famotidine as needed.    Fatty liver  Liver enzymes are being monitored on her blood work. Her recent levels were within normal range.    Lichen sclerosus  Resolved.    Anxiety  The patient has not had any trouble with anxiety lately. She is taking bupropion every morning and buspirone as needed. She takes buspirone less than once per week, which helps.     COPD with asthma  The patient denies wheezing and shortness of breath. She has an occasional cough. She does not see a pulmonologist frequently. She takes montelukast every night. She uses her albuterol inhaler occasionally. She uses azelastine and fluticasone nasal spray as needed. She does not use her Trelegy inhaler  every day; has been a couple of days since she used it. She denies any recent nasal congestion or allergies.     Osteoporosis  The patient last seen her rheumatologist, Dr. Gold Swift, in 06/2023. She is receiving Reclast injections once a year, administered by Dr. Swift. Her last Reclast was about the same and he did not say any worse.    Memory disturbance  The patient has missed 2 appointments this week. The appointments were marked on the calendar. She had the appointments mixed up. She has not missed any other appointments. She has not forgotten about any finances or bills. Her family occasionally complains about her memory. She reads a lot; she is able to recite what she has just read. has a family history of Alzheimer's in her mother.    Vitamin B12 deficiency  The patient is taking 1000 mcg of vitamin B12.    Smoking  The patient is still smoking 0.5 to 1 pack of cigarettes per day. She has not tried just spread them out more. She does not smoke in her house.     Medications  The patient is taking prednisone 2.5 mg once a day and Actemra once a month. She does not need Robaxin often.     Labs  Triglycerides- 80 mg/dL; previously 237 mg/dL.  HDL- WNL.  LDL- 72 mg/dL; previously 146 mg/dL.  B12- elevated.  Vitamin D- 55.   Thyroid- WNL.   Urinalysis- trace white blood cells, no excess protein in her urine. No infection.  Folate- WNL.  Blood glucose- WNL.  Creatinine, BUN- WNL.  LFTs- WNL  CBC- WNL.     CHRONIC CONDITIONS      Past Medical History:   Diagnosis Date    Adenomyomatosis of gallbladder 2009    by ultrasound, Dr. Yomi Tai 2009    Arthritis     Arthritis of lumbosacral spine     Asthma     Chronic hoarseness     Class 1 obesity due to excess calories with serious comorbidity and body mass index (BMI) of 30.0 to 30.9 in adult 7/16/2020 2/1/2021 Eve Navarrete MD      Closed displaced fracture of shaft of right clavicle with malunion     Diverticulitis 2/28/2019    GCA (giant cell  "arteritis)     GERD (gastroesophageal reflux disease)     Lloyd's neuroma of left foot     surgery    Ocular histoplasmosis     BCC 2/6/2017    Panic disorder 2/28/2019    PMR (polymyalgia rheumatica)        Past Surgical History:   Procedure Laterality Date    COLONOSCOPY  02/2009    COSMETIC SURGERY  1994    face lift    COSMETIC SURGERY  1993    TUMMY TUCK    COSMETIC SURGERY  1980    Birth kenny removal    ELBOW PROCEDURE Left     x2    THROAT SURGERY      remote removal of growth from throat       Family History   Problem Relation Age of Onset    Osteoporosis Mother     Osteoporosis Father     Migraines Father     Brain cancer Sister     Stroke Brother     Breast cancer Maternal Grandmother 90    Stroke Paternal Grandmother     Stroke Paternal Aunt     Ovarian cancer Neg Hx     Endometrial cancer Neg Hx        Social History     Socioeconomic History    Marital status:    Tobacco Use    Smoking status: Every Day     Packs/day: 0.50     Years: 50.00     Pack years: 25.00     Types: Cigarettes    Smokeless tobacco: Never    Tobacco comments:     patient refused smoking literature   Substance and Sexual Activity    Alcohol use: Yes     Comment: 3 times per week    Drug use: No    Sexual activity: Defer       Allergies   Allergen Reactions    Bactrim [Sulfamethoxazole-Trimethoprim] Other (See Comments)     Hypokalemia, hyponatremia    Atorvastatin Unknown (See Comments)     Lipitor      Bupropion Hcl [Bupropion] Unknown (See Comments)     wellbutrin    Calcium Unknown (See Comments)     unknown       Review of Systems:   The appropriate review of systems is included in the HPI.    Vital Signs  Vitals:    08/16/23 1057   BP: 134/60   BP Location: Left arm   Patient Position: Sitting   Cuff Size: Adult   Pulse: 70   Temp: 98.2 øF (36.8 øC)   TempSrc: Infrared   SpO2: 92%   Weight: 67.3 kg (148 lb 6.4 oz)   Height: 156.4 cm (61.58\")   PainSc: 0-No pain     Body mass index is 27.52 kg/mý.           Current " Outpatient Medications:     acyclovir (ZOVIRAX) 400 MG tablet, Take 1 tablet by mouth As Needed., Disp: , Rfl:     albuterol sulfate HFA (Ventolin HFA) 108 (90 Base) MCG/ACT inhaler, Inhale 2 puffs Every 4 (Four) Hours As Needed for Wheezing., Disp: 18 g, Rfl: 5    amLODIPine (NORVASC) 5 MG tablet, Take 1 tablet by mouth Daily., Disp: 90 tablet, Rfl: 3    Azelastine-Fluticasone 137-50 MCG/ACT suspension, 2 sprays into the nostril(s) as directed by provider Every 12 (Twelve) Hours., Disp: , Rfl:     benzonatate (TESSALON) 200 MG capsule, Take 1 capsule by mouth 3 (Three) Times a Day As Needed for Cough., Disp: 30 capsule, Rfl: 2    buPROPion SR (WELLBUTRIN SR) 150 MG 12 hr tablet, Take 1 tablet by mouth Daily., Disp: 90 tablet, Rfl: 1    busPIRone (BUSPAR) 5 MG tablet, TAKE 1 TABLET BY MOUTH THREE TIMES DAILY AS NEEDED FOR ANXIETY, Disp: 90 tablet, Rfl: 1    Coenzyme Q10 (COQ-10) 200 MG capsule, Take 1 tablet by mouth Daily., Disp: , Rfl:     ezetimibe (ZETIA) 10 MG tablet, Take 1 tablet by mouth Every Night., Disp: 90 tablet, Rfl: 1    famotidine (PEPCID) 20 MG tablet, TAKE 1 TABLET BY MOUTH TWICE DAILY (Patient taking differently: 1 tablet 2 (Two) Times a Day As Needed.), Disp: 180 tablet, Rfl: 1    Fluticasone-Umeclidin-Vilant (Trelegy Ellipta) 100-62.5-25 MCG/ACT inhaler, Inhale 1 puff Daily., Disp: 3 each, Rfl: 3    folic acid (FOLVITE) 1 MG tablet, Take 1 tablet by mouth Daily., Disp: 90 tablet, Rfl: 3    Lactobacillus-Inulin ([facility]), Take 1 capsule by mouth Daily., Disp: , Rfl:     methocarbamol (ROBAXIN) 500 MG tablet, Take 1 tablet by mouth Every 12 (Twelve) Hours., Disp: , Rfl:     montelukast (SINGULAIR) 10 MG tablet, Take 1 tablet by mouth Every Night., Disp: 90 tablet, Rfl: 1    omeprazole (priLOSEC) 40 MG capsule, Take 1 capsule by mouth Daily., Disp: 90 capsule, Rfl: 1    predniSONE (DELTASONE) 2.5 MG tablet, Take 1 tablet by mouth Daily., Disp: , Rfl:     Probiotic Product  ([employer] PO), Take 1 capsule by mouth Daily., Disp: , Rfl:     rosuvastatin (CRESTOR) 40 MG tablet, Take 1 tablet by mouth Every Night., Disp: 90 tablet, Rfl: 1    tocilizumab (ACTEMRA) 200 MG/10ML solution injection, Infuse 8 mg/kg into a venous catheter., Disp: , Rfl:     zoledronic acid (RECLAST) 5 MG/100ML solution infusion, Administer ZOLEDRONIC ACID 5mg IV once annually, Disp: , Rfl:     cyanocobalamin (VITAMIN B-12) 500 MCG tablet, Take 1 tablet by mouth Daily., Disp: 90 tablet, Rfl: 3    cyclobenzaprine (FLEXERIL) 10 MG tablet, Take 1 tablet by mouth 2 (Two) Times a Day As Needed for Muscle Spasms., Disp: 30 tablet, Rfl: 1    Physical Exam:    Physical Exam  Vitals and nursing note reviewed.   Constitutional:       Appearance: Normal appearance. She is well-developed.      Comments: Overweight.   HENT:      Head: Normocephalic.   Eyes:      Conjunctiva/sclera: Conjunctivae normal.      Pupils: Pupils are equal, round, and reactive to light.   Neck:      Thyroid: No thyromegaly.   Cardiovascular:      Rate and Rhythm: Normal rate and regular rhythm.      Heart sounds: Normal heart sounds.   Pulmonary:      Effort: Pulmonary effort is normal.      Breath sounds: Normal breath sounds. No wheezing.   Musculoskeletal:         General: Normal range of motion.      Cervical back: Normal range of motion and neck supple.   Lymphadenopathy:      Cervical: No cervical adenopathy.   Skin:     General: Skin is warm and dry.   Neurological:      Mental Status: She is alert and oriented to person, place, and time.   Psychiatric:         Thought Content: Thought content normal.      ACE III MINI        Results Review:    I reviewed the patient's new clinical results.    CMP:  Lab Results   Component Value Date    BUN 14 08/14/2023    CREATININE 0.76 08/14/2023    EGFRIFNONA 55 (L) 02/03/2022    EGFRIFAFRI 67 02/03/2022    BCR 18.4 08/14/2023     08/14/2023    K 4.3 08/14/2023    CO2 31.2 (H) 08/14/2023     CALCIUM 9.6 08/14/2023    PROTENTOTREF 6.3 08/14/2023    ALBUMIN 4.3 08/14/2023    LABGLOBREF 2.0 08/14/2023    LABIL2 2.2 08/14/2023    BILITOT 0.3 08/14/2023    ALKPHOS 74 08/14/2023    AST 16 08/14/2023    ALT 18 08/14/2023     HbA1c:  No results found for: HGBA1C  Microalbumin:  Lab Results   Component Value Date    MICROALBUR 15.6 08/14/2023     Lipid Panel  Lab Results   Component Value Date    CHOL 220 (H) 01/08/2019    TRIG 80 08/14/2023    HDL 50 08/14/2023    LDL 72 08/14/2023    AST 16 08/14/2023    ALT 18 08/14/2023       Medication Review: Medications reviewed and noted  Patient Instructions   Problem List Items Addressed This Visit          Cardiac and Vasculature    Mixed hyperlipidemia    Overview     Taking rosuvastatin 40 mg and zetia 10mg daily.          Current Assessment & Plan     Continue rosuvastatin and Zetia nightly. Continue to increase walking and decrease the fats and the carbohydrates in the diet.         Relevant Medications    rosuvastatin (CRESTOR) 40 MG tablet    ezetimibe (ZETIA) 10 MG tablet    Benign essential hypertension - Primary    Overview     Taking amlodipine daily.           Current Assessment & Plan     Continue amlodipine daily. Decrease salt in the diet. Goal is to keep the blood pressure around 120/70 mmHg.         Relevant Medications    amLODIPine (NORVASC) 5 MG tablet       Gastrointestinal Abdominal     Alas's esophagus without dysplasia (Chronic)    Overview     Noted on EGD 1/13/2022.    Taking omeprazole daily. Taking famotidine as needed.         Current Assessment & Plan     Continue taking omeprazole every morning. May add famotidine twice per day as needed.         Relevant Medications    famotidine (PEPCID) 20 MG tablet    omeprazole (priLOSEC) 40 MG capsule    Fatty liver    Current Assessment & Plan     I have encouraged eating less fats in the diet and losing weight.            Mental Health    Generalized anxiety disorder    Overview     Taking  bupropion  mg tablet once a day.   buspirone 5 mg 3 times a day just as needed.               Current Assessment & Plan     She will continue bupropion daily. She may continue to add buspirone as needed.         Relevant Medications    busPIRone (BUSPAR) 5 MG tablet    buPROPion SR (WELLBUTRIN SR) 150 MG 12 hr tablet       Pulmonary and Pneumonias    COPD with asthma    Overview     Last spirometry  showed moderate obstruction without improvement after bronchodilator.    Using Trelegy Ellipta inhaler 1 puff daily.  Using albuterol rescue inhaler as needed for cough wheeze or lung congestion.  Taking montelukast (Singulair) tablet every evening to help with allergies and breathing.             Current Assessment & Plan     She is encouraged to use the Trelegy inhaler every day. Add albuterol inhaler as needed. Continue to work on allergy control. Add nasal spray when needed.             Relevant Medications    Azelastine-Fluticasone 137-50 MCG/ACT suspension    predniSONE (DELTASONE) 2.5 MG tablet    benzonatate (TESSALON) 200 MG capsule    montelukast (SINGULAIR) 10 MG tablet    albuterol sulfate HFA (Ventolin HFA) 108 (90 Base) MCG/ACT inhaler    Fluticasone-Umeclidin-Vilant (Trelegy Ellipta) 100-62.5-25 MCG/ACT inhaler       Tobacco    Tobacco dependence syndrome    Overview     She has decreased cigarettes to 1 ppd.           Current Assessment & Plan     Encouraged the patient to quit smoking. She will try to spread the cigarettes out more. She will find something to keep herself, especially her hands busy, to distract the urge of smoking, like walking, gardening, crossword puzzles. She will make a list of reasons to quit smoking.            Other    Overweight with body mass index (BMI) of 27 to 27.9 in adult (Chronic)    Current Assessment & Plan     She will try to walk more often. Decrease the sugars and the carbohydrates in the diet. Weigh once a week at home to monitor progress.                Diagnosis Plan   1. Benign essential hypertension        2. Mixed hyperlipidemia        3. Alas's esophagus without dysplasia        4. Fatty liver        5. Generalized anxiety disorder        6. Overweight with body mass index (BMI) of 27 to 27.9 in adult        7. COPD with asthma        8. Tobacco dependence syndrome                Follow up: She will come back to see me for annual wellness visit in 6 months.    Plan of care reviewed with patient at the conclusion of today's visit. Education was provided regarding diagnosis, management, and any prescribed or recommended OTC medications.Patient verbalizes understanding of and agreement with management plan.         Transcribed from ambient dictation for Eve Navarrete MD by Savanna Hunt.  08/16/23   13:56 EDT    Patient or patient representative verbalized consent to the visit recording.  I have personally performed the services described in this document as transcribed by the above individual, and it is both accurate and complete.

## 2023-08-17 NOTE — PATIENT INSTRUCTIONS
"BMI for Adults  What is BMI?  Body mass index (BMI) is a number that is calculated from a person's weight and height. BMI can help estimate how much of a person's weight is composed of fat. BMI does not measure body fat directly. Rather, it is an alternative to procedures that directly measure body fat, which can be difficult and expensive.  BMI can help identify people who may be at higher risk for certain medical problems.  What are BMI measurements used for?  BMI is used as a screening tool to identify possible weight problems. It helps determine whether a person is obese, overweight, a healthy weight, or underweight.  BMI is useful for:  Identifying a weight problem that may be related to a medical condition or may increase the risk for medical problems.  Promoting changes, such as changes in diet and exercise, to help reach a healthy weight. BMI screening can be repeated to see if these changes are working.  How is BMI calculated?  BMI involves measuring your weight in relation to your height. Both height and weight are measured, and the BMI is calculated from those numbers. This can be done either in English ([place] or metric measurements. Note that charts and online BMI calculators are available to help you find your BMI quickly and easily without having to do these calculations yourself.  To calculate your BMI in English ([place] measurements:    Measure your weight in pounds (lb).  Multiply the number of pounds by 703.  For example, for a person who weighs 180 lb, multiply that number by 703, which equals 126,540.  Measure your height in inches. Then multiply that number by itself to get a measurement called \"inches squared.\"  For example, for a person who is 70 inches tall, the \"inches squared\" measurement is 70 inches x 70 inches, which equals 4,900 inches squared.  Divide the total from step 2 (number of lb x 703) by the total from step 3 (inches squared): 126,540 ö 4,900 = 25.8. This is your BMI.  To " "calculate your BMI in metric measurements:  Measure your weight in kilograms (kg).  Measure your height in meters (m). Then multiply that number by itself to get a measurement called \"meters squared.\"  For example, for a person who is 1.75 m tall, the \"meters squared\" measurement is 1.75 m x 1.75 m, which is equal to 3.1 meters squared.  Divide the number of kilograms (your weight) by the meters squared number. In this example: 70 ö 3.1 = 22.6. This is your BMI.  What do the results mean?  BMI charts are used to identify whether you are underweight, normal weight, overweight, or obese. The following guidelines will be used:  Underweight: BMI less than 18.5.  Normal weight: BMI between 18.5 and 24.9.  Overweight: BMI between 25 and 29.9.  Obese: BMI of 30 or above.  Keep these notes in mind:  Weight includes both fat and muscle, so someone with a muscular build, such as an athlete, may have a BMI that is higher than 24.9. In cases like these, BMI is not an accurate measure of body fat.  To determine if excess body fat is the cause of a BMI of 25 or higher, further assessments may need to be done by a health care provider.  BMI is usually interpreted in the same way for men and women.  Where to find more information  For more information about BMI, including tools to quickly calculate your BMI, go to these websites:  Centers for Disease Control and Prevention: www.cdc.gov  American Heart Association: www.heart.org  National Heart, Lung, and [facility] [place]: www.nhlbi.nih.gov  Summary  Body mass index (BMI) is a number that is calculated from a person's weight and height.  BMI may help estimate how much of a person's weight is composed of fat. BMI can help identify those who may be at higher risk for certain medical problems.  BMI can be measured using English measurements or metric measurements.  BMI charts are used to identify whether you are underweight, normal weight, overweight, or obese.  This information is not " intended to replace advice given to you by your health care provider. Make sure you discuss any questions you have with your health care provider.  Document Revised: 09/09/2020 Document Reviewed: 07/17/2020  [employer] Patient Education c 2023 [employer].  Heart-Healthy Eating Plan  Many factors influence your heart (coronary) health, including eating and exercise habits. Coronary risk increases with abnormal blood fat (lipid) levels. Heart-healthy meal planning includes limiting unhealthy fats, increasing healthy fats, and making other diet and lifestyle changes.  What is my plan?  Your health care provider may recommend that you:  Limit your fat intake to _________% or less of your total calories each day.  Limit your saturated fat intake to _________% or less of your total calories each day.  Limit the amount of cholesterol in your diet to less than _________ mg per day.  What are tips for following this plan?  Cooking  Cook foods using methods other than frying. Baking, boiling, grilling, and broiling are all good options. Other ways to reduce fat include:  Removing the skin from poultry.  Removing all visible fats from meats.  Steaming vegetables in water or broth.  Meal planning  A plate with examples of foods in a healthy diet.      At meals, imagine dividing your plate into fourths:  Fill one-half of your plate with vegetables and green salads.  Fill one-fourth of your plate with whole grains.  Fill one-fourth of your plate with lean protein foods.  Eat 4-5 servings of vegetables per day. One serving equals 1 cup raw or cooked vegetable, or 2 cups raw leafy greens.  Eat 4-5 servings of fruit per day. One serving equals 1 medium whole fruit, ¬ cup dried fruit, « cup fresh, frozen, or canned fruit, or « cup 100% fruit juice.  Eat more foods that contain soluble fiber. Examples include apples, broccoli, carrots, beans, peas, and barley. Aim to get 25-30 g of fiber per day.  Increase your consumption of legumes,  nuts, and seeds to 4-5 servings per week. One serving of dried beans or legumes equals « cup cooked, 1 serving of nuts is ¬ cup, and 1 serving of seeds equals 1 tablespoon.  Fats  Choose healthy fats more often. Choose monounsaturated and polyunsaturated fats, such as olive and canola oils, flaxseeds, walnuts, almonds, and seeds.  Eat more omega-3 fats. Choose salmon, mackerel, sardines, tuna, flaxseed oil, and ground flaxseeds. Aim to eat fish at least 2 times each week.  Check food labels carefully to identify foods with trans fats or high amounts of saturated fat.  Limit saturated fats. These are found in animal products, such as meats, butter, and cream. Plant sources of saturated fats include palm oil, palm kernel oil, and coconut oil.  Avoid foods with partially hydrogenated oils in them. These contain trans fats. Examples are stick margarine, some tub margarines, cookies, crackers, and other baked goods.  Avoid fried foods.  General information  Eat more home-cooked food and less restaurant, buffet, and fast food.  Limit or avoid alcohol.  Limit foods that are high in starch and sugar.  Lose weight if you are overweight. Losing just 5-10% of your body weight can help your overall health and prevent diseases such as diabetes and heart disease.  Monitor your salt (sodium) intake, especially if you have high blood pressure. Talk with your health care provider about your sodium intake.  Try to incorporate more vegetarian meals weekly.  What foods can I eat?  Fruits  All fresh, canned (in natural juice), or frozen fruits.  Vegetables  Fresh or frozen vegetables (raw, steamed, roasted, or grilled). Green salads.  Grains  Most grains. Choose whole wheat and whole grains most of the time. Rice and pasta, including brown rice and pastas made with whole wheat.  Meats and other proteins  Lean, well-trimmed beef, veal, pork, and lamb. Chicken and turkey without skin. All fish and shellfish. Wild duck, rabbit, pheasant,  and venison. Egg whites or low-cholesterol egg substitutes. Dried beans, peas, lentils, and tofu. Seeds and most nuts.  Dairy  Low-fat or nonfat cheeses, including ricotta and mozzarella. Skim or 1% milk (liquid, powdered, or evaporated). Buttermilk made with low-fat milk. Nonfat or low-fat yogurt.  Fats and oils  Non-hydrogenated (trans-free) margarines. Vegetable oils, including soybean, sesame, sunflower, olive, peanut, safflower, corn, canola, and cottonseed. Salad dressings or mayonnaise made with a vegetable oil.  Beverages  Water (mineral or sparkling). Coffee and tea. Diet carbonated beverages.  Sweets and desserts  Sherbet, gelatin, and fruit ice. Small amounts of dark chocolate.  Limit all sweets and desserts.  Seasonings and condiments  All seasonings and condiments.  The items listed above may not be a complete list of foods and beverages you can eat. Contact a dietitian for more options.  What foods are not recommended?  Fruits  Canned fruit in heavy syrup. Fruit in cream or butter sauce. Fried fruit. Limit coconut.  Vegetables  Vegetables cooked in cheese, cream, or butter sauce. Fried vegetables.  Grains  Breads made with saturated or trans fats, oils, or whole milk. Croissants. Sweet rolls. Donuts. High-fat crackers, such as cheese crackers.  Meats and other proteins  Fatty meats, such as hot dogs, ribs, sausage, herrera, rib-eye roast or steak. High-fat deli meats, such as salami and bologna. Caviar. Domestic duck and goose. Organ meats, such as liver.  Dairy  Cream, sour cream, cream cheese, and creamed cottage cheese. Whole-milk cheeses. Whole or 2% milk (liquid, evaporated, or condensed). Whole buttermilk. Cream sauce or high-fat cheese sauce. Whole-milk yogurt.  Fats and oils  Meat fat, or shortening. Cocoa butter, hydrogenated oils, palm oil, coconut oil, palm kernel oil. Solid fats and shortenings, including herrera fat, salt pork, lard, and butter. Nondairy cream substitutes. Salad dressings with  cheese or sour cream.  Beverages  Regular sodas and any drinks with added sugar.  Sweets and desserts  Frosting. Pudding. Cookies. Cakes. Pies. Milk chocolate or white chocolate. Buttered syrups. Full-fat ice cream or ice cream drinks.  The items listed above may not be a complete list of foods and beverages to avoid. Contact a dietitian for more information.  Summary  Heart-healthy meal planning includes limiting unhealthy fats, increasing healthy fats, and making other diet and lifestyle changes.  Lose weight if you are overweight. Losing just 5-10% of your body weight can help your overall health and prevent diseases such as diabetes and heart disease.  Focus on eating a balance of foods, including fruits and vegetables, low-fat or nonfat dairy, lean protein, nuts and legumes, whole grains, and heart-healthy oils and fats.  This information is not intended to replace advice given to you by your health care provider. Make sure you discuss any questions you have with your health care provider.  Document Revised: 04/28/2022 Document Reviewed: 04/28/2022  [employer] Patient Education c 2022 [employer]    Exercising to Lose Weight  Getting regular exercise is important for everyone. It is especially important if you are overweight. Being overweight increases your risk of heart disease, stroke, diabetes, high blood pressure, and several types of cancer. Exercising, and reducing the calories you consume, can help you lose weight and improve fitness and health.  Exercise can be moderate or vigorous intensity. To lose weight, most people need to do a certain amount of moderate or vigorous-intensity exercise each week.  How can exercise affect me?  You lose weight when you exercise enough to burn more calories than you eat. Exercise also reduces body fat and builds muscle. The more muscle you have, the more calories you burn. Exercise also:  Improves mood.  Reduces stress and tension.  Improves your overall fitness,  flexibility, and endurance.  Increases bone strength.  Moderate-intensity exercise  A person riding a bicycle wearing a safety helmet.      Moderate-intensity exercise is any activity that gets you moving enough to burn at least three times more energy (calories) than if you were sitting.  Examples of moderate exercise include:  Walking a mile in 15 minutes.  Doing light yard work.  Biking at an easy pace.  Most people should get at least 150 minutes of moderate-intensity exercise a week to maintain their body weight.  Vigorous-intensity exercise  Vigorous-intensity exercise is any activity that gets you moving enough to burn at least six times more calories than if you were sitting. When you exercise at this intensity, you should be working hard enough that you are not able to carry on a conversation.  Examples of vigorous exercise include:  Running.  Playing a team sport, such as football, basketball, and soccer.  Jumping rope.  Most people should get at least 75 minutes a week of vigorous exercise to maintain their body weight.  What actions can I take to lose weight?  The amount of exercise you need to lose weight depends on:  Your age.  The type of exercise.  Any health conditions you have.  Your overall physical ability.  Talk to your health care provider about how much exercise you need and what types of activities are safe for you.  Nutrition  A plate with healthy, colorful foods.      Make changes to your diet as told by your health care provider or diet and nutrition specialist (dietitian). This may include:  Eating fewer calories.  Eating more protein.  Eating less unhealthy fats.  Eating a diet that includes fresh fruits and vegetables, whole grains, low-fat dairy products, and lean protein.  Avoiding foods with added fat, salt, and sugar.  Drink plenty of water while you exercise to prevent dehydration or heat stroke.  Activity  Choose an activity that you enjoy and set realistic goals. Your health care  provider can help you make an exercise plan that works for you.  Exercise at a moderate or vigorous intensity most days of the week.  The intensity of exercise may vary from person to person. You can tell how intense a workout is for you by paying attention to your breathing and heartbeat. Most people will notice their breathing and heartbeat get faster with more intense exercise.  Do resistance training twice each week, such as:  Push-ups.  Sit-ups.  Lifting weights.  Using resistance bands.  Getting short amounts of exercise can be just as helpful as long, structured periods of exercise. If you have trouble finding time to exercise, try doing these things as part of your daily routine:  Get up, stretch, and walk around every 30 minutes throughout the day.  Go for a walk during your lunch break.  Park your car farther away from your destination.  If you take public transportation, get off one stop early and walk the rest of the way.  Make phone calls while standing up and walking around.  Take the stairs instead of elevators or escalators.  Wear comfortable clothes and shoes with good support.  Do not exercise so much that you hurt yourself, feel dizzy, or get very short of breath.  Where to find more information  [place] Department of Health and Human Services: www.hhs.gov  Centers for Disease Control and Prevention: www.cdc.gov  Contact a health care provider:  Before starting a new exercise program.  If you have questions or concerns about your weight.  If you have a medical problem that keeps you from exercising.  Get help right away if:  You have any of the following while exercising:  Injury.  Dizziness.  Difficulty breathing or shortness of breath that does not go away when you stop exercising.  Chest pain.  Rapid heartbeat.  These symptoms may represent a serious problem that is an emergency. Do not wait to see if the symptoms will go away. Get medical help right away. Call your local emergency services (864  in the [place] Do not drive yourself to the hospital.  Summary  Getting regular exercise is especially important if you are overweight.  Being overweight increases your risk of heart disease, stroke, diabetes, high blood pressure, and several types of cancer.  Losing weight happens when you burn more calories than you eat.  Reducing the amount of calories you eat, and getting regular moderate or vigorous exercise each week, helps you lose weight.  This information is not intended to replace advice given to you by your health care provider. Make sure you discuss any questions you have with your health care provider.

## 2023-09-21 ENCOUNTER — TELEPHONE (OUTPATIENT)
Dept: OBSTETRICS AND GYNECOLOGY | Facility: CLINIC | Age: 73
End: 2023-09-21
Payer: MEDICARE

## 2023-09-21 NOTE — TELEPHONE ENCOUNTER
PT HAS LICHEN SCLEROSUS AND IS HAVING A FLAIR UP- WOULD LIKE TO GET IN TO SEE  AS SOON AS POSSIBLE- FIRST AVAILABLE APPT SCHEDULED FOR 10/31/2023- PLEASE ADVISE PT IF SOONER APPT IS POSSIBLE -[phone] AT ANYTIME, FINE WITH LVM.       LAST SEEN 02/23/2021

## 2023-09-25 ENCOUNTER — TELEPHONE (OUTPATIENT)
Dept: INTERNAL MEDICINE | Facility: CLINIC | Age: 73
End: 2023-09-25

## 2023-09-25 NOTE — TELEPHONE ENCOUNTER
Caller: Irma Pedraza    Relationship: Self    Best call back number: [phone]    What is the best time to reach you: ANYTIME    Who are you requesting to speak with (clinical staff, provider,  specific staff member): CLINICAL STAFF    Do you know the name of the person who called: ELIZA/ [place]    What was the call regarding: WHAT IS THE NAME OF THE GYN SHE WAS REFERRED TO    Is it okay if the provider responds through MyChart:

## 2023-09-26 NOTE — TELEPHONE ENCOUNTER
Caller: Irma Pedraza    Relationship to patient: Self    Best call back number: [phone]    Patient is needing: INFORMED PT APPT FOR 10/31/23 IS ADDED TO WAITLIST IF THERE ARE SOONER OPENINGS SHE WILL BE NOTIFIED. PT REQUESTS IF SHE CAN HAVE A PREDNISONE STEROID OINTMENT FOR ISSUE SENT TO [facility] BEFORE SHE CAN BE SEEN. PLEASE ADVISE PT.

## 2023-09-26 NOTE — TELEPHONE ENCOUNTER
Returned patient's call. Last office visit here was 2/23/21. States she is having a flare of lichen sclerosis. She has been scheduled to see Dr. Martinez 10/31/23 but would like to be seen ASAP by any available provider. She is overdue for annual exam. Also reports feelings of fullness even when she has not eaten and is concerned because a friend told her it could be a symptom of ovarian cancer. Appointment scheduled for tomorrow. Patient v/u.

## 2023-09-27 ENCOUNTER — OFFICE VISIT (OUTPATIENT)
Dept: OBSTETRICS AND GYNECOLOGY | Facility: CLINIC | Age: 73
End: 2023-09-27
Payer: MEDICARE

## 2023-09-27 VITALS
WEIGHT: 144.2 LBS | BODY MASS INDEX: 26.54 KG/M2 | HEIGHT: 62 IN | DIASTOLIC BLOOD PRESSURE: 78 MMHG | SYSTOLIC BLOOD PRESSURE: 132 MMHG

## 2023-09-27 DIAGNOSIS — Z01.419 WOMEN'S ANNUAL ROUTINE GYNECOLOGICAL EXAMINATION: ICD-10-CM

## 2023-09-27 DIAGNOSIS — L90.0 LICHEN SCLEROSUS: ICD-10-CM

## 2023-09-27 DIAGNOSIS — Z12.31 ENCOUNTER FOR SCREENING MAMMOGRAM FOR MALIGNANT NEOPLASM OF BREAST: Primary | ICD-10-CM

## 2023-09-27 RX ORDER — CLOBETASOL PROPIONATE 0.5 MG/G
CREAM TOPICAL
Qty: 60 G | Refills: 5 | Status: SHIPPED | OUTPATIENT
Start: 2023-09-27

## 2023-09-27 NOTE — PROGRESS NOTES
Gynecologic Annual Exam Note        GYN Annual Exam     CC - Here for annual exam.     Subjective     HPI  Irma Pedraza is a 73 y.o. female, , who presents for annual well woman exam as a(n) established patient.  She is postmenopausal.  Patient denies vaginal bleeding. .  Patient reports problems with: lichen sclerosus. Pt. reports no urinary incontinence. There were no changes to her medical or surgical history since her last visit.. Partner Status: Marital Status: .  She is not currently sexually active. STD testing recommendations have been explained to the patient and she does not desire STD testing.    Additional OB/GYN History     On HRT? No    Last Pap : 22. Results: negative. HPV: unknown .     Last Completed Pap Smear            Discontinued - PAP SMEAR  Discontinued      2023  LIQUID-BASED PAP SMEAR WITH HPV GENOTYPING IF ASCUS ([facility])    2020  Done - negative                  History of abnormal Pap smear: no  Family history of uterine, colon, breast, or ovarian cancer: yes - MGM-Breast  Performs monthly Self-Breast Exam: no  Last mammogram: 22. Done at .    Last Completed Mammogram            Ordered - MAMMOGRAM (Every 2 Years) Ordered on 2022  Mammo Screening Digital Tomosynthesis Bilateral With CAD    2021  Mammo Screening Digital Tomosynthesis Bilateral With CAD    2020  Mammo Screening Digital Tomosynthesis Bilateral With CAD    2018  Mammo Screening Digital Tomosynthesis Bilateral With CAD    2017  Mammo Screening Digital Tomosynthesis Bilateral With CAD    Only the first 5 history entries have been loaded, but more history exists.                  Last colonoscopy: 3/9/2020    Last Completed Colonoscopy            COLORECTAL CANCER SCREENING (COLONOSCOPY - Every 5 Years) Next due on 3/9/2025      2020  SCANNED - COLONOSCOPY    2015  SCANNED - COLONOSCOPY                  Last DEXA: On  6/6/2023 and results were Osteopenia and Osteoporosis  Exercises Regularly: walking  Feelings of Anxiety or Depression: no      Tobacco Usage?: Yes Irma Pedraza  reports that she has been smoking cigarettes. She has a 25.00 pack-year smoking history. She has never used smokeless tobacco.. I have educated her on the risk of diseases from using tobacco products such as cancer, COPD, and heart disease.     I advised her to quit and she is not willing to quit.    I spent 4.5 minutes counseling the patient.            Current Outpatient Medications:     acyclovir (ZOVIRAX) 400 MG tablet, Take 1 tablet by mouth As Needed., Disp: , Rfl:     albuterol sulfate HFA (Ventolin HFA) 108 (90 Base) MCG/ACT inhaler, Inhale 2 puffs Every 4 (Four) Hours As Needed for Wheezing., Disp: 18 g, Rfl: 5    amLODIPine (NORVASC) 5 MG tablet, Take 1 tablet by mouth Daily., Disp: 90 tablet, Rfl: 3    Azelastine-Fluticasone 137-50 MCG/ACT suspension, 2 sprays into the nostril(s) as directed by provider Every 12 (Twelve) Hours., Disp: , Rfl:     benzonatate (TESSALON) 200 MG capsule, Take 1 capsule by mouth 3 (Three) Times a Day As Needed for Cough., Disp: 30 capsule, Rfl: 2    buPROPion SR (WELLBUTRIN SR) 150 MG 12 hr tablet, Take 1 tablet by mouth Daily., Disp: 90 tablet, Rfl: 1    busPIRone (BUSPAR) 5 MG tablet, TAKE 1 TABLET BY MOUTH THREE TIMES DAILY AS NEEDED FOR ANXIETY, Disp: 90 tablet, Rfl: 1    Coenzyme Q10 (COQ-10) 200 MG capsule, Take 1 tablet by mouth Daily., Disp: , Rfl:     cyanocobalamin (VITAMIN B-12) 500 MCG tablet, Take 1 tablet by mouth Daily., Disp: 90 tablet, Rfl: 3    cyclobenzaprine (FLEXERIL) 10 MG tablet, Take 1 tablet by mouth 2 (Two) Times a Day As Needed for Muscle Spasms., Disp: 30 tablet, Rfl: 1    ezetimibe (ZETIA) 10 MG tablet, Take 1 tablet by mouth Every Night., Disp: 90 tablet, Rfl: 1    famotidine (PEPCID) 20 MG tablet, TAKE 1 TABLET BY MOUTH TWICE DAILY (Patient taking differently: 1 tablet 2 (Two) Times  a Day As Needed.), Disp: 180 tablet, Rfl: 1    Fluticasone-Umeclidin-Vilant (Trelegy Ellipta) 100-62.5-25 MCG/ACT inhaler, Inhale 1 puff Daily., Disp: 3 each, Rfl: 3    folic acid (FOLVITE) 1 MG tablet, Take 1 tablet by mouth Daily., Disp: 90 tablet, Rfl: 3    Lactobacillus-Inulin ([facility] [employer] PO), Take 1 capsule by mouth Daily., Disp: , Rfl:     methocarbamol (ROBAXIN) 500 MG tablet, Take 1 tablet by mouth Every 12 (Twelve) Hours., Disp: , Rfl:     montelukast (SINGULAIR) 10 MG tablet, Take 1 tablet by mouth Every Night., Disp: 90 tablet, Rfl: 1    omeprazole (priLOSEC) 40 MG capsule, Take 1 capsule by mouth Daily., Disp: 90 capsule, Rfl: 1    predniSONE (DELTASONE) 2.5 MG tablet, Take 1 tablet by mouth Daily., Disp: , Rfl:     Probiotic Product ([employer] PO), Take 1 capsule by mouth Daily., Disp: , Rfl:     rosuvastatin (CRESTOR) 40 MG tablet, Take 1 tablet by mouth Every Night., Disp: 90 tablet, Rfl: 1    tocilizumab (ACTEMRA) 200 MG/10ML solution injection, Infuse 8 mg/kg into a venous catheter., Disp: , Rfl:     zoledronic acid (RECLAST) 5 MG/100ML solution infusion, Administer ZOLEDRONIC ACID 5mg IV once annually, Disp: , Rfl:     clobetasol (TEMOVATE) 0.05 % cream, Apply once-nightly application for 12 weeks. Then twice weekly for long-term LS maintenance., Disp: 60 g, Rfl: 5    Patient denies the need for medication refills today.    OB History          2    Para   1    Term   1            AB   1    Living   1         SAB        IAB        Ectopic        Molar        Multiple        Live Births                    Past Medical History:   Diagnosis Date    Adenomyomatosis of gallbladder 2009    by ultrasound, Dr. Yomi Tai     Arthritis     Arthritis of lumbosacral spine     Asthma     Chronic hoarseness     Class 1 obesity due to excess calories with serious comorbidity and body mass index (BMI) of 30.0 to 30.9 in adult 2020 Eve Navarrete MD       Closed displaced fracture of shaft of right clavicle with malunion     Diverticulitis 2/28/2019    GCA (giant cell arteritis)     GERD (gastroesophageal reflux disease)     Lloyd's neuroma of left foot     surgery    Ocular histoplasmosis     BCC 2/6/2017    Panic disorder 2/28/2019    PMR (polymyalgia rheumatica)         Past Surgical History:   Procedure Laterality Date    COLONOSCOPY  02/2009    COSMETIC SURGERY  1994    face lift    COSMETIC SURGERY  1993    TUMMY TUCK    COSMETIC SURGERY  1980    Birth kenny removal    ELBOW PROCEDURE Left     x2    THROAT SURGERY      remote removal of growth from throat       Health Maintenance   Topic Date Due    TDAP/TD VACCINES (2 - Td or Tdap) 04/11/2022    INFLUENZA VACCINE  08/01/2023    COVID-19 Vaccine (5 - 2023-24 season) 09/01/2023    LUNG CANCER SCREENING  12/14/2023    ANNUAL WELLNESS VISIT  02/13/2024    BMI FOLLOWUP  02/13/2024    MAMMOGRAM  06/29/2024    LIPID PANEL  08/14/2024    COLORECTAL CANCER SCREENING  03/09/2025    DXA SCAN  06/06/2025    HEPATITIS C SCREENING  Completed    Pneumococcal Vaccine 65+  Completed    ZOSTER VACCINE  Completed    PAP SMEAR  Discontinued       The additional following portions of the patient's history were reviewed and updated as appropriate: allergies, current medications, past family history, past medical history, past social history, and past surgical history.    Review of Systems   Respiratory: Negative.  Negative for shortness of breath.    Cardiovascular: Negative.  Negative for chest pain.   Gastrointestinal:  Negative for abdominal distention, abdominal pain, constipation and nausea.        Feels full a lot.    Genitourinary:  Negative for breast discharge, breast lump, breast pain, dyspareunia, dysuria, pelvic pain, pelvic pressure, urinary incontinence, vaginal bleeding, vaginal discharge and vaginal pain.   Psychiatric/Behavioral:  Positive for depressed mood. The patient is nervous/anxious.        I have  "reviewed and agree with the HPI, ROS, and historical information as entered above. Jayla Hicks, APRN           Objective   /78 (BP Location: Right arm, Patient Position: Sitting, Cuff Size: Adult)   Ht 156.4 cm (61.58\")   Wt 65.4 kg (144 lb 3.2 oz)   BMI 26.74 kg/m²     Physical Exam  Vitals and nursing note reviewed. Exam conducted with a chaperone present.   Constitutional:       Appearance: Normal appearance. She is well-developed. She is not ill-appearing.   HENT:      Head: Normocephalic and atraumatic.   Neck:      Thyroid: No thyroid mass, thyromegaly or thyroid tenderness.   Cardiovascular:      Rate and Rhythm: Normal rate and regular rhythm.      Heart sounds: Normal heart sounds. No murmur heard.  Pulmonary:      Effort: Pulmonary effort is normal. No retractions.      Breath sounds: Normal breath sounds. No wheezing, rhonchi or rales.   Chest:      Chest wall: No mass or tenderness.   Breasts:     Breasts are symmetrical.      Right: Normal. No mass, nipple discharge, skin change or tenderness.      Left: Normal. No mass, nipple discharge, skin change or tenderness.   Abdominal:      General: Bowel sounds are normal.      Palpations: Abdomen is soft. Abdomen is not rigid. There is no mass.      Tenderness: There is no abdominal tenderness. There is no guarding or rebound.      Hernia: No hernia is present. There is no hernia in the left inguinal area or right inguinal area.   Genitourinary:     General: Normal vulva.      Labia:         Right: No rash, tenderness or lesion.         Left: No rash, tenderness or lesion.       Vagina: Normal. No vaginal discharge, erythema, tenderness, bleeding or lesions.      Cervix: No cervical motion tenderness, discharge, friability, lesion, erythema or cervical bleeding.      Uterus: Normal. Not enlarged, not fixed and not tender.       Adnexa: Right adnexa normal and left adnexa normal.        Right: No mass or tenderness.          Left: No mass or " tenderness.        Rectum: No external hemorrhoid.       Musculoskeletal:      Cervical back: Normal range of motion. No muscular tenderness.   Lymphadenopathy:      Upper Body:      Right upper body: No supraclavicular or axillary adenopathy.      Left upper body: No supraclavicular or axillary adenopathy.   Neurological:      Mental Status: She is alert and oriented to person, place, and time.   Psychiatric:         Mood and Affect: Mood normal.         Behavior: Behavior normal.          Assessment and Plan    Problem List Items Addressed This Visit          Skin    Lichen sclerosus    Overview     Biopsy on 1/31/2020 demonstrated lichen sclerosis without evidence of malignancy.  Prescribing clobetasol for disease and symptom control.         Relevant Medications    clobetasol (TEMOVATE) 0.05 % cream     Other Visit Diagnoses       Encounter for screening mammogram for malignant neoplasm of breast    -  Primary    Relevant Orders    Mammo Screening Digital Tomosynthesis Bilateral With CAD    Women's annual routine gynecological examination        Relevant Orders    LIQUID-BASED PAP SMEAR WITH HPV GENOTYPING IF ASCUS ([facility]) (Completed)            GYN annual well woman exam.   Pap guidelines reviewed. Pap today.   Reviewed monthly self breast exams.  Instructed to call with lumps, pain, or breast discharge.   Ordered mammogram today.  Recommended use of Vitamin D and getting adequate calcium in her diet. (1500mg). Bone health information sheet given to patient. On Reclast managed by PCP Eve Navarrete.   Reviewed exercise as a preventative health measures.   Reviewed [facility] ovarian cancer screening program. Information given to patient.   Instructed on [place] exercises and gave patient educational information.  Colonoscopy complete 2020. Recommended to repeat in 5 yrs.   Healthy vulval hygiene practices reviewed. Begin Sitz baths, Aquaphor, and/or A&D ointment to heal excoriated areas first and then begin using  clobetasol ointment as directed.   Pt informed of increased risk of squamous cell carcinoma of the vulva in patients with LS.  Importance of self-examination for signs of malignancy, and the need for clinical follow-up at least once yearly. Instructed to RTC for evaluation thickened areas of skin for sores that do not heal. Advised to f/u to evaluate skin for proper healing. Patient declined. She states she will RTC if does not heal properly.  LS patient education sheets provided to patient.   Return in about 1 year (around 9/27/2024) for Annual physical or sooner if needed.    Jayla Hicks, APRN  09/27/2023

## 2023-09-28 LAB — REF LAB TEST METHOD: NORMAL

## 2023-10-23 RX ORDER — MONTELUKAST SODIUM 10 MG/1
10 TABLET ORAL NIGHTLY
Qty: 90 TABLET | Refills: 1 | Status: SHIPPED | OUTPATIENT
Start: 2023-10-23

## 2023-11-06 ENCOUNTER — APPOINTMENT (OUTPATIENT)
Dept: OTHER | Facility: HOSPITAL | Age: 73
End: 2023-11-06
Payer: MEDICARE

## 2023-11-06 ENCOUNTER — HOSPITAL ENCOUNTER (OUTPATIENT)
Dept: MAMMOGRAPHY | Facility: HOSPITAL | Age: 73
Discharge: HOME | End: 2023-11-06
Payer: MEDICARE

## 2023-11-06 DIAGNOSIS — Z12.31 ENCOUNTER FOR SCREENING MAMMOGRAM FOR MALIGNANT NEOPLASM OF BREAST: ICD-10-CM

## 2023-11-06 PROCEDURE — 77063 BREAST TOMOSYNTHESIS BI: CPT

## 2023-11-06 PROCEDURE — 77067 SCR MAMMO BI INCL CAD: CPT

## 2024-02-27 ENCOUNTER — OFFICE VISIT (OUTPATIENT)
Dept: INTERNAL MEDICINE | Facility: CLINIC | Age: 74
End: 2024-02-27
Payer: MEDICARE

## 2024-02-27 ENCOUNTER — LAB (OUTPATIENT)
Dept: LAB | Facility: HOSPITAL | Age: 74
End: 2024-02-27
Payer: MEDICARE

## 2024-02-27 VITALS
OXYGEN SATURATION: 93 % | BODY MASS INDEX: 26.17 KG/M2 | WEIGHT: 138.6 LBS | HEART RATE: 68 BPM | SYSTOLIC BLOOD PRESSURE: 132 MMHG | HEIGHT: 61 IN | DIASTOLIC BLOOD PRESSURE: 60 MMHG | TEMPERATURE: 98.2 F

## 2024-02-27 DIAGNOSIS — E66.3 OVERWEIGHT WITH BODY MASS INDEX (BMI) OF 26 TO 26.9 IN ADULT: Chronic | ICD-10-CM

## 2024-02-27 DIAGNOSIS — K21.9 GERD WITHOUT ESOPHAGITIS: ICD-10-CM

## 2024-02-27 DIAGNOSIS — R63.0 POOR APPETITE: ICD-10-CM

## 2024-02-27 DIAGNOSIS — I10 BENIGN ESSENTIAL HYPERTENSION: ICD-10-CM

## 2024-02-27 DIAGNOSIS — J44.89 COPD WITH ASTHMA: ICD-10-CM

## 2024-02-27 DIAGNOSIS — E78.2 MIXED HYPERLIPIDEMIA: ICD-10-CM

## 2024-02-27 DIAGNOSIS — Z00.00 ANNUAL PHYSICAL EXAM: ICD-10-CM

## 2024-02-27 DIAGNOSIS — Z00.00 MEDICARE ANNUAL WELLNESS VISIT, SUBSEQUENT: Primary | ICD-10-CM

## 2024-02-27 DIAGNOSIS — F17.200 TOBACCO DEPENDENCE SYNDROME: ICD-10-CM

## 2024-02-27 DIAGNOSIS — R41.3 MILD MEMORY DISTURBANCE: Chronic | ICD-10-CM

## 2024-02-27 DIAGNOSIS — Z12.2 ENCOUNTER FOR SCREENING FOR LUNG CANCER: ICD-10-CM

## 2024-02-27 DIAGNOSIS — E55.9 VITAMIN D DEFICIENCY: Chronic | ICD-10-CM

## 2024-02-27 DIAGNOSIS — M05.9 RHEUMATOID ARTHRITIS, SEROPOSITIVE: Chronic | ICD-10-CM

## 2024-02-27 DIAGNOSIS — F41.1 GENERALIZED ANXIETY DISORDER: ICD-10-CM

## 2024-02-27 DIAGNOSIS — Z87.891 PERSONAL HISTORY OF NICOTINE DEPENDENCE: ICD-10-CM

## 2024-02-27 DIAGNOSIS — K22.70 BARRETT'S ESOPHAGUS WITHOUT DYSPLASIA: Chronic | ICD-10-CM

## 2024-02-27 DIAGNOSIS — K76.0 NONALCOHOLIC FATTY LIVER DISEASE: Chronic | ICD-10-CM

## 2024-02-27 RX ORDER — AMLODIPINE BESYLATE 5 MG/1
5 TABLET ORAL DAILY
Qty: 90 TABLET | Refills: 3 | Status: SHIPPED | OUTPATIENT
Start: 2024-02-27

## 2024-02-27 RX ORDER — EZETIMIBE 10 MG/1
10 TABLET ORAL NIGHTLY
Qty: 90 TABLET | Refills: 1 | Status: SHIPPED | OUTPATIENT
Start: 2024-02-27

## 2024-02-27 RX ORDER — ROSUVASTATIN CALCIUM 40 MG/1
40 TABLET, COATED ORAL NIGHTLY
Qty: 90 TABLET | Refills: 1 | Status: SHIPPED | OUTPATIENT
Start: 2024-02-27

## 2024-02-27 NOTE — PROGRESS NOTES
The ABCs of the Annual Wellness Visit  Initial Medicare Wellness Visit    Chief Complaint   Patient presents with    Medicare Wellness-subsequent    Hypertension     Fasting f/u       Subjective   History of Present Illness:  Irma Pedraza is a 74 y.o. female who presents for annual Medicare Wellness Visit.    Elevated blood pressure.  Her blood pressure is 132/60 mmHg. She does not monitor her blood pressure at home. She takes amlodipine once daily. She does not avoid salty foods or sodas. She denies chest pain, palpitations, tachycardia, and edema of the feet.     Hyperlipidemia.  She takes rosuvastatin and Zetia.     Heartburn.  Her symptoms are well-controlled with the use of omeprazole daily and famotidine as needed. An EGD performed in 2022 revealed Alas's esophagitis and is possibly due for a repeat EGD in 2025.     Mood.  She denies feeling anxious or depressed. Her mood is currently controlled on bupropion once daily and buspirone three times daily as needed. She does not always require Buspirone three times daily but notes that when she does take it, it is beneficial.     Joint pain.  Her symptoms are well-controlled on prednisone 2.5 mg once daily. She has not been seen by rheumatology recently and has not tried any other medications.     Asthma.  She reports mild dyspnea with exertion but has not had to decrease her physical activity. She uses an albuterol inhaler as needed and takes Singulair for wheezing and coughing. A refill of the Trelegy inhaler is needed. She does not have an upcoming appointment with pulmonology.     Cognitive issues.  She tends to forget appointments and such if she does not add them to her calendar. She is able to manage her finances and does not repeat herself often.     Tinnitus.  Tinnitus occurs often. She has not had a hearing test in the last year but states that she hears well.     Loss of appetite.  When she wakes up in the mornings, she feels bloated and too full  to eat. This has been ongoing for approximately 2 months. At times she has to make herself eat. She denies nausea and her bowels are regular without hard stools.     Weight management and exercise.  The patient has lost 6 pounds since our last visit and tries to eat smaller portions. It took her approximately 1.5 months to lose weight while drinking the weight loss shakes which contain protein. She walks around the neighborhood for exercise at least twice a week. She drinks one or two 7.5 ounce diet sodas each day.    Colon cancer screening  Her last colonoscopy was performed in 2020 which revealed polyps. A repeat colonoscopy was recommended in 5 years.     Cognitive testing  She is aware of the year, month, and date. She is able to list 17 animals in one minute. The clock-drawing test was normal. The patient is able to recall most of the given name and address with mild prompting. Her score is 27/30.     Immunizations  She received an influenza vaccine in the fall of 2023. She is due for a tetanus vaccine.     Social history  She has smoked half of 1 pack of cigarettes daily for at least 50 years. She is interested in a repeat lung CT scan.         Medications  Other medications include vitamin D, B12, and folic acid.       CHRONIC CONDITIONS:    HEALTH RISK ASSESSMENT    Recent Hospitalizations:  She was not admitted to the hospital during the last year.       Current Medical Providers:  Patient Care Team:  Eve Navarrete MD as PCP - General (Internal Medicine)  Gold Swift DO as Consulting Physician (Rheumatology)  Sabrina Angelo MD as Consulting Physician (Plastic Surgery)  Juarez Pedersen MD as Consulting Physician (Otolaryngology)    Smoking Status:  Social History     Tobacco Use   Smoking Status Every Day    Packs/day: 0.50    Years: 50.00    Additional pack years: 0.00    Total pack years: 25.00    Types: Cigarettes   Smokeless Tobacco Never   Tobacco Comments    patient refused  smoking literature       Alcohol Consumption:  Social History     Substance and Sexual Activity   Alcohol Use Yes    Comment: Usually have 1-2 drinks, 3-4 nights (vodka with diet 7up)       Depression Screen:       2024     1:23 PM   PHQ-2/PHQ-9 Depression Screening   Little Interest or Pleasure in Doing Things 0-->not at all   Feeling Down, Depressed or Hopeless 0-->not at all   PHQ-9: Brief Depression Severity Measure Score 0       Fall Risk Screen:  ANA LAURA Fall Risk Assessment was completed, and patient is at LOW risk for falls.Assessment completed on:2024    Health Habits and Functional and Cognitive Screenin/20/2024     9:07 AM   Functional & Cognitive Status   Do you have difficulty preparing food and eating? No   Do you have difficulty bathing yourself, getting dressed or grooming yourself? No   Do you have difficulty using the toilet? No   Do you have difficulty moving around from place to place? No   Do you have trouble with steps or getting out of a bed or a chair? No   Dental Exam Up to date   Eye Exam Up to date   Exercise (times per week) 3 times per week   Current Exercises Include Dancing;Walking   Do you need help using the phone?  No   Are you deaf or do you have serious difficulty hearing?  No   Do you need help to go to places out of walking distance? No   Do you need help shopping? No   Do you need help preparing meals?  No   Do you need help with housework?  No   Do you need help with laundry? No   Do you need help taking your medications? No   Do you need help managing money? No   Do you ever drive or ride in a car without wearing a seat belt? No   Have you felt unusual stress, anger or loneliness in the last month? No   Who do you live with? Spouse   If you need help, do you have trouble finding someone available to you? No   Have you been bothered in the last four weeks by sexual problems? No   Do you have difficulty concentrating, remembering or making decisions? No          Age-appropriate Screening Schedule:  Refer to the list below for future screening recommendations based on patient's age, sex and/or medical conditions. Orders for these recommended tests are listed in the plan section. The patient has been provided with a written plan.    Health Maintenance   Topic Date Due    RSV Vaccine - Adults (1 - 1-dose 60+ series) Never done    TDAP/TD VACCINES (2 - Td or Tdap) 04/11/2022    INFLUENZA VACCINE  08/01/2023    COVID-19 Vaccine (5 - 2023-24 season) 09/01/2023    LUNG CANCER SCREENING  12/14/2023    ANNUAL WELLNESS VISIT  02/27/2025    LIPID PANEL  02/27/2025    BMI FOLLOWUP  02/27/2025    COLORECTAL CANCER SCREENING  03/09/2025    DXA SCAN  06/06/2025    MAMMOGRAM  11/06/2025    HEPATITIS C SCREENING  Completed    Pneumococcal Vaccine 65+  Completed    ZOSTER VACCINE  Completed    PAP SMEAR  Discontinued        The following portions of the patient's history were reviewed and updated as appropriate: allergies, current medications, past family history, past medical history, past social history, past surgical history, and problem list.    Does the patient have evidence of cognitive impairment? Yes    Aspirin is not on active medication list.  Aspirin use is not indicated based on review of current medical condition/s. Risk of harm outweighs potential benefits.  .    Outpatient Medications Prior to Visit   Medication Sig Dispense Refill    acyclovir (ZOVIRAX) 400 MG tablet Take 1 tablet by mouth As Needed.      albuterol sulfate HFA (Ventolin HFA) 108 (90 Base) MCG/ACT inhaler Inhale 2 puffs Every 4 (Four) Hours As Needed for Wheezing. 18 g 5    benzonatate (TESSALON) 200 MG capsule Take 1 capsule by mouth 3 (Three) Times a Day As Needed for Cough. 30 capsule 2    buPROPion SR (WELLBUTRIN SR) 150 MG 12 hr tablet Take 1 tablet by mouth Daily. 90 tablet 1    busPIRone (BUSPAR) 5 MG tablet TAKE 1 TABLET BY MOUTH THREE TIMES DAILY AS NEEDED FOR ANXIETY 90 tablet 1    clobetasol  (TEMOVATE) 0.05 % cream Apply once-nightly application for 12 weeks. Then twice weekly for long-term LS maintenance. (Patient taking differently: 2 (Two) Times a Week. prn) 60 g 5    Coenzyme Q10 (COQ-10) 200 MG capsule Take 1 tablet by mouth Daily.      cyanocobalamin (VITAMIN B-12) 500 MCG tablet Take 1 tablet by mouth Daily. 90 tablet 3    cyclobenzaprine (FLEXERIL) 10 MG tablet Take 1 tablet by mouth 2 (Two) Times a Day As Needed for Muscle Spasms. 30 tablet 1    famotidine (PEPCID) 20 MG tablet TAKE 1 TABLET BY MOUTH TWICE DAILY (Patient taking differently: 1 tablet 2 (Two) Times a Day As Needed.) 180 tablet 1    folic acid (FOLVITE) 1 MG tablet Take 1 tablet by mouth Daily. 90 tablet 3    Lactobacillus-Inulin ([facility] [employer] PO) Take 1 capsule by mouth Daily.      methocarbamol (ROBAXIN) 500 MG tablet Take 1 tablet by mouth Every 12 (Twelve) Hours.      montelukast (SINGULAIR) 10 MG tablet TAKE 1 TABLET BY MOUTH EVERY NIGHT 90 tablet 1    omeprazole (priLOSEC) 40 MG capsule Take 1 capsule by mouth Daily. 90 capsule 1    predniSONE (DELTASONE) 2.5 MG tablet Take 1 tablet by mouth Daily.      tocilizumab (ACTEMRA) 200 MG/10ML solution injection Infuse 8 mg/kg into a venous catheter.      zoledronic acid (RECLAST) 5 MG/100ML solution infusion Administer ZOLEDRONIC ACID 5mg IV once annually      amLODIPine (NORVASC) 5 MG tablet Take 1 tablet by mouth Daily. 90 tablet 3    ezetimibe (ZETIA) 10 MG tablet Take 1 tablet by mouth Every Night. 90 tablet 1    Fluticasone-Umeclidin-Vilant (Trelegy Ellipta) 100-62.5-25 MCG/ACT inhaler Inhale 1 puff Daily. 3 each 3    rosuvastatin (CRESTOR) 40 MG tablet Take 1 tablet by mouth Every Night. 90 tablet 1    Azelastine-Fluticasone 137-50 MCG/ACT suspension 2 sprays into the nostril(s) as directed by provider Every 12 (Twelve) Hours. (Patient not taking: Reported on 2/27/2024)      Probiotic Product ([employer] PO) Take 1 capsule by mouth Daily. (Patient  not taking: Reported on 2/27/2024)       No facility-administered medications prior to visit.       Patient Active Problem List   Diagnosis    Cervicalgia    Generalized anxiety disorder    Mixed hyperlipidemia    Mitral valve disorder    Tinnitus    Vitamin D deficiency    Senile osteoporosis    Dyspnea    Polymyalgia rheumatica    Primary insomnia    Chronic pain of both shoulders    Chronic upper back pain    Drug-induced hypokalemia    Benign essential hypertension    Benign paroxysmal positional vertigo    Chronic low back pain    COPD with asthma    GERD without esophagitis    Muscle spasms of both lower extremities    Temporal arteritis    Tobacco dependence syndrome    Nonalcoholic fatty liver disease    Spasm of thoracic back muscle    Annual physical exam    Medicare annual wellness visit, subsequent    Rheumatoid arthritis, seropositive    Lichen sclerosus of female genitalia    Lichen sclerosus    Sprain of interphalangeal joint of right thumb    Alas's esophagus without dysplasia    Overweight with body mass index (BMI) of 26 to 26.9 in adult    Mild memory disturbance    High plasma homocystine    Poor appetite       Advanced Care Planning:  Advance Directive is not on file.  ACP discussion was held with the patient during this visit. Patient does not have an advance directive, information provided.    Review of Systems  The appropriate review of systems is included in the HPI.     Compared to one year ago, the patient feels her physical health is the same.  Compared to one year ago, the patient feels her mental health is the same.    Reviewed chart for potential of high risk medication in the elderly: yes  Reviewed chart for potential of harmful drug interactions in the elderly:yes    Objective         Vitals:    02/27/24 1318   BP: 132/60   BP Location: Left arm   Patient Position: Sitting   Cuff Size: Adult   Pulse: 68   Temp: 98.2 °F (36.8 °C)   TempSrc: Infrared   SpO2: 93%   Weight: 62.9 kg  "(138 lb 9.6 oz)   Height: 155.4 cm (61.18\")     BMI is >= 25 and <30. (Overweight) The following options were offered after discussion;: weight loss educational material (shared in after visit summary), exercise counseling/recommendations, nutrition counseling/recommendations, and Information on healthy weight added to patient's after visit summary.    Body mass index is 26.03 kg/m².  Discussed the patient's BMI with her. The BMI is above average; BMI management plan is completed.    Physical Exam  Vitals and nursing note reviewed.   Constitutional:       Appearance: She is well-developed.   Eyes:      Conjunctiva/sclera: Conjunctivae normal.      Pupils: Pupils are equal, round, and reactive to light.   Neck:      Thyroid: No thyromegaly.   Cardiovascular:      Rate and Rhythm: Normal rate and regular rhythm.      Heart sounds: Normal heart sounds. No murmur heard.  Pulmonary:      Effort: Pulmonary effort is normal.      Breath sounds: Normal breath sounds. No wheezing.   Chest:   Breasts:     Right: No inverted nipple, mass, nipple discharge, skin change or tenderness.      Left: No inverted nipple, mass, nipple discharge, skin change or tenderness.   Abdominal:      General: Bowel sounds are normal. There is no distension.      Palpations: Abdomen is soft. There is no mass.      Tenderness: There is no abdominal tenderness.   Musculoskeletal:         General: No tenderness. Normal range of motion.      Cervical back: Normal range of motion and neck supple.   Lymphadenopathy:      Cervical: No cervical adenopathy.   Skin:     General: Skin is warm and dry.      Findings: No rash.   Neurological:      Mental Status: She is alert and oriented to person, place, and time.      Cranial Nerves: No cranial nerve deficit.      Sensory: No sensory deficit.      Coordination: Coordination normal.      Gait: Gait normal.   Psychiatric:         Speech: Speech normal.         Behavior: Behavior normal.         Thought " Content: Thought content normal.         Judgment: Judgment normal.       CT scan of the chest performed in 2022 was reviewed and demonstrated normal findings.     A 12-lead EKG performed today was reviewed and demonstrated normal findings.   ECG 12 Lead    Date/Time: 2/28/2024 6:18 PM  Performed by: Eve Navarrete MD    Authorized by: Eve Navarrete MD  Comparison: compared with previous ECG   Similar to previous ECG  Rhythm: sinus rhythm  Rate: normal  BPM: 64  Conduction: conduction normal  ST Segments: ST segments normal  T Waves: T waves normal  QRS axis: normal  Other findings: low voltage    Clinical impression: normal ECG          Lab Results   Component Value Date    CHLPL 154 02/27/2024    TRIG 117 02/27/2024    HDL 57 02/27/2024    LDL 76 02/27/2024    VLDL 21 02/27/2024      Assessment & Plan   Medicare Risks and Personalized Health Plan  CMS Preventative Services Quick Reference  Cardiovascular risk  Fall Risk  Lung Cancer Risk  Obesity/Overweight     Hyperlipidemia.  Continue rosuvastatin and Zetia. Increase walking and exercise. Continue to improve low-fat, low-sugar diet.     Hypertension.  Continue amlodipine daily. Decrease sodium in the diet.     Vitamin D deficiency.  Recheck level today.     Nonalcoholic fatty liver disease.  Continue taking rosuvastatin. Continue to decrease fats and carbohydrates in the diet. Continue working on weight loss.     Alas's esophagitis.  Repeat EGD in 01/2025 by Dr. Rosa Maria Cloud.     GERD.  Continue omeprazole daily and famotidine as needed.     Medicare annual wellness.  Recommend the RSV vaccine and Tdap. A CT of the lungs was scheduled to screen for lung cancer. Colonoscopy will be due in 2025.     Annual physical examination.  Recommend the RSV vaccine and Tdap. A CT of the lungs was scheduled to screen for lung cancer. Colonoscopy will be due in 2025.     Anxiety.  Continue bupropion daily and buspirone as needed.     Rheumatoid  arthritis.  Continue current dose of prednisone and follow up with Dr. Swift, rheumatologist.     Mild memory disturbance.  Do a lot of reading. Work on crossword puzzles and other puzzles. Try to learn something new. Work on remembering what is read in the newspaper so it can be recited to a friend or family member. Schedule a repeat audiometry to check for hearing loss. Physical exercise also helps decrease symptoms of memory and cognition.     COPD.  Continue Trelegy inhaler daily and albuterol inhaler as needed. Continue montelukast (Singulair) every evening. Work on smoking cessation.     Tobacco use.  Continue to decrease the number of cigarettes that she allows herself per day. Make a list of reasons to stop smoking, including grandchildren and make a list of things to do instead of smoking such as cleaning out a drawer, walking around the block, playing with a straw, or completing a puzzle.     Obesity.  Continue to eat less fats and carbohydrates and consume three meals per day. Consume protein with each meal. Weigh at home once a week to monitor progress.     Poor appetite.  Continue to monitor weight. Losing another 5 to 6 pounds over the next 6 months would be a good idea, but she needs to eat enough nutrition and have enough protein with each meal three times per day, keep her strength up, and keep her muscles strong.     The above risks/problems have been discussed with the patient.  Pertinent information has been shared with the patient in the After Visit Summary.  Follow up plans and orders are seen below in the Assessment/Plan Section.    Patient Instructions   Problem List Items Addressed This Visit          Cardiac and Vasculature    Mixed hyperlipidemia    Overview     Taking rosuvastatin 40 mg and zetia 10mg daily.          Relevant Medications    rosuvastatin (CRESTOR) 40 MG tablet    ezetimibe (ZETIA) 10 MG tablet    Other Relevant Orders    Lipid Panel (Completed)    TSH (Completed)     Vitamin B12 (Completed)    Homocysteine (Completed)    ECG 12 Lead (Completed)    Benign essential hypertension    Overview     Taking amlodipine daily.           Relevant Medications    amLODIPine (NORVASC) 5 MG tablet    Other Relevant Orders    CBC & Differential (Completed)    Comprehensive Metabolic Panel (Completed)    Microalbumin / Creatinine Urine Ratio - Urine, Clean Catch (Completed)    Urinalysis With Microscopic - Urine, Clean Catch (Completed)       Endocrine and Metabolic    Vitamin D deficiency (Chronic)    Relevant Orders    Vitamin D,25-Hydroxy (Completed)       Gastrointestinal Abdominal     Alas's esophagus without dysplasia (Chronic)    Overview     Noted on EGD 1/13/2022.    Taking omeprazole daily. Taking famotidine as needed.         Relevant Medications    famotidine (PEPCID) 20 MG tablet    omeprazole (priLOSEC) 40 MG capsule    Nonalcoholic fatty liver disease (Chronic)    GERD without esophagitis    Overview               Relevant Medications    famotidine (PEPCID) 20 MG tablet    omeprazole (priLOSEC) 40 MG capsule       Health Encounters    Annual physical exam    Medicare annual wellness visit, subsequent - Primary       Mental Health    Generalized anxiety disorder    Overview     Taking bupropion  mg tablet once a day.   buspirone 5 mg 3 times a day just as needed.               Relevant Medications    busPIRone (BUSPAR) 5 MG tablet    buPROPion SR (WELLBUTRIN SR) 150 MG 12 hr tablet       Musculoskeletal and Injuries    Rheumatoid arthritis, seropositive (Chronic)    Overview     Seeing Dr. Swift. Taking prednisone daily.         Relevant Medications    tocilizumab (ACTEMRA) 200 MG/10ML solution injection    predniSONE (DELTASONE) 2.5 MG tablet       Neuro    Mild memory disturbance (Chronic)       Pulmonary and Pneumonias    COPD with asthma    Overview     Last spirometry  showed moderate obstruction without improvement after bronchodilator.    Using Trelegy Ellipta  inhaler 1 puff daily.  Using albuterol rescue inhaler as needed for cough wheeze or lung congestion.  Taking montelukast (Singulair) tablet every evening to help with allergies and breathing.             Relevant Medications    predniSONE (DELTASONE) 2.5 MG tablet    benzonatate (TESSALON) 200 MG capsule    albuterol sulfate HFA (Ventolin HFA) 108 (90 Base) MCG/ACT inhaler    montelukast (SINGULAIR) 10 MG tablet    Fluticasone-Umeclidin-Vilant (Trelegy Ellipta) 100-62.5-25 MCG/ACT inhaler       Symptoms and Signs    Poor appetite       Tobacco    Tobacco dependence syndrome    Overview     She has decreased cigarettes to 1/2 ppd.              Other    Overweight with body mass index (BMI) of 26 to 26.9 in adult (Chronic)     Other Visit Diagnoses       Encounter for screening for lung cancer        Relevant Orders     CT Chest Low Dose Cancer Screening WO    Personal history of nicotine dependence        Relevant Orders     CT Chest Low Dose Cancer Screening WO             Follow Up:  Next Medicare Wellness visit to be scheduled in 1 year.    Return in about 6 months (around 8/27/2024) for recheck fasting, labs today.    An After Visit Summary and PPPS were given to the patient.          Follow Up   Return in about 6 months (around 8/27/2024) for recheck fasting, labs today.  Patient was given instructions and counseling regarding her condition or for health maintenance advice. Please see specific information pulled into the AVS if appropriate.     Note: Part of this note may be an electronic transcription/translation of spoken language to printed text using the Dragon Dictation System    Transcribed from ambient dictation for Eve Naavrrete MD by Lori Hung  02/28/24   10:25 EST    Patient or patient representative verbalized consent to the visit recording.  I have personally performed the services described in this document as transcribed by the above individual, and it is both accurate and  complete.

## 2024-02-28 LAB
25(OH)D3+25(OH)D2 SERPL-MCNC: 57.2 NG/ML (ref 30–100)
ALBUMIN SERPL-MCNC: 5 G/DL (ref 3.5–5.2)
ALBUMIN/CREAT UR: 13 MG/G CREAT (ref 0–29)
ALBUMIN/GLOB SERPL: 2.9 G/DL
ALP SERPL-CCNC: 59 U/L (ref 39–117)
ALT SERPL-CCNC: 30 U/L (ref 1–33)
APPEARANCE UR: CLEAR
AST SERPL-CCNC: 21 U/L (ref 1–32)
BACTERIA #/AREA URNS HPF: NORMAL /HPF
BASOPHILS # BLD AUTO: 0.02 10*3/MM3 (ref 0–0.2)
BASOPHILS NFR BLD AUTO: 0.3 % (ref 0–1.5)
BILIRUB SERPL-MCNC: 0.7 MG/DL (ref 0–1.2)
BILIRUB UR QL STRIP: NEGATIVE
BUN SERPL-MCNC: 15 MG/DL (ref 8–23)
BUN/CREAT SERPL: 17 (ref 7–25)
CALCIUM SERPL-MCNC: 9.9 MG/DL (ref 8.6–10.5)
CASTS URNS MICRO: NORMAL
CHLORIDE SERPL-SCNC: 99 MMOL/L (ref 98–107)
CHOLEST SERPL-MCNC: 154 MG/DL (ref 0–200)
CO2 SERPL-SCNC: 27.1 MMOL/L (ref 22–29)
COLOR UR: YELLOW
CREAT SERPL-MCNC: 0.88 MG/DL (ref 0.57–1)
CREAT UR-MCNC: 241.9 MG/DL
EGFRCR SERPLBLD CKD-EPI 2021: 69.1 ML/MIN/1.73
EOSINOPHIL # BLD AUTO: 0.08 10*3/MM3 (ref 0–0.4)
EOSINOPHIL NFR BLD AUTO: 1.3 % (ref 0.3–6.2)
EPI CELLS #/AREA URNS HPF: NORMAL /HPF
ERYTHROCYTE [DISTWIDTH] IN BLOOD BY AUTOMATED COUNT: 13.9 % (ref 12.3–15.4)
GLOBULIN SER CALC-MCNC: 1.7 GM/DL
GLUCOSE SERPL-MCNC: 87 MG/DL (ref 65–99)
GLUCOSE UR QL STRIP: NEGATIVE
HCT VFR BLD AUTO: 46.1 % (ref 34–46.6)
HCYS SERPL-SCNC: 11.7 UMOL/L (ref 0–19.2)
HDLC SERPL-MCNC: 57 MG/DL (ref 40–60)
HGB BLD-MCNC: 15.9 G/DL (ref 12–15.9)
HGB UR QL STRIP: NEGATIVE
IMM GRANULOCYTES # BLD AUTO: 0.01 10*3/MM3 (ref 0–0.05)
IMM GRANULOCYTES NFR BLD AUTO: 0.2 % (ref 0–0.5)
KETONES UR QL STRIP: ABNORMAL
LDLC SERPL CALC-MCNC: 76 MG/DL (ref 0–100)
LEUKOCYTE ESTERASE UR QL STRIP: ABNORMAL
LYMPHOCYTES # BLD AUTO: 2.13 10*3/MM3 (ref 0.7–3.1)
LYMPHOCYTES NFR BLD AUTO: 34.1 % (ref 19.6–45.3)
MCH RBC QN AUTO: 31.4 PG (ref 26.6–33)
MCHC RBC AUTO-ENTMCNC: 34.5 G/DL (ref 31.5–35.7)
MCV RBC AUTO: 91.1 FL (ref 79–97)
MICROALBUMIN UR-MCNC: 30.6 UG/ML
MONOCYTES # BLD AUTO: 0.57 10*3/MM3 (ref 0.1–0.9)
MONOCYTES NFR BLD AUTO: 9.1 % (ref 5–12)
NEUTROPHILS # BLD AUTO: 3.43 10*3/MM3 (ref 1.7–7)
NEUTROPHILS NFR BLD AUTO: 55 % (ref 42.7–76)
NITRITE UR QL STRIP: NEGATIVE
NRBC BLD AUTO-RTO: 0 /100 WBC (ref 0–0.2)
PH UR STRIP: 5.5 [PH] (ref 5–8)
PLATELET # BLD AUTO: 171 10*3/MM3 (ref 140–450)
POTASSIUM SERPL-SCNC: 4.1 MMOL/L (ref 3.5–5.2)
PROT SERPL-MCNC: 6.7 G/DL (ref 6–8.5)
PROT UR QL STRIP: ABNORMAL
RBC # BLD AUTO: 5.06 10*6/MM3 (ref 3.77–5.28)
RBC #/AREA URNS HPF: NORMAL /HPF
SODIUM SERPL-SCNC: 142 MMOL/L (ref 136–145)
SP GR UR STRIP: 1.03 (ref 1–1.03)
TRIGL SERPL-MCNC: 117 MG/DL (ref 0–150)
TSH SERPL DL<=0.005 MIU/L-ACNC: 0.78 UIU/ML (ref 0.27–4.2)
UROBILINOGEN UR STRIP-MCNC: ABNORMAL MG/DL
VIT B12 SERPL-MCNC: 723 PG/ML (ref 211–946)
VLDLC SERPL CALC-MCNC: 21 MG/DL (ref 5–40)
WBC # BLD AUTO: 6.24 10*3/MM3 (ref 3.4–10.8)
WBC #/AREA URNS HPF: NORMAL /HPF

## 2024-02-28 NOTE — PATIENT INSTRUCTIONS
Patient Instructions  Problem List Items Addressed This Visit          Cardiac and Vasculature    Mixed hyperlipidemia    Overview     Taking rosuvastatin 40 mg and zetia 10mg daily.          Relevant Medications    rosuvastatin (CRESTOR) 40 MG tablet    ezetimibe (ZETIA) 10 MG tablet    Other Relevant Orders    Lipid Panel    TSH    Vitamin B12    Homocysteine    Benign essential hypertension    Overview     Taking amlodipine daily.           Relevant Medications    amLODIPine (NORVASC) 5 MG tablet    Other Relevant Orders    CBC & Differential    Comprehensive Metabolic Panel    Microalbumin / Creatinine Urine Ratio - Urine, Clean Catch    Urinalysis With Microscopic - Urine, Clean Catch       Endocrine and Metabolic    Vitamin D deficiency (Chronic)    Relevant Orders    Vitamin D,25-Hydroxy       Gastrointestinal Abdominal     Alas's esophagus without dysplasia (Chronic)    Overview     Noted on EGD 1/13/2022.    Taking omeprazole daily. Taking famotidine as needed.         Relevant Medications    famotidine (PEPCID) 20 MG tablet    omeprazole (priLOSEC) 40 MG capsule    Nonalcoholic fatty liver disease (Chronic)    GERD without esophagitis    Overview               Relevant Medications    famotidine (PEPCID) 20 MG tablet    omeprazole (priLOSEC) 40 MG capsule       Health Encounters    Annual physical exam    Medicare annual wellness visit, subsequent - Primary       Mental Health    Generalized anxiety disorder    Overview     Taking bupropion  mg tablet once a day.   buspirone 5 mg 3 times a day just as needed.               Relevant Medications    busPIRone (BUSPAR) 5 MG tablet    buPROPion SR (WELLBUTRIN SR) 150 MG 12 hr tablet       Musculoskeletal and Injuries    Rheumatoid arthritis, seropositive (Chronic)    Overview     Seeing Dr. Swift. Taking prednisone daily.         Relevant Medications    tocilizumab (ACTEMRA) 200 MG/10ML solution injection    predniSONE (DELTASONE) 2.5 MG tablet        Neuro    Mild memory disturbance (Chronic)       Pulmonary and Pneumonias    COPD with asthma    Overview     Last spirometry  showed moderate obstruction without improvement after bronchodilator.    Using Trelegy Ellipta inhaler 1 puff daily.  Using albuterol rescue inhaler as needed for cough wheeze or lung congestion.  Taking montelukast (Singulair) tablet every evening to help with allergies and breathing.             Relevant Medications    predniSONE (DELTASONE) 2.5 MG tablet    benzonatate (TESSALON) 200 MG capsule    albuterol sulfate HFA (Ventolin HFA) 108 (90 Base) MCG/ACT inhaler    montelukast (SINGULAIR) 10 MG tablet    Fluticasone-Umeclidin-Vilant (Trelegy Ellipta) 100-62.5-25 MCG/ACT inhaler       Symptoms and Signs    Poor appetite       Tobacco    Tobacco dependence syndrome    Overview     She has decreased cigarettes to 1/2 ppd.              Other    Overweight with body mass index (BMI) of 26 to 26.9 in adult (Chronic)     Other Visit Diagnoses       Encounter for screening for lung cancer        Relevant Orders     CT Chest Low Dose Cancer Screening WO    Personal history of nicotine dependence        Relevant Orders     CT Chest Low Dose Cancer Screening WO           Hyperlipidemia.  Continue rosuvastatin and Zetia. Increase walking and exercise. Continue to improve low-fat, low-sugar diet.     Hypertension.  Continue amlodipine daily. Decrease sodium in the diet.     Vitamin D deficiency.  Recheck level today.     Nonalcoholic fatty liver disease.  Continue taking rosuvastatin. Continue to decrease fats and carbohydrates in the diet. Continue working on weight loss.     Alas's esophagitis.  Repeat EGD in 01/2025 by Dr. Rosa Maria Cloud.     GERD.  Continue omeprazole daily and famotidine as needed.     Medicare annual wellness.  Recommend the RSV vaccine and Tdap. A CT of the lungs was scheduled to screen for lung cancer. Colonoscopy will be due in 2025.     Annual physical  examination.  Recommend the RSV vaccine and Tdap. A CT of the lungs was scheduled to screen for lung cancer. Colonoscopy will be due in 2025.     Anxiety.  Continue bupropion daily and buspirone as needed.     Rheumatoid arthritis.  Continue current dose of prednisone and follow up with Dr. Swift, rheumatologist.     Mild memory disturbance.  Do a lot of reading. Work on crossword puzzles and other puzzles. Try to learn something new. Work on remembering what is read in the newspaper so it can be recited to a friend or family member. Schedule a repeat audiometry to check for hearing loss. Physical exercise also helps decrease symptoms of memory and cognition.     COPD.  Continue Trelegy inhaler daily and albuterol inhaler as needed. Continue montelukast (Singulair) every evening. Work on smoking cessation.     Tobacco use.  Continue to decrease the number of cigarettes that she allows herself per day. Make a list of reasons to stop smoking, including grandchildren and make a list of things to do instead of smoking such as cleaning out a drawer, walking around the block, playing with a straw, or completing a puzzle.     Obesity.  Continue to eat less fats and carbohydrates and consume three meals per day. Consume protein with each meal. Weigh at home once a week to monitor progress.     Poor appetite.  Continue to monitor weight. Losing another 5 to 6 pounds over the next 6 months would be a good idea, but she needs to eat enough nutrition and have enough protein with each meal three times per day, keep her strength up, and keep her muscles strong.     The above risks/problems have been discussed with the patient.  Pertinent information has been shared with the patient in the After Visit Summary.  Follow up plans and orders are seen below in the Assessment/Plan Section.  BMI for Adults  Body mass index (BMI) is a number found using a person's weight and height. BMI can help tell how much of a person's weight is  "made up of fat. BMI does not measure body fat directly. It is used instead of tests that directly measure body fat, which can be difficult and expensive.  What are BMI measurements used for?  BMI is useful to:  Find out if your weight puts you at higher risk for medical problems.  Help recommend changes, such as in diet and exercise. This can help you reach a healthy weight. BMI screening can be done again to see if these changes are working.  How is BMI calculated?  Your height and weight are measured. The BMI is found from those numbers. This can be done with [place] or metric measurements. Note that charts and online BMI calculators are available to help you find your BMI quickly and easily without doing these calculations.  To calculate your BMI in [place] measurements:  Measure your weight in pounds (lb).  Multiply the number of pounds by 703.  So, for an adult who weighs 150 lb, multiply that number by 703: 150 x 703, which equals 105,450.  Measure your height in inches. Then multiply that number by itself to get a measurement called \"inches squared.\"  So, for an adult who is 70 inches tall, the \"inches squared\" measurement is 70 inches x 70 inches, which equals 4,900 inches squared.  Divide the total from step 2 (number of lb x 703) by the total from step 3 (inches squared): 105,450 ÷ 4,900 = 21.5. This is your BMI.  To calculate your BMI in metric measurements:    Measure your weight in kilograms (kg).  For this example, the weight is 70 kg.  Measure your height in meters (m). Then multiply that number by itself to get a measurement called \"meters squared.\"  So, for an adult who is 1.75 m tall, the \"meters squared\" measurement is 1.75 m x 1.75 m, which equals 3.1 meters squared.  Divide the number of kilograms (your weight) by the meters squared number. In this example: 70 ÷ 3.1 = 22.6. This is your BMI.  What do the results mean?  BMI charts are used to see if you are underweight, normal weight, overweight, or " obese. The following guidelines will be used:  Underweight: BMI less than 18.5.  Normal weight: BMI between 18.5 and 24.9.  Overweight: BMI between 25 and 29.9.  Obese: BMI of 30 or above.  BMI is a tool and cannot diagnose a condition. Talk with your health care provider about what your BMI means for you. Keep these notes in mind:  Weight includes fat and muscle. Someone with a muscular build, such as an athlete, may have a BMI that is higher than 24.9. In cases like these, BMI is not a correct measure of body fat.  If you have a BMI of 25 or higher, your provider may need to do more testing to find out if excess body fat is the cause.  BMI is measured the same way for males and females. Females usually have more body fat than males of the same height and weight.  Where to find more information  For more information about BMI, including tools to quickly find your BMI, go to:  Centers for Disease Control and Prevention: cdc.gov  American Heart Association: heart.org  National Heart, Lung, and Blood [place]: nhlbi.nih.gov  This information is not intended to replace advice given to you by your health care provider. Make sure you discuss any questions you have with your health care provider.  Document Revised: 09/07/2023 Document Reviewed: 08/31/2023  [employer] Patient Education © 2023 [employer]  Exercising to Stay Healthy  To become healthy and stay healthy, it is recommended that you do moderate-intensity and vigorous-intensity exercise. You can tell that you are exercising at a moderate intensity if your heart starts beating faster and you start breathing faster but can still hold a conversation. You can tell that you are exercising at a vigorous intensity if you are breathing much harder and faster and cannot hold a conversation while exercising.  How can exercise benefit me?  Exercising regularly is important. It has many health benefits, such as:  Improving overall fitness, flexibility, and  endurance.  Increasing bone density.  Helping with weight control.  Decreasing body fat.  Increasing muscle strength and endurance.  Reducing stress and tension, anxiety, depression, or anger.  Improving overall health.  What guidelines should I follow while exercising?  Before you start a new exercise program, talk with your health care provider.  Do not exercise so much that you hurt yourself, feel dizzy, or get very short of breath.  Wear comfortable clothes and wear shoes with good support.  Drink plenty of water while you exercise to prevent dehydration or heat stroke.  Work out until your breathing and your heartbeat get faster (moderate intensity).  How often should I exercise?  Choose an activity that you enjoy, and set realistic goals. Your health care provider can help you make an activity plan that is individually designed and works best for you.  Exercise regularly as told by your health care provider. This may include:  Doing strength training two times a week, such as:  Lifting weights.  Using resistance bands.  Push-ups.  Sit-ups.  Yoga.  Doing a certain intensity of exercise for a given amount of time. Choose from these options:  A total of 150 minutes of moderate-intensity exercise every week.  A total of 75 minutes of vigorous-intensity exercise every week.  A mix of moderate-intensity and vigorous-intensity exercise every week.  Children, pregnant women, people who have not exercised regularly, people who are overweight, and older adults may need to talk with a health care provider about what activities are safe to perform. If you have a medical condition, be sure to talk with your health care provider before you start a new exercise program.  What are some exercise ideas?  Moderate-intensity exercise ideas include:  Walking 1 mile (1.6 km) in about 15 minutes.  Biking.  Hiking.  Golfing.  Dancing.  Water aerobics.  Vigorous-intensity exercise ideas include:  Walking 4.5 miles (7.2 km) or more in  about 1 hour.  Jogging or running 5 miles (8 km) in about 1 hour.  Biking 10 miles (16.1 km) or more in about 1 hour.  Lap swimming.  Roller-skating or in-line skating.  Cross-country skiing.  Vigorous competitive sports, such as football, basketball, and soccer.  Jumping rope.  Aerobic dancing.  What are some everyday activities that can help me get exercise?  Yard work, such as:  Pushing a .  Raking and bagging leaves.  Washing your car.  Pushing a stroller.  Shoveling snow.  Gardening.  Washing windows or floors.  How can I be more active in my day-to-day activities?  Use stairs instead of an elevator.  Take a walk during your lunch break.  If you drive, park your car farther away from your work or school.  If you take public transportation, get off one stop early and walk the rest of the way.  Stand up or walk around during all of your indoor phone calls.  Get up, stretch, and walk around every 30 minutes throughout the day.  Enjoy exercise with a friend. Support to continue exercising will help you keep a regular routine of activity.  Where to find more information  You can find more information about exercising to stay healthy from:  [place] Department of Health and Human Services: www.hhs.gov  Centers for Disease Control and Prevention (CDC): www.cdc.gov  Summary  Exercising regularly is important. It will improve your overall fitness, flexibility, and endurance.  Regular exercise will also improve your overall health. It can help you control your weight, reduce stress, and improve your bone density.  Do not exercise so much that you hurt yourself, feel dizzy, or get very short of breath.  Before you start a new exercise program, talk with your health care provider.  This information is not intended to replace advice given to you by your health care provider. Make sure you discuss any questions you have with your health care provider.  Document Revised: 04/15/2022 Document Reviewed:  04/15/2022  [employer] Patient Education © 2023 [employer] Heart-Healthy Eating Plan  Many factors influence your heart (coronary) health, including eating and exercise habits. Coronary risk increases with abnormal blood fat (lipid) levels. Heart-healthy meal planning includes limiting unhealthy fats, increasing healthy fats, and making other diet and lifestyle changes.  What is my plan?  Your health care provider may recommend that you:  Limit your fat intake to _________% or less of your total calories each day.  Limit your saturated fat intake to _________% or less of your total calories each day.  Limit the amount of cholesterol in your diet to less than _________ mg per day.  What are tips for following this plan?  Cooking  Cook foods using methods other than frying. Baking, boiling, grilling, and broiling are all good options. Other ways to reduce fat include:  Removing the skin from poultry.  Removing all visible fats from meats.  Steaming vegetables in water or broth.  Meal planning  A plate with examples of foods in a healthy diet.      At meals, imagine dividing your plate into fourths:  Fill one-half of your plate with vegetables and green salads.  Fill one-fourth of your plate with whole grains.  Fill one-fourth of your plate with lean protein foods.  Eat 4-5 servings of vegetables per day. One serving equals 1 cup raw or cooked vegetable, or 2 cups raw leafy greens.  Eat 4-5 servings of fruit per day. One serving equals 1 medium whole fruit, ¼ cup dried fruit, ½ cup fresh, frozen, or canned fruit, or ½ cup 100% fruit juice.  Eat more foods that contain soluble fiber. Examples include apples, broccoli, carrots, beans, peas, and barley. Aim to get 25-30 g of fiber per day.  Increase your consumption of legumes, nuts, and seeds to 4-5 servings per week. One serving of dried beans or legumes equals ½ cup cooked, 1 serving of nuts is ¼ cup, and 1 serving of seeds equals 1 tablespoon.  Fats  Choose healthy  fats more often. Choose monounsaturated and polyunsaturated fats, such as olive and canola oils, flaxseeds, walnuts, almonds, and seeds.  Eat more omega-3 fats. Choose salmon, mackerel, sardines, tuna, flaxseed oil, and ground flaxseeds. Aim to eat fish at least 2 times each week.  Check food labels carefully to identify foods with trans fats or high amounts of saturated fat.  Limit saturated fats. These are found in animal products, such as meats, butter, and cream. Plant sources of saturated fats include palm oil, palm kernel oil, and coconut oil.  Avoid foods with partially hydrogenated oils in them. These contain trans fats. Examples are stick margarine, some tub margarines, cookies, crackers, and other baked goods.  Avoid fried foods.  General information  Eat more home-cooked food and less restaurant, buffet, and fast food.  Limit or avoid alcohol.  Limit foods that are high in starch and sugar.  Lose weight if you are overweight. Losing just 5-10% of your body weight can help your overall health and prevent diseases such as diabetes and heart disease.  Monitor your salt (sodium) intake, especially if you have high blood pressure. Talk with your health care provider about your sodium intake.  Try to incorporate more vegetarian meals weekly.  What foods can I eat?  Fruits  All fresh, canned (in natural juice), or frozen fruits.  Vegetables  Fresh or frozen vegetables (raw, steamed, roasted, or grilled). Green salads.  Grains  Most grains. Choose whole wheat and whole grains most of the time. Rice and pasta, including brown rice and pastas made with whole wheat.  Meats and other proteins  Lean, well-trimmed beef, veal, pork, and lamb. Chicken and turkey without skin. All fish and shellfish. Wild duck, rabbit, pheasant, and venison. Egg whites or low-cholesterol egg substitutes. Dried beans, peas, lentils, and tofu. Seeds and most nuts.  Dairy  Low-fat or nonfat cheeses, including ricotta and mozzarella. Skim or  1% milk (liquid, powdered, or evaporated). Buttermilk made with low-fat milk. Nonfat or low-fat yogurt.  Fats and oils  Non-hydrogenated (trans-free) margarines. Vegetable oils, including soybean, sesame, sunflower, olive, peanut, safflower, corn, canola, and cottonseed. Salad dressings or mayonnaise made with a vegetable oil.  Beverages  Water (mineral or sparkling). Coffee and tea. Diet carbonated beverages.  Sweets and desserts  Sherbet, gelatin, and fruit ice. Small amounts of dark chocolate.  Limit all sweets and desserts.  Seasonings and condiments  All seasonings and condiments.  The items listed above may not be a complete list of foods and beverages you can eat. Contact a dietitian for more options.  What foods are not recommended?  Fruits  Canned fruit in heavy syrup. Fruit in cream or butter sauce. Fried fruit. Limit coconut.  Vegetables  Vegetables cooked in cheese, cream, or butter sauce. Fried vegetables.  Grains  Breads made with saturated or trans fats, oils, or whole milk. Croissants. Sweet rolls. Donuts. High-fat crackers, such as cheese crackers.  Meats and other proteins  Fatty meats, such as hot dogs, ribs, sausage, herrera, rib-eye roast or steak. High-fat deli meats, such as salami and bologna. Caviar. Domestic duck and goose. Organ meats, such as liver.  Dairy  Cream, sour cream, cream cheese, and creamed cottage cheese. Whole-milk cheeses. Whole or 2% milk (liquid, evaporated, or condensed). Whole buttermilk. Cream sauce or high-fat cheese sauce. Whole-milk yogurt.  Fats and oils  Meat fat, or shortening. Cocoa butter, hydrogenated oils, palm oil, coconut oil, palm kernel oil. Solid fats and shortenings, including herrera fat, salt pork, lard, and butter. Nondairy cream substitutes. Salad dressings with cheese or sour cream.  Beverages  Regular sodas and any drinks with added sugar.  Sweets and desserts  Frosting. Pudding. Cookies. Cakes. Pies. Milk chocolate or white chocolate. Buttered syrups.  Full-fat ice cream or ice cream drinks.  The items listed above may not be a complete list of foods and beverages to avoid. Contact a dietitian for more information.  Summary  Heart-healthy meal planning includes limiting unhealthy fats, increasing healthy fats, and making other diet and lifestyle changes.  Lose weight if you are overweight. Losing just 5-10% of your body weight can help your overall health and prevent diseases such as diabetes and heart disease.  Focus on eating a balance of foods, including fruits and vegetables, low-fat or nonfat dairy, lean protein, nuts and legumes, whole grains, and heart-healthy oils and fats.  This information is not intended to replace advice given to you by your health care provider. Make sure you discuss any questions you have with your health care provider.  Document Revised: 04/28/2022 Document Reviewed: 04/28/2022  [employer] Patient Education © 2022 [employer]    Fall Prevention in the Home, Adult  Falls can cause injuries and affect people of all ages. There are many simple things that you can do to make your home safe and to help prevent falls.  If you need it, ask for help making these changes.  What actions can I take to prevent falls?  General information  Use good lighting in all rooms. Make sure to:  Replace any light bulbs that burn out.  Turn on lights if it is dark and use night-lights.  Keep items that you use often in easy-to-reach places. Lower the shelves around your home if needed.  Move furniture so that there are clear paths around it.  Do not keep throw rugs or other things on the floor that can make you trip.  If any of your floors are uneven, fix them.  Add color or contrast paint or tape to clearly kenny and help you see:  Grab bars or handrails.  First and last steps of staircases.  Where the edge of each step is.  If you use a ladder or stepladder:  Make sure that it is fully opened. Do not climb a closed ladder.  Make sure the sides of the  ladder are locked in place.  Have someone hold the ladder while you use it.  Know where your pets are as you move through your home.  What can I do in the bathroom?         Keep the floor dry. Clean up any water that is on the floor right away.  Remove soap buildup in the bathtub or shower. Buildup makes bathtubs and showers slippery.  Use non-skid mats or decals on the floor of the bathtub or shower.  Attach bath mats securely with double-sided, non-slip rug tape.  If you need to sit down while you are in the shower, use a non-slip stool.  Install grab bars by the toilet and in the bathtub and shower. Do not use towel bars as grab bars.  What can I do in the bedroom?  Make sure that you have a light by your bed that is easy to reach.  Do not use any sheets or blankets on your bed that hang to the floor.  Have a firm bench or chair with side arms that you can use for support when you get dressed.  What can I do in the kitchen?  Clean up any spills right away.  If you need to reach something above you, use a sturdy step stool that has a grab bar.  Keep electrical cables out of the way.  Do not use floor polish or wax that makes floors slippery.  What can I do with my stairs?  Do not leave anything on the stairs.  Make sure that you have a light switch at the top and the bottom of the stairs. Have them installed if you do not have them.  Make sure that there are handrails on both sides of the stairs. Fix handrails that are broken or loose. Make sure that handrails are as long as the staircases.  Install non-slip stair treads on all stairs in your home if they do not have carpet.  Avoid having throw rugs at the top or bottom of stairs, or secure the rugs with carpet tape to prevent them from moving.  Choose a carpet design that does not hide the edge of steps on the stairs. Make sure that carpet is firmly attached to the stairs. Fix any carpet that is loose or worn.  What can I do on the outside of my home?  Use bright  outdoor lighting.  Repair the edges of walkways and driveways and fix any cracks. Clear paths of anything that can make you trip, such as tools or rocks.  Add color or contrast paint or tape to clearly kenny and help you see high doorway thresholds.  Trim any bushes or trees on the main path into your home.  Check that handrails are securely fastened and in good repair. Both sides of all steps should have handrails.  Install guardrails along the edges of any raised decks or porches.  Have leaves, snow, and ice cleared regularly. Use sand, salt, or ice melt on walkways during winter months if you live where there is ice and snow.  In the garage, clean up any spills right away, including grease or oil spills.  What other actions can I take?  Review your medicines with your health care provider. Some medicines can make you confused or feel dizzy. This can increase your chance of falling.  Wear closed-toe shoes that fit well and support your feet. Wear shoes that have rubber soles and low heels.  Use a cane, walker, scooter, or crutches that help you move around if needed.  Talk with your provider about other ways that you can decrease your risk of falls. This may include seeing a physical therapist to learn to do exercises to improve movement and strength.  Where to find more information  Centers for Disease Control and PreventionANA LAURA: cdc.gov  National [place] [employer] Aging: jumana.nih.gov  National [place] on Aging: jumana.nih.gov  Contact a health care provider if:  You are afraid of falling at home.  You feel weak, drowsy, or dizzy at home.  You fall at home.  Get help right away if you:  Lose consciousness or have trouble moving after a fall.  Have a fall that causes a head injury.  These symptoms may be an emergency. Get help right away. Call 911.  Do not wait to see if the symptoms will go away.  Do not drive yourself to the hospital.  This information is not intended to replace advice given to you by your health  care provider. Make sure you discuss any questions you have with your health care provider.  Document Revised: 08/21/2023 Document Reviewed: 08/21/2023  Elsevier Patient Education © 2023 [employer]

## 2024-03-13 RX ORDER — FOLIC ACID 1 MG/1
1 TABLET ORAL DAILY
Qty: 90 TABLET | Refills: 3 | Status: SHIPPED | OUTPATIENT
Start: 2024-03-13

## 2024-04-23 ENCOUNTER — HOSPITAL ENCOUNTER (INPATIENT)
Facility: HOSPITAL | Age: 74
LOS: 2 days | Discharge: HOME | End: 2024-04-25
Attending: EMERGENCY MEDICINE | Admitting: HOSPITALIST
Payer: MEDICARE

## 2024-04-23 ENCOUNTER — APPOINTMENT (OUTPATIENT)
Dept: CT IMAGING | Facility: HOSPITAL | Age: 74
End: 2024-04-23
Payer: MEDICARE

## 2024-04-23 ENCOUNTER — APPOINTMENT (OUTPATIENT)
Dept: GENERAL RADIOLOGY | Facility: HOSPITAL | Age: 74
End: 2024-04-23
Payer: MEDICARE

## 2024-04-23 DIAGNOSIS — R13.10 DYSPHAGIA, UNSPECIFIED TYPE: ICD-10-CM

## 2024-04-23 DIAGNOSIS — I63.9 ACUTE CVA (CEREBROVASCULAR ACCIDENT): ICD-10-CM

## 2024-04-23 DIAGNOSIS — I63.9 ACUTE ISCHEMIC STROKE: Primary | ICD-10-CM

## 2024-04-23 PROBLEM — F41.1 GENERALIZED ANXIETY DISORDER: Chronic | Status: ACTIVE | Noted: 2018-01-24

## 2024-04-23 PROBLEM — K21.9 GERD (GASTROESOPHAGEAL REFLUX DISEASE): Chronic | Status: ACTIVE | Noted: 2019-02-28

## 2024-04-23 PROBLEM — M35.3 POLYMYALGIA RHEUMATICA: Chronic | Status: ACTIVE | Noted: 2019-01-14

## 2024-04-23 PROBLEM — R09.89 SUSPECTED CEREBROVASCULAR ACCIDENT (CVA): Status: ACTIVE | Noted: 2024-04-23

## 2024-04-23 PROBLEM — I10 HTN (HYPERTENSION): Chronic | Status: ACTIVE | Noted: 2019-02-28

## 2024-04-23 PROBLEM — E78.5 DYSLIPIDEMIA: Chronic | Status: ACTIVE | Noted: 2024-04-23

## 2024-04-23 PROBLEM — J44.9 COPD (CHRONIC OBSTRUCTIVE PULMONARY DISEASE): Chronic | Status: ACTIVE | Noted: 2019-02-28

## 2024-04-23 PROBLEM — J43.2 CENTRILOBULAR EMPHYSEMA: Status: ACTIVE | Noted: 2019-02-28

## 2024-04-23 LAB
ALBUMIN SERPL-MCNC: 4.6 G/DL (ref 3.5–5.2)
ALBUMIN/GLOB SERPL: 2.4 G/DL
ALP SERPL-CCNC: 58 U/L (ref 39–117)
ALT SERPL W P-5'-P-CCNC: 20 U/L (ref 1–33)
ALT SERPL W P-5'-P-CCNC: 21 U/L (ref 1–33)
ANION GAP SERPL CALCULATED.3IONS-SCNC: 16 MMOL/L (ref 5–15)
APTT PPP: 25.8 SECONDS (ref 22–39)
AST SERPL-CCNC: 24 U/L (ref 1–32)
AST SERPL-CCNC: 25 U/L (ref 1–32)
BASOPHILS # BLD AUTO: 0.02 10*3/MM3 (ref 0–0.2)
BASOPHILS NFR BLD AUTO: 0.4 % (ref 0–1.5)
BILIRUB SERPL-MCNC: 0.6 MG/DL (ref 0–1.2)
BUN BLDA-MCNC: 11 MG/DL (ref 8–26)
BUN SERPL-MCNC: 11 MG/DL (ref 8–23)
BUN/CREAT SERPL: 13.8 (ref 7–25)
CA-I BLDA-SCNC: 1.17 MMOL/L (ref 1.2–1.32)
CALCIUM SPEC-SCNC: 9.3 MG/DL (ref 8.6–10.5)
CHLORIDE BLDA-SCNC: 100 MMOL/L (ref 98–109)
CHLORIDE SERPL-SCNC: 103 MMOL/L (ref 98–107)
CO2 BLDA-SCNC: 26 MMOL/L (ref 24–29)
CO2 SERPL-SCNC: 24 MMOL/L (ref 22–29)
CREAT BLDA-MCNC: 0.8 MG/DL (ref 0.6–1.3)
CREAT SERPL-MCNC: 0.8 MG/DL (ref 0.57–1)
DEPRECATED RDW RBC AUTO: 44.8 FL (ref 37–54)
EGFRCR SERPLBLD CKD-EPI 2021: 77.4 ML/MIN/1.73
EGFRCR SERPLBLD CKD-EPI 2021: 77.4 ML/MIN/1.73
EOSINOPHIL # BLD AUTO: 0.06 10*3/MM3 (ref 0–0.4)
EOSINOPHIL NFR BLD AUTO: 1.3 % (ref 0.3–6.2)
ERYTHROCYTE [DISTWIDTH] IN BLOOD BY AUTOMATED COUNT: 12.6 % (ref 12.3–15.4)
GLOBULIN UR ELPH-MCNC: 1.9 GM/DL
GLUCOSE BLDC GLUCOMTR-MCNC: 95 MG/DL (ref 70–130)
GLUCOSE BLDC GLUCOMTR-MCNC: 97 MG/DL (ref 70–130)
GLUCOSE SERPL-MCNC: 89 MG/DL (ref 65–99)
HBA1C MFR BLD: 5.5 % (ref 4.8–5.6)
HCT VFR BLD AUTO: 46.9 % (ref 34–46.6)
HCT VFR BLDA CALC: 44 % (ref 38–51)
HGB BLD-MCNC: 15.7 G/DL (ref 12–15.9)
HGB BLDA-MCNC: 15 G/DL (ref 12–17)
HOLD SPECIMEN: NORMAL
IMM GRANULOCYTES # BLD AUTO: 0.02 10*3/MM3 (ref 0–0.05)
IMM GRANULOCYTES NFR BLD AUTO: 0.4 % (ref 0–0.5)
INR PPP: 1.2 (ref 0.8–1.2)
LYMPHOCYTES # BLD AUTO: 1.67 10*3/MM3 (ref 0.7–3.1)
LYMPHOCYTES NFR BLD AUTO: 35.9 % (ref 19.6–45.3)
MCH RBC QN AUTO: 32.2 PG (ref 26.6–33)
MCHC RBC AUTO-ENTMCNC: 33.5 G/DL (ref 31.5–35.7)
MCV RBC AUTO: 96.3 FL (ref 79–97)
MONOCYTES # BLD AUTO: 0.41 10*3/MM3 (ref 0.1–0.9)
MONOCYTES NFR BLD AUTO: 8.8 % (ref 5–12)
NEUTROPHILS NFR BLD AUTO: 2.47 10*3/MM3 (ref 1.7–7)
NEUTROPHILS NFR BLD AUTO: 53.2 % (ref 42.7–76)
NRBC BLD AUTO-RTO: 0 /100 WBC (ref 0–0.2)
PLATELET # BLD AUTO: 138 10*3/MM3 (ref 140–450)
PMV BLD AUTO: 10.2 FL (ref 6–12)
POTASSIUM BLDA-SCNC: 3.8 MMOL/L (ref 3.5–4.9)
POTASSIUM SERPL-SCNC: 4 MMOL/L (ref 3.5–5.2)
PROCALCITONIN SERPL-MCNC: 0.03 NG/ML (ref 0–0.25)
PROT SERPL-MCNC: 6.5 G/DL (ref 6–8.5)
PROTHROMBIN TIME: 14.2 SECONDS (ref 12.8–15.2)
QT INTERVAL: 380 MS
QTC INTERVAL: 410 MS
RBC # BLD AUTO: 4.87 10*6/MM3 (ref 3.77–5.28)
SODIUM BLD-SCNC: 139 MMOL/L (ref 138–146)
SODIUM SERPL-SCNC: 143 MMOL/L (ref 136–145)
TROPONIN T SERPL HS-MCNC: 9 NG/L
TSH SERPL DL<=0.05 MIU/L-ACNC: 0.99 UIU/ML (ref 0.27–4.2)
WBC NRBC COR # BLD AUTO: 4.65 10*3/MM3 (ref 3.4–10.8)
WHOLE BLOOD HOLD COAG: NORMAL
WHOLE BLOOD HOLD SPECIMEN: NORMAL

## 2024-04-23 PROCEDURE — 99291 CRITICAL CARE FIRST HOUR: CPT

## 2024-04-23 PROCEDURE — 84484 ASSAY OF TROPONIN QUANT: CPT | Performed by: EMERGENCY MEDICINE

## 2024-04-23 PROCEDURE — 99222 1ST HOSP IP/OBS MODERATE 55: CPT

## 2024-04-23 PROCEDURE — 92610 EVALUATE SWALLOWING FUNCTION: CPT | Performed by: SPEECH-LANGUAGE PATHOLOGIST

## 2024-04-23 PROCEDURE — 25010000002 TENECTEPLASE PER 50 MG

## 2024-04-23 PROCEDURE — 70498 CT ANGIOGRAPHY NECK: CPT

## 2024-04-23 PROCEDURE — 85025 COMPLETE CBC W/AUTO DIFF WBC: CPT | Performed by: EMERGENCY MEDICINE

## 2024-04-23 PROCEDURE — 25510000001 IOPAMIDOL PER 1 ML: Performed by: EMERGENCY MEDICINE

## 2024-04-23 PROCEDURE — 83036 HEMOGLOBIN GLYCOSYLATED A1C: CPT | Performed by: INTERNAL MEDICINE

## 2024-04-23 PROCEDURE — 85014 HEMATOCRIT: CPT

## 2024-04-23 PROCEDURE — 80047 BASIC METABLC PNL IONIZED CA: CPT

## 2024-04-23 PROCEDURE — 93005 ELECTROCARDIOGRAM TRACING: CPT | Performed by: EMERGENCY MEDICINE

## 2024-04-23 PROCEDURE — 85610 PROTHROMBIN TIME: CPT | Performed by: EMERGENCY MEDICINE

## 2024-04-23 PROCEDURE — 84145 PROCALCITONIN (PCT): CPT | Performed by: INTERNAL MEDICINE

## 2024-04-23 PROCEDURE — 85730 THROMBOPLASTIN TIME PARTIAL: CPT | Performed by: EMERGENCY MEDICINE

## 2024-04-23 PROCEDURE — 70450 CT HEAD/BRAIN W/O DYE: CPT

## 2024-04-23 PROCEDURE — 3E03317 INTRODUCTION OF OTHER THROMBOLYTIC INTO PERIPHERAL VEIN, PERCUTANEOUS APPROACH: ICD-10-PCS | Performed by: STUDENT IN AN ORGANIZED HEALTH CARE EDUCATION/TRAINING PROGRAM

## 2024-04-23 PROCEDURE — 82948 REAGENT STRIP/BLOOD GLUCOSE: CPT

## 2024-04-23 PROCEDURE — 0042T HC CT CEREBRAL PERFUSION W/WO CONTRAST: CPT

## 2024-04-23 PROCEDURE — 84443 ASSAY THYROID STIM HORMONE: CPT | Performed by: INTERNAL MEDICINE

## 2024-04-23 PROCEDURE — 4A03X5D MEASUREMENT OF ARTERIAL FLOW, INTRACRANIAL, EXTERNAL APPROACH: ICD-10-PCS | Performed by: SPECIALIST

## 2024-04-23 PROCEDURE — 71045 X-RAY EXAM CHEST 1 VIEW: CPT

## 2024-04-23 PROCEDURE — 92523 SPEECH SOUND LANG COMPREHEN: CPT | Performed by: SPEECH-LANGUAGE PATHOLOGIST

## 2024-04-23 PROCEDURE — 70496 CT ANGIOGRAPHY HEAD: CPT

## 2024-04-23 PROCEDURE — 99222 1ST HOSP IP/OBS MODERATE 55: CPT | Performed by: INTERNAL MEDICINE

## 2024-04-23 PROCEDURE — 80053 COMPREHEN METABOLIC PANEL: CPT | Performed by: EMERGENCY MEDICINE

## 2024-04-23 RX ORDER — NICOTINE 21 MG/24HR
1 PATCH, TRANSDERMAL 24 HOURS TRANSDERMAL EVERY 24 HOURS
Status: DISCONTINUED | OUTPATIENT
Start: 2024-04-23 | End: 2024-04-25 | Stop reason: HOSPADM

## 2024-04-23 RX ORDER — SODIUM CHLORIDE 0.9 % (FLUSH) 0.9 %
10 SYRINGE (ML) INJECTION EVERY 12 HOURS SCHEDULED
Status: DISCONTINUED | OUTPATIENT
Start: 2024-04-23 | End: 2024-04-25 | Stop reason: HOSPADM

## 2024-04-23 RX ORDER — ARFORMOTEROL TARTRATE 15 UG/2ML
15 SOLUTION RESPIRATORY (INHALATION)
Status: DISCONTINUED | OUTPATIENT
Start: 2024-04-23 | End: 2024-04-25 | Stop reason: HOSPADM

## 2024-04-23 RX ORDER — ACETAMINOPHEN 325 MG/1
650 TABLET ORAL EVERY 4 HOURS PRN
Status: DISCONTINUED | OUTPATIENT
Start: 2024-04-23 | End: 2024-04-25 | Stop reason: HOSPADM

## 2024-04-23 RX ORDER — ROSUVASTATIN CALCIUM 20 MG/1
40 TABLET, COATED ORAL NIGHTLY
Status: DISCONTINUED | OUTPATIENT
Start: 2024-04-23 | End: 2024-04-25 | Stop reason: HOSPADM

## 2024-04-23 RX ORDER — ONDANSETRON 2 MG/ML
4 INJECTION INTRAMUSCULAR; INTRAVENOUS EVERY 6 HOURS PRN
Status: DISCONTINUED | OUTPATIENT
Start: 2024-04-23 | End: 2024-04-25 | Stop reason: HOSPADM

## 2024-04-23 RX ORDER — SODIUM CHLORIDE 9 MG/ML
40 INJECTION, SOLUTION INTRAVENOUS AS NEEDED
Status: DISCONTINUED | OUTPATIENT
Start: 2024-04-23 | End: 2024-04-25 | Stop reason: HOSPADM

## 2024-04-23 RX ORDER — MONTELUKAST SODIUM 10 MG/1
10 TABLET ORAL NIGHTLY
Status: DISCONTINUED | OUTPATIENT
Start: 2024-04-23 | End: 2024-04-25 | Stop reason: HOSPADM

## 2024-04-23 RX ORDER — ASPIRIN 325 MG
325 TABLET ORAL DAILY
Status: DISCONTINUED | OUTPATIENT
Start: 2024-04-24 | End: 2024-04-25 | Stop reason: HOSPADM

## 2024-04-23 RX ORDER — MIDAZOLAM HYDROCHLORIDE 1 MG/ML
1 INJECTION INTRAMUSCULAR; INTRAVENOUS ONCE AS NEEDED
Status: COMPLETED | OUTPATIENT
Start: 2024-04-23 | End: 2024-04-24

## 2024-04-23 RX ORDER — ASPIRIN 300 MG/1
300 SUPPOSITORY RECTAL DAILY
Status: DISCONTINUED | OUTPATIENT
Start: 2024-04-24 | End: 2024-04-25 | Stop reason: HOSPADM

## 2024-04-23 RX ORDER — SODIUM CHLORIDE 0.9 % (FLUSH) 0.9 %
10 SYRINGE (ML) INJECTION
Status: COMPLETED | OUTPATIENT
Start: 2024-04-23 | End: 2024-04-23

## 2024-04-23 RX ORDER — SODIUM CHLORIDE 0.9 % (FLUSH) 0.9 %
10 SYRINGE (ML) INJECTION ONCE
Status: COMPLETED | OUTPATIENT
Start: 2024-04-23 | End: 2024-04-23

## 2024-04-23 RX ORDER — SODIUM CHLORIDE 0.9 % (FLUSH) 0.9 %
10 SYRINGE (ML) INJECTION ONCE
Status: DISCONTINUED | OUTPATIENT
Start: 2024-04-23 | End: 2024-04-23

## 2024-04-23 RX ORDER — SODIUM CHLORIDE 0.9 % (FLUSH) 0.9 %
10 SYRINGE (ML) INJECTION
Status: DISCONTINUED | OUTPATIENT
Start: 2024-04-23 | End: 2024-04-23

## 2024-04-23 RX ORDER — SODIUM CHLORIDE 0.9 % (FLUSH) 0.9 %
10 SYRINGE (ML) INJECTION AS NEEDED
Status: DISCONTINUED | OUTPATIENT
Start: 2024-04-23 | End: 2024-04-25 | Stop reason: HOSPADM

## 2024-04-23 RX ADMIN — Medication 1 PATCH: at 16:41

## 2024-04-23 RX ADMIN — Medication 10 ML: at 12:42

## 2024-04-23 RX ADMIN — Medication 10 ML: at 20:02

## 2024-04-23 RX ADMIN — ROSUVASTATIN CALCIUM 40 MG: 20 TABLET, COATED ORAL at 20:02

## 2024-04-23 RX ADMIN — IOPAMIDOL 115 ML: 755 INJECTION, SOLUTION INTRAVENOUS at 12:31

## 2024-04-23 RX ADMIN — MONTELUKAST 10 MG: 10 TABLET, FILM COATED ORAL at 20:02

## 2024-04-23 RX ADMIN — Medication 17 MG: at 12:41

## 2024-04-23 RX ADMIN — Medication 10 ML: at 12:41

## 2024-04-23 NOTE — CASE MANAGEMENT/SOCIAL WORK
Discharge Planning Assessment  [facility]     Patient Name: Irma Pedraza  MRN: [id number]  Today's Date: 4/23/2024    Admit Date: 4/23/2024    Plan: Home   Discharge Needs Assessment       Row Name 04/23/24 1352       Living Environment    People in Home spouse    Current Living Arrangements home    Primary Care Provided by self    Provides Primary Care For no one    Family Caregiver if Needed spouse    Family Caregiver Names Cesar Pedraza,     Quality of Family Relationships helpful;involved;supportive    Able to Return to Prior Arrangements yes       Transition Planning    Patient/Family Anticipates Transition to home with family    Patient/Family Anticipated Services at Transition        Discharge Needs Assessment    Equipment Currently Used at Home none    Concerns to be Addressed denies needs/concerns at this time    Discharge Coordination/Progress Lives in a house in [place] with her  Cesar, independent with ADLs.  No DME, history of home health or advanced directives.                   Discharge Plan       Row Name 04/23/24 1353       Plan    Plan Home    Patient/Family in Agreement with Plan yes    Plan Comments I spoke with Ms Pedraza at bedside.  [facility] [place]  Cesar, and is independent with ADLs.  She does not use any DME, has not had home health in the past, and does not have any advanced directives.  Her insurance is [employer], and she denies any issues or lapses in coverage.  Her insurance helps to cover prescriptions.  Her PCP is Eve Navarrete and she gets her prescriptions filled at [facility] in [place].  At this time, there are no discharge needs.    Final Discharge Disposition Code 01 - home or self-care                  Continued Care and Services - Admitted Since 4/23/2024    No active coordination exists for this encounter.          Demographic Summary    No  documentation.                  Functional Status       Row Name 04/23/24 1351       Functional Status    Usual Activity Tolerance good    Current Activity Tolerance good       Functional Status, IADL    Medications independent    Meal Preparation independent    Housekeeping independent    Laundry independent    Shopping independent       Mental Status    General Appearance WDL WDL                   Psychosocial    No documentation.                  Abuse/Neglect    No documentation.                  Legal    No documentation.                  Substance Abuse    No documentation.                  Patient Forms    No documentation.                     Ilsa Streeter RN

## 2024-04-23 NOTE — CONSULTS
Stroke Consult Note    Patient Name: Irma Pedraza   MRN: [id number]  Age: 74 y.o.  Sex: female  : 1950    Primary Care Physician: Eve Navarrete MD  Referring Physician:  Osmany Manzo MD    TIME STROKE TEAM CALLED: 1207 EST     TIME PATIENT SEEN: 1217 EST    Handedness: Right  Race:     Chief Complaint/Reason for Consultation: RUE weakness, right facial droop, slurred speech    HPI: Irma Pedraza is a 74-year-old female with PMH of HLD, COPD, chronic back pain, HTN, rheumatoid arthritis, anxiety, and current tobacco/EtOH use who presented to [facility] ED via [facility] with complaints of right facial droop, slurred speech, and right arm weakness.  Patient states that she was feeling her normal self earlier this morning and at approximately 1130 she had a sudden onset of right arm weakness and slurred speech.  She immediately alerted her  who was at home with her and contacted EMS.  Patient does not currently take any anticoagulants or antiplatelet.  She denies headache, dizziness, visual disturbances, or weakness on left side.  Patient states that she has never had anything similar to this happen before.  No recent falls or major surgery reported.  No history of prior stroke.  Patient does report history of tobacco abuse 1 pack/day and states that she also drinks alcohol nearly every day.  She denies current recreational drug use    Her blood pressure in route was reported 168/88 and on arrival 123/49.  NIH 5 on my exam for dysarthria, RUE drift and weakness, right facial droop, and decreased sensation to right hand.  Patient is within the time window for IV thrombolytic and has no current contraindications.  Patient does feel like her symptoms are disabling.  Risk and benefits of IV thrombolytic therapy were reviewed with the patient and she is in agreement that she would like to try IV thrombolytic at this time.  Emergent CT head without evidence of hemorrhage or acute  abnormalities. TNK given at 1241.  CTA demonstrates bilateral carotid bulb arthrosclerotic disease.  No LVO identified.  Patient will be admitted to ICU for further monitoring status post TNK    Last Known Normal Date/Time: 1130 EST     Review of Systems   Constitutional:  Negative for fatigue and fever.   HENT:  Negative for sinus pressure and sinus pain.    Eyes:  Negative for photophobia and visual disturbance.   Respiratory:  Negative for cough and shortness of breath.    Cardiovascular:  Negative for chest pain.   Gastrointestinal:  Negative for nausea and vomiting.   Neurological:  Positive for facial asymmetry, speech difficulty, weakness and numbness. Negative for dizziness, tremors, seizures, syncope, light-headedness and headaches.   Psychiatric/Behavioral: Negative.        Past Medical History:   Diagnosis Date    Adenomyomatosis of gallbladder 2009    by ultrasound, Dr. Yomi Tai 2009    Arthritis     Arthritis of lumbosacral spine     Asthma     Cataract     Remove 7-20 and 7-25 2023    Chronic hoarseness     Class 1 obesity due to excess calories with serious comorbidity and body mass index (BMI) of 30.0 to 30.9 in adult 07/16/2020 2/1/2021 Eve Navarrete MD      Closed displaced fracture of shaft of right clavicle with malunion     Diverticulitis 02/28/2019    Diverticulosis     Years ago, mot sure of date    GCA (giant cell arteritis)     GERD (gastroesophageal reflux disease)     HL (hearing loss) 2022    Hyperlipidemia Unk    Low back pain ?    Medicare annual wellness visit, subsequent 02/01/2021    Lloyd's neuroma of left foot     surgery    Ocular histoplasmosis     BCC 2/6/2017    Panic disorder 02/28/2019    PMR (polymyalgia rheumatica)      Past Surgical History:   Procedure Laterality Date    COLONOSCOPY  02/2009    COSMETIC SURGERY  1994    face lift    [facility]    COSMETIC SURGERY  1980    Birth kenny removal    ELBOW PROCEDURE Left     x2    EYE SURGERY   7/20 and 7/25/2023    Removed cataracts and incerted lenses in bot eyes    THROAT SURGERY      remote removal of growth from throat    TONSILLECTOMY  1964     Family History   Problem Relation Age of Onset    Osteoporosis Mother     Stroke Mother         Alzhiemers    Osteoporosis Father     Migraines Father     Hearing loss Father     Stroke Father         TIA    Brain cancer Sister     Stroke Sister         CVA    Stroke Brother         CVA    Breast cancer Maternal Grandmother 90    Stroke Paternal Grandmother         CVA    Arthritis Paternal Grandmother         Ostioperosis    Stroke Paternal Aunt         TIA    Stroke Sister         CVA    Stroke Sister         TIA    Stroke Brother         CVA    Stroke Sister         Glioblastoma    Other Sister         Rheumatoid Arthritis    Other Brother         no issues    Other Daughter         no issues    Other Sister         no issues    Ovarian cancer Neg Hx     Endometrial cancer Neg Hx      Social History     Socioeconomic History    Marital status:    Tobacco Use    Smoking status: Every Day     Current packs/day: 0.50     Average packs/day: 0.5 packs/day for 50.0 years (25.0 ttl pk-yrs)     Types: Cigarettes    Smokeless tobacco: Never    Tobacco comments:     patient refused smoking literature   Vaping Use    Vaping status: Never Used   Substance and Sexual Activity    Alcohol use: Yes     Comment: Usually have 1-2 drinks, 3-4 nights (vodka with diet 7up)    Drug use: No    Sexual activity: Not Currently     Partners: Male     Allergies   Allergen Reactions    Bactrim [Sulfamethoxazole-Trimethoprim] Other (See Comments)     Hypokalemia, hyponatremia    Atorvastatin Unknown (See Comments)     Lipitor      Bupropion Hcl [Bupropion] Unknown (See Comments)     wellbutrin    Calcium Unknown (See Comments)     unknown     Prior to Admission medications    Medication Sig Start Date End Date Taking? Authorizing Provider   acyclovir (ZOVIRAX) 400 MG tablet Take  1 tablet by mouth As Needed. 1/5/18   Irina Callahan MD   albuterol sulfate HFA (Ventolin HFA) 108 (90 Base) MCG/ACT inhaler Inhale 2 puffs Every 4 (Four) Hours As Needed for Wheezing. 8/16/23   Eve Navarrete MD   amLODIPine (NORVASC) 5 MG tablet Take 1 tablet by mouth Daily. 2/27/24   Eve Navarrete MD   benzonatate (TESSALON) 200 MG capsule Take 1 capsule by mouth 3 (Three) Times a Day As Needed for Cough. 6/15/23   Eve Navarrete MD   buPROPion SR (WELLBUTRIN SR) 150 MG 12 hr tablet Take 1 tablet by mouth Daily. 8/16/23   Eve Navarrete MD   busPIRone (BUSPAR) 5 MG tablet TAKE 1 TABLET BY MOUTH THREE TIMES DAILY AS NEEDED FOR ANXIETY 11/22/22   Eve Navarrete MD   clobetasol (TEMOVATE) 0.05 % cream Apply once-nightly application for 12 weeks. Then twice weekly for long-term LS maintenance.  Patient taking differently: 2 (Two) Times a Week. prn 9/27/23   Jayla Hicks APRN   Coenzyme Q10 (COQ-10) 200 MG capsule Take 1 tablet by mouth Daily. 3/3/17   ProviderIrina MD   cyanocobalamin (VITAMIN B-12) 500 MCG tablet Take 1 tablet by mouth Daily. 8/16/23   Eve Navarrete MD   cyclobenzaprine (FLEXERIL) 10 MG tablet Take 1 tablet by mouth 2 (Two) Times a Day As Needed for Muscle Spasms. 1/20/23   Eve Navarrete MD   ezetimibe (ZETIA) 10 MG tablet Take 1 tablet by mouth Every Night. 2/27/24   Eve Navarrete MD   famotidine (PEPCID) 20 MG tablet TAKE 1 TABLET BY MOUTH TWICE DAILY  Patient taking differently: 1 tablet 2 (Two) Times a Day As Needed. 8/24/22   Ar Horowitz MD   Fluticasone-Umeclidin-Vilant (Trelegy Ellipta) 100-62.5-25 MCG/ACT inhaler Inhale 1 puff Daily. 2/27/24   Eve Navarrete MD   folic acid (FOLVITE) 1 MG tablet TAKE 1 TABLET BY MOUTH DAILY 3/13/24   Eve Navarrete MD   Lactobacillus-Inulin ([facility] PO) Take 1 capsule by mouth Daily.    Provider, MD Irina   methocarbamol (ROBAXIN) 500 MG tablet Take 1  tablet by mouth Every 12 (Twelve) Hours. 2/9/23   Irina Callahan MD   montelukast (SINGULAIR) 10 MG tablet TAKE 1 TABLET BY MOUTH EVERY NIGHT 10/23/23   Eve Navarrete MD   omeprazole (priLOSEC) 40 MG capsule Take 1 capsule by mouth Daily. 8/16/23   Eve Navarrete MD   predniSONE (DELTASONE) 2.5 MG tablet Take 1 tablet by mouth Daily. 2/9/23   Irina Callahan MD   rosuvastatin (CRESTOR) 40 MG tablet Take 1 tablet by mouth Every Night. 2/27/24   Eve Navarrete MD   tocilizumab (ACTEMRA) 200 MG/10ML solution injection Infuse 8 mg/kg into a venous catheter. 10/13/16   Irina Callahan MD   zoledronic acid (RECLAST) 5 MG/100ML solution infusion Administer ZOLEDRONIC ACID 5mg IV once annually 6/20/19   Irina Callahan MD         Temp:  [98.1 °F (36.7 °C)] 98.1 °F (36.7 °C)  Heart Rate:  [80] 80  Resp:  [22] 22  BP: (123-152)/(49-60) 152/60  Neurological Exam  Mental Status  Alert. Oriented to person, place, time and situation. Oriented to person, place, and time. Mild dysarthria present. Language is fluent with no aphasia.    Cranial Nerves  CN II: Visual fields full to confrontation.  CN III, IV, VI: Extraocular movements intact bilaterally. Normal lids and orbits bilaterally. Pupils equal round and reactive to light bilaterally.  CN V: Facial sensation is normal.  CN VII:  Right: There is central facial weakness.  Left: There is no facial weakness.  CN XII: Tongue midline without atrophy or fasciculations.    Motor  Normal muscle bulk throughout. No fasciculations present. Normal muscle tone. Strength is 5/5 in all four extremities except as noted.  3/5 RUE.    Sensory  Light touch abnormality: Patient states decree sensation to right hand compared to left.     Coordination  Right: Finger-to-nose abnormality:Left: Finger-to-nose normal.  Ataxia of right finger-to-nose not out of proportion to weakness.    Gait    Not observed.      Physical Exam  Constitutional:       General: She is  not in acute distress.     Appearance: She is not ill-appearing.   HENT:      Head: Normocephalic and atraumatic.      Mouth/Throat:      Pharynx: Oropharynx is clear.   Eyes:      General: Lids are normal.      Extraocular Movements: Extraocular movements intact.      Pupils: Pupils are equal, round, and reactive to light.   Cardiovascular:      Rate and Rhythm: Normal rate.   Pulmonary:      Effort: Pulmonary effort is normal.   Abdominal:      General: There is no distension.      Tenderness: There is no guarding.   Musculoskeletal:         General: No signs of injury.      Cervical back: No rigidity.      Right lower leg: No edema.      Left lower leg: No edema.   Skin:     General: Skin is warm.      Findings: No bruising or lesion.   Neurological:      Mental Status: She is alert and oriented to person, place, and time.      Cranial Nerves: Cranial nerve deficit and dysarthria present.      Sensory: Sensory deficit present.      Motor: Weakness present.      Coordination: Coordination abnormal.   Psychiatric:         Mood and Affect: Mood normal.         Acute Stroke Data    Thrombolytic Inclusion / Exclusion Criteria    Time: 13:04 EDT  Person Administering Scale: Akil Leung PA-C    Inclusion Criteria  [x]   18 years of age or greater   [x]   Onset of symptoms < 4.5 hours before beginning treatment (stroke onset = time patient was last seen well or without symptoms).   [x]   Diagnosis of acute ischemic stroke causing measurable disabling deficit (Complete Hemianopia, Any Aphasia, Visual or Sensory Extinction, Any weakness limiting sustained effort against gravity)   []   Any remaining deficit considered potentially disabling in view of patient and practitioner   Exclusion criteria (Do not proceed with Alteplase if any are checked under exclusion criteria)  []   Onset unknown or GREATER than 4.5 hours   []   ICH on CT/MRI   []   CT demonstrates hypodensity representing acute or subacute infarct   []    Significant head trauma or prior stroke in the previous 3 months   []   Symptoms suggestive of subarachnoid hemorrhage   []   History of un-ruptured intracranial aneurysm GREATER than 10 mm   []   Recent intracranial or intraspinal surgery within the last 3 months   []   Arterial puncture at a non-compressible site in the previous 7 days   []   Active internal bleeding   []   Acute bleeding tendency   []   Platelet count LESS than 100,000 for known hematological diseases such as leukemia, thrombocytopenia or chronic cirrhosis   []   Current use of anticoagulant with INR GREATER than 1.7 or PT GREATER than 15 seconds, aPTT GREATER than 40 seconds   []   Heparin received within 48 hours, resulting in abnormally elevated aPTT GREATER than upper limit of normal   []   Current use of direct thrombin inhibitors or direct factor Xa inhibitors in the past 48 hours   []   Elevated blood pressure refractory to treatment (systolic GREATER than 185 mm/Hg or diastolic  GREATER than 110 mm/Hg   []   Suspected infective endocarditis and aortic arch dissection   []   Current use of therapeutic treatment dose of low-molecular-weight heparin (LMWH) within the previous 24 hours   []   Structural GI malignancy or bleed   Relative exclusion for all patients  []   Only minor non-disabling symptoms   []   Pregnancy   []   Seizure at onset with postictal residual neurological impairments   []   Major surgery or previous trauma within past 14 days   []   History of previous spontaneous ICH, intracranial neoplasm, or AV malformation   []   Postpartum (within previous 14 days)   []   Recent GI or urinary tract hemorrhage (within previous 21 days)   []   Recent acute MI (within previous 3 months)   []   History of un-ruptured intracranial aneurysm LESS than 10 mm   []   History of ruptured intracranial aneurysm   []   Blood glucose LESS than 50 mg/dL (2.7 mmol/L)   []   Dural puncture within the last 7 days   []   Known GREATER than 10  cerebral microbleeds   Additional exclusions for patients with symptoms onset between 3 and 4.5 hours.  []   Age > 80.   []   On any anticoagulants regardless of INR  >>> Warfarin (Coumadin), Heparin, Enoxaparin (Lovenox), fondaparinux (Arixtra), bivalirudin (Angiomax), Argatroban, dabigatran (Pradaxa), rivaroxaban (Xarelto), or apixaban (Eliquis)   []   Severe stroke (NIHSS > 25).   []   History of BOTH diabetes and previous ischemic stroke.   []   The risks and benefits have been discussed with the patient or family related to the administration of IV thrombolytic therapy for stroke symptoms.   []   I have discussed and reviewed the patient's case and imaging with the attending prior to IV thrombolytic therapy.   1241 Time IV thrombolytic administered       Hospital Meds:  Scheduled-    Infusions- niCARdipine, 5-15 mg/hr       PRNs-   sodium chloride    Functional Status Prior to Current Stroke/Sunset Score: 0    NIH Stroke Scale  Time: 13:04 EDT  Person Administering Scale: Akil Leung PA-C    1a  Level of consciousness: 0=alert; keenly responsive   1b. LOC questions:  0=Answers both questions correctly   1c. LOC commands: 0=Performs both tasks correctly   2.  Best Gaze: 0=normal   3.  Visual: 0=No visual loss   4. Facial Palsy: 1=Minor paralysis (flattened nasolabial fold, asymmetric on smiling)   5a.  Motor left arm: 0=No drift, limb holds 90 (or 45) degrees for full 10 seconds   5b.  Motor right arm: 1=Drift, limb holds 90 (or 45) degrees but drifts down before full 10 seconds: does not hit bed   6a. motor left le=No drift, limb holds 90 (or 45) degrees for full 10 seconds   6b  Motor right le=No drift, limb holds 90 (or 45) degrees for full 10 seconds   7. Limb Ataxia: 1=Present in one limb   8.  Sensory: 1=Mild to moderate sensory loss; patient feels pinprick is less sharp or is dull on the affected side; there is a loss of superficial pain with pinprick but patient is aware She is being touched    9. Best Language:  0=No aphasia, normal   10. Dysarthria: 1=Mild to moderate, patient slurs at least some words and at worst, can be understood with some difficulty   11. Extinction and Inattention: 0=No abnormality    Total:   5     CBC w/diff          8/14/2023    11:12 2/27/2024    14:32 4/23/2024    12:32   CBC w/Diff   WBC 7.33  6.24  4.65    RBC 4.42  5.06  4.87    Hemoglobin 14.0  15.9  15.7    Hematocrit 40.3  46.1  46.9    MCV 91.2  91.1  96.3    MCH 31.7  31.4  32.2    MCHC 34.7  34.5  33.5    RDW 12.0  13.9  12.6    Platelets 212  171  138    Neutrophil Rel % 67.9  55.0  53.2    Immature Granulocyte Rel %   0.4    Lymphocyte Rel % 23.1  34.1  35.9    Monocyte Rel % 7.5  9.1  8.8    Eosinophil Rel % 1.0  1.3  1.3    Basophil Rel % 0.1  0.3  0.4       XR Chest 1 View    Result Date: 4/23/2024  Impression: No evidence of acute cardiopulmonary disease. Electronically Signed: Amish Macias MD  4/23/2024 1:22 PM EDT  Workstation ID: [id number]    CT Angiogram Head w AI Analysis of LVO    Result Date: 4/23/2024  Impression: 1. Bilateral carotid bulb calcified atherosclerotic disease with no evidence of hemodynamically significant stenosis 2. Unremarkable intracranial CT angiogram 3. Isolated Tmax of more than 4 seconds of 15 mL with no other associated findings, nonspecific. May consider further evaluation with an MRI of the brain Electronically Signed: Placido Villegas MD  4/23/2024 1:18 PM EDT  Workstation ID: [id number]    CT Angiogram Neck    Result Date: 4/23/2024  Impression: 1. Bilateral carotid bulb calcified atherosclerotic disease with no evidence of hemodynamically significant stenosis 2. Unremarkable intracranial CT angiogram 3. Isolated Tmax of more than 4 seconds of 15 mL with no other associated findings, nonspecific. May consider further evaluation with an MRI of the brain Electronically Signed: Placido Villegas MD  4/23/2024 1:18 PM EDT  Workstation ID: [id number]    CT CEREBRAL PERFUSION WITH &  WITHOUT CONTRAST    Result Date: 4/23/2024  Impression: 1. Bilateral carotid bulb calcified atherosclerotic disease with no evidence of hemodynamically significant stenosis 2. Unremarkable intracranial CT angiogram 3. Isolated Tmax of more than 4 seconds of 15 mL with no other associated findings, nonspecific. May consider further evaluation with an MRI of the brain Electronically Signed: Placido Villegas MD  4/23/2024 1:18 PM EDT  Workstation ID: [id number]    CT Head Without Contrast Stroke Protocol    Result Date: 4/23/2024  Impression: No evidence of acute intracranial abnormality. Electronically Signed: Amish Macias MD  4/23/2024 12:37 PM EDT  Workstation ID: [id number]      Results Reviewed:  I have personally reviewed current lab, radiology, and data and agree with results.         Assessment/Plan:  74-year-old female with PMH of HLD, COPD, chronic back pain, HTN, rheumatoid arthritis, anxiety, and current tobacco/EtOH use who presented to [facility] ED via [facility] with complaints of right facial droop, slurred speech, and right arm weakness.  LKW 1130  Patient does report history of tobacco abuse 1 pack/day and states that she also drinks alcohol nearly every day.  NIH 5 on my exam for dysarthria, RUE drift and weakness, right facial droop, and decreased sensation to right hand.  Patient is within the time window for IV thrombolytic and has no current contraindications.  Patient does feel like her symptoms are disabling.  Risk and benefits of IV thrombolytic therapy were reviewed with the patient and she is in agreement that she would like to try IV thrombolytic at this time.  Emergent CT head without evidence of hemorrhage or acute abnormalities. TNK given at 1241.  CTA demonstrates bilateral carotid bulb arthrosclerotic disease.  No LVO identified.  Patient will be admitted to ICU for further monitoring status post TNK    Antiplatelet PTA: none  Anticoagulant PTA: none      right facial droop, slurred speech,  and right arm weakness  -Differentials include TIA vs AIS vs metabolic etiology   -TIA/CVA with IV thrombotic therapy orders in place  -N.p.o. until bedside dysphagia screening passed. Healthy cardiac diet once screening is passed  -Activity as tolerated  -Bleeding/post thrombolytic precautions  -24-hour Cth on 4/24/2024 at 1241  -Aspirin 325 mg p.o. daily after 24-hour CTh negative  -MRI brain without contrast on 4/24 am  -TTE pending  -PT/OT/SLP eval and treat  -Neurology stroke will continue to follow    2. HTN  -Cardene as needed for blood pressure management  -Blood pressure goals, SBP less than 180  -Avoid hypotension  -Management per hospital medical team    3. HLD  -May continue Crestor 40 mg nightly home medication (patient is allergic to atorvastatin)  -FLP with a.m. labs  -Goal LDL less than 70    Plan of care discussed with patient, patient's , Dr. Manzo, and Dr. Brito.  Please call with any questions or concerns    Akil Leung PA-C  April 23, 2024  13:04 EDT

## 2024-04-23 NOTE — THERAPY EVALUATION
Acute Care - Speech Language Pathology   Swallow Initial Evaluation [facility]  Clinical Swallow Evaluation  +Cognitive-Communication Evaluation       Patient Name: Irma Pedraza  : 1950  MRN: [id number]  Today's Date: 2024               Admit Date: 2024    Visit Dx:     ICD-10-CM ICD-9-CM   1. Acute ischemic stroke  I63.9 434.91   2. Dysphagia, unspecified type  R13.10 787.20     Patient Active Problem List   Diagnosis    Cervicalgia    BRYAN    Mixed hyperlipidemia    Mitral valve disorder    Tinnitus    Vitamin D deficiency    Senile osteoporosis    Dyspnea    Polymyalgia rheumatica    Primary insomnia    Chronic pain of both shoulders    Chronic upper back pain    Drug-induced hypokalemia    HTN (hypertension)    Benign paroxysmal positional vertigo    Chronic low back pain    COPD (chronic obstructive pulmonary disease)    GERD (gastroesophageal reflux disease)    Muscle spasms of both lower extremities    Temporal arteritis    Tobacco dependence syndrome    Nonalcoholic fatty liver disease    Spasm of thoracic back muscle    Annual physical exam    Medicare annual wellness visit, subsequent    Rheumatoid arthritis, seropositive    Lichen sclerosus of female genitalia    Lichen sclerosus    Sprain of interphalangeal joint of right thumb    Alas's esophagus without dysplasia    Overweight with body mass index (BMI) of 26 to 26.9 in adult    Mild memory disturbance    High plasma homocystine    Poor appetite    Suspected cerebrovascular accident (CVA)    Dyslipidemia     Past Medical History:   Diagnosis Date    Adenomyomatosis of gallbladder 2009    by ultrasound, Dr. Yomi Tai     Arthritis     Arthritis of lumbosacral spine     Asthma     Cataract     Remove  and     Chronic hoarseness     Class 1 obesity due to excess calories with serious comorbidity and body mass index (BMI) of 30.0 to 30.9 in adult 2020 Eve Navarrete MD      Closed displaced  fracture of shaft of right clavicle with malunion     Diverticulitis 02/28/2019    Diverticulosis     Years ago, mot sure of date    Dyslipidemia 04/23/2024    GCA (giant cell arteritis)     GERD (gastroesophageal reflux disease)     HL (hearing loss) 2022    Hyperlipidemia Unk    Low back pain ?    Medicare annual wellness visit, subsequent 02/01/2021    Lloyd's neuroma of left foot     surgery    Ocular histoplasmosis     BCC 2/6/2017    Panic disorder 02/28/2019    PMR (polymyalgia rheumatica)      Past Surgical History:   Procedure Laterality Date    COLONOSCOPY  02/2009    COSMETIC SURGERY  1994    face lift    COSMETIC SURGERY  1993    TUMMY TUCK    COSMETIC SURGERY  1980    Birth kenny removal    ELBOW PROCEDURE Left     x2    EYE SURGERY  7/20 and 7/25/2023    Removed cataracts and incerted lenses in bot eyes    THROAT SURGERY      remote removal of growth from throat    TONSILLECTOMY  1964       SLP Recommendation and Plan  SLP Swallowing Diagnosis: R/O pharyngeal dysphagia (04/23/24 1430)  SLP Diet Recommendation: regular textures, thin liquids, other (see comments) (if any concerns please make NPO) (04/23/24 1430)  Recommended Precautions and Strategies: upright posture during/after eating, small bites of food and sips of liquid, general aspiration precautions (04/23/24 1430)  SLP Rec. for Method of Medication Administration: meds whole, with puree, as tolerated (04/23/24 1430)     Monitor for Signs of Aspiration: yes, notify SLP if any concerns (04/23/24 1430)  Recommended Diagnostics: reassess via clinical swallow evaluation (04/23/24 1430)  Swallow Criteria for Skilled Therapeutic Interventions Met: demonstrates skilled criteria (04/23/24 1430)  Anticipated Discharge Disposition (SLP): inpatient rehabilitation facility (04/23/24 1430)  Rehab Potential/Prognosis, Swallowing: adequate, monitor progress closely (04/23/24 1430)     Predicted Duration Therapy Intervention (Days): until discharge (04/23/24  1430)  Oral Care Recommendations: Oral Care BID/PRN, Toothbrush (04/23/24 1430)        Plan of Care Reviewed With: patient, family  Progress:  (initial eval)      SWALLOW EVALUATION (Last 72 Hours)       SLP Adult Swallow Evaluation       Row Name 04/23/24 1430       Rehab Evaluation    Document Type evaluation  -CJ    Subjective Information no complaints  -CJ    Patient Observations alert;cooperative  -CJ    Patient/Family/Caregiver Comments/Observations family present  -CJ    Patient Effort good  -CJ    Symptoms Noted During/After Treatment none  -CJ       General Information    Patient Profile Reviewed yes  -CJ    Pertinent History Of Current Problem Pt adm on stroke pathway w/ R sided weakness; sig h/o cervicalgia, generalized anxiety, mixed HLD, Mitral valve disorder, HTN, GERD, COPD, Alas's. CXR/CT negative, given TNK @1241. No MRI yet  -CJ    Current Method of Nutrition NPO  -CJ    Precautions/Limitations, Vision WFL with corrective lenses;for purposes of eval  -CJ    Precautions/Limitations, Hearing WFL;for purposes of eval  -CJ    Prior Level of Function-Communication other (see comments)  memory difficulties per chart  -CJ    Prior Level of Function-Swallowing no diet consistency restrictions  -CJ    Plans/Goals Discussed with patient;family  -CJ    Barriers to Rehab none identified  -CJ    Patient's Goals for Discharge patient did not state  -CJ       Pain    Additional Documentation Pain Scale: FACES Pre/Post-Treatment (Group)  -CJ       Pain Scale: FACES Pre/Post-Treatment    Pain: FACES Scale, Pretreatment 0-->no hurt  -CJ    Posttreatment Pain Rating 0-->no hurt  -CJ       Oral Motor Structure and Function    Dentition Assessment natural, present and adequate  -CJ    Secretion Management WNL/WFL  -CJ    Mucosal Quality moist, healthy  -CJ       Oral Musculature and Cranial Nerve Assessment    Oral Motor General Assessment oral labial or buccal impairment;lingual impairment  -CJ    Oral Labial or  Buccal Impairment, Detail, Cranial Nerve VII (Facial): right labial droop  -    Lingual Impairment, Detail. Cranial Nerves IX, XII (Glossopharyngeal and Hypoglossal) reduced strength right  -       General Eating/Swallowing Observations    Respiratory Support Currently in Use nasal cannula  -    Eating/Swallowing Skills self-fed;fed by SLP  -    Positioning During Eating upright in bed  -    Utensils Used spoon;straw;cup  -    Consistencies Trialed regular textures;pureed;ice chips;thin liquids  -       Clinical Swallow Eval    Oral Prep Phase impaired  -    Pharyngeal Phase --  r/o pharyngeal impairment  -    Clinical Swallow Evaluation Summary Pt reports intact sensation on R side of face/lingual surface. Mild anterior loss w/ thins via cup only. No oral residue, adequate mastication w/ solids. No overt s/s of aspiration even when pushed w/ consecutive sips. Pt reports symptoms continue to improve. Will initiate regular diet w/ thin liquids and monitor closely pending further neuro imaging and complete re-eval tomorrow w/ further recs pending. Pt currently on bleeding precautions  -       Oral Prep Concerns    Oral Prep Concerns anterior loss  -       SLP Evaluation Clinical Impression    SLP Swallowing Diagnosis R/O pharyngeal dysphagia  -    Functional Impact risk of aspiration/pneumonia  -    Rehab Potential/Prognosis, Swallowing adequate, monitor progress closely  -    Swallow Criteria for Skilled Therapeutic Interventions Met demonstrates skilled criteria  -       Recommendations    Predicted Duration Therapy Intervention (Days) until discharge  -    SLP Diet Recommendation regular textures;thin liquids;other (see comments)  if any concerns please make NPO  -    Recommended Diagnostics reassess via clinical swallow evaluation  -    Recommended Precautions and Strategies upright posture during/after eating;small bites of food and sips of liquid;general aspiration precautions   -    Oral Care Recommendations Oral Care BID/PRN;Toothbrush  -    SLP Rec. for Method of Medication Administration meds whole;with puree;as tolerated  -    Monitor for Signs of Aspiration yes;notify SLP if any concerns  -    Anticipated Discharge Disposition (SLP) inpatient rehabilitation facility  -              User Key  (r) = Recorded By, (t) = Taken By, (c) = Cosigned By      Initials Name Effective Dates    Isamar Cartagena MS CCC-SLP 07/11/23 -                     EDUCATION  The patient has been educated in the following areas:   Dysphagia (Swallowing Impairment).        SLP GOALS       Row Name 04/23/24 0852             Patient will demonstrate functional speech skills for return to discharge environment    [place] with minimal cues  -CJ      Time frame by discharge  -CJ      Progress/Outcomes new goal  -         SLP Diagnostic Treatment     Patient will participate in further assessment in the following areas graphic expression;cognitive-linguistic;higher-level cognitive-linguistic  -      Time Frame (Diagnostic) short term goal (STG)  -CJ      Progress/Outcomes (Additional Goal 1, SLP) discharged from facility  -         Phonation Goal 1 (SLP)    Improve Phonation By Goal 1 (SLP) using loud speech;with minimal cues (75-90%);100%  -CJ      Time Frame (Phonation Goal 1, SLP) 1 week  -CJ      Progress/Outcomes (Phonation Goal 1, SLP) new goal  -CJ         Articulation Goal 1 (SLP)    Improve Articulation Goal 1 (SLP) by over-articulating at phrase level;by over-articulating in connected speech;100%;with minimal cues (75-90%)  -CJ      Time Frame (Articulation Goal 1, SLP) 1 week  -CJ      Progress/Outcomes (Articulation Goal 1, SLP) new goal  -                User Key  (r) = Recorded By, (t) = Taken By, (c) = Cosigned By      Initials Name Provider Type    Isamar Cartagena MS CCC-SLP Speech and Language Pathologist                       Time Calculation:    Time Calculation- SLP        Row Name 24 1548             Time Calculation- SLP    SLP Start Time 1430  -CJ      SLP Received On 24  -         Untimed Charges    78549-RQ Eval Speech and Production w/ Language Minutes 23  -CJ      04649-YC Eval Oral Pharyng Swallow Minutes 40  -CJ         Total Minutes    Untimed Charges Total Minutes 63  -CJ       Total Minutes 63  -CJ                User Key  (r) = Recorded By, (t) = Taken By, (c) = Cosigned By      Initials Name Provider Type     Isamar Noe, MS CCC-SLP Speech and Language Pathologist                    Therapy Charges for Today       Code Description Service Date Service Provider Modifiers Qty    97498995375 HC ST EVAL ORAL PHARYNG SWALLOW 3 2024 Isamar Noe, MS CCC-SLP GN 1    24748374141 HC ST EVAL SPEECH AND PROD W LANG  2 2024 Isamar Noe, MS CCC-SLP GN 1                 Isamar Noe MS CCC-SLP  2024   and Acute Care - Speech Language Pathology Initial Evaluation  [facility]     Patient Name: Irma Pedraza  : 1950  MRN: [id number]  Today's Date: 2024               Admit Date: 2024     Visit Dx:    ICD-10-CM ICD-9-CM   1. Acute ischemic stroke  I63.9 434.91   2. Dysphagia, unspecified type  R13.10 787.20     Patient Active Problem List   Diagnosis    Cervicalgia    BRYAN    Mixed hyperlipidemia    Mitral valve disorder    Tinnitus    Vitamin D deficiency    Senile osteoporosis    Dyspnea    Polymyalgia rheumatica    Primary insomnia    Chronic pain of both shoulders    Chronic upper back pain    Drug-induced hypokalemia    HTN (hypertension)    Benign paroxysmal positional vertigo    Chronic low back pain    COPD (chronic obstructive pulmonary disease)    GERD (gastroesophageal reflux disease)    Muscle spasms of both lower extremities    Temporal arteritis    Tobacco dependence syndrome    Nonalcoholic fatty liver disease    Spasm of thoracic back muscle    Annual physical exam    Medicare annual wellness visit,  subsequent    Rheumatoid arthritis, seropositive    Lichen sclerosus of female genitalia    Lichen sclerosus    Sprain of interphalangeal joint of right thumb    Alas's esophagus without dysplasia    Overweight with body mass index (BMI) of 26 to 26.9 in adult    Mild memory disturbance    High plasma homocystine    Poor appetite    Suspected cerebrovascular accident (CVA)    Dyslipidemia     Past Medical History:   Diagnosis Date    Adenomyomatosis of gallbladder 2009    by ultrasound, Dr. Yomi Tai 2009    Arthritis     Arthritis of lumbosacral spine     Asthma     Cataract     Remove 7-20 and 7-25 2023    Chronic hoarseness     Class 1 obesity due to excess calories with serious comorbidity and body mass index (BMI) of 30.0 to 30.9 in adult 07/16/2020 2/1/2021 Eve Navarrete MD      Closed displaced fracture of shaft of right clavicle with malunion     Diverticulitis 02/28/2019    Diverticulosis     Years ago, mot sure of date    Dyslipidemia 04/23/2024    GCA (giant cell arteritis)     GERD (gastroesophageal reflux disease)     HL (hearing loss) 2022    Hyperlipidemia Unk    Low back pain ?    Medicare annual wellness visit, subsequent 02/01/2021    Lloyd's neuroma of left foot     surgery    Ocular histoplasmosis     BCC 2/6/2017    Panic disorder 02/28/2019    PMR (polymyalgia rheumatica)      Past Surgical History:   Procedure Laterality Date    COLONOSCOPY  02/2009    COSMETIC SURGERY  1994    face lift    COSMETIC SURGERY  1993    TUMMY TUCK    COSMETIC SURGERY  1980    Birth kenny removal    ELBOW PROCEDURE Left     x2    EYE SURGERY  7/20 and 7/25/2023    Removed cataracts and incerted lenses in bot eyes    THROAT SURGERY      remote removal of growth from throat    TONSILLECTOMY  1964       SLP Recommendation and Plan  SLP Diagnosis: mild, dysarthria (04/23/24 1430)           Swallow Criteria for Skilled Therapeutic Interventions Met: demonstrates skilled criteria (04/23/24 1430)  SLC Criteria  for Skilled Therapy Interventions Met: yes (04/23/24 1430)  Anticipated Discharge Disposition (SLP): inpatient rehabilitation facility (04/23/24 1430)        Therapy Frequency (SLP SLC): 3 days per week (04/23/24 1430)  Predicted Duration Therapy Intervention (Days): until discharge (04/23/24 1430)  Oral Care Recommendations: Oral Care BID/PRN, Toothbrush (04/23/24 1430)                          Plan of Care Reviewed With: patient, family (04/23/24 1545)  Progress:  (initial eval) (04/23/24 1545)      SLP EVALUATION (Last 72 Hours)       SLP SLC Evaluation       Row Name 04/23/24 1430       General Information    Prior Level of Function-Communication other (see comments)  memory difficulty per chart  -CJ    Patient's Goals for Discharge return to home  -CJ    Family Goals for Discharge family did not state  -CJ       Comprehension Assessment/Intervention    Comprehension Assessment/Intervention Auditory Comprehension;Reading Comprehension  -CJ       Auditory Comprehension Assessment/Intervention    Auditory Comprehension (Communication) WFL  -CJ       Reading Comprehension Assessment/Intervention    Reading Comprehension (Communication) unable/difficult to assess  other providers presenting to room  -CJ       Expression Assessment/Intervention    Expression Assessment/Intervention verbal expression;graphic expression  -CJ       Verbal Expression Assessment/Intervention    Automatic Speech (Communication) WFL;months of year;counting 1-20  -CJ    Repetition WFL;sentences;phrases;words  -CJ    Phrase Completion WFL;unpredictable  -CJ    Responsive Naming WFL;complex  -CJ    Confrontational Naming WFL;low frequency  -CJ       Graphic Expression Assessment/Intervention    Graphic Expression unable/difficult to assess;dominant hand  pt R handed u/a to grasp pencil  -CJ       Oral Motor Structure and Function    Oral Motor Structure and Function mild impairment  -CJ       Motor Speech Assessment/Intervention    Motor  Speech Function mild impairment  -    Speech intelligibility 90%;in quiet environment;in connected speech;with unfamiliar listener  -       Cognitive Assessment Intervention- SLP    Cognitive Function (Cognition) unable/difficult to assess  provider presented to room  -       SLP Evaluation Clinical Impressions    SLP Diagnosis mild;dysarthria  -    Rehab Potential/Prognosis good  -    SLC Criteria for Skilled Therapy Interventions Met yes  -    Functional Impact difficulty in expressing complex messages  -       Recommendations    Therapy Frequency (SLP SLC) 3 days per week  -              User Key  (r) = Recorded By, (t) = Taken By, (c) = Cosigned By      Initials Name Effective Dates     Isamar Noe, MS CCC-SLP 07/11/23 -                        EDUCATION  The patient has been educated in the following areas:     Dysphagia (Swallowing Impairment) Oral Care/Hydration.           SLP GOALS       Row Name 04/23/24 2520             Patient will demonstrate functional speech skills for return to discharge environment    [place] with minimal cues  -      Time frame by discharge  -      Progress/Outcomes new goal  -         SLP Diagnostic Treatment     Patient will participate in further assessment in the following areas graphic expression;cognitive-linguistic;higher-level cognitive-linguistic  -      Time Frame (Diagnostic) short term goal (STG)  -CJ      Progress/Outcomes (Additional Goal 1, SLP) discharged from facility  -         Phonation Goal 1 (SLP)    Improve Phonation By Goal 1 (SLP) using loud speech;with minimal cues (75-90%);100%  -      Time Frame (Phonation Goal 1, SLP) 1 week  -      Progress/Outcomes (Phonation Goal 1, SLP) new goal  -         Articulation Goal 1 (SLP)    Improve Articulation Goal 1 (SLP) by over-articulating at phrase level;by over-articulating in connected speech;100%;with minimal cues (75-90%)  -      Time Frame (Articulation Goal 1, SLP) 1  week  -      Progress/Outcomes (Articulation Goal 1, SLP) new goal  -                User Key  (r) = Recorded By, (t) = Taken By, (c) = Cosigned By      Initials Name Provider Type    Isamar Cartagena MS CCC-SLP Speech and Language Pathologist                            Time Calculation:      Time Calculation- SLP       Row Name 04/23/24 1548             Time Calculation- SLP    SLP Start Time 1430  -CJ      SLP Received On 04/23/24  -         Untimed Charges    [id number] Eval Speech and Production w/ Language Minutes 23  -CJ      [id number]-YC Eval Oral Pharyng Swallow Minutes 40  -CJ         Total Minutes    Untimed Charges Total Minutes 63  -CJ       Total Minutes 63  -CJ                User Key  (r) = Recorded By, (t) = Taken By, (c) = Cosigned By      Initials Name Provider Type    Isamar Cartagena MS CCC-SLP Speech and Language Pathologist                    Therapy Charges for Today       Code Description Service Date Service Provider Modifiers Qty    14472947599 HC ST EVAL ORAL PHARYNG SWALLOW 3 4/23/2024 Isamar Noe MS CCC-SLP GN 1    [id number] HC ST EVAL SPEECH AND PROD W LANG  2 4/23/2024 Isamar Noe MS CCC-SLP GN 1                       MS MARY Cloud  4/23/2024

## 2024-04-23 NOTE — H&P
INTENSIVIST   PROGRESS NOTE        SUBJECTIVE     Virginia 74 y.o. female is followed for: Stroke       Suspected cerebrovascular accident (CVA)    BRYAN    Polymyalgia rheumatica    HTN (hypertension)    Emphysema    GERD (gastroesophageal reflux disease)    Dyslipidemia    Acute ischemic stroke    As an Intensivist, we provide an integrated approach to the ICU patient and family, medical management of comorbid conditions, including but not limited to electrolytes, glycemic control, organ dysfunction, lead interdisciplinary rounds and coordinate the care with all other services, including those from other specialists.     Interval History:  I was called by Dr. Manzo (Emergency Medicine) to admit to ICU due to suspected AIS for which she received Tenecteplase.    She presented with slurred speed and right arm weakness.    I saw her in the ED. She felt almost back to normal, with only minimal weakness and facial palsy, NIHSS 3.    When I saw her in 2 B ICU, facial palsy had improved, but she was worried about the numbness, tingling and weakness on her right arm.    Temp  Min: 98.1 °F (36.7 °C)  Max: 98.1 °F (36.7 °C)       History     Last Reviewed by Florencio Childers MD on 4/23/2024 at  5:32 PM    Sections Reviewed    Medical, Surgical, Family, Tobacco, Alcohol, Drug Use, Sexual Activity,   Social Documentation    Problem list reviewed by Florencio Childers MD on 4/23/2024 at  5:31 PM  Medicines reviewed by Florencio Childers MD on 4/23/2024 at  5:31 PM  Allergies reviewed by Florencio Childers MD on 4/23/2024 at  5:31 PM       The patient's relevant past medical, surgical and social history were reviewed and updated in Epic as appropriate.     PMH: She  has a past medical history of Adenomyomatosis of gallbladder (2009), Arthritis, Arthritis of lumbosacral spine, Asthma, Cataract, Chronic hoarseness, Class 1 obesity due to excess calories with serious comorbidity and body mass index (BMI) of 30.0 to 30.9 in adult  (07/16/2020), Closed displaced fracture of shaft of right clavicle with malunion, Diverticulitis (02/28/2019), Diverticulosis, Dyslipidemia (04/23/2024), GCA (giant cell arteritis), GERD (gastroesophageal reflux disease), HL (hearing loss) (2022), Hyperlipidemia ([place]), Low back pain (?), Medicare annual wellness visit, subsequent (02/01/2021), Lloyd's neuroma of left foot, Ocular histoplasmosis, Panic disorder (02/28/2019), and PMR (polymyalgia rheumatica).   PSH: She  has a past surgical history that includes Elbow surgery (Left); Cosmetic surgery (1994); Colonoscopy (02/2009); Throat surgery; Cosmetic surgery (1993); Cosmetic surgery (1980); Eye surgery (7/20 and 7/25/2023); and Tonsillectomy (1964).      Medications:  No current facility-administered medications on file prior to encounter.     Current Outpatient Medications on File Prior to Encounter   Medication Sig    acyclovir (ZOVIRAX) 400 MG tablet Take 1 tablet by mouth As Needed.    albuterol sulfate HFA (Ventolin HFA) 108 (90 Base) MCG/ACT inhaler Inhale 2 puffs Every 4 (Four) Hours As Needed for Wheezing.    amLODIPine (NORVASC) 5 MG tablet Take 1 tablet by mouth Daily.    benzonatate (TESSALON) 200 MG capsule Take 1 capsule by mouth 3 (Three) Times a Day As Needed for Cough.    buPROPion SR (WELLBUTRIN SR) 150 MG 12 hr tablet Take 1 tablet by mouth Daily.    busPIRone (BUSPAR) 5 MG tablet TAKE 1 TABLET BY MOUTH THREE TIMES DAILY AS NEEDED FOR ANXIETY    clobetasol (TEMOVATE) 0.05 % cream Apply once-nightly application for 12 weeks. Then twice weekly for long-term LS maintenance. (Patient taking differently: 2 (Two) Times a Week. prn)    Coenzyme Q10 (COQ-10) 200 MG capsule Take 1 tablet by mouth Daily.    cyanocobalamin (VITAMIN B-12) 500 MCG tablet Take 1 tablet by mouth Daily.    cyclobenzaprine (FLEXERIL) 10 MG tablet Take 1 tablet by mouth 2 (Two) Times a Day As Needed for Muscle Spasms.    ezetimibe (ZETIA) 10 MG tablet Take 1 tablet by mouth  Every Night.    famotidine (PEPCID) 20 MG tablet TAKE 1 TABLET BY MOUTH TWICE DAILY (Patient taking differently: 1 tablet 2 (Two) Times a Day As Needed.)    Fluticasone-Umeclidin-Vilant (Trelegy Ellipta) 100-62.5-25 MCG/ACT inhaler Inhale 1 puff Daily.    folic acid (FOLVITE) 1 MG tablet TAKE 1 TABLET BY MOUTH DAILY    Lactobacillus-Inulin ([facility] WELLNESS PO) Take 1 capsule by mouth Daily.    methocarbamol (ROBAXIN) 500 MG tablet Take 1 tablet by mouth Every 12 (Twelve) Hours.    montelukast (SINGULAIR) 10 MG tablet TAKE 1 TABLET BY MOUTH EVERY NIGHT    omeprazole (priLOSEC) 40 MG capsule Take 1 capsule by mouth Daily.    predniSONE (DELTASONE) 2.5 MG tablet Take 1 tablet by mouth Daily.    rosuvastatin (CRESTOR) 40 MG tablet Take 1 tablet by mouth Every Night.    tocilizumab (ACTEMRA) 200 MG/10ML solution injection Infuse 8 mg/kg into a venous catheter.    zoledronic acid (RECLAST) 5 MG/100ML solution infusion Administer ZOLEDRONIC ACID 5mg IV once annually       Allergies: She is allergic to bactrim [sulfamethoxazole-trimethoprim], atorvastatin, bupropion hcl [bupropion], and calcium.   FH: Her family history includes Arthritis in her paternal grandmother; Brain cancer in her sister; Breast cancer (age of onset: 90) in her maternal grandmother; Hearing loss in her father; Migraines in her father; Osteoporosis in her father and mother; Other in her brother, daughter, sister, and sister; Stroke in her brother, brother, father, mother, paternal aunt, paternal grandmother, sister, sister, sister, and sister.   SH: She  reports that she has been smoking cigarettes. She has a 25 pack-year smoking history. She has never used smokeless tobacco. She reports current alcohol use. She reports that she does not use drugs.     ROS:  As per HPI       OBJECTIVE     Vitals:  Temp: 98.1 °F (36.7 °C) (04/23/24 1257) Temp  Min: 98.1 °F (36.7 °C)  Max: 98.1 °F (36.7 °C)   Temp core:      BP: 150/70 (04/23/24 1800) BP   Min: 103/80  Max: 160/70   MAP (non-invasive) Noninvasive MAP (mmHg): 128 (24 1800) Noninvasive MAP (mmHg)  Av.2  Min: 83  Max: 130   Pulse: 68 (24 1800) Pulse  Min: 64  Max: 89   Resp: 14 (24 1438) Resp  Min: 12  Max: 22   SpO2: 92 % (24) SpO2  Min: 90 %  Max: 97 %   Device: nasal cannula (24 1800)    Flow Rate: 2 (24) Flow (L/min)  Min: 2  Max: 2         24  1226   Weight: 64 kg (141 lb 2.9 oz)        Intake/Ouptut 24 hrs (7:00AM - 6:59 AM)  Intake & Output (last 2 days)       None            Medications (drips):  niCARdipine, Last Rate: Stopped (24 1414)      Physical Examination  Telemetry:  Rhythm: normal sinus rhythm (24)         Constitutional:  No acute distress.   Cardiovascular: RRR.    Respiratory: Normal breath sounds  No adventitious sounds   Abdominal:  Soft with no tenderness.   Extremities: No Edema   Neurological:   Alert, Oriented, Cooperative.  Best Eye Response: 4-->(E4) spontaneous (24 1800)  Best Motor Response: 6-->(M6) obeys commands (24)  Best Verbal Response: 5-->(V5) oriented (24 1800)  [place] Coma Scale Score: 15 (24 1800)     NIH Stroke Scale  Interval: baseline (24 1251)  1a. Level of Consciousness: 0-->Alert, keenly responsive (24)  1b. LOC Questions: 0-->Answers both questions correctly (24)  1c. LOC Commands: 0-->Performs both tasks correctly (24)  2. Best Gaze: 0-->Normal (24)  3. Visual: 0-->No visual loss (24)  4. Facial Palsy: 1-->Minor paralysis (flattened nasolabial fold, asymmetry on smiling) (24)  5a. Motor Arm, Left: 0-->No drift, limb holds 90 (or 45) degrees for full 10 secs (24 1700)  5b. Motor Arm, Right: 1-->Drift, limb holds 90 (or 45) degrees, but drifts down before full 10 secs, does not hit bed or other support (24 170)  6a. Motor Leg, Left: 0-->No drift, leg holds 30 degree  position for full 5 secs (04/23/24 1700)  6b. Motor Leg, Right: 0-->No drift, leg holds 30 degree position for full 5 secs (04/23/24 1700)  7. Limb Ataxia: 1-->Present in one limb (04/23/24 1700)  8. Sensory: 0-->Normal, no sensory loss (04/23/24 1700)  9. Best Language: 0-->No aphasia, normal (04/23/24 1700)  10. Dysarthria: 0-->Normal (04/23/24 1700)  11. Extinction and Inattention (formerly Neglect): 0-->No abnormality (04/23/24 1700)  Total (NIH Stroke Scale): 3 (04/23/24 1700)      Results Reviewed:  Laboratory  Microbiology  Radiology  Pathology    Hematology:  Results from last 7 days   Lab Units 04/23/24  1232   WBC 10*3/mm3 4.65   HEMOGLOBIN g/dL 15.7   MCV fL 96.3   PLATELETS 10*3/mm3 138*     Results from last 7 days   Lab Units 04/23/24  1232   NEUTROS ABS 10*3/mm3 2.47   LYMPHS ABS 10*3/mm3 1.67   EOS ABS 10*3/mm3 0.06     Chemistry:  Estimated Creatinine Clearance: 52.9 mL/min (by C-G formula based on SCr of 0.8 mg/dL).    Results from last 7 days   Lab Units 04/23/24  1232   SODIUM mmol/L 143   POTASSIUM mmol/L 4.0   CHLORIDE mmol/L 103   CO2 mmol/L 24.0   BUN mg/dL 11   CREATININE mg/dL 0.80   GLUCOSE mg/dL 89     Results from last 7 days   Lab Units 04/23/24  1232   CALCIUM mg/dL 9.3       Hepatic Panel:  Results from last 7 days   Lab Units 04/23/24  1232   ALBUMIN g/dL 4.6   TOTAL PROTEIN g/dL 6.5   BILIRUBIN mg/dL 0.6   AST (SGOT) U/L 25  24   ALT (SGPT) U/L 21  20   ALK PHOS U/L 58       Coagulation Labs:  Results from last 7 days   Lab Units 04/23/24  1232 04/23/24  1230   PROTIME seconds  --  14.2   INR   --  1.2   APTT seconds 25.8  --         Cardiac Labs:  Results from last 7 days   Lab Units 04/23/24  1232   HSTROP T ng/L 9     Images:  XR Chest 1 View    Result Date: 4/23/2024  Impression: No evidence of acute cardiopulmonary disease. Electronically Signed: Amish Macias MD  4/23/2024 1:22 PM EDT  Workstation ID: [id number]    CT Angiogram Head w AI Analysis of LVO    Result Date:  4/23/2024  Impression: 1. Bilateral carotid bulb calcified atherosclerotic disease with no evidence of hemodynamically significant stenosis 2. Unremarkable intracranial CT angiogram 3. Isolated Tmax of more than 4 seconds of 15 mL with no other associated findings, nonspecific. May consider further evaluation with an MRI of the brain Electronically Signed: Placido Villegas MD  4/23/2024 1:18 PM EDT  Workstation ID: [id number]    CT Angiogram Neck    Result Date: 4/23/2024  Impression: 1. Bilateral carotid bulb calcified atherosclerotic disease with no evidence of hemodynamically significant stenosis 2. Unremarkable intracranial CT angiogram 3. Isolated Tmax of more than 4 seconds of 15 mL with no other associated findings, nonspecific. May consider further evaluation with an MRI of the brain Electronically Signed: Placido Villegas MD  4/23/2024 1:18 PM EDT  Workstation ID: [id number]    CT CEREBRAL PERFUSION WITH & WITHOUT CONTRAST    Result Date: 4/23/2024  Impression: 1. Bilateral carotid bulb calcified atherosclerotic disease with no evidence of hemodynamically significant stenosis 2. Unremarkable intracranial CT angiogram 3. Isolated Tmax of more than 4 seconds of 15 mL with no other associated findings, nonspecific. May consider further evaluation with an MRI of the brain Electronically Signed: Placido Villegas MD  4/23/2024 1:18 PM EDT  Workstation ID: [id number]    CT Head Without Contrast Stroke Protocol    Result Date: 4/23/2024  Impression: No evidence of acute intracranial abnormality. Electronically Signed: Amihs Macias MD  4/23/2024 12:37 PM EDT  Workstation ID: [id number]     Echo:      Results: Reviewed.    I reviewed the patient's new laboratory and imaging results.  I independently reviewed the patient's new images.    Medications: Reviewed.    Assessment   A/P     Hospital:  LOS: 0 days   ICU: 2h     Active Hospital Problems    Diagnosis  POA    **Suspected cerebrovascular accident (CVA) [R09.89]  Yes     Dyslipidemia [E78.5]  Yes    Acute ischemic stroke [I63.9]  Yes    Emphysema [J43.2]  Yes     Last spirometry  showed moderate obstruction without improvement after bronchodilator.    Using Trelegy Ellipta inhaler 1 puff daily.  Using albuterol rescue inhaler as needed for cough wheeze or lung congestion.  Taking montelukast (Singulair) tablet every evening to help with allergies and breathing.          GERD (gastroesophageal reflux disease) [K21.9]  Yes            HTN (hypertension) [I10]  Yes     Taking amlodipine daily.        Polymyalgia rheumatica [M35.3]  Yes     Taking Actemra infusions with Dr. Swift and low-dose prednisone 2.5 mg daily.  Tylenol as needed for pain.            BRYAN [F41.1]  Yes     Taking bupropion  mg tablet once a day.   buspirone 5 mg 3 times a day just as needed.             Virginia is a 74 y.o. female admitted on 4/23/2024 with Acute ischemic stroke [I63.9]    Assessment/Management/Treatment Plan:    Suspected AIS s/p Tenecteplase  } Per Stroke Team  CT Perfusion: Nonspecific findings.  Pulmonary   Emphysema  Office Spirometry 01/16/2020: FVC 1.94 L 70% of predicted, FEV1 1.25 L 60% of predicted, Ratio: 0.65. Airflow obstruction. Moderate (FEV1 60-69%)  Tobacco use. 1-1.5 ppd.  Last LDCT was on 12/2022 and there were no suspicious nodules. She is scheduled for a follow up study. (Dr. Eve Navarrete).  Cardiovascular  HTN  Dyslipidemia ? Treatment with rosuvastatin  GI/Hepatology  GERD  Polymyalgia rheumatica ? Treatment with Tocilizumab and Prednisone.  Endocrine  Body mass index is 26.68 kg/m². Overweight: 25.0-29.9kg/m2   No history of Diabetes    Lab Results   Lab Value Date/Time    HGBA1C 5.50 04/23/2024 1232             Diet: Diet: Cardiac; Healthy Heart (2-3 Na+); Texture: Regular (IDDSI 7); Fluid Consistency: Thin (IDDSI 0)  No active supplement orders      Advance Directives: Code Status and Medical Interventions:   Ordered at: 04/23/24 3109     Code Status (Patient has  no pulse and is not breathing):    CPR (Attempt to Resuscitate)     Medical Interventions (Patient has pulse or is breathing):    Full Support        DVT prophylaxis:  Mechanical DVT prophylaxis orders are signed and held. Mechanical DVT prophylaxis orders are present.         In brief:    Acute Ischemic Stroke  Monitor NIHSS  Monitor for arrhythmias  Parenteral antihypertensives prn  After Thrombolytics:  Target  BP < 180/105 mmHg  No ASA during first 24h post thrombolytics  CT-Head 24-h post-thrombolytics  No lion placement or removal for 24h  No anticoagulation or anti-platelet agents for 24h  MRI  FLP  High-intensity statin - Long term goal LDL < 70.  Goal: Glucose < 180 mg/dL.  HbA1c  ECHO with agitated saline study  VTE prophylaxis with SCDs  Stress ulcer prophylaxis  PT/OT/SLT  Smoking cessation  LABA + LAMA  Disposition: Admit to ICU    Plan of care and goals reviewed during interdisciplinary rounds.  I discussed the patient's findings and my recommendations with patient and nursing staff    MDM:    Problem(s) High due to: Acute or Chronic illness or injury that may poses a threat to life or bodily function  Data: Moderate due to: Review of prior external records from each unique source, Review or results of each unique test, and Ordering of each unique test    Moderate      [x] Primary Attending Intensive Care Medicine - Nutrition Support   [] Consultant

## 2024-04-23 NOTE — SIGNIFICANT NOTE
Savanna Villegas DNP, APRN   APRN NOTE    Irma Pedraza is a 74 y.o. female who presents to [facility] ED 04/23/24 with suspected CVA.     Patient has a PMH of tobacco and ETOH use, polymyalgia rheumatica on Actemra & prednisone, COPD, HTN, dyslipidemia, GERD, and BRYAN.     Per report, ~1130 today patient experienced acute onset of slurred speech and right arm weakness, prompting call to EMS. She was brought to our ED where upon arrival initial NIHSS 5 and CTH was negative for an acute intracranial abnormality. Subsequent CTA H/N negative for flow limiting stenosis and CTP was negative for LVO.     She was evaluated by Neurology ultimately deemed a candidate for thrombolytic therapy with TNKase administered. She will be admitted to ICU for further management and comprehensive CVA workup.     Split share visit.  APRN Time: I spent 8 minutes.   Electronically signed by Savanna Villegas DNP, APRN, 04/23/24, 3:39 PM EDT.

## 2024-04-23 NOTE — PLAN OF CARE
Goal Outcome Evaluation:  Plan of Care Reviewed With: patient, family        Progress:  (initial eval)     Plan for re-eval tomorrow to ensure no further difficulties.    Anticipated Discharge Disposition (SLP): inpatient rehabilitation facility    SLP Diagnosis: mild, dysarthria (04/23/24 1430)     SLP Swallowing Diagnosis: R/O pharyngeal dysphagia (04/23/24 1430)

## 2024-04-23 NOTE — ED PROVIDER NOTES
Subjective   History of Present Illness    Pt presents with right sided stroke symptoms.  She woke nromal. Says abut 1130 she had acute onset of right sided numbness.  Her right arm felt weak bt not leg.  She woke her  and felt her speech was a little slurred.  She does not take any blood thinners.    History provided by:  Patient and EMS personnel      Review of Systems   Constitutional:  Negative for fever.   Respiratory:  Negative for shortness of breath.    Neurological:  Positive for speech difficulty, weakness and numbness.   All other systems reviewed and are negative.      Past Medical History:   Diagnosis Date    Adenomyomatosis of gallbladder 2009    by ultrasound, Dr. Yomi Tai 2009    Arthritis     Arthritis of lumbosacral spine     Asthma     Cataract     Remove 7-20 and 7-25 2023    Chronic hoarseness     Class 1 obesity due to excess calories with serious comorbidity and body mass index (BMI) of 30.0 to 30.9 in adult 07/16/2020 2/1/2021 Eve Navarrete MD      Closed displaced fracture of shaft of right clavicle with malunion     Diverticulitis 02/28/2019    Diverticulosis     Years ago, mot sure of date    Dyslipidemia 04/23/2024    GCA (giant cell arteritis)     GERD (gastroesophageal reflux disease)     HL (hearing loss) 2022    Hyperlipidemia Unk    Low back pain ?    Medicare annual wellness visit, subsequent 02/01/2021    Lloyd's neuroma of left foot     surgery    Ocular histoplasmosis     BCC 2/6/2017    Panic disorder 02/28/2019    PMR (polymyalgia rheumatica)        Allergies   Allergen Reactions    Bactrim [Sulfamethoxazole-Trimethoprim] Other (See Comments)     Hypokalemia, hyponatremia    Atorvastatin Unknown (See Comments)     Lipitor      Bupropion Hcl [Bupropion] Unknown (See Comments)     wellbutrin    Calcium Unknown (See Comments)     unknown       Past Surgical History:   Procedure Laterality Date    COLONOSCOPY  02/2009    COSMETIC SURGERY  1994    face lift     COSMETIC SURGERY  1993    [facility]    COSMETIC SURGERY  1980    Birth kenny removal    ELBOW PROCEDURE Left     x2    EYE SURGERY  7/20 and 7/25/2023    Removed cataracts and incerted lenses in bot eyes    THROAT SURGERY      remote removal of growth from throat    TONSILLECTOMY  1964       Family History   Problem Relation Age of Onset    Osteoporosis Mother     Stroke Mother         Alzhiemers    Osteoporosis Father     Migraines Father     Hearing loss Father     Stroke Father         TIA    Brain cancer Sister     Stroke Sister         CVA    Stroke Brother         CVA    Breast cancer Maternal Grandmother 90    Stroke Paternal Grandmother         CVA    Arthritis Paternal Grandmother         Ostioperosis    Stroke Paternal Aunt         TIA    Stroke Sister         CVA    Stroke Sister         TIA    Stroke Brother         CVA    Stroke Sister         Glioblastoma    Other Sister         Rheumatoid Arthritis    Other Brother         no issues    Other Daughter         no issues    Other Sister         no issues    Ovarian cancer Neg Hx     Endometrial cancer Neg Hx        Social History     Socioeconomic History    Marital status:    Tobacco Use    Smoking status: Every Day     Current packs/day: 0.50     Average packs/day: 0.5 packs/day for 50.0 years (25.0 ttl pk-yrs)     Types: Cigarettes    Smokeless tobacco: Never    Tobacco comments:     patient refused smoking literature   Vaping Use    Vaping status: Never Used   Substance and Sexual Activity    Alcohol use: Yes     Comment: Usually have 1-2 drinks, 3-4 nights (vodka with diet 7up)    Drug use: No    Sexual activity: Not Currently     Partners: Male           Objective   Physical Exam  Vitals and nursing note reviewed.   Constitutional:       General: She is not in acute distress.     Appearance: Normal appearance. She is not ill-appearing.   HENT:      Head: Normocephalic and atraumatic.      Mouth/Throat:      Mouth: Mucous membranes are moist.    Eyes:      General: No scleral icterus.        Right eye: No discharge.         Left eye: No discharge.      Conjunctiva/sclera: Conjunctivae normal.   Cardiovascular:      Rate and Rhythm: Normal rate and regular rhythm.      Heart sounds: No murmur heard.  Pulmonary:      Effort: Pulmonary effort is normal. No respiratory distress.      Breath sounds: Normal breath sounds. No wheezing.   Abdominal:      General: Bowel sounds are normal. There is no distension.      Palpations: Abdomen is soft.      Tenderness: There is no abdominal tenderness. There is no guarding or rebound.   Musculoskeletal:         General: No swelling. Normal range of motion.      Cervical back: Normal range of motion and neck supple.   Skin:     General: Skin is warm and dry.      Findings: No rash.   Neurological:      Mental Status: She is alert and oriented to person, place, and time.      Cranial Nerves: Cranial nerve deficit present.      Comments: Mild R facial weakness. Mild R  weakness. No leg weakness.  Altered sensation RUE and RLE.  Speech normal.  Mentation normal.   Psychiatric:         Mood and Affect: Mood normal.         Behavior: Behavior normal.         Thought Content: Thought content normal.         Critical Care    Performed by: Osmany Manzo MD  Authorized by: Osmany Manzo MD    Critical care provider statement:     Critical care time (minutes):  35    Critical care time was exclusive of:  Separately billable procedures and treating other patients    Critical care was necessary to treat or prevent imminent or life-threatening deterioration of the following conditions:  CNS failure or compromise    Critical care was time spent personally by me on the following activities:  Discussions with consultants, examination of patient, obtaining history from patient or surrogate, ordering and performing treatments and interventions, ordering and review of laboratory studies and ordering and review of  radiographic studies    I assumed direction of critical care for this patient from another provider in my specialty: no      Care discussed with: admitting provider               ED Course    Seen on arrival, in CT scanner, with stroke team.  History from patient and EMS.  CT head negative for ICH.  TNK considered and stroke team is discussing risk/benefit with patient.    Discussed with stroke team.  They recommend TNK.  She is in the window for TNK and has R sided symptoms.  She agreed to TNK and it was given.    Labs benign.  EKG NSR.      Discussed with ICU attending for admission to the hospital.                      Total (NIH Stroke Scale): 3                  Medical Decision Making  Problems Addressed:  Acute ischemic stroke: complicated acute illness or injury with systemic symptoms that poses a threat to life or bodily functions    Amount and/or Complexity of Data Reviewed  Labs: ordered. Decision-making details documented in ED Course.  Radiology: ordered and independent interpretation performed. Decision-making details documented in ED Course.     Details: CT head NAD, read by me  ECG/medicine tests: ordered and independent interpretation performed. Decision-making details documented in ED Course.     Details: EKG NSR read by me  Discussion of management or test interpretation with external provider(s): Stroke neurology, ICU    Risk  Prescription drug management.  Decision regarding hospitalization.    Critical Care  Total time providing critical care: 35 minutes        Final diagnoses:   Acute ischemic stroke       ED Disposition  ED Disposition       ED Disposition   Decision to Admit    Condition   --    Comment   Level of Care: Critical Care [6]   Admitting Physician: YOON GUAN [[id number]]                 No follow-up provider specified.       Medication List      No changes were made to your prescriptions during this visit.            Osmany Manzo MD  04/23/24 [id number]

## 2024-04-24 ENCOUNTER — APPOINTMENT (OUTPATIENT)
Dept: MRI IMAGING | Facility: HOSPITAL | Age: 74
End: 2024-04-24
Payer: MEDICARE

## 2024-04-24 ENCOUNTER — APPOINTMENT (OUTPATIENT)
Dept: CARDIOLOGY | Facility: HOSPITAL | Age: 74
End: 2024-04-24
Payer: MEDICARE

## 2024-04-24 ENCOUNTER — APPOINTMENT (OUTPATIENT)
Dept: CT IMAGING | Facility: HOSPITAL | Age: 74
End: 2024-04-24
Payer: MEDICARE

## 2024-04-24 LAB
ANION GAP SERPL CALCULATED.3IONS-SCNC: 8 MMOL/L (ref 5–15)
BASOPHILS # BLD AUTO: 0.02 10*3/MM3 (ref 0–0.2)
BASOPHILS NFR BLD AUTO: 0.4 % (ref 0–1.5)
BH CV ECHO MEAS - AO MAX PG: 6.8 MMHG
BH CV ECHO MEAS - AO MEAN PG: 4 MMHG
BH CV ECHO MEAS - AO ROOT DIAM: 2.7 CM
BH CV ECHO MEAS - AO V2 MAX: 130 CM/SEC
BH CV ECHO MEAS - AO V2 VTI: 30.4 CM
BH CV ECHO MEAS - AVA(I,D): 1.99 CM2
BH CV ECHO MEAS - EDV(CUBED): 59.3 ML
BH CV ECHO MEAS - EDV(MOD-SP2): 78.7 ML
BH CV ECHO MEAS - EDV(MOD-SP4): 74.9 ML
BH CV ECHO MEAS - EF(MOD-BP): 65 %
BH CV ECHO MEAS - EF(MOD-SP2): 59.7 %
BH CV ECHO MEAS - EF(MOD-SP4): 66.6 %
BH CV ECHO MEAS - ESV(CUBED): 20.7 ML
BH CV ECHO MEAS - ESV(MOD-SP2): 31.7 ML
BH CV ECHO MEAS - ESV(MOD-SP4): 25 ML
BH CV ECHO MEAS - FS: 29.6 %
BH CV ECHO MEAS - IVS/LVPW: 1 CM
BH CV ECHO MEAS - IVSD: 0.9 CM
BH CV ECHO MEAS - LA DIMENSION: 2.7 CM
BH CV ECHO MEAS - LAT PEAK E' VEL: 7 CM/SEC
BH CV ECHO MEAS - LV MASS(C)D: 105.3 GRAMS
BH CV ECHO MEAS - LV MAX PG: 3.7 MMHG
BH CV ECHO MEAS - LV MEAN PG: 2 MMHG
BH CV ECHO MEAS - LV V1 MAX: 96 CM/SEC
BH CV ECHO MEAS - LV V1 VTI: 20.2 CM
BH CV ECHO MEAS - LVIDD: 3.9 CM
BH CV ECHO MEAS - LVIDS: 2.7 CM
BH CV ECHO MEAS - LVOT AREA: 3 CM2
BH CV ECHO MEAS - LVOT DIAM: 1.95 CM
BH CV ECHO MEAS - LVPWD: 0.9 CM
BH CV ECHO MEAS - MED PEAK E' VEL: 4.9 CM/SEC
BH CV ECHO MEAS - MV A MAX VEL: 102.8 CM/SEC
BH CV ECHO MEAS - MV DEC SLOPE: 213.8 CM/SEC2
BH CV ECHO MEAS - MV DEC TIME: 0.37 SEC
BH CV ECHO MEAS - MV E MAX VEL: 77.1 CM/SEC
BH CV ECHO MEAS - MV E/A: 0.75
BH CV ECHO MEAS - MV MAX PG: 5.2 MMHG
BH CV ECHO MEAS - MV MEAN PG: 2.36 MMHG
BH CV ECHO MEAS - MV P1/2T: 115.3 MSEC
BH CV ECHO MEAS - MV V2 VTI: 39.5 CM
BH CV ECHO MEAS - MVA(P1/2T): 1.91 CM2
BH CV ECHO MEAS - MVA(VTI): 1.54 CM2
BH CV ECHO MEAS - PA ACC TIME: 0.09 SEC
BH CV ECHO MEAS - SV(LVOT): 60.6 ML
BH CV ECHO MEAS - SV(MOD-SP2): 47 ML
BH CV ECHO MEAS - SV(MOD-SP4): 49.9 ML
BH CV ECHO MEAS - TAPSE (>1.6): 1.89 CM
BH CV ECHO MEASUREMENTS AVERAGE E/E' RATIO: 12.96
BH CV VAS BP LEFT ARM: NORMAL MMHG
BH CV XLRA - RV BASE: 3.3 CM
BH CV XLRA - TDI S': 11.6 CM/SEC
BH CV XLRA MEAS LEFT DIST CCA EDV: 31.3 CM/SEC
BH CV XLRA MEAS LEFT DIST CCA PSV: 92.6 CM/SEC
BH CV XLRA MEAS LEFT DIST ICA EDV: 24.9 CM/SEC
BH CV XLRA MEAS LEFT DIST ICA PSV: 105 CM/SEC
BH CV XLRA MEAS LEFT ICA/CCA RATIO: 1.04
BH CV XLRA MEAS LEFT MID CCA EDV: 23.8 CM/SEC
BH CV XLRA MEAS LEFT MID CCA PSV: 98.9 CM/SEC
BH CV XLRA MEAS LEFT MID ICA EDV: 23.6 CM/SEC
BH CV XLRA MEAS LEFT MID ICA PSV: 103 CM/SEC
BH CV XLRA MEAS LEFT PROX CCA EDV: 26.3 CM/SEC
BH CV XLRA MEAS LEFT PROX CCA PSV: 103 CM/SEC
BH CV XLRA MEAS LEFT PROX ECA EDV: 10.6 CM/SEC
BH CV XLRA MEAS LEFT PROX ECA PSV: 103 CM/SEC
BH CV XLRA MEAS LEFT PROX ICA EDV: 21.1 CM/SEC
BH CV XLRA MEAS LEFT PROX ICA PSV: 92.6 CM/SEC
BH CV XLRA MEAS LEFT PROX SCLA PSV: 209 CM/SEC
BH CV XLRA MEAS LEFT VERTEBRAL A EDV: 15.5 CM/SEC
BH CV XLRA MEAS LEFT VERTEBRAL A PSV: 77 CM/SEC
BH CV XLRA MEAS RIGHT DIST CCA EDV: 19.3 CM/SEC
BH CV XLRA MEAS RIGHT DIST CCA PSV: 65.2 CM/SEC
BH CV XLRA MEAS RIGHT DIST ICA EDV: 23.8 CM/SEC
BH CV XLRA MEAS RIGHT DIST ICA PSV: 64.5 CM/SEC
BH CV XLRA MEAS RIGHT ICA/CCA RATIO: 1.27
BH CV XLRA MEAS RIGHT MID CCA EDV: 16.2 CM/SEC
BH CV XLRA MEAS RIGHT MID CCA PSV: 95.7 CM/SEC
BH CV XLRA MEAS RIGHT MID ICA EDV: 30.1 CM/SEC
BH CV XLRA MEAS RIGHT MID ICA PSV: 103 CM/SEC
BH CV XLRA MEAS RIGHT PROX CCA EDV: 19.9 CM/SEC
BH CV XLRA MEAS RIGHT PROX CCA PSV: 112 CM/SEC
BH CV XLRA MEAS RIGHT PROX ECA EDV: 11.2 CM/SEC
BH CV XLRA MEAS RIGHT PROX ECA PSV: 120 CM/SEC
BH CV XLRA MEAS RIGHT PROX ICA EDV: 30.1 CM/SEC
BH CV XLRA MEAS RIGHT PROX ICA PSV: 122 CM/SEC
BH CV XLRA MEAS RIGHT PROX SCLA PSV: 246 CM/SEC
BH CV XLRA MEAS RIGHT VERTEBRAL A EDV: 12.6 CM/SEC
BH CV XLRA MEAS RIGHT VERTEBRAL A PSV: 44.1 CM/SEC
BUN SERPL-MCNC: 9 MG/DL (ref 8–23)
BUN/CREAT SERPL: 11.8 (ref 7–25)
CALCIUM SPEC-SCNC: 9.2 MG/DL (ref 8.6–10.5)
CHLORIDE SERPL-SCNC: 101 MMOL/L (ref 98–107)
CHOLEST SERPL-MCNC: 130 MG/DL (ref 0–200)
CO2 SERPL-SCNC: 31 MMOL/L (ref 22–29)
CREAT SERPL-MCNC: 0.76 MG/DL (ref 0.57–1)
DEPRECATED RDW RBC AUTO: 42.6 FL (ref 37–54)
EGFRCR SERPLBLD CKD-EPI 2021: 82.3 ML/MIN/1.73
EOSINOPHIL # BLD AUTO: 0.09 10*3/MM3 (ref 0–0.4)
EOSINOPHIL NFR BLD AUTO: 1.7 % (ref 0.3–6.2)
ERYTHROCYTE [DISTWIDTH] IN BLOOD BY AUTOMATED COUNT: 12.4 % (ref 12.3–15.4)
GLUCOSE BLDC GLUCOMTR-MCNC: 84 MG/DL (ref 70–130)
GLUCOSE SERPL-MCNC: 87 MG/DL (ref 65–99)
HCT VFR BLD AUTO: 43.2 % (ref 34–46.6)
HDLC SERPL-MCNC: 50 MG/DL (ref 40–60)
HGB BLD-MCNC: 14.6 G/DL (ref 12–15.9)
IMM GRANULOCYTES # BLD AUTO: 0.01 10*3/MM3 (ref 0–0.05)
IMM GRANULOCYTES NFR BLD AUTO: 0.2 % (ref 0–0.5)
LDLC SERPL CALC-MCNC: 58 MG/DL (ref 0–100)
LDLC/HDLC SERPL: 1.1 {RATIO}
LYMPHOCYTES # BLD AUTO: 1.98 10*3/MM3 (ref 0.7–3.1)
LYMPHOCYTES NFR BLD AUTO: 36.9 % (ref 19.6–45.3)
MCH RBC QN AUTO: 31.5 PG (ref 26.6–33)
MCHC RBC AUTO-ENTMCNC: 33.8 G/DL (ref 31.5–35.7)
MCV RBC AUTO: 93.3 FL (ref 79–97)
MONOCYTES # BLD AUTO: 0.45 10*3/MM3 (ref 0.1–0.9)
MONOCYTES NFR BLD AUTO: 8.4 % (ref 5–12)
NEUTROPHILS NFR BLD AUTO: 2.81 10*3/MM3 (ref 1.7–7)
NEUTROPHILS NFR BLD AUTO: 52.4 % (ref 42.7–76)
NRBC BLD AUTO-RTO: 0 /100 WBC (ref 0–0.2)
PLATELET # BLD AUTO: 130 10*3/MM3 (ref 140–450)
PMV BLD AUTO: 10.2 FL (ref 6–12)
POTASSIUM SERPL-SCNC: 3.8 MMOL/L (ref 3.5–5.2)
RBC # BLD AUTO: 4.63 10*6/MM3 (ref 3.77–5.28)
RIGHT ARM BP: NORMAL MMHG
SODIUM SERPL-SCNC: 140 MMOL/L (ref 136–145)
TRIGL SERPL-MCNC: 124 MG/DL (ref 0–150)
VLDLC SERPL-MCNC: 22 MG/DL (ref 5–40)
WBC NRBC COR # BLD AUTO: 5.36 10*3/MM3 (ref 3.4–10.8)

## 2024-04-24 PROCEDURE — 82948 REAGENT STRIP/BLOOD GLUCOSE: CPT

## 2024-04-24 PROCEDURE — 94799 UNLISTED PULMONARY SVC/PX: CPT

## 2024-04-24 PROCEDURE — 99233 SBSQ HOSP IP/OBS HIGH 50: CPT | Performed by: STUDENT IN AN ORGANIZED HEALTH CARE EDUCATION/TRAINING PROGRAM

## 2024-04-24 PROCEDURE — 97530 THERAPEUTIC ACTIVITIES: CPT

## 2024-04-24 PROCEDURE — 85025 COMPLETE CBC W/AUTO DIFF WBC: CPT | Performed by: INTERNAL MEDICINE

## 2024-04-24 PROCEDURE — 93306 TTE W/DOPPLER COMPLETE: CPT | Performed by: INTERNAL MEDICINE

## 2024-04-24 PROCEDURE — 97161 PT EVAL LOW COMPLEX 20 MIN: CPT

## 2024-04-24 PROCEDURE — 70450 CT HEAD/BRAIN W/O DYE: CPT

## 2024-04-24 PROCEDURE — 70551 MRI BRAIN STEM W/O DYE: CPT

## 2024-04-24 PROCEDURE — 93306 TTE W/DOPPLER COMPLETE: CPT

## 2024-04-24 PROCEDURE — 97535 SELF CARE MNGMENT TRAINING: CPT

## 2024-04-24 PROCEDURE — 94761 N-INVAS EAR/PLS OXIMETRY MLT: CPT

## 2024-04-24 PROCEDURE — 25010000002 MIDAZOLAM PER 1 MG: Performed by: NURSE PRACTITIONER

## 2024-04-24 PROCEDURE — 99232 SBSQ HOSP IP/OBS MODERATE 35: CPT | Performed by: INTERNAL MEDICINE

## 2024-04-24 PROCEDURE — 80048 BASIC METABOLIC PNL TOTAL CA: CPT | Performed by: INTERNAL MEDICINE

## 2024-04-24 PROCEDURE — 92610 EVALUATE SWALLOWING FUNCTION: CPT

## 2024-04-24 PROCEDURE — 97165 OT EVAL LOW COMPLEX 30 MIN: CPT

## 2024-04-24 PROCEDURE — 80061 LIPID PANEL: CPT

## 2024-04-24 PROCEDURE — 63710000001 REVEFENACIN 175 MCG/3ML SOLUTION: Performed by: INTERNAL MEDICINE

## 2024-04-24 PROCEDURE — 93880 EXTRACRANIAL BILAT STUDY: CPT | Performed by: INTERNAL MEDICINE

## 2024-04-24 PROCEDURE — 93880 EXTRACRANIAL BILAT STUDY: CPT

## 2024-04-24 PROCEDURE — 92507 TX SP LANG VOICE COMM INDIV: CPT

## 2024-04-24 RX ORDER — ALBUTEROL SULFATE 2.5 MG/3ML
2.5 SOLUTION RESPIRATORY (INHALATION) EVERY 4 HOURS PRN
Status: DISCONTINUED | OUTPATIENT
Start: 2024-04-24 | End: 2024-04-25 | Stop reason: HOSPADM

## 2024-04-24 RX ORDER — PREDNISONE 5 MG/1
2.5 TABLET ORAL
Status: DISCONTINUED | OUTPATIENT
Start: 2024-04-25 | End: 2024-04-25 | Stop reason: HOSPADM

## 2024-04-24 RX ADMIN — ASPIRIN 325 MG: 325 TABLET ORAL at 16:52

## 2024-04-24 RX ADMIN — Medication 10 ML: at 20:52

## 2024-04-24 RX ADMIN — Medication 1 PATCH: at 16:52

## 2024-04-24 RX ADMIN — REVEFENACIN 175 MCG: 175 SOLUTION RESPIRATORY (INHALATION) at 10:02

## 2024-04-24 RX ADMIN — ARFORMOTEROL TARTRATE 15 MCG: 15 SOLUTION RESPIRATORY (INHALATION) at 19:36

## 2024-04-24 RX ADMIN — ROSUVASTATIN CALCIUM 40 MG: 20 TABLET, COATED ORAL at 20:52

## 2024-04-24 RX ADMIN — Medication 10 ML: at 09:02

## 2024-04-24 RX ADMIN — ARFORMOTEROL TARTRATE 15 MCG: 15 SOLUTION RESPIRATORY (INHALATION) at 10:02

## 2024-04-24 RX ADMIN — MONTELUKAST 10 MG: 10 TABLET, FILM COATED ORAL at 20:52

## 2024-04-24 RX ADMIN — MIDAZOLAM HYDROCHLORIDE 1 MG: 1 INJECTION, SOLUTION INTRAMUSCULAR; INTRAVENOUS at 02:27

## 2024-04-24 NOTE — THERAPY EVALUATION
Patient Name: Irma Pedraza  : 1950    MRN: [id number]                              Today's Date: 2024       Admit Date: 2024    Visit Dx:     ICD-10-CM ICD-9-CM   1. Acute ischemic stroke  I63.9 434.91   2. Dysphagia, unspecified type  R13.10 787.20     Patient Active Problem List   Diagnosis    Cervicalgia    BRYAN    Mixed hyperlipidemia    Mitral valve disorder    Tinnitus    Vitamin D deficiency    Senile osteoporosis    Dyspnea    Polymyalgia rheumatica    Primary insomnia    Chronic pain of both shoulders    Chronic upper back pain    Drug-induced hypokalemia    HTN (hypertension)    Benign paroxysmal positional vertigo    Chronic low back pain    Emphysema    GERD (gastroesophageal reflux disease)    Muscle spasms of both lower extremities    Temporal arteritis    Tobacco dependence syndrome    Nonalcoholic fatty liver disease    Spasm of thoracic back muscle    Annual physical exam    Medicare annual wellness visit, subsequent    Rheumatoid arthritis, seropositive    Lichen sclerosus of female genitalia    Lichen sclerosus    Sprain of interphalangeal joint of right thumb    Alas's esophagus without dysplasia    Overweight with body mass index (BMI) of 26 to 26.9 in adult    Mild memory disturbance    High plasma homocystine    Poor appetite    Suspected cerebrovascular accident (CVA)    Dyslipidemia    Acute CVA (cerebrovascular accident)     Past Medical History:   Diagnosis Date    Adenomyomatosis of gallbladder 2009    by ultrasound, Dr. Yomi Tai     Arthritis     Arthritis of lumbosacral spine     Asthma     Cataract     Remove  and     Chronic hoarseness     Class 1 obesity due to excess calories with serious comorbidity and body mass index (BMI) of 30.0 to 30.9 in adult 2020 Eve Navarrete MD      Closed displaced fracture of shaft of right clavicle with malunion     Diverticulitis 2019    Diverticulosis     Years ago, mot sure of date     Dyslipidemia 04/23/2024    GCA (giant cell arteritis)     GERD (gastroesophageal reflux disease)     HL (hearing loss) 2022    Hyperlipidemia Unk    Low back pain ?    Medicare annual wellness visit, subsequent 02/01/2021    Lloyd's neuroma of left foot     surgery    Ocular histoplasmosis     BCC 2/6/2017    Panic disorder 02/28/2019    PMR (polymyalgia rheumatica)      Past Surgical History:   Procedure Laterality Date    COLONOSCOPY  02/2009    COSMETIC SURGERY  1994    face lift    COSMETIC SURGERY  1993    TUMMY TUCK    COSMETIC SURGERY  1980    Birth kenny removal    ELBOW PROCEDURE Left     x2    EYE SURGERY  7/20 and 7/25/2023    Removed cataracts and incerted lenses in bot eyes    THROAT SURGERY      remote removal of growth from throat    TONSILLECTOMY  1964      General Information       Row Name 04/24/24 1102          OT Time and Intention    Document Type evaluation  -     Mode of Treatment occupational therapy  -       Row Name 04/24/24 1102          General Information    Patient Profile Reviewed yes  -     Prior Level of Function independent:;all household mobility;community mobility;gait;transfer;bed mobility;ADL's  -     Existing Precautions/Restrictions no known precautions/restrictions  -     Barriers to Rehab none identified  -       Row Name 04/24/24 1102          Living Environment    People in Home spouse  -       Row Name 04/24/24 1102          Home Main Entrance    Number of Stairs, Main Entrance one  -KL     Stair Railings, Main Entrance none  -       Row Name 04/24/24 1102          Stairs Within Home, Primary    Stairs, Within Home, Primary multi level home. Office on second level, pt can reside on 1st  -KL     Stair Railings, Within Home, Primary railing on left side (ascending)  -       Row Name 04/24/24 1102          Cognition    Orientation Status (Cognition) oriented x 4  -KL       Row Name 04/24/24 1102          Safety Issues, Functional Mobility    Impairments  Affecting Function (Mobility) sensation/sensory awareness;visual/perceptual  -               User Key  (r) = Recorded By, (t) = Taken By, (c) = Cosigned By      Initials Name Provider Type    Fanny Daniels OT Occupational Therapist                     Mobility/ADL's       Row Name 04/24/24 1104          Bed Mobility    Bed Mobility supine-sit  -     Supine-Sit [place] (Bed Mobility) modified independence  -     Assistive Device (Bed Mobility) head of bed elevated  -KL       Row Name 04/24/24 1104          Transfers    Transfers sit-stand transfer;stand-sit transfer;toilet transfer  -KL       Row Name 04/24/24 1104          Sit-Stand Transfer    Sit-Stand [place] (Transfers) supervision  -     Assistive Device (Sit-Stand Transfers) other (see comments)  None  -KL       Row Name 04/24/24 1104          Stand-Sit Transfer    Stand-Sit [place] (Transfers) supervision  -     Assistive Device (Stand-Sit Transfers) other (see comments)  None  -KL       Row Name 04/24/24 1104          Toilet Transfer    Type (Toilet Transfer) sit-stand;stand-sit  -     [place] Level (Toilet Transfer) supervision  -     Assistive Device (Toilet Transfer) grab bars/safety frame  -KL       Row Name 04/24/24 1104          Functional Mobility    Functional Mobility- Ind. Level supervision required  -     Functional Mobility- Device other (see comments)  None  -     Functional Mobility-Distance (Feet) --  HH distances  -     Functional Mobility- Safety Issues supplemental O2  -KL       Row Name 04/24/24 1104          Activities of Daily Living    BADL Assessment/Intervention lower body dressing;toileting;grooming  -KL       Row Name 04/24/24 1104          Lower Body Dressing Assessment/Training    [place] Level (Lower Body Dressing) don;socks;set up  -     Position (Lower Body Dressing) edge of bed sitting  -[facility] Name 04/24/24 1104          Toileting Assessment/Training    [place] Level  (Toileting) adjust/manage clothing;perform perineal hygiene;standby assist  -     Position (Toileting) unsupported sitting;unsupported standing  -KL       Row Name 04/24/24 1104          Grooming Assessment/Training    [place] Level (Grooming) wash face, hands;supervision  -     Position (Grooming) sink side  -               User Key  (r) = Recorded By, (t) = Taken By, (c) = Cosigned By      Initials Name Provider Type    KL Fanny Harmon OT Occupational Therapist                   Obj/Interventions       [facility] Name 04/24/24 1108          Sensory Assessment (Somatosensory)    Sensory Assessment (Somatosensory) right UE  [facility]     Sensory Subjective Reports paresthesia;tingling  -KL       Row Name 04/24/24 1108          Vision Assessment/Intervention    Visual Impairment/Limitations peripheral vision impaired left  -KL       Row Name 04/24/24 1108          Range of Motion Comprehensive    General Range of Motion bilateral upper extremity ROM WFL  -KL       Row Name 04/24/24 1108          Strength Comprehensive (MMT)    Comment, General Manual Muscle Testing (MMT) Assessment RUE MMT grossly 4/5; LUE 4+/5  -KL       Row Name 04/24/24 1108          Motor Skills    Motor Skills coordination  -     Coordination bilateral;fine motor deficit;WFL  -KL       Row Name 04/24/24 1108          Balance    Balance Assessment sitting static balance;sitting dynamic balance;sit to stand dynamic balance;standing static balance;standing dynamic balance  -     Static Sitting Balance independent  -     Dynamic Sitting Balance independent  -     Position, Sitting Balance unsupported;sitting edge of bed  -     Static Standing Balance standby assist  -     Dynamic Standing Balance supervision  -     Position/Device Used, Standing Balance unsupported  -     Balance Interventions sitting;standing;sit to stand;supported;static;dynamic;occupation based/functional task  -               User Key  (r) = Recorded By, (t) =  Taken By, (c) = Cosigned By      Initials Name Provider Type    Fanny Daniels, OT Occupational Therapist                   Goals/Plan       Row Name 04/24/24 1114          Transfer Goal 1 (OT)    Activity/Assistive Device (Transfer Goal 1, OT) sit-to-stand/stand-to-sit;toilet  -     [place] Level/Cues Needed (Transfer Goal 1, OT) independent  -KL     Time Frame (Transfer Goal 1, OT) 10 days  -KL     Progress/Outcome (Transfer Goal 1, OT) new goal  -       Row Name 04/24/24 1114          Grooming Goal 1 (OT)    Activity/Device (Grooming Goal 1, OT) hair care;oral care;wash face, hands  -KL     [place] (Grooming Goal 1, OT) independent  -KL     Time Frame (Grooming Goal 1, OT) long term goal (LTG);10 days  -KL     Strategies/Barriers (Grooming Goal 1, OT) sink side using RUE w/ AE PRN  -KL     Progress/Outcome (Grooming Goal 1, OT) new goal  -       Row Name 04/24/24 1114          Problem Specific Goal 1 (OT)    Problem Specific Goal 1 (OT) Pt will demo understanding of sensory integration HEP for RUE to progress towards normal sensation in dominant UE.  -KL     Time Frame (Problem Specific Goal 1, OT) long term goal (LTG);10 days  -KL     Progress/Outcome (Problem Specific Goal 1, OT) new goal  -       Row Name 04/24/24 1114          Therapy Assessment/Plan (OT)    Planned Therapy Interventions (OT) adaptive equipment training;occupation/activity based interventions;patient/caregiver education/training;transfer/mobility retraining;cognitive/visual perception retraining;other (see comments)  sensory integration  -               User Key  (r) = Recorded By, (t) = Taken By, (c) = Cosigned By      Initials Name Provider Type    Fanny Daniels, OT Occupational Therapist                   Clinical Impression       Row Name 04/24/24 1112          Pain Assessment    Pretreatment Pain Rating 0/10 - no pain  -     Posttreatment Pain Rating 0/10 - no pain  -       Row Name 04/24/24 1112          Plan of  Care Review    Plan of Care Reviewed With patient  -     Progress no change  -     Outcome Evaluation Pt presents to OT evaluation near baseline function for mobility and self-care. Pt would benefit from IPOT services to address sensation integration in dominant RUE. d/c rec is home w/ assist PRN.  -       Row Name 04/24/24 1112          Therapy Assessment/Plan (OT)    Patient/Family Therapy Goal Statement (OT) Improve sensation in RUE and return home  -     Rehab Potential (OT) good, to achieve stated therapy goals  -     Criteria for Skilled Therapeutic Interventions Met (OT) yes  -KL     Therapy Frequency (OT) daily  -       Row Name 04/24/24 1112          Therapy Plan Review/Discharge Plan (OT)    Anticipated Discharge Disposition (OT) home with assist  -       Row Name 04/24/24 1112          Vital Signs    Pre Systolic BP Rehab 134  -KL     Pre Treatment Diastolic BP 71  -KL     Post Systolic BP Rehab 130  -KL     Post Treatment Diastolic BP 88  -KL     Pretreatment Heart Rate (beats/min) 59  -KL     Posttreatment Heart Rate (beats/min) 62  -KL     Pre SpO2 (%) 95  -KL     O2 Delivery Pre Treatment nasal cannula  -     Post SpO2 (%) 97  -KL     O2 Delivery Post Treatment nasal cannula  -KL     Pre Patient Position Supine  -KL     Intra Patient Position Standing  -KL     Post Patient Position Sitting  -       Row Name 04/24/24 1112          Positioning and Restraints    Pre-Treatment Position in bed  -KL     Post Treatment Position chair  -KL     In Chair notified nsg;reclined;sitting;call light within reach;encouraged to call for assist;exit alarm on;waffle cushion;legs elevated  -               User Key  (r) = Recorded By, (t) = Taken By, (c) = Cosigned By      Initials Name Provider Type    Fanny Daniels, OT Occupational Therapist                   Outcome Measures       Row Name 04/24/24 1117          How much help from another is currently needed...    Putting on and taking off regular  lower body clothing? 4  -KL     Bathing (including washing, rinsing, and drying) 4  -KL     Toileting (which includes using toilet bed pan or urinal) 4  -KL     Putting on and taking off regular upper body clothing 4  -KL     Taking care of personal grooming (such as brushing teeth) 3  -KL     Eating meals 4  -KL     AM-PAC 6 Clicks Score (OT) 23  -       Row Name 04/24/24 0800          How much help from another person do you currently need...    Turning from your back to your side while in flat bed without using bedrails? 4  -AM     Moving from lying on back to sitting on the side of a flat bed without bedrails? 4  -AM     Moving to and from a bed to a chair (including a wheelchair)? 4  -AM     Standing up from a chair using your arms (e.g., wheelchair, bedside chair)? 4  -AM     Climbing 3-5 steps with a railing? 3  -AM     To walk in hospital room? 4  -AM     AM-PAC 6 Clicks Score (PT) 23  -AM     Highest Level of Mobility Goal 7 --> Walk 25 feet or more  -AM       Row Name 04/24/24 1117          Modified Zay Scale    Pre-Stroke Modified Zay Scale 6 - Unable to determine (UTD) from the medical record documentation  -     Modified Bentley Scale 1 - No significant disability despite symptoms.  Able to carry out all usual duties and activities.  -       Row Name 04/24/24 1117          Functional Assessment    Outcome Measure Options AM-PAC 6 Clicks Daily Activity (OT);Modified Bentley  -               User Key  (r) = Recorded By, (t) = Taken By, (c) = Cosigned By      Initials Name Provider Type    AM Beatrice Smiley, RN Registered Nurse    Fanny Daniels OT Occupational Therapist                    Occupational Therapy Education       Title: PT OT SLP Therapies (In Progress)       Topic: Occupational Therapy (In Progress)       Point: ADL training (Done)       Description:   Instruct learner(s) on proper safety adaptation and remediation techniques during self care or transfers.   Instruct in proper  use of assistive devices.                  Learning Progress Summary             Patient Acceptance, E, VU by  at 4/24/2024 1117                         Point: Home exercise program (Not Started)       Description:   Instruct learner(s) on appropriate technique for monitoring, assisting and/or progressing therapeutic exercises/activities.                  Learner Progress:  Not documented in this visit.              Point: Precautions (Done)       Description:   Instruct learner(s) on prescribed precautions during self-care and functional transfers.                  Learning Progress Summary             Patient Acceptance, E, VU by  at 4/24/2024 1117                         Point: Body mechanics (Done)       Description:   Instruct learner(s) on proper positioning and spine alignment during self-care, functional mobility activities and/or exercises.                  Learning Progress Summary             Patient Acceptance, E, VU by  at 4/24/2024 1117                                         User Key       Initials Effective Dates Name Provider Type Discipline     02/05/24 -  Fanny Harmon OT Occupational Therapist OT                  OT Recommendation and Plan  Planned Therapy Interventions (OT): adaptive equipment training, occupation/activity based interventions, patient/caregiver education/training, transfer/mobility retraining, cognitive/visual perception retraining, other (see comments) (sensory integration)  Therapy Frequency (OT): daily  Plan of Care Review  Plan of Care Reviewed With: patient  Progress: no change  Outcome Evaluation: Pt presents to OT evaluation near baseline function for mobility and self-care. Pt would benefit from IPOT services to address sensation integration in dominant RUE. d/c rec is home w/ assist PRN.     Time Calculation:   Evaluation Complexity (OT)  Review Occupational Profile/Medical/Therapy History Complexity: brief/low complexity  Assessment, Occupational  Performance/Identification of Deficit Complexity: 1-3 performance deficits  Clinical Decision Making Complexity (OT): problem focused assessment/low complexity  Overall Complexity of Evaluation (OT): low complexity     Time Calculation- OT       Row Name 04/24/24 1118             Time Calculation- OT    OT Start Time 0815  -KL      OT Received On 04/24/24  -      OT Goal Re-Cert Due Date 05/04/24  -         Timed Charges    22125 - OT Self Care/Mgmt Minutes 10  -KL         Untimed Charges    OT Eval/Re-eval Minutes 46  -KL         Total Minutes    Timed Charges Total Minutes 10  -KL      Untimed Charges Total Minutes 46  -KL       Total Minutes 56  -KL                User Key  (r) = Recorded By, (t) = Taken By, (c) = Cosigned By      Initials Name Provider Type    Fanny Daniels OT Occupational Therapist                  Therapy Charges for Today       Code Description Service Date Service Provider Modifiers Qty    [id number]  OT SELF CARE/MGMT/TRAIN EA 15 MIN 4/24/2024 Fanny Harmon OT GO 1    [id number]  OT EVAL LOW COMPLEXITY 4 4/24/2024 Fanny Harmon OT GO 1                 Fanny Harmon OT  4/24/2024

## 2024-04-24 NOTE — THERAPY EVALUATION
Patient Name: Irma Pedraza  : 1950    MRN: [id number]                              Today's Date: 2024       Admit Date: 2024    Visit Dx:     ICD-10-CM ICD-9-CM   1. Acute ischemic stroke  I63.9 434.91   2. Dysphagia, unspecified type  R13.10 787.20     Patient Active Problem List   Diagnosis    Cervicalgia    BRYAN    Mixed hyperlipidemia    Mitral valve disorder    Tinnitus    Vitamin D deficiency    Senile osteoporosis    Dyspnea    Polymyalgia rheumatica    Primary insomnia    Chronic pain of both shoulders    Chronic upper back pain    Drug-induced hypokalemia    HTN (hypertension)    Benign paroxysmal positional vertigo    Chronic low back pain    Emphysema    GERD (gastroesophageal reflux disease)    Muscle spasms of both lower extremities    Temporal arteritis    Tobacco dependence syndrome    Nonalcoholic fatty liver disease    Spasm of thoracic back muscle    Annual physical exam    Medicare annual wellness visit, subsequent    Rheumatoid arthritis, seropositive    Lichen sclerosus of female genitalia    Lichen sclerosus    Sprain of interphalangeal joint of right thumb    Alas's esophagus without dysplasia    Overweight with body mass index (BMI) of 26 to 26.9 in adult    Mild memory disturbance    High plasma homocystine    Poor appetite    Suspected cerebrovascular accident (CVA)    Dyslipidemia    Acute CVA (cerebrovascular accident)     Past Medical History:   Diagnosis Date    Adenomyomatosis of gallbladder 2009    by ultrasound, Dr. Yomi Tai     Arthritis     Arthritis of lumbosacral spine     Asthma     Cataract     Remove  and     Chronic hoarseness     Class 1 obesity due to excess calories with serious comorbidity and body mass index (BMI) of 30.0 to 30.9 in adult 2020 Eve Navarrete MD      Closed displaced fracture of shaft of right clavicle with malunion     Diverticulitis 2019    Diverticulosis     Years ago, mot sure of date     Dyslipidemia 04/23/2024    GCA (giant cell arteritis)     GERD (gastroesophageal reflux disease)     HL (hearing loss) 2022    Hyperlipidemia Unk    Low back pain ?    Medicare annual wellness visit, subsequent 02/01/2021    Lloyd's neuroma of left foot     surgery    Ocular histoplasmosis     BCC 2/6/2017    Panic disorder 02/28/2019    PMR (polymyalgia rheumatica)      Past Surgical History:   Procedure Laterality Date    COLONOSCOPY  02/2009    COSMETIC SURGERY  1994    face lift    COSMETIC SURGERY  1993    TUMMY TUCK    COSMETIC SURGERY  1980    Birth kenny removal    ELBOW PROCEDURE Left     x2    EYE SURGERY  7/20 and 7/25/2023    Removed cataracts and incerted lenses in bot eyes    THROAT SURGERY      remote removal of growth from throat    TONSILLECTOMY  1964      General Information       [facility] Name 04/24/24 1016          Physical Therapy Time and Intention    Document Type evaluation  -     Mode of Treatment physical therapy  -       Row Name 04/24/24 1016          General Information    Patient Profile Reviewed yes  -     Prior Level of Function independent:;all household mobility;community mobility;gait;transfer;bed mobility;ADL's;home management;using stairs  No AD use at baseline  -     Existing Precautions/Restrictions other (see comments)  decreased L peripheral vision  -     Barriers to Rehab medically complex  -       Row Name 04/24/24 1016          Living Environment    People in Home spouse  -[facility] Name 04/24/24 1016          Home Main Entrance    Number of Stairs, Main Entrance one  -     Stair Railings, Main Entrance none  -       Row Name 04/24/24 1016          Stairs Within Home, Primary    Stairs, Within Home, Primary Home office is on second level. Pt can stay on main level  -     Number of Stairs, Within Home, Primary twelve  -     Stair Railings, Within Home, Primary railing on left side (ascending)  -       Row Name 04/24/24 1016          Cognition     Orientation Status (Cognition) oriented x 4  -       Row Name 04/24/24 1016          Safety Issues, Functional Mobility    Safety Issues Affecting Function (Mobility) insight into deficits/self-awareness;safety precaution awareness  -     Impairments Affecting Function (Mobility) endurance/activity tolerance;visual/perceptual  -               User Key  (r) = Recorded By, (t) = Taken By, (c) = Cosigned By      Initials Name Provider Type     Patito Valencia PT Physical Therapist                   Mobility       Row Name 04/24/24 1019          Bed Mobility    Bed Mobility supine-sit  -     Supine-Sit [place] (Bed Mobility) modified independence  -     Assistive Device (Bed Mobility) head of bed elevated  -     Comment, (Bed Mobility) Denied dizziness in sitting. Attempted mobility w/o O2, however SpO2 desat to 87-88% on RA so reapplied 2L NC  -[facility] Name 04/24/24 1019          Sit-Stand Transfer    Sit-Stand [place] (Transfers) supervision  -     Comment, (Sit-Stand Transfer) STS x1 from EOB, x1 from toilet  -[facility] Name 04/24/24 1019          Gait/Stairs (Locomotion)    [place] Level (Gait) standby assist;verbal cues  -     Patient was able to Ambulate yes  -     Distance in Feet (Gait) 450  -     Deviations/Abnormal Patterns (Gait) bilateral deviations;stride length decreased  -     [place] Level (Stairs) not tested  -     Comment, (Gait/Stairs) Pt demo step through gait pattern at normal gait speed. Pt completed vertical and horizontal head turns w/o dizziness or LOB. Distance limited by fatigue. Stair training not performed this date d/t fatigue  -               User Key  (r) = Recorded By, (t) = Taken By, (c) = Cosigned By      Initials Name Provider Type     Patito Valencia PT Physical Therapist                   Obj/Interventions       Row Name 04/24/24 1126          Range of Motion Comprehensive    General Range of Motion bilateral lower  extremity ROM WFL  -[facility] Name 04/24/24 1126          Strength Comprehensive (MMT)    General Manual Muscle Testing (MMT) Assessment lower extremity strength deficits identified  -     Comment, General Manual Muscle Testing (MMT) Assessment BLEs grossly 4+/5. No asymmetries noted  -[facility] Name 04/24/24 1126          Motor Skills    Motor Skills coordination  -     Coordination WFL;bilateral;lower extremity;heel to shin  -LH       Row Name 04/24/24 1126          Balance    Balance Assessment sitting static balance;sitting dynamic balance;sit to stand dynamic balance;standing static balance;standing dynamic balance  -     Static Sitting Balance independent  -     Dynamic Sitting Balance independent  -     Position, Sitting Balance unsupported;sitting edge of bed  -     Sit to Stand Dynamic Balance standby assist  -     Static Standing Balance standby assist  -     Dynamic Standing Balance standby assist  -     Position/Device Used, Standing Balance unsupported  -     Balance Interventions sitting;standing;sit to stand;supported;static;dynamic  -     Comment, Balance SBA for safety, no LOB noted  -LH       Row Name 04/24/24 1126          Sensory Assessment (Somatosensory)    Sensory Assessment (Somatosensory) LE sensation intact  -               User Key  (r) = Recorded By, (t) = Taken By, (c) = Cosigned By      Initials Name Provider Type     Patito Valencia, PT Physical Therapist                   Goals/Plan       Row Name 04/24/24 1131          Transfer Goal 1 (PT)    Activity/Assistive Device (Transfer Goal 1, PT) sit-to-stand/stand-to-sit;bed-to-chair/chair-to-bed  -     [place] Level/Cues Needed (Transfer Goal 1, PT) independent  -     Time Frame (Transfer Goal 1, PT) long term goal (LTG);10 days  -[facility] Name 04/24/24 1131          Gait Training Goal 1 (PT)    Activity/Assistive Device (Gait Training Goal 1, PT) gait (walking locomotion)  -     [place]  Level (Gait Training Goal 1, PT) independent  -     Distance (Gait Training Goal 1, PT) 600 ft  -     Time Frame (Gait Training Goal 1, PT) long term goal (LTG);10 days  -       Row Name 04/24/24 1131          Stairs Goal 1 (PT)    Activity/Assistive Device (Stairs Goal 1, PT) ascending stairs;descending stairs;using handrail, left;using handrail, right  -     [place] Level/Cues Needed (Stairs Goal 1, PT) standby assist  -     Number of Stairs (Stairs Goal 1, PT) 12  -     Time Frame (Stairs Goal 1, PT) long term goal (LTG);10 days  -[facility] Name 04/24/24 1131          Therapy Assessment/Plan (PT)    Planned Therapy Interventions (PT) balance training;bed mobility training;gait training;home exercise program;neuromuscular re-education;patient/family education;postural re-education;ROM (range of motion);stair training;strengthening;stretching;transfer training  -               User Key  (r) = Recorded By, (t) = Taken By, (c) = Cosigned By      Initials Name Provider Type     Patito Valencia, PT Physical Therapist                   Clinical Impression       [facility] Name 04/24/24 1127          Pain    Pretreatment Pain Rating 0/10 - no pain  -     Posttreatment Pain Rating 0/10 - no pain  -[facility] Name 04/24/24 1127          Plan of Care Review    Plan of Care Reviewed With patient  -     Outcome Evaluation PT eval completed. Pt presents slightly below baseline function d/t deficits in balance and activity tolerance. Pt ambulated 450 ft unsupported, SBA, on 2L O2 NC. Pt would benefit from skilled IP PT. Rec home w/ assist at d/c.  -[facility] Name 04/24/24 1127          Therapy Assessment/Plan (PT)    Patient/Family Therapy Goals Statement (PT) to go home  -     Rehab Potential (PT) good, to achieve stated therapy goals  -     Criteria for Skilled Interventions Met (PT) yes;meets criteria;skilled treatment is necessary  -     Therapy Frequency (PT) daily  -[facility] Name  04/24/24 1127          Vital Signs    Pre Systolic BP Rehab 134  -LH     Pre Treatment Diastolic BP 71  -LH     Post Systolic BP Rehab 130  -LH     Post Treatment Diastolic BP 58  -LH     Pretreatment Heart Rate (beats/min) 58  -LH     Posttreatment Heart Rate (beats/min) 64  -LH     Pre SpO2 (%) 95  -LH     O2 Delivery Pre Treatment nasal cannula  -LH     Intra SpO2 (%) 88  -LH     O2 Delivery Intra Treatment room air  -LH     Post SpO2 (%) 99  -LH     O2 Delivery Post Treatment nasal cannula  -LH     Pre Patient Position Supine  -     Intra Patient Position Standing  -     Post Patient Position Sitting  -LH       Row Name 04/24/24 1127          Positioning and Restraints    Pre-Treatment Position in bed  -     Post Treatment Position chair  -     In Chair notified nsg;reclined;sitting;call light within reach;encouraged to call for assist;exit alarm on;waffle cushion;legs elevated  -               User Key  (r) = Recorded By, (t) = Taken By, (c) = Cosigned By      Initials Name Provider Type     Patito Valencia, PT Physical Therapist                   Outcome Measures       Row Name 04/24/24 1131 04/24/24 0800       How much help from another person do you currently need...    Turning from your back to your side while in flat bed without using bedrails? 4  - 4  -AM    Moving from lying on back to sitting on the side of a flat bed without bedrails? 4  - 4  -AM    Moving to and from a bed to a chair (including a wheelchair)? 3  - 4  -AM    Standing up from a chair using your arms (e.g., wheelchair, bedside chair)? 4  - 4  -AM    Climbing 3-5 steps with a railing? 3  - 3  -AM    To walk in hospital room? 3  - 4  -AM    AM-PAC 6 Clicks Score (PT) 21  - 23  -AM    Highest Level of Mobility Goal 6 --> Walk 10 steps or more  - 7 --> Walk 25 feet or more  -AM      Row Name 04/24/24 1131 04/24/24 1117       Modified [place] Scale    Pre-Stroke Modified Zay Scale 6 - Unable to determine (UTD)  from the medical record documentation  - 6 - Unable to determine (UTD) from the medical record documentation  -KL    Modified Gibson Scale 1 - No significant disability despite symptoms.  Able to carry out all usual duties and activities.  - 1 - No significant disability despite symptoms.  Able to carry out all usual duties and activities.  -      Row Name 04/24/24 1131 04/24/24 1117       Functional Assessment    Outcome Measure Options AM-PAC 6 Clicks Basic Mobility (PT);Modified Gibson  - AM-PAC 6 Clicks Daily Activity (OT);Modified Zay  -              User Key  (r) = Recorded By, (t) = Taken By, (c) = Cosigned By      Initials Name Provider Type    AM Beatrice Smiley, RN Registered Nurse     Patito Valencia, PT Physical Therapist    Fanny Daniels, OT Occupational Therapist                                 Physical Therapy Education       Title: PT OT SLP Therapies (In Progress)       Topic: Physical Therapy (In Progress)       Point: Mobility training (Done)       Learning Progress Summary             Patient Acceptance, E, VU,NR by  at 4/24/2024 1131                         Point: Home exercise program (Not Started)       Learner Progress:  Not documented in this visit.              Point: Body mechanics (Done)       Learning Progress Summary             Patient Acceptance, E, VU,NR by  at 4/24/2024 1131                         Point: Precautions (Done)       Learning Progress Summary             Patient Acceptance, E, VU,NR by  at 4/24/2024 1131                                         User Key       Initials Effective Dates Name Provider Type Discipline     09/21/23 -  Patito Valencia, PT Physical Therapist PT                  PT Recommendation and Plan  Planned Therapy Interventions (PT): balance training, bed mobility training, gait training, home exercise program, neuromuscular re-education, patient/family education, postural re-education, ROM (range of motion), stair training,  strengthening, stretching, transfer training  Plan of Care Reviewed With: patient  Outcome Evaluation: PT eval completed. Pt presents slightly below baseline function d/t deficits in balance and activity tolerance. Pt ambulated 450 ft unsupported, SBA, on 2L O2 NC. Pt would benefit from skilled IP PT. Rec home w/ assist at d/c.     Time Calculation:   PT Evaluation Complexity  History, PT Evaluation Complexity: 3 or more personal factors and/or comorbidities  Examination of Body Systems (PT Eval Complexity): total of 3 or more elements  Clinical Presentation (PT Evaluation Complexity): stable  Clinical Decision Making (PT Evaluation Complexity): low complexity  Overall Complexity (PT Evaluation Complexity): low complexity     PT Charges       Row Name 04/24/24 1132             Time Calculation    Start Time 0820  -      PT Received On 04/24/24  -      PT Goal Re-Cert Due Date 05/04/24  -         Timed Charges    71677 - PT Therapeutic Activity Minutes 8  -LH         Untimed Charges    PT Eval/Re-eval Minutes 48  -LH         Total Minutes    Timed Charges Total Minutes 8  -LH      Untimed Charges Total Minutes 48  -LH       Total Minutes 56  -LH                User Key  (r) = Recorded By, (t) = Taken By, (c) = Cosigned By      Initials Name Provider Type     Patito Valencia, PT Physical Therapist                  Therapy Charges for Today       Code Description Service Date Service Provider Modifiers Qty    70183351034  PT THERAPEUTIC ACT EA 15 MIN 4/24/2024 Patito Valencia, PT GP 1    [id number] HC PT EVAL LOW COMPLEXITY 4 4/24/2024 Patito Valencia, PT GP 1            PT G-Codes  Outcome Measure Options: AM-PAC 6 Clicks Basic Mobility (PT), Modified Zay  AM-PAC 6 Clicks Score (PT): 21  AM-PAC 6 Clicks Score (OT): 23  Modified Wilcox Scale: 1 - No significant disability despite symptoms.  Able to carry out all usual duties and activities.  PT Discharge Summary  Anticipated Discharge Disposition (PT):  home with assist    Patito Valencia, PT  4/24/2024

## 2024-04-24 NOTE — PROGRESS NOTES
"Critical Care Note     LOS: 1 day   Patient Care Team:  Eve Navarrete MD as PCP - General (Internal Medicine)  Gold Swift DO as Consulting Physician (Rheumatology)  Sabrina Angelo MD as Consulting Physician (Plastic Surgery)  Juarez Pedersen MD as Consulting Physician (Otolaryngology)    Chief Complaint/Reason for visit:    Chief Complaint   Patient presents with    Stroke   Acute left middle cerebral artery stroke  COPD  Hypertension    Subjective     Interval History:     Patient hospitalized yesterday with a stroke and received TNK.  She denies headache or nausea.  Her right arm still feels numb and weak.  She was cleared by speech for a diet.  She walked 450 feet with physical therapy today.    Review of Systems:    All systems were reviewed and negative except as noted in subjective.    Medical history, surgical history, social history, family history reviewed    Objective     Intake/Output:    Intake/Output Summary (Last 24 hours) at 4/24/2024 1359  Last data filed at 4/24/2024 0851  Gross per 24 hour   Intake 254 ml   Output 800 ml   Net -546 ml       Nutrition:  Diet: Cardiac; Healthy Heart (2-3 Na+); Texture: Regular (IDDSI 7); Fluid Consistency: Thin (IDDSI 0)    Infusions:  niCARdipine, 5-15 mg/hr, Last Rate: Stopped (04/23/24 1414)        Mechanical Ventilator Settings:                                                Telemetry: Sinus rhythm             Vital Signs  Blood pressure 135/76, pulse 66, temperature 98.3 °F (36.8 °C), temperature source Oral, resp. rate 16, height 154.9 cm (61\"), weight 64 kg (141 lb), SpO2 93%.    Physical Exam:  General Appearance:  Older woman sitting upright in bed in no distress   Head:  Atraumatic    Eyes:          pupils are equal, extraocular movements intact   Ears:     Throat: Oral mucosa moist   Neck: Trachea midline, no carotid provide   Back:      Lungs:   Scattered expiratory rhonchi bilaterally    Heart:  Regular rhythm, S1, S2 " auscultated   Abdomen:   Bowel sounds present, soft, nontender   Rectal:   Deferred   Extremities: No pitting edema or cyanosis   Pulses: Palpable pedal pulses    Skin: warm and dry without rash   Lymph nodes: No cervical adenopathy   Neurologic: Alert, oriented, right facial droop, right upper extremity clumsy    Interval:  (shift change)  1a. Level of Consciousness: 0-->Alert, keenly responsive  1b. LOC Questions: 0-->Answers both questions correctly  1c. LOC Commands: 0-->Performs both tasks correctly  2. Best Gaze: 0-->Normal  3. Visual: 0-->No visual loss  4. Facial Palsy: 1-->Minor paralysis (flattened nasolabial fold, asymmetry on smiling)  5a. Motor Arm, Left: 0-->No drift, limb holds 90 (or 45) degrees for full 10 secs  5b. Motor Arm, Right: 0-->No drift, limb holds 90 (or 45) degrees for full 10 secs  6a. Motor Leg, Left: 0-->No drift, leg holds 30 degree position for full 5 secs  6b. Motor Leg, Right: 0-->No drift, leg holds 30 degree position for full 5 secs  7. Limb Ataxia: 1-->Present in one limb  8. Sensory: 0-->Normal, no sensory loss  9. Best Language: 0-->No aphasia, normal  10. Dysarthria: 0-->Normal  11. Extinction and Inattention (formerly Neglect): 0-->No abnormality    Total (NIH Stroke Scale): 2   Results Review:     I reviewed the patient's new clinical results.   Results from last 7 days   Lab Units 04/24/24  0241 04/23/24  1232 04/23/24  1231   SODIUM mmol/L 140 143  --    POTASSIUM mmol/L 3.8 4.0  --    CHLORIDE mmol/L 101 103  --    CO2 mmol/L 31.0* 24.0  --    BUN mg/dL 9 11  --    CREATININE mg/dL 0.76 0.80 0.80   CALCIUM mg/dL 9.2 9.3  --    BILIRUBIN mg/dL  --  0.6  --    ALK PHOS U/L  --  58  --    ALT (SGPT) U/L  --  21  20  --    AST (SGOT) U/L  --  25  24  --    GLUCOSE mg/dL 87 89  --      Results from last 7 days   Lab Units 04/24/24  0241 04/23/24  1232 04/23/24  1231   WBC 10*3/mm3 5.36 4.65  --    HEMOGLOBIN g/dL 14.6 15.7  --    HEMOGLOBIN, POC g/dL  --   --  15.0  "  HEMATOCRIT % 43.2 46.9*  --    HEMATOCRIT POC %  --   --  44   PLATELETS 10*3/mm3 130* 138*  --          No results found for: \"BLOODCX\"  Lab Results   Component Value Date    URINECX Final report (A) 09/20/2021       I reviewed the patient's new imaging including images and reports.    CT HEAD WO CONTRAST    Date of Exam: 4/24/2024 12:42 PM EDT    Indication: Stroke, follow up  24 Hours Post Thrombolytic Administration.    Comparison: CT head 4/23/2024, MRI of the brain 4/24/2024    Technique: Axial CT images were obtained of the head without contrast administration.  Automated exposure control and iterative construction methods were used.      Findings:  Punctate hypodensities in the region of the left central sulcus consistent with acute infarcts demonstrated on comparison MRI. There is no significant mass effect. There is no evidence of intracranial hemorrhage.    No mass or extra-axial fluid collection. Tiny remote lacunar infarct of the right caudate head.    Mucous retention cyst of the left maxillary sinus. Bilateral ethmoid air cell mucosal thickening. Partial opacification of the left mastoid air cells. The osseous structures are intact.   Impression:     Impression:  Punctate hypodensities in the region of the left central sulcus consistent with acute infarcts. No evidence for hemorrhagic transformation.        Electronically Signed: Camila Berger MD   4/24/2024 12:59 PM EDT     MRI BRAIN WO CONTRAST    Date of Exam: 4/24/2024 1:51 AM EDT    Indication: Stroke, follow up.     Comparison: Head CT, CT cerebral perfusion 4/23/2024    Technique:  Routine multiplanar/multisequence sequence images of the brain were obtained without contrast administration.      Findings:  There is a cluster of small acute cortical infarcts surrounding the left central sulcus involving some of the precentral gyrus and postcentral gyrus (series 5 image 76-78). Additionally there is a punctate focus of diffusion restriction in " the left  occipital lobe (series 5 image 67) with questionable ADC hypointense correlate, compatible with tiny acute infarct. No intracranial hemorrhage. There is a punctate focus of SWI susceptibility in the left parietal lobe which could reflect chronic  microhemorrhage or calcification of cerebral vein. No intracranial mass. There are scattered subcortical and periventricular white matter FLAIR/T2 hyperintensities which are nonspecific and can be seen in the setting of chronic small vessel ischemic  change. The major intracranial vascular flow voids are preserved. The cerebellopontine angles and midline structures are normal. Unremarkable appearance of the orbits. There is a mucous retention cyst at the floor of the left maxillary sinus. There is a  left mastoid air cell effusion, nonspecific. No acute or suspicious bony findings.   Impression:     Impression:  Cluster of small acute cortical infarcts surrounding the left central sulcus. Punctate likely acute infarct in the left occipital lobe.    Findings discussed with stroke navigator by Dr. Herve Bah via telephone at 7:15 a.m. 4/24/2024.            Electronically Signed: Herve Bah MD   4/24/2024 7:19 AM EDT       Interpretation Summary         Left ventricular systolic function is normal.  Left ventricular ejection fraction appears to be 61 - 65%.    Left ventricular diastolic function is consistent with (grade I) impaired relaxation.    There is calcification of the mitral valve.    Saline test results are negative.       All medications reviewed.   arformoterol, 15 mcg, Nebulization, BID - RT  aspirin, 325 mg, Oral, Daily   Or  aspirin, 300 mg, Rectal, Daily  montelukast, 10 mg, Oral, Nightly  nicotine, 1 patch, Transdermal, Q24H  revefenacin, 175 mcg, Nebulization, Daily - RT  rosuvastatin, 40 mg, Oral, Nightly  sodium chloride, 10 mL, Intravenous, Q12H          Assessment & Plan       Acute CVA (cerebrovascular accident)    BRYAN    Polymyalgia  rheumatica    HTN (hypertension)    Emphysema    GERD (gastroesophageal reflux disease)    Dyslipidemia    74-year-old woman, smoker with COPD, polymyalgia rheumatica on Actemra and prednisone, COPD, hypertension presenting with abrupt onset of slurred speech and right upper extremity weakness.  Initial NIH stroke score was a 5.  Initial imaging was negative.  She was given TNK.  MRI confirms some small acute cortical infarcts in the left central sulcus.  She had no large vessel occlusion.  This morning her NIH stroke score is a 2.  Speech has cleared her for diet.      Echocardiogram reveals an EF of 61 to 65%, diastolic dysfunction, saline test negative for a shunt.  He does not have a history of diabetes and her A1c is 5.5.  Her dyslipidemia is treated, and total cholesterol is 130.    He was actively smoking on admission and has some underlying emphysema.  Oxygen saturation is 93 to 97% on 2 L.  Chest x-ray with no acute infiltrate.  In 2020 FEV1 was 1.25 L, 60%.    Blood pressure is 135 systolic on no antihypertensives will continue to monitor and resume blood pressure medicines as needed      PLAN:    PT, OT, speech therapy  Clarissa Adams nebulizer  Nicotine patch  Aspirin, Crestor  Singulair, home medication  Resume low-dose prednisone  Resume Pepcid  Carotid duplex  Holter monitor at discharge  Discussed smoking cessation  Telemetry      Justine Durham MD  04/24/24  13:59 EDT      Time: Critical care 25 min  I personally provided care to this critically ill patient as documented above.  Critical care time does not include time spent on separately billed procedures.  None of my critical care time was concurrent with other critical care providers.

## 2024-04-24 NOTE — THERAPY RE-EVALUATION
Acute Care - Speech Language Pathology   Swallow Re-Evaluation [facility]  Clinical Swallow Evaluation       Patient Name: Irma Pedraza  : 1950  MRN: [id number]  Today's Date: 2024               Admit Date: 2024    Visit Dx:     ICD-10-CM ICD-9-CM   1. Acute ischemic stroke  I63.9 434.91   2. Dysphagia, unspecified type  R13.10 787.20     Patient Active Problem List   Diagnosis    Cervicalgia    BRYAN    Mixed hyperlipidemia    Mitral valve disorder    Tinnitus    Vitamin D deficiency    Senile osteoporosis    Dyspnea    Polymyalgia rheumatica    Primary insomnia    Chronic pain of both shoulders    Chronic upper back pain    Drug-induced hypokalemia    HTN (hypertension)    Benign paroxysmal positional vertigo    Chronic low back pain    Emphysema    GERD (gastroesophageal reflux disease)    Muscle spasms of both lower extremities    Temporal arteritis    Tobacco dependence syndrome    Nonalcoholic fatty liver disease    Spasm of thoracic back muscle    Annual physical exam    Medicare annual wellness visit, subsequent    Rheumatoid arthritis, seropositive    Lichen sclerosus of female genitalia    Lichen sclerosus    Sprain of interphalangeal joint of right thumb    Alas's esophagus without dysplasia    Overweight with body mass index (BMI) of 26 to 26.9 in adult    Mild memory disturbance    High plasma homocystine    Poor appetite    Suspected cerebrovascular accident (CVA)    Dyslipidemia    Acute CVA (cerebrovascular accident)     Past Medical History:   Diagnosis Date    Adenomyomatosis of gallbladder 2009    by ultrasound, Dr. Yomi Tai     Arthritis     Arthritis of lumbosacral spine     Asthma     Cataract     Remove  and     Chronic hoarseness     Class 1 obesity due to excess calories with serious comorbidity and body mass index (BMI) of 30.0 to 30.9 in adult 2020 Eve Navarrete MD      Closed displaced fracture of shaft of right clavicle with  malunion     Diverticulitis 02/28/2019    Diverticulosis     Years ago, mot sure of date    Dyslipidemia 04/23/2024    GCA (giant cell arteritis)     GERD (gastroesophageal reflux disease)     HL (hearing loss) 2022    Hyperlipidemia Unk    Low back pain ?    Medicare annual wellness visit, subsequent 02/01/2021    Lloyd's neuroma of left foot     surgery    Ocular histoplasmosis     BCC 2/6/2017    Panic disorder 02/28/2019    PMR (polymyalgia rheumatica)      Past Surgical History:   Procedure Laterality Date    COLONOSCOPY  02/2009    COSMETIC SURGERY  1994    face lift    COSMETIC SURGERY  1993    TUMMY TUCK    COSMETIC SURGERY  1980    Birth kenny removal    ELBOW PROCEDURE Left     x2    EYE SURGERY  7/20 and 7/25/2023    Removed cataracts and incerted lenses in bot eyes    THROAT SURGERY      remote removal of growth from throat    TONSILLECTOMY  1964       SLP Recommendation and Plan  SLP Swallowing Diagnosis: swallow WFL/no suspected pharyngeal impairment (04/24/24 1425)  SLP Diet Recommendation: regular textures, thin liquids (04/24/24 1425)  Recommended Precautions and Strategies: upright posture during/after eating, small bites of food and sips of liquid, general aspiration precautions (04/24/24 1425)  SLP Rec. for Method of Medication Administration: meds whole, with puree, as tolerated (04/24/24 1425)     Monitor for Signs of Aspiration: yes, notify SLP if any concerns (04/24/24 1425)  Recommended Diagnostics: No further SLP services recommended (04/24/24 1425)  Swallow Criteria for Skilled Therapeutic Interventions Met: no problems identified which require skilled intervention (04/24/24 1425)  Anticipated Discharge Disposition (SLP): inpatient rehabilitation facility (04/24/24 1425)     Therapy Frequency (Swallow): evaluation only (04/24/24 1425)     Oral Care Recommendations: Oral Care BID/PRN, Toothbrush (04/24/24 1425)                                               SWALLOW EVALUATION (Last 72 Hours)        SLP Adult Swallow Evaluation       Row Name 04/24/24 1425 04/23/24 1430                Rehab Evaluation    Document Type re-evaluation  - evaluation  -       Subjective Information no complaints  - no complaints  -       Patient Observations alert;cooperative  - alert;cooperative  -       Patient/Family/Caregiver Comments/Observations No family present  - family present  -       Patient Effort good  - good  -       Symptoms Noted During/After Treatment none  - none  -          General Information    Patient Profile Reviewed yes  - yes  -       Pertinent History Of Current Problem See initial eval  - Pt adm on stroke pathway w/ R sided weakness; sig h/o cervicalgia, generalized anxiety, mixed HLD, Mitral valve disorder, HTN, GERD, COPD, Alas's. CXR/CT negative, given TNK @1241. No MRI yet  -       Current Method of Nutrition regular textures;thin liquids  - NPO  -       Precautions/Limitations, Vision WFL with corrective lenses;for purposes of eval  - WFL with corrective lenses;for purposes of eval  -       Precautions/Limitations, Hearing WFL;for purposes of [facility] WFL;for purposes of eval  -       Prior Level of Function-Communication other (see comments)  memory deficits per chart  - other (see comments)  memory difficulties per chart  -       Prior Level of Function-Swallowing no diet consistency restrictions  - no diet consistency restrictions  -       Plans/Goals Discussed with patient;agreed upon  - patient;family  -       Barriers to Rehab none identified  - none identified  -       Patient's Goals for Discharge patient did not state  - patient did not state  -          Pain    Additional Documentation Pain Scale: FACES Pre/Post-Treatment (Group)  - Pain Scale: FACES Pre/Post-Treatment (Group)  -          Pain Scale: FACES Pre/Post-Treatment    Pain: FACES Scale, Pretreatment 0-->no hurt  - 0-->no hurt  -       Posttreatment  Pain Rating 0-->no hurt  - 0-->no hurt  -          Oral Motor Structure and Function    Dentition Assessment natural, present and adequate  - natural, present and adequate  -       Secretion Management WNL/WFL  - WNL/WFL  -       Mucosal Quality moist, healthy  - moist, healthy  -          Oral Musculature and Cranial Nerve Assessment    Oral Motor General Assessment oral labial or buccal impairment;lingual impairment  - oral labial or buccal impairment;lingual impairment  -       Oral Labial or Buccal Impairment, Detail, Cranial Nerve VII (Facial): right labial droop  - right labial droop  -       Lingual Impairment, Detail. Cranial Nerves IX, XII (Glossopharyngeal and Hypoglossal) reduced strength right  - reduced strength right  -          General Eating/Swallowing Observations    Respiratory Support Currently in Use room air  - nasal cannula  -       Eating/Swallowing Skills self-fed;fed by SLP  - self-fed;fed by SLP  -       Positioning During Eating upright in bed  - upright in bed  -       Utensils Used spoon;cup;straw  - spoon;straw;cup  -       Consistencies Trialed ice chips;thin liquids;pureed;regular textures  - regular textures;pureed;ice chips;thin liquids  -          Clinical Swallow Eval    Oral Prep Phase -- impaired  -       Pharyngeal Phase no overt signs/symptoms of pharyngeal impairment  - --  r/o pharyngeal impairment  -       Clinical Swallow Evaluation Summary No overt s/s of aspiration accross trials of thin liquid, pureed, or regular solid consistenices; even when pushed for conwsecutive sips of thin. No anterior loss noted. Adequate oral manipulation/mastication of regular solid trial & no significant oral residue. Ok to continue regular diet w/ thin liquids, general aspiration precautions. SLP will sign off for dysphagia, please reconsult if further concerns arise  - Pt reports intact sensation on R side of face/lingual surface. Mild  anterior loss w/ thins via cup only. No oral residue, adequate mastication w/ solids. No overt s/s of aspiration even when pushed w/ consecutive sips. Pt reports symptoms continue to improve. Will initiate regular diet w/ thin liquids and monitor closely pending further neuro imaging and complete re-eval tomorrow w/ further recs pending. Pt currently on bleeding precautions  -          Oral Prep Concerns    Oral Prep Concerns -- anterior loss  -          SLP Evaluation Clinical Impression    SLP Swallowing Diagnosis swallow WFL/no suspected pharyngeal impairment  - R/O pharyngeal dysphagia  -       Functional Impact no impact on function  - risk of aspiration/pneumonia  -       Rehab Potential/Prognosis, Swallowing -- adequate, monitor progress closely  -       Swallow Criteria for Skilled Therapeutic Interventions Met no problems identified which require skilled intervention  - demonstrates skilled criteria  -          Recommendations    Therapy Frequency (Swallow) evaluation only  - --       Predicted Duration Therapy Intervention (Days) -- until discharge  -       SLP Diet Recommendation regular textures;thin liquids  - regular textures;thin liquids;other (see comments)  if any concerns please make NPO  -       Recommended Diagnostics No further SLP services recommended  - reassess via clinical swallow evaluation  -       Recommended Precautions and Strategies upright posture during/after eating;small bites of food and sips of liquid;general aspiration precautions  - upright posture during/after eating;small bites of food and sips of liquid;general aspiration precautions  -       Oral Care Recommendations Oral Care BID/PRN;Toothbrush  - Oral Care BID/PRN;Toothbrush  -       SLP Rec. for Method of Medication Administration meds whole;with puree;as tolerated  - meds whole;with puree;as tolerated  -       Monitor for Signs of Aspiration yes;notify SLP if any concerns  -  yes;notify SLP if any concerns  -CJ       Anticipated Discharge Disposition (SLP) inpatient rehabilitation facility  - inpatient rehabilitation facility  -                 User Key  (r) = Recorded By, (t) = Taken By, (c) = Cosigned By      Initials Name Effective Dates    CJ Noe Isamar DENNY, MS CCC-SLP 07/11/23 -      Ramonita Michelle, MS CCC-SLP 05/12/23 -                     EDUCATION  The patient has been educated in the following areas:   Communication Impairment Dysphagia (Swallowing Impairment).        SLP GOALS       Row Name 04/24/24 1425 04/23/24 1430          Patient will demonstrate functional speech skills for return to discharge environment    [place] with minimal cues  - with minimal cues  -CJ     Time frame by discharge  - by discharge  -     Progress/Outcomes continuing progress toward goal  - new goal  -        SLP Diagnostic Treatment     Patient will participate in further assessment in the following areas graphic expression;cognitive-linguistic;higher-level cognitive-linguistic  - graphic expression;cognitive-linguistic;higher-level cognitive-linguistic  -     Time Frame (Diagnostic) short term goal (STG)  - short term goal (STG)  -CJ     Progress/Outcomes (Additional Goal 1, SLP) goal met  - discharged from facility  -CJ     Comment (Diagnostic) Graphic expression and cognitive linguistic abilities are grossly functional.  -MH --        Phonation Goal 1 (SLP)    Improve Phonation By Goal 1 (SLP) using loud speech;with minimal cues (75-90%);100%  -MH using loud speech;with minimal cues (75-90%);100%  -CJ     Time Frame (Phonation Goal 1, SLP) 1 week  - 1 week  -CJ     Progress/Outcomes (Phonation Goal 1, SLP) continuing progress toward goal  - new goal  -        Articulation Goal 1 (SLP)    Improve Articulation Goal 1 (SLP) by over-articulating in connected speech;100%;with minimal cues (75-90%)  -MH by over-articulating at phrase level;by over-articulating in  connected speech;100%;with minimal cues (75-90%)  -     Time Frame (Articulation Goal 1, SLP) 1 week  - 1 week  -     Progress/Outcomes (Articulation Goal 1, SLP) continuing progress toward goal  - new goal  -     Comment (Articulation Goal 1, SLP) Cont w/ trace-mild dysarthria  - --               User Key  (r) = Recorded By, (t) = Taken By, (c) = Cosigned By      Initials Name Provider Type    Isamar Cartagena MS CCC-SLP Speech and Language Pathologist     Ramonita Michelle MS CCC-SLP Speech and Language Pathologist                       Time Calculation:    Time Calculation- SLP       Row Name 04/24/24 1624             Time Calculation- SLP    SLP Start Time 1425  -      SLP Received On 04/24/24  -         Untimed Charges    51302-LK Eval Oral Pharyng Swallow Minutes 40  -MH      55261-WN Treatment/ST Modification Prosth Aug Alter  15  -MH         Total Minutes    Untimed Charges Total Minutes 55  -MH       Total Minutes 55  -MH                User Key  (r) = Recorded By, (t) = Taken By, (c) = Cosigned By      Initials Name Provider Type     Ramonita Michelle, MS CCC-SLP Speech and Language Pathologist                    Therapy Charges for Today       Code Description Service Date Service Provider Modifiers Qty    51702925213 HC ST EVAL ORAL PHARYNG SWALLOW 3 4/24/2024 Ramonita Michelle MS CCC-SANTANA GN 1    21619813793 HC ST TREATMENT SPEECH 1 4/24/2024 Ramonita Michelle MS CCC-SLP GN 1                 MS MARY Villarreal  4/24/2024

## 2024-04-24 NOTE — PLAN OF CARE
Goal Outcome Evaluation:  Plan of Care Reviewed With: patient           Outcome Evaluation: PT eval completed. Pt presents slightly below baseline function d/t deficits in balance and activity tolerance. Pt ambulated 450 ft unsupported, SBA, on 2L O2 NC. Pt would benefit from skilled IP PT. Rec home w/ assist at d/c.      Anticipated Discharge Disposition (PT): home with assist

## 2024-04-24 NOTE — PLAN OF CARE
Goal Outcome Evaluation:  Plan of Care Reviewed With: patient        Progress: no change  Outcome Evaluation: Pt presents to OT evaluation near baseline function for mobility and self-care. Pt would benefit from IPOT services to address sensation integration in dominant RUE. d/c rec is home w/ assist PRN.      Anticipated Discharge Disposition (OT): home with assist

## 2024-04-24 NOTE — PLAN OF CARE
Goal Outcome Evaluation:                     Anticipated Discharge Disposition (SLP): inpatient rehabilitation facility          SLP Swallowing Diagnosis: swallow WFL/no suspected pharyngeal impairment (04/24/24 2601)

## 2024-04-24 NOTE — CONSULTS
Diabetes Education    Patient Name:  Irma Pedraza  YOB: 1950  MRN: [id number]  Admit Date:  4/23/2024    Patient does not meet diabetes education order criteria due to A1C 5.5%, no diabetes history or on diabetes medications, therefore patient was not seen for diabetes education at this time.  Please re consult as needed.       Electronically signed by:  Nicole Walsh RN  04/24/24 10:00 EDT

## 2024-04-24 NOTE — PROGRESS NOTES
Stroke Neurology Progress Note     Subjective     This patient was seen in follow-up for: right upper extremity weakness, right facial droop, dysarthria  Present for the encounter were: self, patient    Subjective:  Admitted overnight. Neurologic exam limited due to patient getting echo at time of evaluation.  Patient denies headache or throat swelling.  Patient reports persistent right arm heaviness. Smokes 1PPD. Strong FH stroke.     Objective      Temp:  [98.1 °F (36.7 °C)-98.3 °F (36.8 °C)] 98.3 °F (36.8 °C)  Heart Rate:  [55-89] 59  Resp:  [12-22] 16  BP: ()/() 119/63            Objective    Exam limited due to patient receiving echo at time of evaluation.  Physical Exam:  General Appearance: Alert  HEENT: anicteric sclera, no scleral injection  Lungs: respirations appear comfortable, no obvious increased work of breathing  Extremities: No cyanosis or fingernail clubbing   Skin: No rashes in exposed skin areas     Neurological Examination:   Mental status: Alert and oriented. No dysarthria. Able to name and repeat.  Cranial Nerves: Visual fields intact. Extraocular movements intact with no nystagmus. Midline gaze. Face symmetric.    Sensory: Normal sensory exam to light touch.      Labs:    Lab Results   Component Value Date    HGBA1C 5.50 04/23/2024      Lab Results   Component Value Date    CHOL 130 04/24/2024    CHLPL 154 02/27/2024    TRIG 124 04/24/2024    HDL 50 04/24/2024    LDL 58 04/24/2024       Lab Results   Component Value Date    WBC 5.36 04/24/2024    HGB 14.6 04/24/2024    HCT 43.2 04/24/2024    MCV 93.3 04/24/2024     (L) 04/24/2024     Lab Results   Component Value Date    GLUCOSE 87 04/24/2024    BUN 9 04/24/2024    CREATININE 0.76 04/24/2024    EGFRIFNONA 55 (L) 02/03/2022    EGFRIFAFRI 67 02/03/2022    BCR 11.8 04/24/2024    CO2 31.0 (H) 04/24/2024    CALCIUM 9.2 04/24/2024    PROTENTOTREF 6.7 02/27/2024    ALBUMIN 4.6 04/23/2024    LABIL2 2.9 02/27/2024    AST 24  "04/23/2024    AST 25 04/23/2024    ALT 20 04/23/2024    ALT 21 04/23/2024       Results from last 7 days   Lab Units 04/24/24  0241 04/23/24  1232 04/23/24  1231   SODIUM mmol/L 140 143  --    POTASSIUM mmol/L 3.8 4.0  --    CHLORIDE mmol/L 101 103  --    CO2 mmol/L 31.0* 24.0  --    BUN mg/dL 9 11  --    CREATININE mg/dL 0.76 0.80 0.80   CALCIUM mg/dL 9.2 9.3  --    BILIRUBIN mg/dL  --  0.6  --    ALK PHOS U/L  --  58  --    ALT (SGPT) U/L  --  21  20  --    AST (SGOT) U/L  --  25  24  --    GLUCOSE mg/dL 87 89  --        No results found for: \"BLOODCX\"  UA          8/14/2023    11:12 2/27/2024    14:32   Urinalysis   Blood, UA Negative  Negative    Leukocytes, UA Trace  Trace    Nitrite, UA Negative  Negative    RBC, UA 0-2  0-2    Bacteria, UA Comment  Comment      Lab Results   Component Value Date    URINECX Final report (A) 09/20/2021       Results Review:      All images and reports were personally reviewed and I agree with the interpretations except as noted below.      MRI Brain Without Contrast 4/24/2024  Impression: Cluster of small acute cortical infarcts surrounding the left central sulcus. Punctate likely acute infarct in the left occipital lobe.       CT Angiogram Head and Neck 4/23/2024  Impression: Bilateral carotid bulb calcified atherosclerotic disease with no evidence of hemodynamically significant stenosis. Unremarkable intracranial CT angiogram.    CT Perfusion 4/23/2024  Impression: Isolated Tmax of more than 4 seconds of 15 mL with no other associated findings, nonspecific. May consider further evaluation with an MRI of the brain.    CT Head Without Contrast 4/23/2024  Impression: No evidence of acute intracranial abnormality.           Assessment/Plan     Assessment:    # Acute right MCA territory infarct s/p TNK: etiology cardio-embolic versus atheroembolic  # Tobacco use disorder  # Hypertension  # Carotid bulb atherosclerotic disease    -24 Hour post TNK CTH ordered 1241  -Speech " therapy cleared regular textures  -If 24-hour CT head without hemorrhage, start aspirin 325 mg daily  -Continue home rosuvastatin 40 mg daily and ezetimibe (LDL 58)  -Carotid ultrasound ordered  -Patient will need Holter monitor prior to discharge (ordered)  -Tobacco cessation encouraged and counseling provided  -Mediterranean diet discussed  -Neuro-checks TNK protocol  -Blood pressure goal: SBP <180 post TNK  -DVT prophylaxis: SCD, consider enoxaparin if 24-hour CT head without hemorrhage  -Follow-up in stroke clinic in 1 month      Patient education: call 911 or present to emergency department with any stroke symptom, including unilateral face, arm, or leg weakness, numbness, or paresthesias, unilateral facial droop, speech deficits, dizziness with nausea, vomiting, nystagmus, and incoordination, visual deficits, or severe onset headache.    Stroke neurology will follow. Please call with questions.     Shellie Brito MD  [facility] [place] STROKE NEURO  04/24/24  10:32 EDT

## 2024-04-25 ENCOUNTER — READMISSION MANAGEMENT (OUTPATIENT)
Dept: CALL CENTER | Facility: HOSPITAL | Age: 74
End: 2024-04-25
Payer: MEDICARE

## 2024-04-25 VITALS
WEIGHT: 143.3 LBS | OXYGEN SATURATION: 90 % | TEMPERATURE: 97.9 F | RESPIRATION RATE: 18 BRPM | HEART RATE: 62 BPM | HEIGHT: 61 IN | BODY MASS INDEX: 27.06 KG/M2 | DIASTOLIC BLOOD PRESSURE: 90 MMHG | SYSTOLIC BLOOD PRESSURE: 125 MMHG

## 2024-04-25 DIAGNOSIS — I63.9 ACUTE CVA (CEREBROVASCULAR ACCIDENT): Primary | ICD-10-CM

## 2024-04-25 PROCEDURE — 94664 DEMO&/EVAL PT USE INHALER: CPT

## 2024-04-25 PROCEDURE — 99232 SBSQ HOSP IP/OBS MODERATE 35: CPT

## 2024-04-25 PROCEDURE — 63710000001 PREDNISONE PER 5 MG: Performed by: INTERNAL MEDICINE

## 2024-04-25 PROCEDURE — 94799 UNLISTED PULMONARY SVC/PX: CPT

## 2024-04-25 PROCEDURE — 92507 TX SP LANG VOICE COMM INDIV: CPT | Performed by: SPEECH-LANGUAGE PATHOLOGIST

## 2024-04-25 PROCEDURE — 99239 HOSP IP/OBS DSCHRG MGMT >30: CPT | Performed by: HOSPITALIST

## 2024-04-25 RX ORDER — ASPIRIN 325 MG
325 TABLET ORAL DAILY
Qty: 30 TABLET | Refills: 2 | Status: SHIPPED | OUTPATIENT
Start: 2024-04-26

## 2024-04-25 RX ORDER — NICOTINE 21 MG/24HR
1 PATCH, TRANSDERMAL 24 HOURS TRANSDERMAL EVERY 24 HOURS
Qty: 30 PATCH | Refills: 0 | Status: SHIPPED | OUTPATIENT
Start: 2024-04-25 | End: 2024-05-01 | Stop reason: SDUPTHER

## 2024-04-25 RX ADMIN — ARFORMOTEROL TARTRATE 15 MCG: 15 SOLUTION RESPIRATORY (INHALATION) at 09:09

## 2024-04-25 RX ADMIN — ASPIRIN 325 MG: 325 TABLET ORAL at 09:05

## 2024-04-25 RX ADMIN — Medication 10 ML: at 09:05

## 2024-04-25 RX ADMIN — PREDNISONE 2.5 MG: 5 TABLET ORAL at 09:05

## 2024-04-25 NOTE — DISCHARGE SUMMARY
[facility] Medicine Services  DISCHARGE SUMMARY    Patient Name: Irma Pedraza  : 1950  MRN: [id number]    Date of Admission: 2024 12:22 PM  Date of Discharge:  2024  Primary Care Physician: Eve Navarrete MD    Consults       Date and Time Order Name Status Description    2024 12:24 PM Inpatient Neurology Consult Stroke Completed             Hospital Course     Presenting Problem: CVA    Active Hospital Problems    Diagnosis  POA    **Acute CVA (cerebrovascular accident) [I63.9]  Yes    Dyslipidemia [E78.5]  Yes    Emphysema [J43.2]  Yes    GERD (gastroesophageal reflux disease) [K21.9]  Yes    HTN (hypertension) [I10]  Yes    Polymyalgia rheumatica [M35.3]  Yes    BRYAN [F41.1]  Yes      Resolved Hospital Problems   No resolved problems to display.          Hospital Course:  Irma Pedraza is a 74 y.o. female with history of tobacco use/COPD, PMR, HTN, here with slurred speech and RUE weakness, s/p TNK.      CVA  -She was given aspirin and statin therapy following TNK and her weakness and speech continue to improve. She will follow up with stroke/neurology in 1 month and will have cardiac monitoring at home. ECHO demonstrated no shunt.  She is motivated to quit smoking and we ordered nicotine patches.      COPD  -She can continue home inhalers     Hx of PMR  -She can continue her home medications.      Hx of GERD    Discharge Follow Up Recommendations for outpatient labs/diagnostics:  PCP 1 week  Neurology 1 month, with cardiac monitoring, ordered per stroke team    Day of Discharge     HPI: Up in bed. No family at bedside. No f/c. No SOA. No visual disturbances. R arm feels weaker/heavy.        Objective  Objective      Vital Signs:   Temp:  [97.9 °F (36.6 °C)-98.3 °F (36.8 °C)] 97.9 °F (36.6 °C)  Heart Rate:  [56-82] 82  Resp:  [16] 16  BP: ()/(32-96) 125/90  Flow (L/min):  [1-2] 2     Physical Exam:  NAD, alert and oriented  OP clear, MMM  Neck  supple  No LAD  RRR  CTAB  +BS, ND, NT, soft  R arm weakness/numbness  Normal affect  No rashes  Pertinent  and/or Most Recent Results     LAB RESULTS:      Lab 04/24/24  0241 04/23/24  1232 04/23/24  1231 04/23/24  1230   WBC 5.36 4.65  --   --    HEMOGLOBIN 14.6 15.7  --   --    HEMOGLOBIN, POC  --   --  15.0  --    HEMATOCRIT 43.2 46.9*  --   --    HEMATOCRIT POC  --   --  44  --    PLATELETS 130* 138*  --   --    NEUTROS ABS 2.81 2.47  --   --    IMMATURE GRANS (ABS) 0.01 0.02  --   --    LYMPHS ABS 1.98 1.67  --   --    MONOS ABS 0.45 0.41  --   --    EOS ABS 0.09 0.06  --   --    MCV 93.3 96.3  --   --    PROCALCITONIN  --  0.03  --   --    PROTIME  --   --   --  14.2   APTT  --  25.8  --   --          Lab 04/24/24  0241 04/23/24  1232 04/23/24  1231   SODIUM 140 143  --    POTASSIUM 3.8 4.0  --    CHLORIDE 101 103  --    CO2 31.0* 24.0  --    ANION GAP 8.0 16.0*  --    BUN 9 11  --    CREATININE 0.76 0.80 0.80   EGFR 82.3 77.4 77.4   GLUCOSE 87 89  --    CALCIUM 9.2 9.3  --    HEMOGLOBIN A1C  --  5.50  --    TSH  --  0.992  --          Lab 04/23/24  1232   TOTAL PROTEIN 6.5   ALBUMIN 4.6   GLOBULIN 1.9   ALT (SGPT) 21  20   AST (SGOT) 25  24   BILIRUBIN 0.6   ALK PHOS 58         Lab 04/23/24  1232 04/23/24  1230   HSTROP T 9  --    PROTIME  --  14.2   INR  --  1.2         Lab 04/24/24  0241   CHOLESTEROL 130   LDL CHOL 58   HDL CHOL 50   TRIGLYCERIDES 124             Brief Urine Lab Results  (Last result in the past 365 days)        Color   Clarity   Blood   Leuk Est   Nitrite   Protein   CREAT   Urine HCG        02/27/24 1432             241.9         02/27/24 1432 Yellow  Comment: REFERENCE RANGE: Yellow, Straw   Clear   Negative   Trace   Negative   Trace                 Microbiology Results (last 10 days)       ** No results found for the last 240 hours. **            Duplex Carotid Ultrasound CAR    Result Date: 4/24/2024    Right internal carotid artery demonstrates a less than 50% stenosis.   Left  internal carotid artery demonstrates a less than 50% stenosis.     CT Head Without Contrast    Result Date: 4/24/2024  CT HEAD WO CONTRAST Date of Exam: 4/24/2024 12:42 PM EDT Indication: Stroke, follow up 24 Hours Post Thrombolytic Administration. Comparison: CT head 4/23/2024, MRI of the brain 4/24/2024 Technique: Axial CT images were obtained of the head without contrast administration.  Automated exposure control and iterative construction methods were used. Findings: Punctate hypodensities in the region of the left central sulcus consistent with acute infarcts demonstrated on comparison MRI. There is no significant mass effect. There is no evidence of intracranial hemorrhage. No mass or extra-axial fluid collection. Tiny remote lacunar infarct of the right caudate head. Mucous retention cyst of the left maxillary sinus. Bilateral ethmoid air cell mucosal thickening. Partial opacification of the left mastoid air cells. The osseous structures are intact.     Impression: Punctate hypodensities in the region of the left central sulcus consistent with acute infarcts. No evidence for hemorrhagic transformation. Electronically Signed: Camila Berger MD  4/24/2024 12:59 PM EDT  Workstation ID: [id number]    Adult Transthoracic Echo Complete W/ Cont if Necessary Per Protocol (With Agitated Saline)    Addendum Date: 4/24/2024      Left ventricular systolic function is normal.  Left ventricular ejection fraction appears to be 61 - 65%.   Left ventricular diastolic function is consistent with (grade I) impaired relaxation.   There is calcification of the mitral valve.   Saline test results are negative.     Result Date: 4/24/2024    Left ventricular systolic function is normal.  Left ventricular ejection fraction appears to be 61 - 65%.   Left ventricular diastolic function is consistent with (grade I) impaired relaxation.   Saline test results are negative.     MRI Brain Without Contrast    Result Date: 4/24/2024  MRI BRAIN  WO CONTRAST Date of Exam: 4/24/2024 1:51 AM EDT Indication: Stroke, follow up.  Comparison: Head CT, CT cerebral perfusion 4/23/2024 Technique:  Routine multiplanar/multisequence sequence images of the brain were obtained without contrast administration. Findings: There is a cluster of small acute cortical infarcts surrounding the left central sulcus involving some of the precentral gyrus and postcentral gyrus (series 5 image 76-78). Additionally there is a punctate focus of diffusion restriction in the left occipital lobe (series 5 image 67) with questionable ADC hypointense correlate, compatible with tiny acute infarct. No intracranial hemorrhage. There is a punctate focus of SWI susceptibility in the left parietal lobe which could reflect chronic microhemorrhage or calcification of cerebral vein. No intracranial mass. There are scattered subcortical and periventricular white matter FLAIR/T2 hyperintensities which are nonspecific and can be seen in the setting of chronic small vessel ischemic change. The major intracranial vascular flow voids are preserved. The cerebellopontine angles and midline structures are normal. Unremarkable appearance of the orbits. There is a mucous retention cyst at the floor of the left maxillary sinus. There is a left mastoid air cell effusion, nonspecific. No acute or suspicious bony findings.     Impression: Cluster of small acute cortical infarcts surrounding the left central sulcus. Punctate likely acute infarct in the left occipital lobe. Findings discussed with stroke navigator by Dr. Herve Bah via telephone at 7:15 a.m. 4/24/2024. Electronically Signed: Herve Bah MD  4/24/2024 7:19 AM EDT  Workstation ID: [id number]    XR Chest 1 View    Result Date: 4/23/2024  XR CHEST 1 VW Date of Exam: 4/23/2024 12:52 PM EDT Indication: Acute Stroke Protocol (onset < 12 hrs) Comparison: Chest radiograph 11/18/2019. Findings: Cardiomediastinal silhouette is unchanged. No focal  consolidation or overt pulmonary edema. No pleural effusion or pneumothorax. Osseous structures are unchanged.     Impression: No evidence of acute cardiopulmonary disease. Electronically Signed: Amish Macias MD  4/23/2024 1:22 PM EDT  Workstation ID: [id number]    CT Angiogram Head w AI Analysis of LVO    Result Date: 4/23/2024  CT ANGIOGRAM HEAD W AI ANALYSIS OF LVO, CT CEREBRAL PERFUSION W WO CONTRAST, CT ANGIOGRAM NECK Date of Exam: 4/23/2024 12:28 PM EDT Indication: Neuro deficit, acute stroke suspected Neuro deficit, acute stroke suspected. Comparison: CT brain dated 4/23/2024 Technique: CTA of the head was performed after the uneventful intravenous administration of intravenous contrast. Reconstructed coronal and sagittal images were also obtained. In addition, a 3-D volume rendered image was created for interpretation. Automated exposure control and iterative reconstruction methods were used. Findings: CT perfusion: CBF less than 30%: 0 mL, Tmax more than 6 seconds: 0 mL, mismatch volume: 0 mL, mismatch ratio: None Tmax more than 4 seconds: 15 mL noticed in the right occipital lobe and bilateral middle cranial fossa, nonspecific CTA head and neck: The lung apices are clear with minimal emphysematous changes. The transverse aorta shows calcified plaque formation. There is a three-vessel arch with patent great vessels. The right common carotid artery is patent. There is calcified plaque formation at  the right carotid bulb resulting in 30% stenosis. The right internal iliac artery is patent. The left common carotid artery is patent. There is calcified plaque formation at the left carotid bulb resulting in 20% stenosis. The left internal artery is patent. The bilateral vertebral arteries are patent and appear codominant. Minimal calcified plaque formation at the bilateral V4 segments. The basilar artery is patent. The bilateral anterior cerebral, middle cerebral and posterior cerebral arteries are patent. No  visible aneurysms or vascular malformations are seen     Impression: 1. Bilateral carotid bulb calcified atherosclerotic disease with no evidence of hemodynamically significant stenosis 2. Unremarkable intracranial CT angiogram 3. Isolated Tmax of more than 4 seconds of 15 mL with no other associated findings, nonspecific. May consider further evaluation with an MRI of the brain Electronically Signed: Placido Villegas MD  4/23/2024 1:18 PM EDT  Workstation ID: [id number]    CT Angiogram Neck    Result Date: 4/23/2024  CT ANGIOGRAM HEAD W AI ANALYSIS OF LVO, CT CEREBRAL PERFUSION W WO CONTRAST, CT ANGIOGRAM NECK Date of Exam: 4/23/2024 12:28 PM EDT Indication: Neuro deficit, acute stroke suspected Neuro deficit, acute stroke suspected. Comparison: CT brain dated 4/23/2024 Technique: CTA of the head was performed after the uneventful intravenous administration of intravenous contrast. Reconstructed coronal and sagittal images were also obtained. In addition, a 3-D volume rendered image was created for interpretation. Automated exposure control and iterative reconstruction methods were used. Findings: CT perfusion: CBF less than 30%: 0 mL, Tmax more than 6 seconds: 0 mL, mismatch volume: 0 mL, mismatch ratio: None Tmax more than 4 seconds: 15 mL noticed in the right occipital lobe and bilateral middle cranial fossa, nonspecific CTA head and neck: The lung apices are clear with minimal emphysematous changes. The transverse aorta shows calcified plaque formation. There is a three-vessel arch with patent great vessels. The right common carotid artery is patent. There is calcified plaque formation at  the right carotid bulb resulting in 30% stenosis. The right internal iliac artery is patent. The left common carotid artery is patent. There is calcified plaque formation at the left carotid bulb resulting in 20% stenosis. The left internal artery is patent. The bilateral vertebral arteries are patent and appear codominant.  Minimal calcified plaque formation at the bilateral V4 segments. The basilar artery is patent. The bilateral anterior cerebral, middle cerebral and posterior cerebral arteries are patent. No visible aneurysms or vascular malformations are seen     Impression: 1. Bilateral carotid bulb calcified atherosclerotic disease with no evidence of hemodynamically significant stenosis 2. Unremarkable intracranial CT angiogram 3. Isolated Tmax of more than 4 seconds of 15 mL with no other associated findings, nonspecific. May consider further evaluation with an MRI of the brain Electronically Signed: Placido Villegas MD  4/23/2024 1:18 PM EDT  Workstation ID: [id number]    CT CEREBRAL PERFUSION WITH & WITHOUT CONTRAST    Result Date: 4/23/2024  CT ANGIOGRAM HEAD W AI ANALYSIS OF LVO, CT CEREBRAL PERFUSION W WO CONTRAST, CT ANGIOGRAM NECK Date of Exam: 4/23/2024 12:28 PM EDT Indication: Neuro deficit, acute stroke suspected Neuro deficit, acute stroke suspected. Comparison: CT brain dated 4/23/2024 Technique: CTA of the head was performed after the uneventful intravenous administration of intravenous contrast. Reconstructed coronal and sagittal images were also obtained. In addition, a 3-D volume rendered image was created for interpretation. Automated exposure control and iterative reconstruction methods were used. Findings: CT perfusion: CBF less than 30%: 0 mL, Tmax more than 6 seconds: 0 mL, mismatch volume: 0 mL, mismatch ratio: None Tmax more than 4 seconds: 15 mL noticed in the right occipital lobe and bilateral middle cranial fossa, nonspecific CTA head and neck: The lung apices are clear with minimal emphysematous changes. The transverse aorta shows calcified plaque formation. There is a three-vessel arch with patent great vessels. The right common carotid artery is patent. There is calcified plaque formation at  the right carotid bulb resulting in 30% stenosis. The right internal iliac artery is patent. The left common  carotid artery is patent. There is calcified plaque formation at the left carotid bulb resulting in 20% stenosis. The left internal artery is patent. The bilateral vertebral arteries are patent and appear codominant. Minimal calcified plaque formation at the bilateral V4 segments. The basilar artery is patent. The bilateral anterior cerebral, middle cerebral and posterior cerebral arteries are patent. No visible aneurysms or vascular malformations are seen     Impression: 1. Bilateral carotid bulb calcified atherosclerotic disease with no evidence of hemodynamically significant stenosis 2. Unremarkable intracranial CT angiogram 3. Isolated Tmax of more than 4 seconds of 15 mL with no other associated findings, nonspecific. May consider further evaluation with an MRI of the brain Electronically Signed: Placido Villegas MD  4/23/2024 1:18 PM EDT  Workstation ID: [id number]    CT Head Without Contrast Stroke Protocol    Result Date: 4/23/2024  CT HEAD WO CONTRAST STROKE PROTOCOL Date of Exam: 4/23/2024 12:24 PM EDT Indication: Neuro deficit, acute, stroke suspected Neuro deficit, acute stroke suspected. Comparison: Head CT 3/2/2023. Technique: Axial CT images were obtained of the head without contrast administration.  Reconstructed coronal images were also obtained. Automated exposure control and iterative construction methods were used. Scan Time: 12:21 p.m. Results discussed with stroke team via telephone by Amish Macias MD at 12:32 p.m. Findings: No evidence of acute intracranial hemorrhage or mass effect. No extra-axial collection. The gray white matter differentiation is preserved. Ventricles and sulci are symmetric. Similar mild and subcortical white matter hypodensity, nonspecific, but likely  sequela of chronic small vessel ischemic disease. The mastoid air cells and paranasal sinuses are well aerated. Globes and extraocular muscles are unremarkable. Bones appear demineralized. No acute or suspicious osseous  abnormality. Soft tissues within normal limits.     Impression: No evidence of acute intracranial abnormality. Electronically Signed: Amish Macias MD  4/23/2024 12:37 PM EDT  Workstation ID: [id number]     Results for orders placed during the hospital encounter of 04/23/24    Duplex Carotid Ultrasound CAR    Interpretation Summary    Right internal carotid artery demonstrates a less than 50% stenosis.    Left internal carotid artery demonstrates a less than 50% stenosis.      Results for orders placed during the hospital encounter of 04/23/24    Duplex Carotid Ultrasound CAR    Interpretation Summary    Right internal carotid artery demonstrates a less than 50% stenosis.    Left internal carotid artery demonstrates a less than 50% stenosis.      Results for orders placed during the hospital encounter of 04/23/24    Adult Transthoracic Echo Complete W/ Cont if Necessary Per Protocol (With Agitated Saline)    Interpretation Summary    Left ventricular systolic function is normal.  Left ventricular ejection fraction appears to be 61 - 65%.    Left ventricular diastolic function is consistent with (grade I) impaired relaxation.    There is calcification of the mitral valve.    Saline test results are negative.      Plan for Follow-up of Pending Labs/Results: Reviewed    Discharge Details        Discharge Medications        New Medications        Instructions Start Date   aspirin 325 MG tablet   325 mg, Oral, Daily   Start Date: April 26, 2024     nicotine 21 MG/24HR patch  Commonly known as: NICODERM CQ   1 patch, Transdermal, Every 24 Hours             Changes to Medications        Instructions Start Date   clobetasol propionate 0.05 % cream  Commonly known as: TEMOVATE  What changed:   when to take this  additional instructions   Apply once-nightly application for 12 weeks. Then twice weekly for long-term LS maintenance.      famotidine 20 MG tablet  Commonly known as: PEPCID  What changed:   how to take this  when to  take this  reasons to take this   TAKE 1 TABLET BY MOUTH TWICE DAILY             Continue These Medications        Instructions Start Date   albuterol sulfate  (90 Base) MCG/ACT inhaler  Commonly known as: Ventolin HFA   2 puffs, Inhalation, Every 4 Hours PRN      benzonatate 200 MG capsule  Commonly known as: TESSALON   200 mg, Oral, 3 Times Daily PRN      buPROPion  MG 12 hr tablet  Commonly known as: WELLBUTRIN SR   150 mg, Oral, Daily      busPIRone 5 MG tablet  Commonly known as: BUSPAR   TAKE 1 TABLET BY MOUTH THREE TIMES DAILY AS NEEDED FOR ANXIETY      CoQ-10 200 MG capsule   1 tablet, Oral, Daily      [facility] WELLNESS PO   1 capsule, Oral, Daily      cyanocobalamin 500 MCG tablet  Commonly known as: VITAMIN B-12   500 mcg, Oral, Daily      cyclobenzaprine 10 MG tablet  Commonly known as: FLEXERIL   10 mg, Oral, 2 Times Daily PRN      ezetimibe 10 MG tablet  Commonly known as: ZETIA   10 mg, Oral, Nightly      Fluticasone-Umeclidin-Vilant 100-62.5-25 MCG/ACT inhaler  Commonly known as: Trelegy Ellipta   1 puff, Inhalation, Daily      folic acid 1 MG tablet  Commonly known as: FOLVITE   1 mg, Oral, Daily      montelukast 10 MG tablet  Commonly known as: SINGULAIR   10 mg, Oral, Nightly      omeprazole 40 MG capsule  Commonly known as: priLOSEC   40 mg, Oral, Daily      predniSONE 2.5 MG tablet  Commonly known as: DELTASONE   1 tablet, Oral, Daily      rosuvastatin 40 MG tablet  Commonly known as: CRESTOR   40 mg, Oral, Nightly      tocilizumab 200 MG/10ML solution injection  Commonly known as: ACTEMRA   8 mg/kg, Intravenous      zoledronic acid 5 MG/100ML solution infusion  Commonly known as: RECLAST   Administer ZOLEDRONIC ACID 5mg IV once annually             Stop These Medications      acyclovir 400 MG tablet  Commonly known as: ZOVIRAX     amLODIPine 5 MG tablet  Commonly known as: NORVASC     methocarbamol 500 MG tablet  Commonly known as: ROBAXIN              Allergies    Allergen Reactions    Bactrim [Sulfamethoxazole-Trimethoprim] Other (See Comments)     Hypokalemia, hyponatremia    Atorvastatin Unknown (See Comments)     Lipitor      Bupropion Hcl [Bupropion] Unknown (See Comments)     wellbutrin    Calcium Unknown (See Comments)     unknown         Discharge Disposition:  Home or Self Care    Diet:  Hospital:  Diet Order   Procedures    Diet: Cardiac; Healthy Heart (2-3 Na+); Texture: Regular (IDDSI 7); Fluid Consistency: Thin (IDDSI 0)            Activity:      Restrictions or Other Recommendations:         CODE STATUS:    Code Status and Medical Interventions:   Ordered at: 04/23/24 1731     Code Status (Patient has no pulse and is not breathing):    CPR (Attempt to Resuscitate)     Medical Interventions (Patient has pulse or is breathing):    Full Support       Future Appointments   Date Time Provider Department Center   8/27/2024  1:15 PM Eve Navarrete MD [facility] [place]   9/30/2024  3:20 PM Maida Martinez MD [facility]   3/4/2025  1:15 PM Eve Navarrete MD [facility] [place]       Additional Instructions for the Follow-ups that You Need to Schedule       Discharge Follow-up with PCP   As directed       Currently Documented PCP:    Eve Navarrete MD    PCP Phone Number:    [phone]     Follow Up Details: 1 week                      Mckay Morton MD  04/25/24      Time Spent on Discharge:  I spent  40  minutes on this discharge activity which included: face-to-face encounter with the patient, reviewing the data in the system, coordination of the care with the nursing staff as well as consultants, documentation, and entering orders.

## 2024-04-25 NOTE — PROGRESS NOTES
[facility] Medicine Services  PROGRESS NOTE    Patient Name: Irma Pedraza  : 1950  MRN: [id number]    Date of Admission: 2024  Primary Care Physician: Eve Navarrete MD    Subjective   Subjective     CC: R weakness    HPI: Up in bed. No family at bedside. No f/c. No SOA. No visual disturbances. R arm feels weaker/heavy.       Objective   Objective     Vital Signs:   Temp:  [97.9 °F (36.6 °C)-98.3 °F (36.8 °C)] 97.9 °F (36.6 °C)  Heart Rate:  [56-82] 82  Resp:  [16] 16  BP: ()/(32-96) 125/90  Flow (L/min):  [1-2] 2     Physical Exam:  NAD, alert and oriented  OP clear, MMM  Neck supple  No LAD  RRR  CTAB  +BS, ND, NT, soft  R arm weakness/numbness  Normal affect  No rashes    Results Reviewed:  LAB RESULTS:      Lab 24  12324  12324  1230   WBC 5.36 4.65  --   --    HEMOGLOBIN 14.6 15.7  --   --    HEMOGLOBIN, POC  --   --  15.0  --    HEMATOCRIT 43.2 46.9*  --   --    HEMATOCRIT POC  --   --  44  --    PLATELETS 130* 138*  --   --    NEUTROS ABS 2.81 2.47  --   --    IMMATURE GRANS (ABS) 0.01 0.02  --   --    LYMPHS ABS 1.98 1.67  --   --    MONOS ABS 0.45 0.41  --   --    EOS ABS 0.09 0.06  --   --    MCV 93.3 96.3  --   --    PROCALCITONIN  --  0.03  --   --    PROTIME  --   --   --  14.2   APTT  --  25.8  --   --          Lab 24  1232 24  1231   SODIUM 140 143  --    POTASSIUM 3.8 4.0  --    CHLORIDE 101 103  --    CO2 31.0* 24.0  --    ANION GAP 8.0 16.0*  --    BUN 9 11  --    CREATININE 0.76 0.80 0.80   EGFR 82.3 77.4 77.4   GLUCOSE 87 89  --    CALCIUM 9.2 9.3  --    HEMOGLOBIN A1C  --  5.50  --    TSH  --  0.992  --          Lab 24  1232   TOTAL PROTEIN 6.5   ALBUMIN 4.6   GLOBULIN 1.9   ALT (SGPT) 21  20   AST (SGOT) 25  24   BILIRUBIN 0.6   ALK PHOS 58         Lab 24  1232 24  1230   HSTROP T 9  --    PROTIME  --  14.2   INR  --  1.2         Lab 24  0241    CHOLESTEROL 130   LDL CHOL 58   HDL CHOL 50   TRIGLYCERIDES 124             Brief Urine Lab Results  (Last result in the past 365 days)        Color   Clarity   Blood   Leuk Est   Nitrite   Protein   CREAT   Urine HCG        02/27/24 1432             241.9         02/27/24 1432 Yellow  Comment: REFERENCE RANGE: Yellow, Straw   Clear   Negative   Trace   Negative   Trace                   Microbiology Results Abnormal       None            Duplex Carotid Ultrasound CAR    Result Date: 4/24/2024    Right internal carotid artery demonstrates a less than 50% stenosis.   Left internal carotid artery demonstrates a less than 50% stenosis.     CT Head Without Contrast    Result Date: 4/24/2024  CT HEAD WO CONTRAST Date of Exam: 4/24/2024 12:42 PM EDT Indication: Stroke, follow up 24 Hours Post Thrombolytic Administration. Comparison: CT head 4/23/2024, MRI of the brain 4/24/2024 Technique: Axial CT images were obtained of the head without contrast administration.  Automated exposure control and iterative construction methods were used. Findings: Punctate hypodensities in the region of the left central sulcus consistent with acute infarcts demonstrated on comparison MRI. There is no significant mass effect. There is no evidence of intracranial hemorrhage. No mass or extra-axial fluid collection. Tiny remote lacunar infarct of the right caudate head. Mucous retention cyst of the left maxillary sinus. Bilateral ethmoid air cell mucosal thickening. Partial opacification of the left mastoid air cells. The osseous structures are intact.     Impression: Impression: Punctate hypodensities in the region of the left central sulcus consistent with acute infarcts. No evidence for hemorrhagic transformation. Electronically Signed: Camila Berger MD  4/24/2024 12:59 PM EDT  Workstation ID: [id number]    Adult Transthoracic Echo Complete W/ Cont if Necessary Per Protocol (With Agitated Saline)    Addendum Date: 4/24/2024      Left  ventricular systolic function is normal.  Left ventricular ejection fraction appears to be 61 - 65%.   Left ventricular diastolic function is consistent with (grade I) impaired relaxation.   There is calcification of the mitral valve.   Saline test results are negative.     Result Date: 4/24/2024    Left ventricular systolic function is normal.  Left ventricular ejection fraction appears to be 61 - 65%.   Left ventricular diastolic function is consistent with (grade I) impaired relaxation.   Saline test results are negative.     MRI Brain Without Contrast    Result Date: 4/24/2024  MRI BRAIN WO CONTRAST Date of Exam: 4/24/2024 1:51 AM EDT Indication: Stroke, follow up.  Comparison: Head CT, CT cerebral perfusion 4/23/2024 Technique:  Routine multiplanar/multisequence sequence images of the brain were obtained without contrast administration. Findings: There is a cluster of small acute cortical infarcts surrounding the left central sulcus involving some of the precentral gyrus and postcentral gyrus (series 5 image 76-78). Additionally there is a punctate focus of diffusion restriction in the left occipital lobe (series 5 image 67) with questionable ADC hypointense correlate, compatible with tiny acute infarct. No intracranial hemorrhage. There is a punctate focus of SWI susceptibility in the left parietal lobe which could reflect chronic microhemorrhage or calcification of cerebral vein. No intracranial mass. There are scattered subcortical and periventricular white matter FLAIR/T2 hyperintensities which are nonspecific and can be seen in the setting of chronic small vessel ischemic change. The major intracranial vascular flow voids are preserved. The cerebellopontine angles and midline structures are normal. Unremarkable appearance of the orbits. There is a mucous retention cyst at the floor of the left maxillary sinus. There is a left mastoid air cell effusion, nonspecific. No acute or suspicious bony findings.      Impression: Impression: Cluster of small acute cortical infarcts surrounding the left central sulcus. Punctate likely acute infarct in the left occipital lobe. Findings discussed with stroke navigator by Dr. Herve Bah via telephone at 7:15 a.m. 4/24/2024. Electronically Signed: Herve Bah MD  4/24/2024 7:19 AM EDT  Workstation ID: [id number]    XR Chest 1 View    Result Date: 4/23/2024  XR CHEST 1 VW Date of Exam: 4/23/2024 12:52 PM EDT Indication: Acute Stroke Protocol (onset < 12 hrs) Comparison: Chest radiograph 11/18/2019. Findings: Cardiomediastinal silhouette is unchanged. No focal consolidation or overt pulmonary edema. No pleural effusion or pneumothorax. Osseous structures are unchanged.     Impression: Impression: No evidence of acute cardiopulmonary disease. Electronically Signed: Amish Macias MD  4/23/2024 1:22 PM EDT  Workstation ID: [id number]    CT Angiogram Head w AI Analysis of LVO    Result Date: 4/23/2024  CT ANGIOGRAM HEAD W AI ANALYSIS OF LVO, CT CEREBRAL PERFUSION W WO CONTRAST, CT ANGIOGRAM NECK Date of Exam: 4/23/2024 12:28 PM EDT Indication: Neuro deficit, acute stroke suspected Neuro deficit, acute stroke suspected. Comparison: CT brain dated 4/23/2024 Technique: CTA of the head was performed after the uneventful intravenous administration of intravenous contrast. Reconstructed coronal and sagittal images were also obtained. In addition, a 3-D volume rendered image was created for interpretation. Automated exposure control and iterative reconstruction methods were used. Findings: CT perfusion: CBF less than 30%: 0 mL, Tmax more than 6 seconds: 0 mL, mismatch volume: 0 mL, mismatch ratio: None Tmax more than 4 seconds: 15 mL noticed in the right occipital lobe and bilateral middle cranial fossa, nonspecific CTA head and neck: The lung apices are clear with minimal emphysematous changes. The transverse aorta shows calcified plaque formation. There is a three-vessel arch with patent  great vessels. The right common carotid artery is patent. There is calcified plaque formation at  the right carotid bulb resulting in 30% stenosis. The right internal iliac artery is patent. The left common carotid artery is patent. There is calcified plaque formation at the left carotid bulb resulting in 20% stenosis. The left internal artery is patent. The bilateral vertebral arteries are patent and appear codominant. Minimal calcified plaque formation at the bilateral V4 segments. The basilar artery is patent. The bilateral anterior cerebral, middle cerebral and posterior cerebral arteries are patent. No visible aneurysms or vascular malformations are seen     Impression: Impression: 1. Bilateral carotid bulb calcified atherosclerotic disease with no evidence of hemodynamically significant stenosis 2. Unremarkable intracranial CT angiogram 3. Isolated Tmax of more than 4 seconds of 15 mL with no other associated findings, nonspecific. May consider further evaluation with an MRI of the brain Electronically Signed: Placido Villegas MD  4/23/2024 1:18 PM EDT  Workstation ID: [id number]    CT Angiogram Neck    Result Date: 4/23/2024  CT ANGIOGRAM HEAD W AI ANALYSIS OF LVO, CT CEREBRAL PERFUSION W WO CONTRAST, CT ANGIOGRAM NECK Date of Exam: 4/23/2024 12:28 PM EDT Indication: Neuro deficit, acute stroke suspected Neuro deficit, acute stroke suspected. Comparison: CT brain dated 4/23/2024 Technique: CTA of the head was performed after the uneventful intravenous administration of intravenous contrast. Reconstructed coronal and sagittal images were also obtained. In addition, a 3-D volume rendered image was created for interpretation. Automated exposure control and iterative reconstruction methods were used. Findings: CT perfusion: CBF less than 30%: 0 mL, Tmax more than 6 seconds: 0 mL, mismatch volume: 0 mL, mismatch ratio: None Tmax more than 4 seconds: 15 mL noticed in the right occipital lobe and bilateral middle  cranial fossa, nonspecific CTA head and neck: The lung apices are clear with minimal emphysematous changes. The transverse aorta shows calcified plaque formation. There is a three-vessel arch with patent great vessels. The right common carotid artery is patent. There is calcified plaque formation at  the right carotid bulb resulting in 30% stenosis. The right internal iliac artery is patent. The left common carotid artery is patent. There is calcified plaque formation at the left carotid bulb resulting in 20% stenosis. The left internal artery is patent. The bilateral vertebral arteries are patent and appear codominant. Minimal calcified plaque formation at the bilateral V4 segments. The basilar artery is patent. The bilateral anterior cerebral, middle cerebral and posterior cerebral arteries are patent. No visible aneurysms or vascular malformations are seen     Impression: Impression: 1. Bilateral carotid bulb calcified atherosclerotic disease with no evidence of hemodynamically significant stenosis 2. Unremarkable intracranial CT angiogram 3. Isolated Tmax of more than 4 seconds of 15 mL with no other associated findings, nonspecific. May consider further evaluation with an MRI of the brain Electronically Signed: Placido Villegas MD  4/23/2024 1:18 PM EDT  Workstation ID: [id number]    CT CEREBRAL PERFUSION WITH & WITHOUT CONTRAST    Result Date: 4/23/2024  CT ANGIOGRAM HEAD W AI ANALYSIS OF LVO, CT CEREBRAL PERFUSION W WO CONTRAST, CT ANGIOGRAM NECK Date of Exam: 4/23/2024 12:28 PM EDT Indication: Neuro deficit, acute stroke suspected Neuro deficit, acute stroke suspected. Comparison: CT brain dated 4/23/2024 Technique: CTA of the head was performed after the uneventful intravenous administration of intravenous contrast. Reconstructed coronal and sagittal images were also obtained. In addition, a 3-D volume rendered image was created for interpretation. Automated exposure control and iterative reconstruction  methods were used. Findings: CT perfusion: CBF less than 30%: 0 mL, Tmax more than 6 seconds: 0 mL, mismatch volume: 0 mL, mismatch ratio: None Tmax more than 4 seconds: 15 mL noticed in the right occipital lobe and bilateral middle cranial fossa, nonspecific CTA head and neck: The lung apices are clear with minimal emphysematous changes. The transverse aorta shows calcified plaque formation. There is a three-vessel arch with patent great vessels. The right common carotid artery is patent. There is calcified plaque formation at  the right carotid bulb resulting in 30% stenosis. The right internal iliac artery is patent. The left common carotid artery is patent. There is calcified plaque formation at the left carotid bulb resulting in 20% stenosis. The left internal artery is patent. The bilateral vertebral arteries are patent and appear codominant. Minimal calcified plaque formation at the bilateral V4 segments. The basilar artery is patent. The bilateral anterior cerebral, middle cerebral and posterior cerebral arteries are patent. No visible aneurysms or vascular malformations are seen     Impression: Impression: 1. Bilateral carotid bulb calcified atherosclerotic disease with no evidence of hemodynamically significant stenosis 2. Unremarkable intracranial CT angiogram 3. Isolated Tmax of more than 4 seconds of 15 mL with no other associated findings, nonspecific. May consider further evaluation with an MRI of the brain Electronically Signed: Placido Villegas MD  4/23/2024 1:18 PM EDT  Workstation ID: [id number]    CT Head Without Contrast Stroke Protocol    Result Date: 4/23/2024  CT HEAD WO CONTRAST STROKE PROTOCOL Date of Exam: 4/23/2024 12:24 PM EDT Indication: Neuro deficit, acute, stroke suspected Neuro deficit, acute stroke suspected. Comparison: Head CT 3/2/2023. Technique: Axial CT images were obtained of the head without contrast administration.  Reconstructed coronal images were also obtained. Automated  exposure control and iterative construction methods were used. Scan Time: 12:21 p.m. Results discussed with stroke team via telephone by Amish Macias MD at 12:32 p.m. Findings: No evidence of acute intracranial hemorrhage or mass effect. No extra-axial collection. The gray white matter differentiation is preserved. Ventricles and sulci are symmetric. Similar mild and subcortical white matter hypodensity, nonspecific, but likely  sequela of chronic small vessel ischemic disease. The mastoid air cells and paranasal sinuses are well aerated. Globes and extraocular muscles are unremarkable. Bones appear demineralized. No acute or suspicious osseous abnormality. Soft tissues within normal limits.     Impression: Impression: No evidence of acute intracranial abnormality. Electronically Signed: Amish Macias MD  4/23/2024 12:37 PM EDT  Workstation ID: [id number]     Results for orders placed during the hospital encounter of 04/23/24    Adult Transthoracic Echo Complete W/ Cont if Necessary Per Protocol (With Agitated Saline)    Interpretation Summary    Left ventricular systolic function is normal.  Left ventricular ejection fraction appears to be 61 - 65%.    Left ventricular diastolic function is consistent with (grade I) impaired relaxation.    There is calcification of the mitral valve.    Saline test results are negative.      Current medications:  Scheduled Meds:arformoterol, 15 mcg, Nebulization, BID - RT  aspirin, 325 mg, Oral, Daily   Or  aspirin, 300 mg, Rectal, Daily  montelukast, 10 mg, Oral, Nightly  nicotine, 1 patch, Transdermal, Q24H  predniSONE, 2.5 mg, Oral, Daily With Breakfast  revefenacin, 175 mcg, Nebulization, Daily - RT  rosuvastatin, 40 mg, Oral, Nightly  sodium chloride, 10 mL, Intravenous, Q12H      Continuous Infusions:niCARdipine, 5-15 mg/hr, Last Rate: Stopped (04/23/24 1414)      PRN Meds:.  acetaminophen    Albuterol Sulfate NEB Orderable    ondansetron    Potassium Replacement - Follow Nurse  / BPA Driven Protocol    sodium chloride    sodium chloride    sodium chloride    Assessment & Plan   Assessment & Plan     Active Hospital Problems    Diagnosis  POA    **Acute CVA (cerebrovascular accident) [I63.9]  Yes    Dyslipidemia [E78.5]  Yes    Emphysema [J43.2]  Yes    GERD (gastroesophageal reflux disease) [K21.9]  Yes    HTN (hypertension) [I10]  Yes    Polymyalgia rheumatica [M35.3]  Yes    BRYAN [F41.1]  Yes      Resolved Hospital Problems   No resolved problems to display.        Brief Hospital Course to date:  Irma Pedraza is a 74 y.o. female with history of tobacco use/COPD, PMR, HTN, here with slurred speech and RUE weakness, s/p TNK.     CVA  -asa/statin  -s/p TNK  -ECHO without shunt  -A1C 5.5  -outpatient cardiac monitoring per stroke team, and follow up in 1 month    COPD  -continue home inhalers    Hx of PMR    Hx of GERD    Expected Discharge Location and Transportation: Home  Expected Discharge   Expected Discharge Date: 4/25/2024; Expected Discharge Time:      DVT prophylaxis:  Mechanical DVT prophylaxis orders are present.         AM-PAC 6 Clicks Score (PT): 21 (04/24/24 1812)    CODE STATUS:   Code Status and Medical Interventions:   Ordered at: 04/23/24 3750     Code Status (Patient has no pulse and is not breathing):    CPR (Attempt to Resuscitate)     Medical Interventions (Patient has pulse or is breathing):    Full Support       Mckay Morton MD  04/25/24

## 2024-04-25 NOTE — CASE MANAGEMENT/SOCIAL WORK
Continued Stay Note  [facility]     Patient Name: Irma Pedraza  MRN: [id number]  Today's Date: 4/25/2024    Admit Date: 4/23/2024    Plan: Home   Discharge Plan       Row Name 04/25/24 1117       Plan    Plan Home    Patient/Family in Agreement with Plan yes    Plan Comments Spoke with Ms. Pedraza at the bedside. Discharge plan is home. Denies any discharge needs and is agreeable with discharge plan. Spouse to transport home.    Final Discharge Disposition Code 01 - home or self-care                   Discharge Codes    No documentation.                 Expected Discharge Date and Time       Expected Discharge Date Expected Discharge Time    Apr 25, 2024               Whitney Jha RN

## 2024-04-25 NOTE — THERAPY DISCHARGE NOTE
Acute Care - Speech Language Pathology Treatment Note/Discharge  [facility]     Patient Name: Irma Pedraza  : 1950  MRN: [id number]  Today's Date: 2024               Admit Date: 2024     Visit Dx:    ICD-10-CM ICD-9-CM   1. Acute ischemic stroke  I63.9 434.91   2. Dysphagia, unspecified type  R13.10 787.20   3. Acute CVA (cerebrovascular accident)  I63.9 434.91     Patient Active Problem List   Diagnosis    Cervicalgia    BRYAN    Mixed hyperlipidemia    Mitral valve disorder    Tinnitus    Vitamin D deficiency    Senile osteoporosis    Dyspnea    Polymyalgia rheumatica    Primary insomnia    Chronic pain of both shoulders    Chronic upper back pain    Drug-induced hypokalemia    HTN (hypertension)    Benign paroxysmal positional vertigo    Chronic low back pain    Emphysema    GERD (gastroesophageal reflux disease)    Muscle spasms of both lower extremities    Temporal arteritis    Tobacco dependence syndrome    Nonalcoholic fatty liver disease    Spasm of thoracic back muscle    Annual physical exam    Medicare annual wellness visit, subsequent    Rheumatoid arthritis, seropositive    Lichen sclerosus of female genitalia    Lichen sclerosus    Sprain of interphalangeal joint of right thumb    Alas's esophagus without dysplasia    Overweight with body mass index (BMI) of 26 to 26.9 in adult    Mild memory disturbance    High plasma homocystine    Poor appetite    Suspected cerebrovascular accident (CVA)    Dyslipidemia    Acute CVA (cerebrovascular accident)     Past Medical History:   Diagnosis Date    Adenomyomatosis of gallbladder     by ultrasound, Dr. Yomi Tai     Arthritis     Arthritis of lumbosacral spine     Asthma     Cataract     Remove  and     Chronic hoarseness     Class 1 obesity due to excess calories with serious comorbidity and body mass index (BMI) of 30.0 to 30.9 in adult 2020 Eve Navarrete MD      Closed displaced fracture of  shaft of right clavicle with malunion     Diverticulitis 02/28/2019    Diverticulosis     Years ago, mot sure of date    Dyslipidemia 04/23/2024    GCA (giant cell arteritis)     GERD (gastroesophageal reflux disease)     HL (hearing loss) 2022    Hyperlipidemia Unk    Low back pain ?    Medicare annual wellness visit, subsequent 02/01/2021    Lloyd's neuroma of left foot     surgery    Ocular histoplasmosis     BCC 2/6/2017    Panic disorder 02/28/2019    PMR (polymyalgia rheumatica)      Past Surgical History:   Procedure Laterality Date    COLONOSCOPY  02/2009    COSMETIC SURGERY  1994    face lift    COSMETIC SURGERY  1993    TUMMY TUCK    COSMETIC SURGERY  1980    Birth kenny removal    ELBOW PROCEDURE Left     x2    EYE SURGERY  7/20 and 7/25/2023    Removed cataracts and incerted lenses in bot eyes    THROAT SURGERY      remote removal of growth from throat    TONSILLECTOMY  1964       SLP Recommendation and Plan              Anticipated Discharge Disposition (SLP): home with assist (HEP) (04/25/24 0900)           Daily Summary of Progress (SLP): progress toward functional goals is good (04/25/24 0900)                    Treatment Assessment (SLP): improved, dysarthria (04/25/24 0900)  Treatment Assessment Comments (SLP): Reviewed strategies for over artic and increased loudness to improve speech intelligibility. Gave home exercises for continued use. She verbalized understanding. (04/25/24 0900)  Plan for Continued Treatment (SLP): other (see comments) (pt discharging w/ home program) (04/25/24 0900)    Plan of Care Reviewed With: patient (04/25/24 1126)  Progress: improving (04/25/24 1126)    SLP EVALUATION (Last 72 Hours)       SLP SLC Evaluation       Row Name 04/25/24 0900       Communication Assessment/Intervention    Document Type therapy note (daily note)  -CJ    Subjective Information no complaints  -CJ    Patient Observations alert;cooperative;agree to therapy  -CJ    Patient/Family/Caregiver  Comments/Observations no family present  -CJ    Patient Effort good  -    Symptoms Noted During/After Treatment none  -       General Information    Prior Level of Function-Communication --    Patient's Goals for Discharge --    Family Goals for Discharge --       Pain    Additional Documentation Pain Scale: FACES Pre/Post-Treatment (Group)  -       Pain Scale: FACES Pre/Post-Treatment    Pain: FACES Scale, Pretreatment 0-->no hurt  -CJ    Posttreatment Pain Rating 0-->no hurt  -CJ       Comprehension Assessment/Intervention    Comprehension Assessment/Intervention --       Auditory Comprehension Assessment/Intervention    Auditory Comprehension (Communication) --       Reading Comprehension Assessment/Intervention    Reading Comprehension (Communication) --       Expression Assessment/Intervention    Expression Assessment/Intervention --       Verbal Expression Assessment/Intervention    Automatic Speech (Communication) --    Repetition --    Phrase Completion --    Responsive Naming --    Confrontational Naming --       Graphic Expression Assessment/Intervention    Graphic Expression --       Oral Motor Structure and Function    Oral Motor Structure and Function --       Motor Speech Assessment/Intervention    Motor Speech Function --    Speech intelligibility --       Cognitive Assessment Intervention- SLP    Cognitive Function (Cognition) --       SLP Evaluation Clinical Impressions    SLP Diagnosis --    Rehab Potential/Prognosis --    SLC Criteria for Skilled Therapy Interventions Met --    Functional Impact --       SLP Treatment Clinical Impressions    Treatment Assessment (SLP) improved;dysarthria  -    Treatment Assessment Comments (SLP) Reviewed strategies for over artic and increased loudness to improve speech intelligibility. Gave home exercises for continued use. She verbalized understanding.  -    Daily Summary of Progress (SLP) progress toward functional goals is good  -    Plan for  Continued Treatment (SLP) other (see comments)  pt discharging w/ home program  -CJ    Care Plan Review care plan/treatment goals reviewed  -CJ       Recommendations    Therapy Frequency (SLP SLC) --    Anticipated Discharge Disposition (SLP) home with assist  HEP  -              User Key  (r) = Recorded By, (t) = Taken By, (c) = Cosigned By      Initials Name Effective Dates     Hasmukh Isamar EDUIN, MS CCC-SLP 07/11/23 -                        EDUCATION  The patient has been educated in the following areas:   Communication Impairment.           SLP GOALS       Row Name 04/25/24 0900 04/24/24 1425 04/23/24 1430       Patient will demonstrate functional speech skills for return to discharge environment    [place] with minimal cues  -CJ with minimal cues  -MH with minimal cues  -CJ    Time frame by discharge  -CJ by discharge  -MH by discharge  -CJ    Progress/Outcomes good progress toward goal  -CJ continuing progress toward goal  -MH new goal  -CJ       SLP Diagnostic Treatment     Patient will participate in further assessment in the following areas -- graphic expression;cognitive-linguistic;higher-level cognitive-linguistic  -MH graphic expression;cognitive-linguistic;higher-level cognitive-linguistic  -CJ    Time Frame (Diagnostic) -- short term goal (STG)  -MH short term goal (STG)  -CJ    Progress/Outcomes (Additional Goal 1, SLP) -- goal met  - discharged from facility  -CJ    Comment (Diagnostic) -- Graphic expression and cognitive linguistic abilities are grossly functional.  -MH --       Phonation Goal 1 (SLP)    Improve Phonation By Goal 1 (SLP) using loud speech;with minimal cues (75-90%);100%  -CJ using loud speech;with minimal cues (75-90%);100%  -MH using loud speech;with minimal cues (75-90%);100%  -CJ    Time Frame (Phonation Goal 1, SLP) 1 week  -CJ 1 week  -MH 1 week  -CJ    Progress (Phonation Goal 1, SLP) 90%;with minimal cues (75-90%)  -CJ -- --    Progress/Outcomes (Phonation Goal 1,  SLP) good progress toward goal  -CJ continuing progress toward goal  -MH new goal  -CJ    Comment (Phonation Goal 1, SLP) reviewed and gave handouts  -CJ -- --       Articulation Goal 1 (SLP)    Improve Articulation Goal 1 (SLP) by over-articulating in connected speech;100%;with minimal cues (75-90%)  -CJ by over-articulating in connected speech;100%;with minimal cues (75-90%)  -MH by over-articulating at phrase level;by over-articulating in connected speech;100%;with minimal cues (75-90%)  -CJ    Time Frame (Articulation Goal 1, SLP) 1 week  -CJ 1 week  -MH 1 week  -CJ    Progress (Articulation Goal 1, SLP) 90%;independently (over 90% accuracy)  -CJ -- --    Progress/Outcomes (Articulation Goal 1, SLP) good progress toward goal  -CJ continuing progress toward goal  -MH new goal  -CJ    Comment (Articulation Goal 1, SLP) reviewed and gave handouts  -CJ Cont w/ trace-mild dysarthria  -MH --              User Key  (r) = Recorded By, (t) = Taken By, (c) = Cosigned By      Initials Name Provider Type    Isamar Cartagena MS CCC-SLP Speech and Language Pathologist    Ramonita Nielson, MS CCC-SLP Speech and Language Pathologist                        Time Calculation:    Time Calculation- SLP       Row Name 04/25/24 1126             Time Calculation- SLP    SLP Start Time 0900  -      SLP Received On 04/25/24  -         Untimed Charges    31205-LT Treatment/ST Modification Prosth Aug Alter  24  -CJ         Total Minutes    Untimed Charges Total Minutes 24  -CJ       Total Minutes 24  -CJ                User Key  (r) = Recorded By, (t) = Taken By, (c) = Cosigned By      Initials Name Provider Type    Isamar Cartagena, MS CCC-SLP Speech and Language Pathologist                    Therapy Charges for Today       Code Description Service Date Service Provider Modifiers Qty    [id number]  ST TREATMENT SPEECH 2 4/25/2024 Isamar Noe, MS CCC-SLP GN 1                     SLP Discharge Summary  Anticipated  Discharge Disposition (SLP): home with assist (HEP)    Isamar Noe, MS CCC-SLP  4/25/2024

## 2024-04-25 NOTE — PLAN OF CARE
Goal Outcome Evaluation:  Plan of Care Reviewed With: patient        Progress: improving         Anticipated Discharge Disposition (SLP): home with assist (HEP)             Treatment Assessment (SLP): improved, dysarthria (04/25/24 0900)  Treatment Assessment Comments (SLP): Reviewed strategies for over artic and increased loudness to improve speech intelligibility. Gave home exercises for continued use. She verbalized understanding. (04/25/24 0900)  Plan for Continued Treatment (SLP): other (see comments) (pt discharging w/ home program) (04/25/24 0900)

## 2024-04-25 NOTE — PROGRESS NOTES
Stroke Progress Note       Chief Complaint:  Right upper extremity Weakness    Subjective    Subjective     Subjective:  Patient was seen today sitting upright on side of hospital bed eating breakfast.  Neurological exam is markedly improved compared to when I evaluated the patient first in the ER.  Speech is now fluent without evidence of dysarthria.  No facial droop appreciated.  Patient does still have some weakness in her right hand however she is able to  and  objects and her coffee without too much difficulty.  Her main complaint today is tingling in her right hand.  This is compared to the ER presentation where she had little to no function of  strength in her right hand.  She has no current complaint of headache or dizziness.  She is a current smoker 1 pack/day up to the time of admission, however states that her and her  have been wanting to quit.  Smoking cessation counseling was given today and the patient is requesting additional nicotine patches upon discharge to help her until she can follow-up with her primary care physician.  Patient had no additional new complaints today and is looking forward to going home.  Educational material concerning signs symptoms of stroke and healthy diet were reviewed with the patient and will be given at discharge.  Patient is in agreement to follow-up with stroke clinic in approximately 1 month.     Review of Systems   Constitutional:  Negative for fatigue and fever.   HENT:  Positive for tinnitus. Negative for congestion, sinus pressure and sinus pain.    Eyes:  Negative for photophobia and visual disturbance.   Respiratory:  Negative for cough and shortness of breath.    Cardiovascular:  Negative for chest pain and palpitations.   Gastrointestinal:  Negative for blood in stool, nausea and vomiting.   Genitourinary: Negative.    Musculoskeletal: Negative.    Neurological:  Positive for weakness and numbness. Negative for dizziness, tremors,  seizures, syncope, facial asymmetry, speech difficulty, light-headedness and headaches.   Hematological:  Does not bruise/bleed easily.   Psychiatric/Behavioral: Negative.              Objective    Objective      Temp:  [97.9 °F (36.6 °C)-98.1 °F (36.7 °C)] 97.9 °F (36.6 °C)  Heart Rate:  [61-82] 62  Resp:  [16-18] 18  BP: ()/(56-96) 125/90        Neurological Exam  Mental Status  Alert. Oriented to person, place, time and situation. Oriented to person, place, and time. Speech is normal. Language is fluent with no aphasia.    Cranial Nerves  CN II: Visual fields full to confrontation.  CN III, IV, VI: Extraocular movements intact bilaterally. Normal lids and orbits bilaterally. Pupils equal round and reactive to light bilaterally.  CN V: Facial sensation is normal.  CN VII: Full and symmetric facial movement.  CN XI: Shoulder shrug strength is normal.  CN XII: Tongue midline without atrophy or fasciculations.    Motor  Normal muscle bulk throughout. No fasciculations present. Normal muscle tone. Strength is 5/5 in all four extremities except as noted.  Right hand  strength 3+/5 .    Sensory  Light touch abnormality: Slightly decreased sensation to right hand.     Coordination  Right: Finger-to-nose normal. Heel-to-shin normal.Left: Finger-to-nose normal. Heel-to-shin normal.    Gait    Not observed.      Physical Exam  Constitutional:       General: She is not in acute distress.     Appearance: She is not ill-appearing.   HENT:      Head: Normocephalic and atraumatic.      Mouth/Throat:      Pharynx: Oropharynx is clear.   Eyes:      General: Lids are normal.      Extraocular Movements: Extraocular movements intact.      Pupils: Pupils are equal, round, and reactive to light.   Cardiovascular:      Rate and Rhythm: Normal rate.   Pulmonary:      Effort: Pulmonary effort is normal. No respiratory distress.   Abdominal:      General: There is no distension.      Tenderness: There is no guarding.    Musculoskeletal:         General: No signs of injury.      Cervical back: No rigidity.      Right lower leg: No edema.      Left lower leg: No edema.   Skin:     General: Skin is warm.      Findings: No lesion.   Neurological:      Mental Status: She is alert and oriented to person, place, and time.      Cranial Nerves: No cranial nerve deficit.      Sensory: Sensory deficit present.      Motor: Weakness present.      Coordination: Coordination normal.   Psychiatric:         Mood and Affect: Mood normal.         Speech: Speech normal.       Interval:  (shift change)  1a. Level of Consciousness: 0-->Alert, keenly responsive  1b. LOC Questions: 0-->Answers both questions correctly  1c. LOC Commands: 0-->Performs both tasks correctly  2. Best Gaze: 0-->Normal  3. Visual: 0-->No visual loss  4. Facial Palsy: 0-->Normal symmetrical movements  5a. Motor Arm, Left: 0-->No drift, limb holds 90 (or 45) degrees for full 10 secs  5b. Motor Arm, Right: 0-->No drift, limb holds 90 (or 45) degrees for full 10 secs  6a. Motor Leg, Left: 0-->No drift, leg holds 30 degree position for full 5 secs  6b. Motor Leg, Right: 0-->No drift, leg holds 30 degree position for full 5 secs  7. Limb Ataxia: 0-->Absent  8. Sensory: 1-->Mild-to-moderate sensory loss, patient feels pinprick is less sharp or is dull on the affected side, or there is a loss of superficial pain with pinprick, but patient is aware of being touched  9. Best Language: 0-->No aphasia, normal  10. Dysarthria: 0-->Normal  11. Extinction and Inattention (formerly Neglect): 0-->No abnormality    Total (NIH Stroke Scale): 1     Basic Metabolic Panel    Sodium Sodium   Date Value Ref Range Status   04/24/2024 140 136 - 145 mmol/L Final   04/23/2024 143 136 - 145 mmol/L Final      Potassium Potassium   Date Value Ref Range Status   04/24/2024 3.8 3.5 - 5.2 mmol/L Final     Comment:     Slight hemolysis detected by analyzer. Result may be falsely elevated.   04/23/2024 4.0 3.5 -  "5.2 mmol/L Final      Chloride Chloride   Date Value Ref Range Status   04/24/2024 101 98 - 107 mmol/L Final   04/23/2024 103 98 - 107 mmol/L Final      Bicarbonate No results found for: \"PLASMABICARB\"   BUN BUN   Date Value Ref Range Status   04/24/2024 9 8 - 23 mg/dL Final   04/23/2024 11 8 - 23 mg/dL Final      Creatinine Creatinine   Date Value Ref Range Status   04/24/2024 0.76 0.57 - 1.00 mg/dL Final   04/23/2024 0.80 0.57 - 1.00 mg/dL Final   04/23/2024 0.80 0.60 - 1.30 mg/dL Final      Calcium Calcium   Date Value Ref Range Status   04/24/2024 9.2 8.6 - 10.5 mg/dL Final   04/23/2024 9.3 8.6 - 10.5 mg/dL Final      Glucose      No components found for: \"GLUCOSE.*\"      CBC w/diff          2/27/2024    14:32 4/23/2024    12:31 4/23/2024    12:32 4/24/2024    02:41   CBC w/Diff   WBC 6.24   4.65  5.36    RBC 5.06   4.87  4.63    Hemoglobin 15.9  15.0  15.7  14.6    Hematocrit 46.1  44  46.9  43.2    MCV 91.1   96.3  93.3    MCH 31.4   32.2  31.5    MCHC 34.5   33.5  33.8    RDW 13.9   12.6  12.4    Platelets 171   138  130    Neutrophil Rel % 55.0   53.2  52.4    Immature Granulocyte Rel %   0.4  0.2    Lymphocyte Rel % 34.1   35.9  36.9    Monocyte Rel % 9.1   8.8  8.4    Eosinophil Rel % 1.3   1.3  1.7    Basophil Rel % 0.3   0.4  0.4       Results Review:    I reviewed the patient's new clinical results.    Results for orders placed during the hospital encounter of 04/23/24    Adult Transthoracic Echo Complete W/ Cont if Necessary Per Protocol (With Agitated Saline)    Interpretation Summary    Left ventricular systolic function is normal.  Left ventricular ejection fraction appears to be 61 - 65%.    Left ventricular diastolic function is consistent with (grade I) impaired relaxation.    There is calcification of the mitral valve.    Saline test results are negative.         MRI Brain Without Contrast 4/24/2024  Impression: Cluster of small acute cortical infarcts surrounding the left central sulcus. " Punctate likely acute infarct in the left occipital lobe.        CT Angiogram Head and Neck 4/23/2024  Impression: Bilateral carotid bulb calcified atherosclerotic disease with no evidence of hemodynamically significant stenosis. Unremarkable intracranial CT angiogram.     CT Perfusion 4/23/2024  Impression: Isolated Tmax of more than 4 seconds of 15 mL with no other associated findings, nonspecific. May consider further evaluation with an MRI of the brain.     CT Head Without Contrast 4/23/2024  Impression: No evidence of acute intracranial abnormality.        Assessment/Plan   Assessment:  74-year-old female with PMH of HLD, COPD, chronic back pain, HTN, rheumatoid arthritis, anxiety, and current tobacco use who presented to [facility] ED via [facility] with complaints of right facial droop, slurred speech, and right arm weakness.  LKW 1130  Patient does report history of tobacco abuse 1 pack/day and states that she also drinks alcohol nearly every day.  NIH 5 on my exam for dysarthria, RUE drift and weakness, right facial droop, and decreased sensation to right hand.  Emergent CT head without evidence of hemorrhage or acute abnormalities. TNK given at 1241.  CTA demonstrates bilateral carotid bulb arthrosclerotic disease.  No LVO identified.  MRI Brain: acute cortical infarcts surrounding the left central sulcus     # Acute Left MCA territory infarct s/p TNK: etiology cardio-embolic versus atheroembolic  # Tobacco use disorder  # Hypertension  # Carotid bulb atherosclerotic disease     -24 Hour post TNK CTH without hemorrhage  -Continue aspirin 325 mg daily  -Continue home rosuvastatin 40 mg daily and ezetimibe (LDL 58)  -Carotid ultrasound less than 50% stenosis bilateral ICA  -Patient will need Holter monitor prior to discharge (ordered)  -Tobacco cessation encouraged and counseling provided  -Mediterranean diet discussed  -Normalize blood pressure goals  -Follow-up in stroke clinic in 1 month  -Neuro Stroke will  sign off at this time    Discussed the importance of medication compliance Aspirin 325mg daily and lifestyle modifications adequate control of blood pressure, adequate control of cholesterol (goal LDL <70), smoking cessation, increased physical activity, and implementation of healthy diet to help reduce the risk of future cerebrovascular events.  Also discussed the signs symptoms that would warrant the patient return back to the emergency department including unilateral weakness, unilateral numbness, visual disturbances, loss of balance, speech difficulties, and/or a sudden severe headache.  Patient acknowledged understanding.     Neuro Stroke will sign off. Plan discussed with patient, Dr. Brito, and Dr. Morton.  Please call with any questions or concerns       Akil Leung PA-C  04/25/24  10:12 EDT

## 2024-04-25 NOTE — OUTREACH NOTE
Prep Survey      Flowsheet Row Responses   [facility] patient discharged from? [place]   Is LACE score < 7 ? No   Eligibility [facility]   Date of Admission 04/23/24   Date of Discharge 04/25/24   Discharge Disposition Home or Self Care   Discharge diagnosis Acute CVA (cerebrovascular accident)   Does the patient have one of the following disease processes/diagnoses(primary or secondary)? Stroke   Does the patient have Home health ordered? No   Is there a DME ordered? No   Prep survey completed? Yes            Ronna BAÑUELOS - Registered Nurse

## 2024-04-26 ENCOUNTER — TRANSITIONAL CARE MANAGEMENT TELEPHONE ENCOUNTER (OUTPATIENT)
Dept: CALL CENTER | Facility: HOSPITAL | Age: 74
End: 2024-04-26
Payer: MEDICARE

## 2024-04-26 NOTE — OUTREACH NOTE
Call Center TCM Note      Flowsheet Row Responses   [facility] patient discharged from? [place]   Does the patient have one of the following disease processes/diagnoses(primary or secondary)? Stroke   TCM attempt successful? No   Unsuccessful attempts Attempt 1  [Attempted to reach patient and spouse, listed on PCP verbal release]            Melissa Ordonez RN    4/26/2024, 12:17 EDT

## 2024-04-26 NOTE — OUTREACH NOTE
Call Center TCM Note      Flowsheet Row Responses   [facility] [place] [facility] from? [place]   Does the patient have one of the following disease processes/diagnoses(primary or secondary)? Stroke   TCM attempt successful? No   Unsuccessful attempts Attempt 2            Melissa Ordonez RN    4/26/2024, 14:11 EDT

## 2024-04-28 ENCOUNTER — TRANSITIONAL CARE MANAGEMENT TELEPHONE ENCOUNTER (OUTPATIENT)
Dept: CALL CENTER | Facility: HOSPITAL | Age: 74
End: 2024-04-28
Payer: MEDICARE

## 2024-04-28 NOTE — OUTREACH NOTE
Call Center TCM Note      Flowsheet Row Responses   [facility] [place] patient discharged from? [place]   Does the patient have one of the following disease processes/diagnoses(primary or secondary)? Stroke   TCM attempt successful? No   Unsuccessful attempts Attempt 3            Leana Granados, KELY    4/28/2024, 11:38 EDT

## 2024-04-29 ENCOUNTER — TELEPHONE (OUTPATIENT)
Dept: INTERNAL MEDICINE | Facility: CLINIC | Age: 74
End: 2024-04-29

## 2024-04-29 ENCOUNTER — OFFICE VISIT (OUTPATIENT)
Dept: INTERNAL MEDICINE | Facility: CLINIC | Age: 74
End: 2024-04-29
Payer: MEDICARE

## 2024-04-29 VITALS
WEIGHT: 141.2 LBS | HEART RATE: 71 BPM | TEMPERATURE: 97.7 F | SYSTOLIC BLOOD PRESSURE: 126 MMHG | DIASTOLIC BLOOD PRESSURE: 60 MMHG | OXYGEN SATURATION: 90 % | HEIGHT: 61 IN | BODY MASS INDEX: 26.66 KG/M2

## 2024-04-29 DIAGNOSIS — F41.1 GENERALIZED ANXIETY DISORDER: Chronic | ICD-10-CM

## 2024-04-29 DIAGNOSIS — R41.3 MILD MEMORY DISTURBANCE: Chronic | ICD-10-CM

## 2024-04-29 DIAGNOSIS — R29.898 WEAKNESS OF RIGHT UPPER EXTREMITY: ICD-10-CM

## 2024-04-29 DIAGNOSIS — E78.2 MIXED HYPERLIPIDEMIA: ICD-10-CM

## 2024-04-29 DIAGNOSIS — F17.200 TOBACCO DEPENDENCE SYNDROME: ICD-10-CM

## 2024-04-29 DIAGNOSIS — I10 PRIMARY HYPERTENSION: Chronic | ICD-10-CM

## 2024-04-29 DIAGNOSIS — I65.23 BILATERAL CAROTID ARTERY STENOSIS: Chronic | ICD-10-CM

## 2024-04-29 DIAGNOSIS — E66.3 OVERWEIGHT WITH BODY MASS INDEX (BMI) OF 26 TO 26.9 IN ADULT: Chronic | ICD-10-CM

## 2024-04-29 DIAGNOSIS — I63.9 ACUTE CVA (CEREBROVASCULAR ACCIDENT): Primary | ICD-10-CM

## 2024-04-29 PROBLEM — E78.5 DYSLIPIDEMIA: Chronic | Status: RESOLVED | Noted: 2024-04-23 | Resolved: 2024-04-29

## 2024-04-29 PROBLEM — R09.89 SUSPECTED CEREBROVASCULAR ACCIDENT (CVA): Status: RESOLVED | Noted: 2024-04-23 | Resolved: 2024-04-29

## 2024-04-29 PROCEDURE — 3074F SYST BP LT 130 MM HG: CPT | Performed by: INTERNAL MEDICINE

## 2024-04-29 PROCEDURE — 1111F DSCHRG MED/CURRENT MED MERGE: CPT | Performed by: INTERNAL MEDICINE

## 2024-04-29 PROCEDURE — 1160F RVW MEDS BY RX/DR IN RCRD: CPT | Performed by: INTERNAL MEDICINE

## 2024-04-29 PROCEDURE — 99496 TRANSJ CARE MGMT HIGH F2F 7D: CPT | Performed by: INTERNAL MEDICINE

## 2024-04-29 PROCEDURE — 1159F MED LIST DOCD IN RCRD: CPT | Performed by: INTERNAL MEDICINE

## 2024-04-29 PROCEDURE — 3078F DIAST BP <80 MM HG: CPT | Performed by: INTERNAL MEDICINE

## 2024-04-29 RX ORDER — BUPROPION HYDROCHLORIDE 150 MG/1
150 TABLET, EXTENDED RELEASE ORAL DAILY
Qty: 90 TABLET | Refills: 1 | Status: SHIPPED | OUTPATIENT
Start: 2024-04-29

## 2024-04-29 NOTE — TELEPHONE ENCOUNTER
Patient called stating her right hand is tingling a lot and is worried since she had had a stroke. I asked Dr. Navarrete and informed her that tingling is ok but if she starts getting weak she should go to the ER.  Patient verbalized understanding.

## 2024-04-29 NOTE — PROGRESS NOTES
Transitional Care Follow Up Visit  Subjective     Irma Pedraza is a 74 y.o. female who presents for a transitional care management visit.    Within 48 business hours after discharge our office contacted her via telephone to coordinate her care and needs.      I reviewed and discussed the details of that call along with the discharge summary, hospital problems, inpatient lab results, inpatient diagnostic studies, and consultation reports with [place].     Current outpatient and discharge medications have been reconciled for the patient.  Reviewed by: Eve Navarrete MD          4/25/2024     5:07 PM   Date of TCM Phone Call   [facility]   Date of Admission 4/23/2024   Date of Discharge 4/25/2024   Discharge Disposition Home or Self Care     Risk for Readmission (LACE) Score: 11 (4/25/2024  6:00 AM)      History of Present Illness   Course During Hospital Stay: Patient was admitted at [facility] 4/23/2024 with acute ischemic stroke.  She was discharged home on 4/25/2024.  She presented with slurred speech and right upper extremity weakness.  She received TNK in the ER.  She was given aspirin and statin therapy and her weakness and speech continue to improve.  She is to follow-up with neurology in 1 month.  She was put on telemetry for home.  They ordered nicotine patches and instructed her to quit smoking.  She was sent home on aspirin 325 mg tablet daily.      Carotid duplex showed less than 50% stenosis bilaterally.      Echocardiogram revealed left ventricular ejection fraction 61 to 65%.  Left ventricular diastolic dysfunction grade 1.  There was some calcification of the mitral valve.  Saline test showed no shunt.    LDL on 4/24/2024 was 58.  Goal is less than 75.  She is taking statin daily.  History of Present Illness  The patient is here for a follow-up hospital visit. She is accompanied by her .    The patient reports persistent weakness in her right arm, accompanied  "by a tingling sensation predominantly in her hand. she does not perceive any significant improvement. She denies any slurred speech since her hospital admission. Mild weakness has been observed in her right leg, but not as severe as her arm. Her mobility remains unaffected, and she denies any episodes of dizziness, lightheadedness, or loss of balance. Physical therapy has not been arranged by  the hospital. She has been attempting to use her hand for activities and is \"strongly right-handed\". Her memory remains mildly  poor and unchanged since her stroke per the patient and her . She has an upcoming appointment with a neurologist.    The patient did not wear her nicotine patch today . She acknowledges that the patch has decreased her smoking desire and denies any associated nausea or other side effects.    The patient is currently on a regimen of Zetia and rosuvastatin for cholesterol management. Her cholesterol levels, checked during her hospital stay, were satisfactory.    The patient does not consistently take bupropion, only taking it when she experiences jitteriness. She is uncertain about her use of buspirone.    She organizes a pill box weekly.    Supplemental Information  She is taking acyclovir as needed for fever blisters.   She denies any family history of heart attack.      The following portions of the patient's history were reviewed and updated as appropriate: allergies, current medications, past family history, past medical history, past social history, past surgical history, and problem list.    Vitals:    04/29/24 0921   BP: 126/60   BP Location: Left arm   Patient Position: Sitting   Cuff Size: Adult   Pulse: 71   Temp: 97.7 °F (36.5 °C)   TempSrc: Infrared   SpO2: 90%   Weight: 64 kg (141 lb 3.2 oz)   Height: 154.9 cm (60.98\")     Body mass index is 26.69 kg/m².       Review of Systems    Objective   Physical Exam  Vitals and nursing note reviewed.   Constitutional:       Appearance: She is " well-developed and overweight.   HENT:      Head: Normocephalic.   Eyes:      Conjunctiva/sclera: Conjunctivae normal.      Pupils: Pupils are equal, round, and reactive to light.   Neck:      Thyroid: No thyromegaly.   Cardiovascular:      Rate and Rhythm: Normal rate and regular rhythm.      Heart sounds: Normal heart sounds.   Pulmonary:      Effort: Pulmonary effort is normal.      Breath sounds: Normal breath sounds. No wheezing.   Musculoskeletal:         General: Normal range of motion.      Cervical back: Normal range of motion and neck supple.      Right lower leg: No edema.      Left lower leg: No edema.   Lymphadenopathy:      Cervical: No cervical adenopathy.   Skin:     General: Skin is warm and dry.   Neurological:      Mental Status: She is alert and oriented to person, place, and time.      Motor: Weakness present. No tremor or seizure activity.      Coordination: Coordination abnormal. Finger-Nose-Finger Test abnormal.      Gait: Gait is intact.      Comments: RUE weakness and dyscoordination.  still strong   Psychiatric:         Attention and Perception: Attention normal.         Mood and Affect: Mood normal.         Speech: Speech normal.         Behavior: Behavior normal.         Thought Content: Thought content normal.         Cognition and Memory: She exhibits impaired recent memory.         Assessment & Plan     Patient Instructions   Problem List Items Addressed This Visit          Cardiac and Vasculature    HTN (hypertension) (Chronic)    Overview     Amlodipine stopped in hospital 4/2024.         Bilateral carotid artery stenosis (Chronic)    Overview     4/2024 carotid duplex shows:  Right internal carotid artery less than 50% stenosis.  Left internal carotid artery  less than 50% stenosis.      Bilateral carotid bulbs with calcified plaque.         Mixed hyperlipidemia    Overview     Taking rosuvastatin 40 mg and zetia 10mg daily.          Relevant Medications    rosuvastatin  (CRESTOR) 40 MG tablet    ezetimibe (ZETIA) 10 MG tablet       Mental Health    BRYAN (Chronic)    Overview     Taking bupropion  mg tablet once a day.   buspirone 5 mg 3 times a day just as needed.               Relevant Medications    busPIRone (BUSPAR) 5 MG tablet    buPROPion SR (WELLBUTRIN SR) 150 MG 12 hr tablet    nicotine (NICODERM CQ) 21 MG/24HR patch       Musculoskeletal and Injuries    Weakness of right upper extremity    Overview     Right upper extremity weakness and dysmetria secondary to CVA 4/2024.         Relevant Orders    Ambulatory Referral to Physical Therapy Evaluate and treat (Completed)       Neuro    Mild memory disturbance (Chronic)    Acute CVA (cerebrovascular accident) - Primary    Overview     4/23/24 CVA with RUE weakness and dysmetria and slurred speech.         Relevant Orders    Ambulatory Referral to Physical Therapy Evaluate and treat (Completed)    Ambulatory Referral to Cardiology       Tobacco    Tobacco dependence syndrome    Overview     Nicotine patch started 4/2024.         Relevant Medications    nicotine (NICODERM CQ) 21 MG/24HR patch       Other    Overweight with body mass index (BMI) of 26 to 26.9 in adult (Chronic)          Diagnosis Plan   1. Acute CVA (cerebrovascular accident)  Ambulatory Referral to Physical Therapy Evaluate and treat    Ambulatory Referral to Cardiology      2. Weakness of right upper extremity  Ambulatory Referral to Physical Therapy Evaluate and treat      3. Mixed hyperlipidemia        4. Bilateral carotid artery stenosis        5. Tobacco dependence syndrome        6. Mild memory disturbance        7. BRYAN        8. Primary hypertension        9. Overweight with body mass index (BMI) of 26 to 26.9 in adult          Assessment & Plan  1. Hypertension.  The patient's Amlodipine was discontinued during her hospital stay. The patient and her spouse will monitor her blood pressure at home, with the objective of maintaining the systolic  pressure below 130 systolic. They will inform us if it exceeds 130. The patient is advised to maintain a low-salt diet and engage in daily walks.    2.  Mixed hyperlipidemia.  The patient is advised to continue her nightly regimen of rosuvastatin and Zetia, avoidance of fats in the diet, and start daily walks.    3. Bilateral carotid artery disease.  The patient is to continue nightly rosuvastatin and a 325 mg aspirin tablet daily. She is also advised to reduce fats and sugars in her diet and increase her physical activity.    4. Acute CVA with right upper extremity weakness, dysmetria, and slurred speech.  The patient is advised to continue her daily regimen of 325 mg aspirin and rosuvastatin. She is also encouraged to decrease fats and carbohydrates in her diet, increase her walking and exercise, and continue wearing the heart monitor to rule out atrial fibrillation.    5. Tobacco dependence.  The patient is advised to continue her daily usage of nicotine patch and daily intake of bupropion (Wellbutrin). Quitting smoking will significantly reduce her risk of another stroke and myocardial infarction.    6. Right upper extremity weakness.  The patient was given a prescription for physical therapy, which she will take to Erin at [place] and also perform their exercises daily at home.    7. General anxiety disorder.  The patient is advised to ensure she takes the bupropion tablet every morning. She may take the buspirone tablet up to 3 times a day as needed for anxiety.  If she needs it 3 times a day every day that is perfectly fine.  Walking and physical activity also aid in reducing symptoms of stress, anxiety, and mood. Controling anxiety will also aid in quitting smoking.    8. Overweight.  BMI 26  The patient is encouraged to continue reducing her intake of fats, sugars, and carbohydrates in her diet, increase her physical activity, and start daily walks.  Weigh at home once a week to make sure she is starting  to lose weight.  It is reasonable to try to lose 4 pounds per month.    9. Mild memory disturbance.  The patient is scheduled for a follow-up with the neurologist as ordered post-stroke. We can consider initiating donepezil (Aricept) for memory problems.  Physical exercise and walking also help improve memory and cognition.    Tobacco dependence  She is encouraged to continue using the nicotine patch every day!  Exercising and walking also helps decrease the urge to smoke.  Make a list of things to do instead of smoking such as walking, looking up plants in the yard, cleaning out a  the kitchen, doing a word puzzles, using the hands such as squeezing a squishy ball or playing with a straw.  Also make a list of reasons that she wants to quit such as to be there for the grandchildren, to enjoy her life, to save money, not to smell like smoke etc.        Patient or patient representative verbalized consent for the use of Ambient Listening during the visit with  Eve Navarrete MD for chart documentation. 5/2/2024  10:05 EDT

## 2024-04-29 NOTE — PROGRESS NOTES
"Transitional Care Follow Up Visit  Subjective     Irma Pedraza is a 74 y.o. female who presents for a transitional care management visit.    Within 48 business hours after discharge our office contacted her via telephone to coordinate her care and needs.      I reviewed and discussed the details of that call along with the discharge summary, hospital problems, inpatient lab results, inpatient diagnostic studies, and consultation reports with [place].     Current outpatient and discharge medications have been reconciled for the patient.  Reviewed by: Sergio Valenzuela MA          4/25/2024     5:07 PM   Date of TCM Phone Call   [facility]   Date of Admission 4/23/2024   Date of Discharge 4/25/2024   Discharge Disposition Home or Self Care     Risk for Readmission (LACE) Score: 11 (4/25/2024  6:00 AM)      History of Present Illness   Course During Hospital Stay:  ***  History of Present Illness        {Common H&P Review Areas:63025}    Vitals:    04/29/24 0921   BP: 126/60   BP Location: Left arm   Patient Position: Sitting   Cuff Size: Adult   Pulse: 71   Temp: 97.7 °F (36.5 °C)   TempSrc: Infrared   SpO2: 90%   Weight: 64 kg (141 lb 3.2 oz)   Height: 154.9 cm (60.98\")     Body mass index is 26.69 kg/m².       Review of Systems    Objective   Physical Exam    Assessment & Plan     Patient Instructions   Problem List Items Addressed This Visit    None        No diagnosis found.  Assessment & Plan      {Time Spent (Optional):19466}    {[facility] [employer] Provider Statement:95501}    "

## 2024-04-29 NOTE — TELEPHONE ENCOUNTER
Caller: Irma Pedraza    Relationship: Self    Best call back number: [phone]    What was the call regarding: PATIENT CALLED ASKING IF SHE SHOULD BE ON BLOOD THINNERS SINCE SHE HAS HAD A STROKE.

## 2024-05-01 RX ORDER — NICOTINE 21 MG/24HR
1 PATCH, TRANSDERMAL 24 HOURS TRANSDERMAL EVERY 24 HOURS
Qty: 30 PATCH | Refills: 3 | Status: SHIPPED | OUTPATIENT
Start: 2024-05-01 | End: 2024-05-31

## 2024-05-02 ENCOUNTER — TELEPHONE (OUTPATIENT)
Dept: INTERNAL MEDICINE | Facility: CLINIC | Age: 74
End: 2024-05-02
Payer: MEDICARE

## 2024-05-02 NOTE — TELEPHONE ENCOUNTER
Caller: PedrazaIrma    Relationship: Self    Best call back number: [phone]     What is the medical concern/diagnosis: BACK ISSUES    What specialty or service is being requested: PHYSICAL THERAPY    What is the provider, practice or medical service name: SAME PLACE LAST ORDER WENT    Any additional details: THE PATIENT WENT TO PT TODAY AND WAS TOLD THAT IF SHE WANTED TO HAVE HER BACK ADDRESSED AS WELL TO CALL HER PCP AND GET ANOTHER ORDER SENT. THEY CAN DO THIS AT THE SAME TIME THEN. PLEASE SEND.

## 2024-05-02 NOTE — PATIENT INSTRUCTIONS
Patient Instructions  Problem List Items Addressed This Visit          Cardiac and Vasculature    HTN (hypertension) (Chronic)    Overview     Amlodipine stopped in hospital 4/2024.         Bilateral carotid artery stenosis (Chronic)    Overview     4/2024 carotid duplex shows:  Right internal carotid artery less than 50% stenosis.  Left internal carotid artery  less than 50% stenosis.      Bilateral carotid bulbs with calcified plaque.         Mixed hyperlipidemia    Overview     Taking rosuvastatin 40 mg and zetia 10mg daily.          Relevant Medications    rosuvastatin (CRESTOR) 40 MG tablet    ezetimibe (ZETIA) 10 MG tablet       Mental Health    BRYAN (Chronic)    Overview     Taking bupropion  mg tablet once a day.   buspirone 5 mg 3 times a day just as needed.               Relevant Medications    busPIRone (BUSPAR) 5 MG tablet    buPROPion SR (WELLBUTRIN SR) 150 MG 12 hr tablet    nicotine (NICODERM CQ) 21 MG/24HR patch       Musculoskeletal and Injuries    Weakness of right upper extremity    Overview     Right upper extremity weakness and dysmetria secondary to CVA 4/2024.         Relevant Orders    Ambulatory Referral to Physical Therapy Evaluate and treat (Completed)       Neuro    Mild memory disturbance (Chronic)    Acute CVA (cerebrovascular accident) - Primary    Overview     4/23/24 CVA with RUE weakness and dysmetria and slurred speech.         Relevant Orders    Ambulatory Referral to Physical Therapy Evaluate and treat (Completed)    Ambulatory Referral to Cardiology       Tobacco    Tobacco dependence syndrome    Overview     Nicotine patch started 4/2024.         Relevant Medications    nicotine (NICODERM CQ) 21 MG/24HR patch       Other    Overweight with body mass index (BMI) of 26 to 26.9 in adult (Chronic)       Exercising to Stay Healthy  To become healthy and stay healthy, it is recommended that you do moderate-intensity and vigorous-intensity exercise. You can tell that you are  exercising at a moderate intensity if your heart starts beating faster and you start breathing faster but can still hold a conversation. You can tell that you are exercising at a vigorous intensity if you are breathing much harder and faster and cannot hold a conversation while exercising.  How can exercise benefit me?  Exercising regularly is important. It has many health benefits, such as:  Improving overall fitness, flexibility, and endurance.  Increasing bone density.  Helping with weight control.  Decreasing body fat.  Increasing muscle strength and endurance.  Reducing stress and tension, anxiety, depression, or anger.  Improving overall health.  What guidelines should I follow while exercising?  Before you start a new exercise program, talk with your health care provider.  Do not exercise so much that you hurt yourself, feel dizzy, or get very short of breath.  Wear comfortable clothes and wear shoes with good support.  Drink plenty of water while you exercise to prevent dehydration or heat stroke.  Work out until your breathing and your heartbeat get faster (moderate intensity).  How often should I exercise?  Choose an activity that you enjoy, and set realistic goals. Your health care provider can help you make an activity plan that is individually designed and works best for you.  Exercise regularly as told by your health care provider. This may include:  Doing strength training two times a week, such as:  Lifting weights.  Using resistance bands.  Push-ups.  Sit-ups.  Yoga.  Doing a certain intensity of exercise for a given amount of time. Choose from these options:  A total of 150 minutes of moderate-intensity exercise every week.  A total of 75 minutes of vigorous-intensity exercise every week.  A mix of moderate-intensity and vigorous-intensity exercise every week.  Children, pregnant women, people who have not exercised regularly, people who are overweight, and older adults may need to talk with a  health care provider about what activities are safe to perform. If you have a medical condition, be sure to talk with your health care provider before you start a new exercise program.  What are some exercise ideas?  Moderate-intensity exercise ideas include:  Walking 1 mile (1.6 km) in about 15 minutes.  Biking.  Hiking.  Golfing.  Dancing.  Water aerobics.  Vigorous-intensity exercise ideas include:  Walking 4.5 miles (7.2 km) or more in about 1 hour.  Jogging or running 5 miles (8 km) in about 1 hour.  Biking 10 miles (16.1 km) or more in about 1 hour.  Lap swimming.  Roller-skating or in-line skating.  Cross-country skiing.  Vigorous competitive sports, such as football, basketball, and soccer.  Jumping rope.  Aerobic dancing.  What are some everyday activities that can help me get exercise?  Yard work, such as:  Pushing a .  Raking and bagging leaves.  Washing your car.  Pushing a stroller.  Shoveling snow.  Gardening.  Washing windows or floors.  How can I be more active in my day-to-day activities?  Use stairs instead of an elevator.  Take a walk during your lunch break.  If you drive, park your car farther away from your work or school.  If you take public transportation, get off one stop early and walk the rest of the way.  Stand up or walk around during all of your indoor phone calls.  Get up, stretch, and walk around every 30 minutes throughout the day.  Enjoy exercise with a friend. Support to continue exercising will help you keep a regular routine of activity.  Where to find more information  You can find more information about exercising to stay healthy from:  [place] Department of Health and Human Services: www.hhs.gov  Centers for Disease Control and Prevention (CDC): www.cdc.gov  Summary  Exercising regularly is important. It will improve your overall fitness, flexibility, and endurance.  Regular exercise will also improve your overall health. It can help you control your weight, reduce  stress, and improve your bone density.  Do not exercise so much that you hurt yourself, feel dizzy, or get very short of breath.  Before you start a new exercise program, talk with your health care provider.  This information is not intended to replace advice given to you by your health care provider. Make sure you discuss any questions you have with your health care provider.  Document Revised: 04/15/2022 Document Reviewed: 04/15/2022  Elsevier Patient Education © 2023 [employer]

## 2024-05-02 NOTE — TELEPHONE ENCOUNTER
Patient understood and verbalized understanding.    She said the tingling in her right hand has been occurring since she was in the hospital. She wanted to do PT/OT on her back.    She did not want to go to the ER noting she was just there. I re-iterated the message to go to the ER and to let us know if she has any questions

## 2024-05-02 NOTE — TELEPHONE ENCOUNTER
Please call patient or her .  She was informed yesterday to go to the ER but I do not see that she did go.

## 2024-05-03 DIAGNOSIS — M54.50 CHRONIC RIGHT-SIDED LOW BACK PAIN WITHOUT SCIATICA: Primary | ICD-10-CM

## 2024-05-03 DIAGNOSIS — G89.29 CHRONIC RIGHT-SIDED LOW BACK PAIN WITHOUT SCIATICA: Primary | ICD-10-CM

## 2024-05-03 NOTE — TELEPHONE ENCOUNTER
Patient understood and verbalized understanding.    She said her hand tingling has slightly improved as of this morning

## 2024-05-07 ENCOUNTER — LAB (OUTPATIENT)
Dept: LAB | Facility: HOSPITAL | Age: 74
End: 2024-05-07
Payer: MEDICARE

## 2024-05-07 ENCOUNTER — OFFICE VISIT (OUTPATIENT)
Dept: INTERNAL MEDICINE | Facility: CLINIC | Age: 74
End: 2024-05-07
Payer: MEDICARE

## 2024-05-07 VITALS
SYSTOLIC BLOOD PRESSURE: 118 MMHG | HEART RATE: 73 BPM | TEMPERATURE: 97.5 F | HEIGHT: 61 IN | WEIGHT: 143 LBS | OXYGEN SATURATION: 96 % | DIASTOLIC BLOOD PRESSURE: 52 MMHG | BODY MASS INDEX: 27 KG/M2

## 2024-05-07 DIAGNOSIS — E66.3 OVERWEIGHT WITH BODY MASS INDEX (BMI) OF 26 TO 26.9 IN ADULT: Chronic | ICD-10-CM

## 2024-05-07 DIAGNOSIS — D69.6 PLATELETS DECREASED: ICD-10-CM

## 2024-05-07 DIAGNOSIS — E78.2 MIXED HYPERLIPIDEMIA: ICD-10-CM

## 2024-05-07 DIAGNOSIS — R29.898 WEAKNESS OF RIGHT UPPER EXTREMITY: ICD-10-CM

## 2024-05-07 DIAGNOSIS — F17.200 TOBACCO DEPENDENCE SYNDROME: ICD-10-CM

## 2024-05-07 DIAGNOSIS — I63.9 ACUTE CVA (CEREBROVASCULAR ACCIDENT): Primary | ICD-10-CM

## 2024-05-07 DIAGNOSIS — R41.3 MILD MEMORY DISTURBANCE: Chronic | ICD-10-CM

## 2024-05-07 DIAGNOSIS — F41.1 GENERALIZED ANXIETY DISORDER: Chronic | ICD-10-CM

## 2024-05-07 DIAGNOSIS — R20.2 NUMBNESS AND TINGLING IN RIGHT HAND: ICD-10-CM

## 2024-05-07 DIAGNOSIS — R20.0 NUMBNESS AND TINGLING IN RIGHT HAND: ICD-10-CM

## 2024-05-07 DIAGNOSIS — I10 PRIMARY HYPERTENSION: Chronic | ICD-10-CM

## 2024-05-07 DIAGNOSIS — K76.0 NONALCOHOLIC FATTY LIVER DISEASE: Chronic | ICD-10-CM

## 2024-05-07 DIAGNOSIS — I10 PRIMARY HYPERTENSION: ICD-10-CM

## 2024-05-07 LAB
BASOPHILS # BLD AUTO: 0.02 10*3/MM3 (ref 0–0.2)
BASOPHILS NFR BLD AUTO: 0.3 % (ref 0–1.5)
DEPRECATED RDW RBC AUTO: 41.6 FL (ref 37–54)
EOSINOPHIL # BLD AUTO: 0.09 10*3/MM3 (ref 0–0.4)
EOSINOPHIL NFR BLD AUTO: 1.2 % (ref 0.3–6.2)
ERYTHROCYTE [DISTWIDTH] IN BLOOD BY AUTOMATED COUNT: 12.1 % (ref 12.3–15.4)
HCT VFR BLD AUTO: 44.6 % (ref 34–46.6)
HGB BLD-MCNC: 15.3 G/DL (ref 12–15.9)
IMM GRANULOCYTES # BLD AUTO: 0.02 10*3/MM3 (ref 0–0.05)
IMM GRANULOCYTES NFR BLD AUTO: 0.3 % (ref 0–0.5)
LYMPHOCYTES # BLD AUTO: 2.29 10*3/MM3 (ref 0.7–3.1)
LYMPHOCYTES NFR BLD AUTO: 31.3 % (ref 19.6–45.3)
MCH RBC QN AUTO: 32.3 PG (ref 26.6–33)
MCHC RBC AUTO-ENTMCNC: 34.3 G/DL (ref 31.5–35.7)
MCV RBC AUTO: 94.1 FL (ref 79–97)
MONOCYTES # BLD AUTO: 0.46 10*3/MM3 (ref 0.1–0.9)
MONOCYTES NFR BLD AUTO: 6.3 % (ref 5–12)
NEUTROPHILS NFR BLD AUTO: 4.44 10*3/MM3 (ref 1.7–7)
NEUTROPHILS NFR BLD AUTO: 60.6 % (ref 42.7–76)
NRBC BLD AUTO-RTO: 0 /100 WBC (ref 0–0.2)
PLATELET # BLD AUTO: 184 10*3/MM3 (ref 140–450)
PMV BLD AUTO: 10.9 FL (ref 6–12)
RBC # BLD AUTO: 4.74 10*6/MM3 (ref 3.77–5.28)
WBC NRBC COR # BLD AUTO: 7.32 10*3/MM3 (ref 3.4–10.8)

## 2024-05-07 PROCEDURE — 85025 COMPLETE CBC W/AUTO DIFF WBC: CPT

## 2024-05-07 RX ORDER — DONEPEZIL HYDROCHLORIDE 5 MG/1
5 TABLET, FILM COATED ORAL NIGHTLY
Qty: 30 TABLET | Refills: 5 | Status: SHIPPED | OUTPATIENT
Start: 2024-05-07

## 2024-05-07 NOTE — PATIENT INSTRUCTIONS
Patient Instructions  Problem List Items Addressed This Visit          Cardiac and Vasculature    HTN (hypertension) (Chronic)    Overview     Amlodipine stopped in hospital 4/2024.         Relevant Orders    CBC & Differential (Completed)    Mixed hyperlipidemia    Overview     Taking rosuvastatin 40 mg and zetia 10mg daily.          Relevant Medications    rosuvastatin (CRESTOR) 40 MG tablet    ezetimibe (ZETIA) 10 MG tablet       Gastrointestinal Abdominal     Nonalcoholic fatty liver disease (Chronic)       Mental Health    BRYAN (Chronic)    Overview     Taking bupropion  mg tablet once a day.   buspirone 5 mg 3 times a day just as needed.               Relevant Medications    busPIRone (BUSPAR) 5 MG tablet    buPROPion SR (WELLBUTRIN SR) 150 MG 12 hr tablet    nicotine (NICODERM CQ) 21 MG/24HR patch    donepezil (Aricept) 5 MG tablet       Musculoskeletal and Injuries    Weakness of right upper extremity    Overview     Right upper extremity weakness and dysmetria secondary to CVA 4/2024.            Neuro    Mild memory disturbance (Chronic)    Acute CVA (cerebrovascular accident) - Primary    Overview     4/23/24 CVA with RUE weakness and dysmetria and slurred speech.            Symptoms and Signs    Numbness and tingling in right hand       Tobacco    Tobacco dependence syndrome    Overview     Nicotine patch started 4/2024.         Relevant Medications    nicotine (NICODERM CQ) 21 MG/24HR patch       Other    Overweight with body mass index (BMI) of 26 to 26.9 in adult (Chronic)     Other Visit Diagnoses       Platelets decreased                BMI for Adults  Body mass index (BMI) is a number found using a person's weight and height. BMI can help tell how much of a person's weight is made up of fat. BMI does not measure body fat directly. It is used instead of tests that directly measure body fat, which can be difficult and expensive.  What are BMI measurements used for?  BMI is useful to:  Find out  "if your weight puts you at higher risk for medical problems.  Help recommend changes, such as in diet and exercise. This can help you reach a healthy weight. BMI screening can be done again to see if these changes are working.  How is BMI calculated?  Your height and weight are measured. The BMI is found from those numbers. This can be done with [place] or metric measurements. Note that charts and online BMI calculators are available to help you find your BMI quickly and easily without doing these calculations.  To calculate your BMI in [place] measurements:  Measure your weight in pounds (lb).  Multiply the number of pounds by 703.  So, for an adult who weighs 150 lb, multiply that number by 703: 150 x 703, which equals 105,450.  Measure your height in inches. Then multiply that number by itself to get a measurement called \"inches squared.\"  So, for an adult who is 70 inches tall, the \"inches squared\" measurement is 70 inches x 70 inches, which equals 4,900 inches squared.  Divide the total from step 2 (number of lb x 703) by the total from step 3 (inches squared): 105,450 ÷ 4,900 = 21.5. This is your BMI.  To calculate your BMI in metric measurements:    Measure your weight in kilograms (kg).  For this example, the weight is 70 kg.  Measure your height in meters (m). Then multiply that number by itself to get a measurement called \"meters squared.\"  So, for an adult who is 1.75 m tall, the \"meters squared\" measurement is 1.75 m x 1.75 m, which equals 3.1 meters squared.  Divide the number of kilograms (your weight) by the meters squared number. In this example: 70 ÷ 3.1 = 22.6. This is your BMI.  What do the results mean?  BMI charts are used to see if you are underweight, normal weight, overweight, or obese. The following guidelines will be used:  Underweight: BMI less than 18.5.  Normal weight: BMI between 18.5 and 24.9.  Overweight: BMI between 25 and 29.9.  Obese: BMI of 30 or above.  BMI is a tool and cannot " diagnose a condition. Talk with your health care provider about what your BMI means for you. Keep these notes in mind:  Weight includes fat and muscle. Someone with a muscular build, such as an athlete, may have a BMI that is higher than 24.9. In cases like these, BMI is not a correct measure of body fat.  If you have a BMI of 25 or higher, your provider may need to do more testing to find out if excess body fat is the cause.  BMI is measured the same way for males and females. Females usually have more body fat than males of the same height and weight.  Where to find more information  For more information about BMI, including tools to quickly find your BMI, go to:  Centers for Disease Control and Prevention: cdc.gov  American Heart Association: heart.org  National Heart, Lung, and Blood [place]: nhlbi.nih.gov  This information is not intended to replace advice given to you by your health care provider. Make sure you discuss any questions you have with your health care provider.  Document Revised: 09/07/2023 Document Reviewed: 08/31/2023  [employer] Patient Education © 2024 "[employer]  Heart-Healthy Eating Plan  Many factors influence your heart (coronary) health, including eating and exercise habits. Coronary risk increases with abnormal blood fat (lipid) levels. Heart-healthy meal planning includes limiting unhealthy fats, increasing healthy fats, and making other diet and lifestyle changes.  What is my plan?  Your health care provider may recommend that you:  Limit your fat intake to _________% or less of your total calories each day.  Limit your saturated fat intake to _________% or less of your total calories each day.  Limit the amount of cholesterol in your diet to less than _________ mg per day.  What are tips for following this plan?  Cooking  Cook foods using methods other than frying. Baking, boiling, grilling, and broiling are all good options. Other ways to reduce fat include:  Removing the skin from  poultry.  Removing all visible fats from meats.  Steaming vegetables in water or broth.  Meal planning  A plate with examples of foods in a healthy diet.      At meals, imagine dividing your plate into fourths:  Fill one-half of your plate with vegetables and green salads.  Fill one-fourth of your plate with whole grains.  Fill one-fourth of your plate with lean protein foods.  Eat 4-5 servings of vegetables per day. One serving equals 1 cup raw or cooked vegetable, or 2 cups raw leafy greens.  Eat 4-5 servings of fruit per day. One serving equals 1 medium whole fruit, ¼ cup dried fruit, ½ cup fresh, frozen, or canned fruit, or ½ cup 100% fruit juice.  Eat more foods that contain soluble fiber. Examples include apples, broccoli, carrots, beans, peas, and barley. Aim to get 25-30 g of fiber per day.  Increase your consumption of legumes, nuts, and seeds to 4-5 servings per week. One serving of dried beans or legumes equals ½ cup cooked, 1 serving of nuts is ¼ cup, and 1 serving of seeds equals 1 tablespoon.  Fats  Choose healthy fats more often. Choose monounsaturated and polyunsaturated fats, such as olive and canola oils, flaxseeds, walnuts, almonds, and seeds.  Eat more omega-3 fats. Choose salmon, mackerel, sardines, tuna, flaxseed oil, and ground flaxseeds. Aim to eat fish at least 2 times each week.  Check food labels carefully to identify foods with trans fats or high amounts of saturated fat.  Limit saturated fats. These are found in animal products, such as meats, butter, and cream. Plant sources of saturated fats include palm oil, palm kernel oil, and coconut oil.  Avoid foods with partially hydrogenated oils in them. These contain trans fats. Examples are stick margarine, some tub margarines, cookies, crackers, and other baked goods.  Avoid fried foods.  General information  Eat more home-cooked food and less restaurant, buffet, and fast food.  Limit or avoid alcohol.  Limit foods that are high in starch  and sugar.  Lose weight if you are overweight. Losing just 5-10% of your body weight can help your overall health and prevent diseases such as diabetes and heart disease.  Monitor your salt (sodium) intake, especially if you have high blood pressure. Talk with your health care provider about your sodium intake.  Try to incorporate more vegetarian meals weekly.  What foods can I eat?  Fruits  All fresh, canned (in natural juice), or frozen fruits.  Vegetables  Fresh or frozen vegetables (raw, steamed, roasted, or grilled). Green salads.  Grains  Most grains. Choose whole wheat and whole grains most of the time. Rice and pasta, including brown rice and pastas made with whole wheat.  Meats and other proteins  Lean, well-trimmed beef, veal, pork, and lamb. Chicken and turkey without skin. All fish and shellfish. Wild duck, rabbit, pheasant, and venison. Egg whites or low-cholesterol egg substitutes. Dried beans, peas, lentils, and tofu. Seeds and most nuts.  Dairy  Low-fat or nonfat cheeses, including ricotta and mozzarella. Skim or 1% milk (liquid, powdered, or evaporated). Buttermilk made with low-fat milk. Nonfat or low-fat yogurt.  Fats and oils  Non-hydrogenated (trans-free) margarines. Vegetable oils, including soybean, sesame, sunflower, olive, peanut, safflower, corn, canola, and cottonseed. Salad dressings or mayonnaise made with a vegetable oil.  Beverages  Water (mineral or sparkling). Coffee and tea. Diet carbonated beverages.  Sweets and desserts  Sherbet, gelatin, and fruit ice. Small amounts of dark chocolate.  Limit all sweets and desserts.  Seasonings and condiments  All seasonings and condiments.  The items listed above may not be a complete list of foods and beverages you can eat. Contact a dietitian for more options.  What foods are not recommended?  Fruits  Canned fruit in heavy syrup. Fruit in cream or butter sauce. Fried fruit. Limit coconut.  Vegetables  Vegetables cooked in cheese, cream, or  butter sauce. Fried vegetables.  Grains  Breads made with saturated or trans fats, oils, or whole milk. Croissants. Sweet rolls. Donuts. High-fat crackers, such as cheese crackers.  Meats and other proteins  Fatty meats, such as hot dogs, ribs, sausage, herrera, rib-eye roast or steak. High-fat deli meats, such as salami and bologna. Caviar. Domestic duck and goose. Organ meats, such as liver.  Dairy  Cream, sour cream, cream cheese, and creamed cottage cheese. Whole-milk cheeses. Whole or 2% milk (liquid, evaporated, or condensed). Whole buttermilk. Cream sauce or high-fat cheese sauce. Whole-milk yogurt.  Fats and oils  Meat fat, or shortening. Cocoa butter, hydrogenated oils, palm oil, coconut oil, palm kernel oil. Solid fats and shortenings, including herrera fat, salt pork, lard, and butter. Nondairy cream substitutes. Salad dressings with cheese or sour cream.  Beverages  Regular sodas and any drinks with added sugar.  Sweets and desserts  Frosting. Pudding. Cookies. Cakes. Pies. Milk chocolate or white chocolate. Buttered syrups. Full-fat ice cream or ice cream drinks.  The items listed above may not be a complete list of foods and beverages to avoid. Contact a dietitian for more information.  Summary  Heart-healthy meal planning includes limiting unhealthy fats, increasing healthy fats, and making other diet and lifestyle changes.  Lose weight if you are overweight. Losing just 5-10% of your body weight can help your overall health and prevent diseases such as diabetes and heart disease.  Focus on eating a balance of foods, including fruits and vegetables, low-fat or nonfat dairy, lean protein, nuts and legumes, whole grains, and heart-healthy oils and fats.  This information is not intended to replace advice given to you by your health care provider. Make sure you discuss any questions you have with your health care provider.  Document Revised: 04/28/2022 Document Reviewed: 04/28/2022  [employer] Patient  Education © 2022 [employer]    Exercising to Stay Healthy  To become healthy and stay healthy, it is recommended that you do moderate-intensity and vigorous-intensity exercise. You can tell that you are exercising at a moderate intensity if your heart starts beating faster and you start breathing faster but can still hold a conversation. You can tell that you are exercising at a vigorous intensity if you are breathing much harder and faster and cannot hold a conversation while exercising.  How can exercise benefit me?  Exercising regularly is important. It has many health benefits, such as:  Improving overall fitness, flexibility, and endurance.  Increasing bone density.  Helping with weight control.  Decreasing body fat.  Increasing muscle strength and endurance.  Reducing stress and tension, anxiety, depression, or anger.  Improving overall health.  What guidelines should I follow while exercising?  Before you start a new exercise program, talk with your health care provider.  Do not exercise so much that you hurt yourself, feel dizzy, or get very short of breath.  Wear comfortable clothes and wear shoes with good support.  Drink plenty of water while you exercise to prevent dehydration or heat stroke.  Work out until your breathing and your heartbeat get faster (moderate intensity).  How often should I exercise?  Choose an activity that you enjoy, and set realistic goals. Your health care provider can help you make an activity plan that is individually designed and works best for you.  Exercise regularly as told by your health care provider. This may include:  Doing strength training two times a week, such as:  Lifting weights.  Using resistance bands.  Push-ups.  Sit-ups.  Yoga.  Doing a certain intensity of exercise for a given amount of time. Choose from these options:  A total of 150 minutes of moderate-intensity exercise every week.  A total of 75 minutes of vigorous-intensity exercise every week.  A mix  of moderate-intensity and vigorous-intensity exercise every week.  Children, pregnant women, people who have not exercised regularly, people who are overweight, and older adults may need to talk with a health care provider about what activities are safe to perform. If you have a medical condition, be sure to talk with your health care provider before you start a new exercise program.  What are some exercise ideas?  Moderate-intensity exercise ideas include:  Walking 1 mile (1.6 km) in about 15 minutes.  Biking.  Hiking.  Golfing.  Dancing.  Water aerobics.  Vigorous-intensity exercise ideas include:  Walking 4.5 miles (7.2 km) or more in about 1 hour.  Jogging or running 5 miles (8 km) in about 1 hour.  Biking 10 miles (16.1 km) or more in about 1 hour.  Lap swimming.  Roller-skating or in-line skating.  Cross-country skiing.  Vigorous competitive sports, such as football, basketball, and soccer.  Jumping rope.  Aerobic dancing.  What are some everyday activities that can help me get exercise?  Yard work, such as:  Pushing a .  Raking and bagging leaves.  Washing your car.  Pushing a stroller.  Shoveling snow.  Gardening.  Washing windows or floors.  How can I be more active in my day-to-day activities?  Use stairs instead of an elevator.  Take a walk during your lunch break.  If you drive, park your car farther away from your work or school.  If you take public transportation, get off one stop early and walk the rest of the way.  Stand up or walk around during all of your indoor phone calls.  Get up, stretch, and walk around every 30 minutes throughout the day.  Enjoy exercise with a friend. Support to continue exercising will help you keep a regular routine of activity.  Where to find more information  You can find more information about exercising to stay healthy from:  [place] Department of Health and Human Services: www.hhs.gov  Centers for Disease Control and Prevention (CDC):  www.cdc.gov  Summary  Exercising regularly is important. It will improve your overall fitness, flexibility, and endurance.  Regular exercise will also improve your overall health. It can help you control your weight, reduce stress, and improve your bone density.  Do not exercise so much that you hurt yourself, feel dizzy, or get very short of breath.  Before you start a new exercise program, talk with your health care provider.  This information is not intended to replace advice given to you by your health care provider. Make sure you discuss any questions you have with your health care provider.  Document Revised: 04/15/2022 Document Reviewed: 04/15/2022  [employer] Patient Education © 2023 [employer]

## 2024-05-07 NOTE — PROGRESS NOTES
[facility]     Irma Pedraza  1950   [id number]      Patient Care Team:  Eve Navarrete MD as PCP - General (Internal Medicine)  Gold Swift DO as Consulting Physician (Rheumatology)  Sabrina Angelo MD as Consulting Physician (Plastic Surgery)  Juarez Pedersen MD as Consulting Physician (Otolaryngology)    Chief Complaint   Patient presents with    Numbness     Right hand, pinky finger up through hand        Patient is a 74 y.o. female.   History of Present Illness  The patient is here for follow-up.    The patient has been actively participating in physical therapy for her right arm weakness, which she reports as progressing well. Additionally, she has been provided with a set of home exercises.    The patient has not yet initiated a walking regimen, she has gone on some errands with her  where they walk several minutes store.  She denies any episodes of falls and reports no feelings of imbalance during ambulation. She utilizes a heart monitor and has not received any feedback regarding its use for at least 2 weeks. She is currently on a daily regimen of aspirin.  The patient adheres to a daily regimen of rosuvastatin 40 mg and Zetia for cholesterol management.    The patient has not used the nicotine patch consistently. She has reduced her smoking from over one pack per day to half a pack per day or less. She reports no adverse effects from her nicotine patch.  She states that she forgets it.    The patient denies experiencing any anxiety symptoms.  She is unsure if she is taking  bupropion on an as-needed basis or daily and buspirone as needed.    Supplemental Information  She denies any trouble with shortness of breath or wheezing.         CHRONIC CONDITIONS      Past Medical History:   Diagnosis Date    Adenomyomatosis of gallbladder 2009    by ultrasound, Dr. Yomi Tai 2009    Arthritis     Arthritis of lumbosacral spine     Asthma     Cataract      Remove 7-20 and 7-25 2023    Chronic hoarseness     Class 1 obesity due to excess calories with serious comorbidity and body mass index (BMI) of 30.0 to 30.9 in adult 07/16/2020 2/1/2021 Eve Navarrete MD      Closed displaced fracture of shaft of right clavicle with malunion     Diverticulitis 02/28/2019    Diverticulosis     Years ago, mot sure of date    Dyslipidemia 04/23/2024    GCA (giant cell arteritis)     GERD (gastroesophageal reflux disease)     HL (hearing loss) 2022    Hyperlipidemia Unk    Low back pain ?    Medicare annual wellness visit, subsequent 02/01/2021    Lloyd's neuroma of left foot     surgery    Ocular histoplasmosis     BCC 2/6/2017    Panic disorder 02/28/2019    PMR (polymyalgia rheumatica)        Past Surgical History:   Procedure Laterality Date    COLONOSCOPY  02/2009    COSMETIC SURGERY  1994    face lift    COSMETIC SURGERY  1993    TUMMY TUCK    COSMETIC SURGERY  1980    Birth kenny removal    ELBOW PROCEDURE Left     x2    EYE SURGERY  7/20 and 7/25/2023    Removed cataracts and incerted lenses in bot eyes    THROAT SURGERY      remote removal of growth from throat    TONSILLECTOMY  1964       Family History   Problem Relation Age of Onset    Osteoporosis Mother     Stroke Mother         Alzhiemers    Osteoporosis Father     Migraines Father     Hearing loss Father     Stroke Father         TIA    Brain cancer Sister     Stroke Sister         CVA    Stroke Brother         CVA    Breast cancer Maternal Grandmother 90    Stroke Paternal Grandmother         CVA    Arthritis Paternal Grandmother         Ostioperosis    Stroke Paternal Aunt         TIA    Stroke Sister         CVA    Stroke Sister         TIA    Stroke Brother         CVA    Stroke Sister         Glioblastoma    Other Sister         Rheumatoid Arthritis    Other Brother         no issues    Other Daughter         no issues    Other Sister         no issues    Ovarian cancer Neg Hx     Endometrial cancer Neg Hx   "      Social History     Socioeconomic History    Marital status:    Tobacco Use    Smoking status: Every Day     Current packs/day: 0.50     Average packs/day: 0.5 packs/day for 50.0 years (25.0 ttl pk-yrs)     Types: Cigarettes    Smokeless tobacco: Never    Tobacco comments:     patient refused smoking literature   Vaping Use    Vaping status: Never Used   Substance and Sexual Activity    Alcohol use: Yes     Comment: Usually have 1-2 drinks, 3-4 nights (vodka with diet 7up)    Drug use: No    Sexual activity: Not Currently     Partners: Male       Allergies   Allergen Reactions    Bactrim [Sulfamethoxazole-Trimethoprim] Other (See Comments)     Hypokalemia, hyponatremia    Atorvastatin Unknown (See Comments)     Lipitor      Bupropion Hcl [Bupropion] Unknown (See Comments)     wellbutrin    Calcium Unknown (See Comments)     unknown       Review of Systems:     Review of Systems    Vital Signs  Vitals:    05/07/24 1420   BP: 118/52   BP Location: Left arm   Patient Position: Sitting   Cuff Size: Adult   Pulse: 73   Temp: 97.5 °F (36.4 °C)   TempSrc: Infrared   SpO2: 96%   Weight: 64.9 kg (143 lb)   Height: 154.9 cm (60.98\")   PainSc: 0-No pain     Body mass index is 27.03 kg/m².  BMI is >= 25 and <30. (Overweight) The following options were offered after discussion;: weight loss educational material (shared in after visit summary), exercise counseling/recommendations, nutrition counseling/recommendations, and Information on healthy weight added to patient's after visit summary.          Current Outpatient Medications:     albuterol sulfate HFA (Ventolin HFA) 108 (90 Base) MCG/ACT inhaler, Inhale 2 puffs Every 4 (Four) Hours As Needed for Wheezing., Disp: 18 g, Rfl: 5    aspirin 325 MG tablet, Take 1 tablet by mouth Daily., Disp: 30 tablet, Rfl: 2    buPROPion SR (WELLBUTRIN SR) 150 MG 12 hr tablet, Take 1 tablet by mouth Daily., Disp: 90 tablet, Rfl: 1    busPIRone (BUSPAR) 5 MG tablet, TAKE 1 TABLET BY " MOUTH THREE TIMES DAILY AS NEEDED FOR ANXIETY, Disp: 90 tablet, Rfl: 1    clobetasol (TEMOVATE) 0.05 % cream, Apply once-nightly application for 12 weeks. Then twice weekly for long-term LS maintenance. (Patient taking differently: 2 (Two) Times a Week. prn), Disp: 60 g, Rfl: 5    Coenzyme Q10 (COQ-10) 200 MG capsule, Take 1 tablet by mouth Daily., Disp: , Rfl:     cyanocobalamin (VITAMIN B-12) 500 MCG tablet, Take 1 tablet by mouth Daily., Disp: 90 tablet, Rfl: 3    cyclobenzaprine (FLEXERIL) 10 MG tablet, Take 1 tablet by mouth 2 (Two) Times a Day As Needed for Muscle Spasms., Disp: 30 tablet, Rfl: 1    ezetimibe (ZETIA) 10 MG tablet, Take 1 tablet by mouth Every Night., Disp: 90 tablet, Rfl: 1    famotidine (PEPCID) 20 MG tablet, TAKE 1 TABLET BY MOUTH TWICE DAILY (Patient taking differently: 1 tablet 2 (Two) Times a Day As Needed.), Disp: 180 tablet, Rfl: 1    Fluticasone-Umeclidin-Vilant (Trelegy Ellipta) 100-62.5-25 MCG/ACT inhaler, Inhale 1 puff Daily., Disp: 3 each, Rfl: 3    folic acid (FOLVITE) 1 MG tablet, TAKE 1 TABLET BY MOUTH DAILY, Disp: 90 tablet, Rfl: 3    Lactobacillus-Inulin ([facility] PO), Take 1 capsule by mouth Daily., Disp: , Rfl:     montelukast (SINGULAIR) 10 MG tablet, TAKE 1 TABLET BY MOUTH EVERY NIGHT, Disp: 90 tablet, Rfl: 1    nicotine (NICODERM CQ) 21 MG/24HR patch, Place 1 patch on the skin as directed by provider Daily for 30 days., Disp: 30 patch, Rfl: 3    omeprazole (priLOSEC) 40 MG capsule, Take 1 capsule by mouth Daily., Disp: 90 capsule, Rfl: 1    predniSONE (DELTASONE) 2.5 MG tablet, Take 1 tablet by mouth Daily., Disp: , Rfl:     rosuvastatin (CRESTOR) 40 MG tablet, Take 1 tablet by mouth Every Night., Disp: 90 tablet, Rfl: 1    tocilizumab (ACTEMRA) 200 MG/10ML solution injection, Infuse 8 mg/kg into a venous catheter Every 30 (Thirty) Days., Disp: , Rfl:     zoledronic acid (RECLAST) 5 MG/100ML solution infusion, Administer ZOLEDRONIC ACID 5mg IV once  annually, Disp: , Rfl:     donepezil (Aricept) 5 MG tablet, Take 1 tablet by mouth Every Night., Disp: 30 tablet, Rfl: 5    Physical Exam:    Physical Exam  Vitals and nursing note reviewed.   Constitutional:       Appearance: She is well-developed and overweight.   HENT:      Head: Normocephalic.   Eyes:      Conjunctiva/sclera: Conjunctivae normal.      Pupils: Pupils are equal, round, and reactive to light.   Neck:      Thyroid: No thyromegaly.   Cardiovascular:      Rate and Rhythm: Normal rate and regular rhythm.      Heart sounds: Normal heart sounds.   Pulmonary:      Effort: Pulmonary effort is normal.      Breath sounds: Normal breath sounds. No wheezing.   Musculoskeletal:         General: Normal range of motion.      Cervical back: Normal range of motion and neck supple.   Lymphadenopathy:      Cervical: No cervical adenopathy.   Skin:     General: Skin is warm and dry.   Neurological:      Mental Status: She is alert and oriented to person, place, and time.      Motor: Weakness present.      Gait: Gait is intact.      Comments: Right upper extremity weakness and dysmetria are improving 1 month post stroke   Psychiatric:         Thought Content: Thought content normal.          ACE III MINI        Results Review:    I reviewed the patient's new clinical results.  Results  Laboratory Studies  Platelet count was low.       CMP:  Lab Results   Component Value Date    BUN 9 04/24/2024    CREATININE 0.76 04/24/2024    EGFRIFNONA 55 (L) 02/03/2022    EGFRIFAFRI 67 02/03/2022    BCR 11.8 04/24/2024     04/24/2024    K 3.8 04/24/2024    CO2 31.0 (H) 04/24/2024    CALCIUM 9.2 04/24/2024    PROTENTOTREF 6.7 02/27/2024    ALBUMIN 4.6 04/23/2024    LABGLOBREF 1.7 02/27/2024    LABIL2 2.9 02/27/2024    BILITOT 0.6 04/23/2024    ALKPHOS 58 04/23/2024    AST 24 04/23/2024    AST 25 04/23/2024    ALT 20 04/23/2024    ALT 21 04/23/2024     HbA1c:  Lab Results   Component Value Date    HGBA1C 5.50 04/23/2024      Microalbumin:  Lab Results   Component Value Date    MICROALBUR 30.6 02/27/2024     Lipid Panel  Lab Results   Component Value Date    CHOL 130 04/24/2024    TRIG 124 04/24/2024    HDL 50 04/24/2024    LDL 58 04/24/2024    AST 24 04/23/2024    AST 25 04/23/2024    ALT 20 04/23/2024    ALT 21 04/23/2024       Medication Review: Medications reviewed and noted  Patient Instructions   Problem List Items Addressed This Visit          Cardiac and Vasculature    HTN (hypertension) (Chronic)    Overview     Amlodipine stopped in hospital 4/2024.         Relevant Orders    CBC & Differential (Completed)    Mixed hyperlipidemia    Overview     Taking rosuvastatin 40 mg and zetia 10mg daily.          Relevant Medications    rosuvastatin (CRESTOR) 40 MG tablet    ezetimibe (ZETIA) 10 MG tablet       Gastrointestinal Abdominal     Nonalcoholic fatty liver disease (Chronic)       Mental Health    BRYAN (Chronic)    Overview     Taking bupropion  mg tablet once a day.   buspirone 5 mg 3 times a day just as needed.               Relevant Medications    busPIRone (BUSPAR) 5 MG tablet    buPROPion SR (WELLBUTRIN SR) 150 MG 12 hr tablet    nicotine (NICODERM CQ) 21 MG/24HR patch    donepezil (Aricept) 5 MG tablet       Musculoskeletal and Injuries    Weakness of right upper extremity    Overview     Right upper extremity weakness and dysmetria secondary to CVA 4/2024.            Neuro    Mild memory disturbance (Chronic)    Acute CVA (cerebrovascular accident) - Primary    Overview     4/23/24 CVA with RUE weakness and dysmetria and slurred speech.            Symptoms and Signs    Numbness and tingling in right hand       Tobacco    Tobacco dependence syndrome    Overview     Nicotine patch started 4/2024.         Relevant Medications    nicotine (NICODERM CQ) 21 MG/24HR patch       Other    Overweight with body mass index (BMI) of 26 to 26.9 in adult (Chronic)     Other Visit Diagnoses       Platelets decreased                    Diagnosis Plan   1. Acute CVA (cerebrovascular accident)        2. Weakness of right upper extremity        3. Numbness and tingling in right hand        4. Mixed hyperlipidemia        5. Primary hypertension  CBC & Differential      6. Mild memory disturbance        7. Tobacco dependence syndrome        8. BRYAN        9. Overweight with body mass index (BMI) of 26 to 26.9 in adult        10. Nonalcoholic fatty liver disease        11. Platelets decreased          Assessment & Plan  1. Acute stroke.  The patient is advised to persist with her daily regimen of 325 mg tablet aspirin, rosuvastatin 40 mg nightly, and ezetimibe 10 mg nightly. She is also encouraged to persist with physical therapy to enhance strength and coordination of her right arm, and increase her walking and exercise regimen.    2. Weakness of right upper extremity.  The weakness in the right upper extremity is a secondary symptom of the stroke, which may gradually improve as her strength improves. The patient is advised to continue working with physical therapy.    3. Numbness.  The numbness in the right upper extremity is a secondary symptom from the stroke, which may gradually improve as strength improves. The patient is advised to continue work with physical therapy.    4. Mixed hyperlipidemia.  The patient is advised to continue her rosuvastatin and Zetia regimen, reduce fats and carbohydrates in her diet, increase exercise, and walk daily, and focus on weight loss.    5. Hypertension.  The patient's blood pressure is commendable today at 118/52. The patient will remain off amlodipine for the time being, continue avoiding salt in her diet, and walk daily.    6. Mild memory disturbance.  The patient will be started on donepezil 5 mg tablet daily. If it causes nausea, she will take it after the evening meal daily.    7. Tobacco dependence syndrome.  The importance of smoking cessation is emphasized to prevent another stroke and prevent heart  attack. The patient is encouraged to apply the nicotine patch daily when she takes her medications, and to keep the patches with the pills. She is also encouraged to take bupropion daily. I have again explained that it does not help to take it as needed. She must take the bupropion daily to help control anxiety and help her quit smoking. She may take the buspirone as needed for anxiety.    8. Overweight.  The patient is advised to continue reducing carbohydrates, fats, and sugars in her diet, walk daily, and work on losing weight down to about 135. She is encouraged to eat less and walk more.    9. Nonalcoholic fatty liver disease.  The patient is advised to eat less fats and lose weight to help decrease fat in the liver and prevent hepatitis and cirrhosis.    10. Decreased platelets.  The patient's platelet count will be rechecked today.    Follow-up  The patient is scheduled for a follow-up visit in 1 month.         Plan of care reviewed with patient at the conclusion of today's visit. Education was provided regarding diagnosis, management, and any prescribed or recommended OTC medications. Patient verbalizes understanding of and agreement with management plan.         05/07/24   14:19 EDT    Patient or patient representative verbalized consent for the use of Ambient Listening during the visit with  Eve Navarrete MD for chart documentation. 5/19/2024  15:05 EDT

## 2024-05-16 ENCOUNTER — TELEPHONE (OUTPATIENT)
Age: 74
End: 2024-05-16

## 2024-05-16 NOTE — TELEPHONE ENCOUNTER
PT CALLED IN STATING THAT SHE GETS ACTEMRA INFUSIONS. SHE HAD A STROKE ON 4/23/24. PT WAS WANTING TO MAKE SURE SHE WAS STILL OKAY TO GET HER INFUSIONS

## 2024-05-22 ENCOUNTER — HOSPITAL ENCOUNTER (OUTPATIENT)
Dept: CT IMAGING | Facility: HOSPITAL | Age: 74
Discharge: HOME | End: 2024-05-22
Admitting: INTERNAL MEDICINE
Payer: MEDICARE

## 2024-05-22 DIAGNOSIS — Z12.2 ENCOUNTER FOR SCREENING FOR LUNG CANCER: ICD-10-CM

## 2024-05-22 DIAGNOSIS — Z87.891 PERSONAL HISTORY OF NICOTINE DEPENDENCE: ICD-10-CM

## 2024-05-22 PROCEDURE — 71271 CT THORAX LUNG CANCER SCR C-: CPT

## 2024-05-29 ENCOUNTER — OFFICE VISIT (OUTPATIENT)
Dept: NEUROLOGY | Facility: CLINIC | Age: 74
End: 2024-05-29
Payer: MEDICARE

## 2024-05-29 VITALS
TEMPERATURE: 98 F | OXYGEN SATURATION: 92 % | SYSTOLIC BLOOD PRESSURE: 132 MMHG | WEIGHT: 144 LBS | HEIGHT: 61 IN | DIASTOLIC BLOOD PRESSURE: 66 MMHG | HEART RATE: 73 BPM | BODY MASS INDEX: 27.19 KG/M2

## 2024-05-29 DIAGNOSIS — Z86.73 HISTORY OF STROKE: Primary | ICD-10-CM

## 2024-05-29 RX ORDER — ERYTHROMYCIN 5 MG/G
OINTMENT OPHTHALMIC
COMMUNITY
Start: 2024-03-15

## 2024-05-29 RX ORDER — DEXLANSOPRAZOLE 60 MG/1
1 CAPSULE, DELAYED RELEASE ORAL DAILY
COMMUNITY

## 2024-05-29 NOTE — PROGRESS NOTES
New Patient Office Visit      Encounter Date: 2024   Patient Name: Irma Pedraza  : 1950   MRN: [id number]   PCP: Eve Navarrete MD    Referring Provider: Akil Leung PA-C     Chief Complaint:    Chief Complaint   Patient presents with    Follow-up    Stroke       History of Present Illness: Irma Pedraza is a 74 y.o. female who is here today to establish care.  Patient has prior medical history significant for hypertension, hyperlipidemia, tobacco abuse, COPD, chronic back pain and rheumatoid arthritis.  She presented to [facility] ED on 2024 with right facial droop, slurred speech and right arm weakness.  NIH was noted to be a 5.  CT of the head was negative for acute intracranial abnormality.  TNK was given.  CTA of the head and neck demonstrates bilateral carotid bulb atherosclerosis.  No LVO identified.  MRI of the brain revealed a right MCA territory infarct, cortical infarct surrounding the left central sulcus.  Carotid ultrasound was completed which shows less than 50% stenosis in bilateral ICA.  Echocardiogram shows EF of 61 to 65%, normal left atrial cavity and negative saline test.  Etiology felt to be cardioembolic versus atheroembolic.  Recommended to discharge on aspirin 325 mg, rosuvastatin 40 mg and Zetia for secondary stroke prevention.  He was recommended to complete an outpatient Holter monitor which the results are still pending.  Encouraged to stop smoking.    Clinic visit 2024: Patient presents to clinic today accompanied by her daughter.  She reports that she has been doing well overall.  No new or worsening strokelike symptoms.  She reports that she has been struggling with brain fog and some forgetfulness.  Her daughter shares that she was having some forgetfulness prior to the stroke.  Reviewed patient's workup, imaging, possible etiologies and treatment plan with patient and family.  Answered all questions.  Patient is taking her medication without issue or  side effect.  She just recently completed her Holter monitor and will be sending that back today or tomorrow.  Explained to the patient that if any atrial fibrillation was found that we would recommend Eliquis with a baby aspirin for secondary stroke prevention.  If no atrial fibrillation is found we will discuss at that time if any further cardiac workup is warranted.    Stroke Risk Factors: carotid stenosis, hyperlipidemia, hypertension, and smoking      Subjective      Review of Systems:   Review of Systems   Constitutional:  Positive for fatigue. Negative for activity change, appetite change, chills, diaphoresis, fever, unexpected weight gain and unexpected weight loss.   Eyes:  Negative for photophobia and visual disturbance.   Respiratory:  Positive for cough. Negative for shortness of breath.    Cardiovascular:  Negative for chest pain, palpitations and leg swelling.   Gastrointestinal:  Negative for blood in stool, nausea and vomiting.   Genitourinary:  Negative for hematuria.   Musculoskeletal:  Negative for gait problem.   Neurological:  Positive for weakness and memory problem. Negative for tremors, seizures, syncope, facial asymmetry, speech difficulty, light-headedness, numbness and headache.   Psychiatric/Behavioral:  Negative for depressed mood. The patient is not nervous/anxious.        Past Medical History:   Past Medical History:   Diagnosis Date    Adenomyomatosis of gallbladder 2009    by ultrasound, Dr. Yomi Tai 2009    Arthritis     Arthritis of lumbosacral spine     Asthma     Cataract     Remove 7-20 and 7-25 2023    Chronic hoarseness     Class 1 obesity due to excess calories with serious comorbidity and body mass index (BMI) of 30.0 to 30.9 in adult 07/16/2020 2/1/2021 Eve Navarrete MD      Closed displaced fracture of shaft of right clavicle with malunion     Diverticulitis 02/28/2019    Diverticulosis     Years ago, mot sure of date    Dyslipidemia 04/23/2024    GCA (giant cell  arteritis)     GERD (gastroesophageal reflux disease)     HL (hearing loss) 2022    Hyperlipidemia Unk    Low back pain ?    Medicare annual wellness visit, subsequent 02/01/2021    Lloyd's neuroma of left foot     surgery    Ocular histoplasmosis     BCC 2/6/2017    Panic disorder 02/28/2019    PMR (polymyalgia rheumatica)        Past Surgical History:   Past Surgical History:   Procedure Laterality Date    COLONOSCOPY  02/2009    COSMETIC SURGERY  1994    face lift    COSMETIC SURGERY  1993    TUMMY TUCK    COSMETIC SURGERY  1980    Birth kenny removal    ELBOW PROCEDURE Left     x2    EYE SURGERY  7/20 and 7/25/2023    Removed cataracts and incerted lenses in bot eyes    THROAT SURGERY      remote removal of growth from throat    TONSILLECTOMY  1964       Family History:   Family History   Problem Relation Age of Onset    Osteoporosis Mother     Stroke Mother         Alzhiemers    Osteoporosis Father     Migraines Father     Hearing loss Father     Stroke Father         TIA    Brain cancer Sister     Stroke Sister         CVA    Stroke Brother         CVA    Breast cancer Maternal Grandmother 90    Stroke Paternal Grandmother         CVA    Arthritis Paternal Grandmother         Ostioperosis    Stroke Paternal Aunt         TIA    Stroke Sister         CVA    Stroke Sister         TIA    Stroke Brother         CVA    Stroke Sister         Glioblastoma    Other Sister         Rheumatoid Arthritis    Other Brother         no issues    Other Daughter         no issues    Other Sister         no issues    Ovarian cancer Neg Hx     Endometrial cancer Neg Hx        Social History:   Social History     Socioeconomic History    Marital status:    Tobacco Use    Smoking status: Every Day     Current packs/day: 0.50     Average packs/day: 0.5 packs/day for 50.0 years (25.0 ttl pk-yrs)     Types: Cigarettes     Passive exposure: Current    Smokeless tobacco: Never    Tobacco comments:     patient refused smoking  literature   Vaping Use    Vaping status: Never Used   Substance and Sexual Activity    Alcohol use: Yes     Comment: Usually have 1-2 drinks, 3-4 nights (vodka with diet 7up)    Drug use: No    Sexual activity: Not Currently     Partners: Male       Medications:     Current Outpatient Medications:     albuterol sulfate HFA (Ventolin HFA) 108 (90 Base) MCG/ACT inhaler, Inhale 2 puffs Every 4 (Four) Hours As Needed for Wheezing., Disp: 18 g, Rfl: 5    aspirin 325 MG tablet, Take 1 tablet by mouth Daily., Disp: 30 tablet, Rfl: 2    Coenzyme Q10 (COQ-10) 200 MG capsule, Take 1 tablet by mouth Daily., Disp: , Rfl:     cyanocobalamin (VITAMIN B-12) 500 MCG tablet, Take 1 tablet by mouth Daily., Disp: 90 tablet, Rfl: 3    dexlansoprazole (Dexilant) 60 MG capsule, Take 1 capsule by mouth Daily., Disp: , Rfl:     erythromycin (ROMYCIN) 5 MG/GM ophthalmic ointment, APPLY TO RIGHT EYELID TWICE DAILY FOR 7 DAYS AS DIRECTED AND THEN BEGIN DAILY MASSAGE, Disp: , Rfl:     ezetimibe (ZETIA) 10 MG tablet, Take 1 tablet by mouth Every Night., Disp: 90 tablet, Rfl: 1    famotidine (PEPCID) 20 MG tablet, TAKE 1 TABLET BY MOUTH TWICE DAILY (Patient taking differently: 1 tablet 2 (Two) Times a Day As Needed.), Disp: 180 tablet, Rfl: 1    Fluticasone-Umeclidin-Vilant (Trelegy Ellipta) 100-62.5-25 MCG/ACT inhaler, Inhale 1 puff Daily., Disp: 3 each, Rfl: 3    folic acid (FOLVITE) 1 MG tablet, TAKE 1 TABLET BY MOUTH DAILY, Disp: 90 tablet, Rfl: 3    Lactobacillus-Inulin ([facility] PO), Take 1 capsule by mouth Daily., Disp: , Rfl:     montelukast (SINGULAIR) 10 MG tablet, TAKE 1 TABLET BY MOUTH EVERY NIGHT, Disp: 90 tablet, Rfl: 1    nicotine (NICODERM CQ) 21 MG/24HR patch, Place 1 patch on the skin as directed by provider Daily for 30 days., Disp: 30 patch, Rfl: 3    omeprazole (priLOSEC) 40 MG capsule, Take 1 capsule by mouth Daily., Disp: 90 capsule, Rfl: 1    predniSONE (DELTASONE) 2.5 MG tablet, Take 1 tablet by  "mouth Daily., Disp: , Rfl:     rosuvastatin (CRESTOR) 40 MG tablet, Take 1 tablet by mouth Every Night., Disp: 90 tablet, Rfl: 1    tocilizumab (ACTEMRA) 200 MG/10ML solution injection, Infuse 8 mg/kg into a venous catheter Every 30 (Thirty) Days., Disp: , Rfl:     zoledronic acid (RECLAST) 5 MG/100ML solution infusion, Administer ZOLEDRONIC ACID 5mg IV once annually, Disp: , Rfl:     buPROPion SR (WELLBUTRIN SR) 150 MG 12 hr tablet, Take 1 tablet by mouth Daily. (Patient not taking: Reported on 5/29/2024), Disp: 90 tablet, Rfl: 1    busPIRone (BUSPAR) 5 MG tablet, TAKE 1 TABLET BY MOUTH THREE TIMES DAILY AS NEEDED FOR ANXIETY (Patient not taking: Reported on 5/29/2024), Disp: 90 tablet, Rfl: 1    clobetasol (TEMOVATE) 0.05 % cream, Apply once-nightly application for 12 weeks. Then twice weekly for long-term LS maintenance. (Patient not taking: Reported on 5/29/2024), Disp: 60 g, Rfl: 5    cyclobenzaprine (FLEXERIL) 10 MG tablet, Take 1 tablet by mouth 2 (Two) Times a Day As Needed for Muscle Spasms. (Patient not taking: Reported on 5/29/2024), Disp: 30 tablet, Rfl: 1    donepezil (Aricept) 5 MG tablet, Take 1 tablet by mouth Every Night., Disp: 30 tablet, Rfl: 5    Allergies:   Allergies   Allergen Reactions    Bactrim [Sulfamethoxazole-Trimethoprim] Other (See Comments)     Hypokalemia, hyponatremia    Atorvastatin Unknown (See Comments)     Lipitor      Bupropion Hcl [Bupropion] Unknown (See Comments)     wellbutrin    Calcium Unknown (See Comments)     unknown       Objective     Physical Exam:  Vital Signs:   Vitals:    05/29/24 1321   BP: 132/66   Pulse: 73   Temp: 98 °F (36.7 °C)   SpO2: 92%   Weight: 65.3 kg (144 lb)   Height: 154.9 cm (60.98\")     Body mass index is 27.22 kg/m².     Physical Exam  Vitals and nursing note reviewed.   Constitutional:       General: She is awake. She is not in acute distress.     Appearance: Normal appearance. She is normal weight. She is not ill-appearing.      Comments: " 74-year-old  female   HENT:      Head: Normocephalic and atraumatic.      Nose: Nose normal.      Mouth/Throat:      Mouth: Mucous membranes are moist.   Eyes:      General: Lids are normal.      Extraocular Movements: Extraocular movements intact.      Pupils: Pupils are equal, round, and reactive to light.   Cardiovascular:      Rate and Rhythm: Normal rate and regular rhythm.      Pulses: Normal pulses.   Pulmonary:      Effort: Pulmonary effort is normal. No respiratory distress.   Skin:     General: Skin is warm and dry.   Neurological:      Mental Status: She is alert and oriented to person, place, and time.      Motor: Weakness present.      Coordination: Coordination is intact.   Psychiatric:         Mood and Affect: Mood normal.         Speech: Speech normal.         Behavior: Behavior normal.       Neurological Exam  Mental Status  Awake and alert. Oriented to person, place, time and situation. Oriented to person, place, and time. Speech is normal. Language is fluent with no aphasia.    Cranial Nerves  CN II: Right visual acuity: Counts fingers. Left visual acuity: Counts fingers. Visual fields full to confrontation.  CN III, IV, VI: Extraocular movements intact bilaterally. Normal lids and orbits bilaterally. Pupils equal round and reactive to light bilaterally.  CN V: Facial sensation is normal.  CN VII: Full and symmetric facial movement.  CN VIII: Hearing is normal to speech .    Motor  Normal muscle bulk throughout. No fasciculations present. Normal muscle tone. Strength is 5/5 in all four extremities except as noted.  Right hand  4+/5.    Sensory  Light touch is normal in upper and lower extremities. Pinprick is normal in upper and lower extremities.     Coordination    Finger-to-nose, rapid alternating movements and heel-to-shin normal bilaterally without dysmetria.  No obvious dysmetria .    Gait  Casual gait is normal including stance, stride, and arm swing.     NIH 0       Modified  "Zay Score: 1-2        0  No Symptoms    1 No significant disability. Able to carry out all usual activities, despite some symptoms.    2 Slight disability. Able to look after own affairs without assistance, but unable to carry out all previous activities.    3 Moderate disability. Requires some help, but able to walk unassisted.    4 Moderately severe disability. Unable to attend to own bodily needs without assistance, and unable to walk unassisted.    5 Severe disability. Requires constant nursing care and attention, bedridden, incontinent.    6 Dead       PHQ-9 Depression Screening  Little interest or pleasure in doing things? 0-->not at all   Feeling down, depressed, or hopeless? 0-->not at all   Trouble falling or staying asleep, or sleeping too much?     Feeling tired or having little energy?     Poor appetite or overeating?     Feeling bad about yourself - or that you are a failure or have let yourself or your family down?     Trouble concentrating on things, such as reading the newspaper or watching television?     Moving or speaking so slowly that other people could have noticed? Or the opposite - being so fidgety or restless that you have been moving around a lot more than usual?     Thoughts that you would be better off dead, or of hurting yourself in some way?     PHQ-9 Total Score 0   If you checked off any problems, how difficult have these problems made it for you to do your work, take care of things at home, or get along with other people?           STOP-Bang Score  Have you been diagnosed with Sleep Apnea?: no  Snoring?: no  Tired?: no  Observed?: no  Pressure?: no  Stop Score: 0  Body Mass Index more than 35 kg/m2?: no  Age older than 50 year old?: yes  Neck large? \">17\"/43cm-M, >16\"/41cm-F: no  Gender=Male?: no  Total Stop-Bang Score: 1       ANA LAURA Fall Risk Clinician Key Questions   Have you fallen in the past year?: No  Do you feel unsteady with walking?: No  Are you worried about falling?: " No      Imaging Reviewed:   CT Chest Low Dose Cancer Screening WO    Result Date: 5/26/2024  Impression: 1. No suspicious pulmonary nodule. 2. Mild centrilobular emphysema.  Recommendation: Continue annual screening with LDCT Lung Rads Assessment: Lung-RADS L1 - Negative, <1% chance of malignancy. Electronically Signed: Camila Berger MD  5/26/2024 12:37 PM EDT  Workstation ID: [id number]    CT Head Without Contrast    Result Date: 4/24/2024  Impression: Punctate hypodensities in the region of the left central sulcus consistent with acute infarcts. No evidence for hemorrhagic transformation. Electronically Signed: Camila Berger MD  4/24/2024 12:59 PM EDT  Workstation ID: [id number]    Adult Transthoracic Echo Complete W/ Cont if Necessary Per Protocol (With Agitated Saline)    Addendum Date: 4/24/2024      Left ventricular systolic function is normal.  Left ventricular ejection fraction appears to be 61 - 65%.   Left ventricular diastolic function is consistent with (grade I) impaired relaxation.   There is calcification of the mitral valve.   Saline test results are negative.     MRI Brain Without Contrast    Result Date: 4/24/2024  Impression: Cluster of small acute cortical infarcts surrounding the left central sulcus. Punctate likely acute infarct in the left occipital lobe. Findings discussed with stroke navigator by Dr. Herve Bah via telephone at 7:15 a.m. 4/24/2024. Electronically Signed: Herve Bah MD  4/24/2024 7:19 AM EDT  Workstation ID: [id number]    XR Chest 1 View    Result Date: 4/23/2024  Impression: No evidence of acute cardiopulmonary disease. Electronically Signed: Amish Macias MD  4/23/2024 1:22 PM EDT  Workstation ID: [id number]    CT Angiogram Head w AI Analysis of LVO    Result Date: 4/23/2024  Impression: 1. Bilateral carotid bulb calcified atherosclerotic disease with no evidence of hemodynamically significant stenosis 2. Unremarkable intracranial CT angiogram 3. Isolated Tmax of more  than 4 seconds of 15 mL with no other associated findings, nonspecific. May consider further evaluation with an MRI of the brain Electronically Signed: Placido Villegas MD  4/23/2024 1:18 PM EDT  Workstation ID: [id number]    CT Angiogram Neck    Result Date: 4/23/2024  Impression: 1. Bilateral carotid bulb calcified atherosclerotic disease with no evidence of hemodynamically significant stenosis 2. Unremarkable intracranial CT angiogram 3. Isolated Tmax of more than 4 seconds of 15 mL with no other associated findings, nonspecific. May consider further evaluation with an MRI of the brain Electronically Signed: Placido Villegas MD  4/23/2024 1:18 PM EDT  Workstation ID: [id number]    CT CEREBRAL PERFUSION WITH & WITHOUT CONTRAST    Result Date: 4/23/2024  Impression: 1. Bilateral carotid bulb calcified atherosclerotic disease with no evidence of hemodynamically significant stenosis 2. Unremarkable intracranial CT angiogram 3. Isolated Tmax of more than 4 seconds of 15 mL with no other associated findings, nonspecific. May consider further evaluation with an MRI of the brain Electronically Signed: Placido Villegas MD  4/23/2024 1:18 PM EDT  Workstation ID: [id number]    CT Head Without Contrast Stroke Protocol    Result Date: 4/23/2024  Impression: No evidence of acute intracranial abnormality. Electronically Signed: Amish Macias MD  4/23/2024 12:37 PM EDT  Workstation ID: [id number]      Laboratory Results:   Lab Results   Component Value Date    GLUCOSE 87 04/24/2024    BUN 9 04/24/2024    CREATININE 0.76 04/24/2024    EGFRRESULT 69.1 02/27/2024    EGFR 82.3 04/24/2024    BCR 11.8 04/24/2024    K 3.8 04/24/2024    CO2 31.0 (H) 04/24/2024    CALCIUM 9.2 04/24/2024    PROTENTOTREF 6.7 02/27/2024    ALBUMIN 4.6 04/23/2024    BILITOT 0.6 04/23/2024    AST 24 04/23/2024    AST 25 04/23/2024    ALT 20 04/23/2024    ALT 21 04/23/2024     Lipid Panel          8/14/2023    11:12 2/27/2024    14:32 4/24/2024    02:41   Lipid Panel    Total Cholesterol   130    Total Cholesterol 138  154     Triglycerides 80  117  124    HDL Cholesterol 50  57  50    VLDL Cholesterol 16  21  22    LDL Cholesterol  72  76  58    LDL/HDL Ratio   1.10       Most Recent A1C          4/23/2024    12:32   HGBA1C Most Recent   Hemoglobin A1C 5.50            Assessment / Plan      Assessment/Plan:   # Acute Left MCA territory infarct s/p TNK: etiology cardio-embolic versus atheroembolic  # Tobacco use disorder  # Hypertension  # Carotid bulb atherosclerotic disease    -Continue aspirin 325 mg daily  -Continue home rosuvastatin 40 mg daily and ezetimibe (LDL 58)  -Carotid ultrasound less than 50% stenosis bilateral ICA  -14 day holter is pending  -Tobacco cessation encouraged and counseling provided. She states she will think about cutting back   -Normalize blood pressure goals, <130/80. Today's BP is 132/66  -discussed lifestyle modifications such as heart healthy diet and increased activity   -reviewed s/sxs of stroke and when to call 911 or return to the ED  -follow up in July for 3 month appointment     Discussed the importance of medication compliance and lifestyle modifications (adequate blood pressure control, adequate control of hyperlipidemia, adequate glycemic control, increase physical activity, and healthy diet) to help reduce the risk of future cerebrovascular events.  Also discussed the signs symptoms that would warrant the patient return back to the emergency department including unilateral weakness, unilateral numbness, visual disturbances, loss of balance, speech difficulties, and/or a sudden severe headache.      Follow Up:   Return in about 2 months (around 7/29/2024).    DEVI Dawn  [facility] [place] [facility]

## 2024-05-30 ENCOUNTER — TELEPHONE (OUTPATIENT)
Dept: INTERNAL MEDICINE | Facility: CLINIC | Age: 74
End: 2024-05-30
Payer: MEDICARE

## 2024-05-30 NOTE — TELEPHONE ENCOUNTER
Caller: Irma Pedraza    Relationship: Self    Best call back number: [phone]     What medication are you requesting: MEDICATION TO HELP SYMPTOMS    What are your current symptoms: RUNNY NOSE, ITCHY THROAT, FEVERISH    How long have you been experiencing symptoms: 05/30/24    Have you had these symptoms before:    [x] Yes  [] No    Have you been treated for these symptoms before:   [x] Yes  [] No    If a prescription is needed, what is your preferred pharmacy and phone number: [id number] [address]  AT [facility] [address] - [phone] [facility] [phone] FX

## 2024-05-30 NOTE — TELEPHONE ENCOUNTER
I would suggest mucinex dm one tab twice a day and tylenol as needed. She should do home covid test and if keeps worsening be seen. Most of what going around not getting better with antibiotics

## 2024-05-31 ENCOUNTER — PATIENT ROUNDING (BHMG ONLY) (OUTPATIENT)
Dept: NEUROLOGY | Facility: CLINIC | Age: 74
End: 2024-05-31
Payer: MEDICARE

## 2024-05-31 NOTE — PROGRESS NOTES
BG FOR PT TO CALL [facility] [phone] IF SHE WOULD BE INTERESTED IN PROVIDING FEEDBACK ABOUT HER RECENT VISIT WITH DEVI JAMES.

## 2024-06-05 ENCOUNTER — APPOINTMENT (OUTPATIENT)
Dept: CT IMAGING | Facility: HOSPITAL | Age: 74
DRG: 190 | End: 2024-06-05
Payer: MEDICARE

## 2024-06-05 ENCOUNTER — HOSPITAL ENCOUNTER (INPATIENT)
Facility: HOSPITAL | Age: 74
LOS: 3 days | Discharge: HOME | DRG: 190 | End: 2024-06-08
Attending: EMERGENCY MEDICINE | Admitting: INTERNAL MEDICINE
Payer: MEDICARE

## 2024-06-05 ENCOUNTER — APPOINTMENT (OUTPATIENT)
Dept: GENERAL RADIOLOGY | Facility: HOSPITAL | Age: 74
DRG: 190 | End: 2024-06-05
Payer: MEDICARE

## 2024-06-05 DIAGNOSIS — J96.01 ACUTE RESPIRATORY FAILURE WITH HYPOXIA: ICD-10-CM

## 2024-06-05 DIAGNOSIS — M79.81 NONTRAUMATIC RECTUS HEMATOMA: ICD-10-CM

## 2024-06-05 DIAGNOSIS — R10.31 RIGHT LOWER QUADRANT ABDOMINAL PAIN: Primary | ICD-10-CM

## 2024-06-05 DIAGNOSIS — F17.200 TOBACCO USE DISORDER: ICD-10-CM

## 2024-06-05 DIAGNOSIS — R06.02 SHORTNESS OF BREATH: ICD-10-CM

## 2024-06-05 DIAGNOSIS — R11.0 NAUSEA: ICD-10-CM

## 2024-06-05 DIAGNOSIS — K57.92 DIVERTICULITIS: ICD-10-CM

## 2024-06-05 DIAGNOSIS — J44.1 COPD WITH ACUTE EXACERBATION: ICD-10-CM

## 2024-06-05 DIAGNOSIS — J44.9 COPD WITH HYPOXIA: ICD-10-CM

## 2024-06-05 PROBLEM — K57.33 DIVERTICULITIS LARGE INTESTINE W/O PERFORATION OR ABSCESS W/BLEEDING: Status: ACTIVE | Noted: 2024-06-05

## 2024-06-05 LAB
ALBUMIN SERPL-MCNC: 4.3 G/DL (ref 3.5–5.2)
ALBUMIN/GLOB SERPL: 2.4 G/DL
ALP SERPL-CCNC: 61 U/L (ref 39–117)
ALT SERPL W P-5'-P-CCNC: 24 U/L (ref 1–33)
ANION GAP SERPL CALCULATED.3IONS-SCNC: 10 MMOL/L (ref 5–15)
AST SERPL-CCNC: 27 U/L (ref 1–32)
B PARAPERT DNA SPEC QL NAA+PROBE: NOT DETECTED
B PERT DNA SPEC QL NAA+PROBE: NOT DETECTED
BACTERIA UR QL AUTO: ABNORMAL /HPF
BASOPHILS # BLD AUTO: 0.01 10*3/MM3 (ref 0–0.2)
BASOPHILS NFR BLD AUTO: 0.1 % (ref 0–1.5)
BILIRUB SERPL-MCNC: 0.5 MG/DL (ref 0–1.2)
BILIRUB UR QL STRIP: NEGATIVE
BUN SERPL-MCNC: 8 MG/DL (ref 8–23)
BUN/CREAT SERPL: 10.8 (ref 7–25)
C PNEUM DNA NPH QL NAA+NON-PROBE: NOT DETECTED
CALCIUM SPEC-SCNC: 9.4 MG/DL (ref 8.6–10.5)
CHLORIDE SERPL-SCNC: 101 MMOL/L (ref 98–107)
CLARITY UR: CLEAR
CO2 SERPL-SCNC: 30 MMOL/L (ref 22–29)
COLOR UR: YELLOW
CREAT SERPL-MCNC: 0.74 MG/DL (ref 0.57–1)
D-LACTATE SERPL-SCNC: 1.1 MMOL/L (ref 0.5–2)
DEPRECATED RDW RBC AUTO: 42.3 FL (ref 37–54)
EGFRCR SERPLBLD CKD-EPI 2021: 85 ML/MIN/1.73
EOSINOPHIL # BLD AUTO: 0.07 10*3/MM3 (ref 0–0.4)
EOSINOPHIL NFR BLD AUTO: 1 % (ref 0.3–6.2)
ERYTHROCYTE [DISTWIDTH] IN BLOOD BY AUTOMATED COUNT: 12.3 % (ref 12.3–15.4)
FLUAV SUBTYP SPEC NAA+PROBE: NOT DETECTED
FLUBV RNA ISLT QL NAA+PROBE: NOT DETECTED
GLOBULIN UR ELPH-MCNC: 1.8 GM/DL
GLUCOSE SERPL-MCNC: 106 MG/DL (ref 65–99)
GLUCOSE UR STRIP-MCNC: NEGATIVE MG/DL
HADV DNA SPEC NAA+PROBE: NOT DETECTED
HCOV 229E RNA SPEC QL NAA+PROBE: NOT DETECTED
HCOV HKU1 RNA SPEC QL NAA+PROBE: NOT DETECTED
HCOV NL63 RNA SPEC QL NAA+PROBE: NOT DETECTED
HCOV OC43 RNA SPEC QL NAA+PROBE: NOT DETECTED
HCT VFR BLD AUTO: 43.6 % (ref 34–46.6)
HGB BLD-MCNC: 14.8 G/DL (ref 12–15.9)
HGB UR QL STRIP.AUTO: NEGATIVE
HMPV RNA NPH QL NAA+NON-PROBE: NOT DETECTED
HPIV1 RNA ISLT QL NAA+PROBE: NOT DETECTED
HPIV2 RNA SPEC QL NAA+PROBE: NOT DETECTED
HPIV3 RNA NPH QL NAA+PROBE: NOT DETECTED
HPIV4 P GENE NPH QL NAA+PROBE: NOT DETECTED
HYALINE CASTS UR QL AUTO: ABNORMAL /LPF
IMM GRANULOCYTES # BLD AUTO: 0.12 10*3/MM3 (ref 0–0.05)
IMM GRANULOCYTES NFR BLD AUTO: 1.6 % (ref 0–0.5)
INR PPP: 0.97 (ref 0.89–1.12)
KETONES UR QL STRIP: NEGATIVE
LEUKOCYTE ESTERASE UR QL STRIP.AUTO: ABNORMAL
LIPASE SERPL-CCNC: 36 U/L (ref 13–60)
LYMPHOCYTES # BLD AUTO: 1.75 10*3/MM3 (ref 0.7–3.1)
LYMPHOCYTES NFR BLD AUTO: 23.9 % (ref 19.6–45.3)
M PNEUMO IGG SER IA-ACNC: NOT DETECTED
MAGNESIUM SERPL-MCNC: 1.6 MG/DL (ref 1.6–2.4)
MCH RBC QN AUTO: 32 PG (ref 26.6–33)
MCHC RBC AUTO-ENTMCNC: 33.9 G/DL (ref 31.5–35.7)
MCV RBC AUTO: 94.2 FL (ref 79–97)
MONOCYTES # BLD AUTO: 0.61 10*3/MM3 (ref 0.1–0.9)
MONOCYTES NFR BLD AUTO: 8.3 % (ref 5–12)
NEUTROPHILS NFR BLD AUTO: 4.76 10*3/MM3 (ref 1.7–7)
NEUTROPHILS NFR BLD AUTO: 65.1 % (ref 42.7–76)
NITRITE UR QL STRIP: NEGATIVE
NRBC BLD AUTO-RTO: 0 /100 WBC (ref 0–0.2)
PH UR STRIP.AUTO: 5.5 [PH] (ref 5–8)
PLATELET # BLD AUTO: 142 10*3/MM3 (ref 140–450)
PMV BLD AUTO: 10.3 FL (ref 6–12)
POTASSIUM SERPL-SCNC: 3.5 MMOL/L (ref 3.5–5.2)
PROT SERPL-MCNC: 6.1 G/DL (ref 6–8.5)
PROT UR QL STRIP: ABNORMAL
PROTHROMBIN TIME: 13 SECONDS (ref 12.2–14.5)
QT INTERVAL: 456 MS
QTC INTERVAL: 456 MS
RBC # BLD AUTO: 4.63 10*6/MM3 (ref 3.77–5.28)
RBC # UR STRIP: ABNORMAL /HPF
REF LAB TEST METHOD: ABNORMAL
RHINOVIRUS RNA SPEC NAA+PROBE: DETECTED
RSV RNA NPH QL NAA+NON-PROBE: NOT DETECTED
SARS-COV-2 RNA NPH QL NAA+NON-PROBE: NOT DETECTED
SODIUM SERPL-SCNC: 141 MMOL/L (ref 136–145)
SP GR UR STRIP: 1.02 (ref 1–1.03)
SQUAMOUS #/AREA URNS HPF: ABNORMAL /HPF
UROBILINOGEN UR QL STRIP: ABNORMAL
WBC # UR STRIP: ABNORMAL /HPF
WBC NRBC COR # BLD AUTO: 7.32 10*3/MM3 (ref 3.4–10.8)
WHOLE BLOOD HOLD COAG: NORMAL

## 2024-06-05 PROCEDURE — 25010000002 ONDANSETRON PER 1 MG: Performed by: EMERGENCY MEDICINE

## 2024-06-05 PROCEDURE — 93005 ELECTROCARDIOGRAM TRACING: CPT | Performed by: EMERGENCY MEDICINE

## 2024-06-05 PROCEDURE — 83735 ASSAY OF MAGNESIUM: CPT | Performed by: EMERGENCY MEDICINE

## 2024-06-05 PROCEDURE — 83690 ASSAY OF LIPASE: CPT | Performed by: EMERGENCY MEDICINE

## 2024-06-05 PROCEDURE — 25010000002 PIPERACILLIN SOD-TAZOBACTAM PER 1 G: Performed by: EMERGENCY MEDICINE

## 2024-06-05 PROCEDURE — 25010000002 FENTANYL CITRATE (PF) 50 MCG/ML SOLUTION: Performed by: EMERGENCY MEDICINE

## 2024-06-05 PROCEDURE — 25010000002 CEFTRIAXONE PER 250 MG: Performed by: PEDIATRICS

## 2024-06-05 PROCEDURE — 94799 UNLISTED PULMONARY SVC/PX: CPT

## 2024-06-05 PROCEDURE — 80053 COMPREHEN METABOLIC PANEL: CPT | Performed by: EMERGENCY MEDICINE

## 2024-06-05 PROCEDURE — 85025 COMPLETE CBC W/AUTO DIFF WBC: CPT | Performed by: EMERGENCY MEDICINE

## 2024-06-05 PROCEDURE — 25010000002 HYDROMORPHONE PER 4 MG: Performed by: EMERGENCY MEDICINE

## 2024-06-05 PROCEDURE — 25010000002 MORPHINE PER 10 MG: Performed by: EMERGENCY MEDICINE

## 2024-06-05 PROCEDURE — 99285 EMERGENCY DEPT VISIT HI MDM: CPT

## 2024-06-05 PROCEDURE — 99222 1ST HOSP IP/OBS MODERATE 55: CPT | Performed by: PEDIATRICS

## 2024-06-05 PROCEDURE — 74176 CT ABD & PELVIS W/O CONTRAST: CPT

## 2024-06-05 PROCEDURE — 36415 COLL VENOUS BLD VENIPUNCTURE: CPT

## 2024-06-05 PROCEDURE — 87040 BLOOD CULTURE FOR BACTERIA: CPT | Performed by: EMERGENCY MEDICINE

## 2024-06-05 PROCEDURE — 81001 URINALYSIS AUTO W/SCOPE: CPT | Performed by: EMERGENCY MEDICINE

## 2024-06-05 PROCEDURE — 94640 AIRWAY INHALATION TREATMENT: CPT

## 2024-06-05 PROCEDURE — 25010000002 METRONIDAZOLE 500 MG/100ML SOLUTION: Performed by: PEDIATRICS

## 2024-06-05 PROCEDURE — 71045 X-RAY EXAM CHEST 1 VIEW: CPT

## 2024-06-05 PROCEDURE — 0202U NFCT DS 22 TRGT SARS-COV-2: CPT | Performed by: EMERGENCY MEDICINE

## 2024-06-05 PROCEDURE — 25010000002 ENOXAPARIN PER 10 MG: Performed by: PEDIATRICS

## 2024-06-05 PROCEDURE — 94664 DEMO&/EVAL PT USE INHALER: CPT

## 2024-06-05 PROCEDURE — 83605 ASSAY OF LACTIC ACID: CPT | Performed by: EMERGENCY MEDICINE

## 2024-06-05 PROCEDURE — 85610 PROTHROMBIN TIME: CPT | Performed by: EMERGENCY MEDICINE

## 2024-06-05 RX ORDER — POLYETHYLENE GLYCOL 3350 17 G/17G
17 POWDER, FOR SOLUTION ORAL DAILY PRN
Status: DISCONTINUED | OUTPATIENT
Start: 2024-06-05 | End: 2024-06-06

## 2024-06-05 RX ORDER — AMOXICILLIN 250 MG
2 CAPSULE ORAL 2 TIMES DAILY PRN
Status: DISCONTINUED | OUTPATIENT
Start: 2024-06-05 | End: 2024-06-06

## 2024-06-05 RX ORDER — SODIUM CHLORIDE 0.9 % (FLUSH) 0.9 %
10 SYRINGE (ML) INJECTION AS NEEDED
Status: DISCONTINUED | OUTPATIENT
Start: 2024-06-05 | End: 2024-06-08 | Stop reason: HOSPADM

## 2024-06-05 RX ORDER — ASPIRIN 325 MG
325 TABLET ORAL DAILY
Status: DISCONTINUED | OUTPATIENT
Start: 2024-06-06 | End: 2024-06-08 | Stop reason: HOSPADM

## 2024-06-05 RX ORDER — PREDNISONE 5 MG/1
2.5 TABLET ORAL DAILY
Status: DISCONTINUED | OUTPATIENT
Start: 2024-06-06 | End: 2024-06-08 | Stop reason: HOSPADM

## 2024-06-05 RX ORDER — NICOTINE 21 MG/24HR
1 PATCH, TRANSDERMAL 24 HOURS TRANSDERMAL
Status: DISCONTINUED | OUTPATIENT
Start: 2024-06-05 | End: 2024-06-08 | Stop reason: HOSPADM

## 2024-06-05 RX ORDER — MORPHINE SULFATE 2 MG/ML
2 INJECTION, SOLUTION INTRAMUSCULAR; INTRAVENOUS ONCE
Status: COMPLETED | OUTPATIENT
Start: 2024-06-05 | End: 2024-06-05

## 2024-06-05 RX ORDER — PANTOPRAZOLE SODIUM 40 MG/1
40 TABLET, DELAYED RELEASE ORAL
Status: DISCONTINUED | OUTPATIENT
Start: 2024-06-06 | End: 2024-06-08 | Stop reason: HOSPADM

## 2024-06-05 RX ORDER — SODIUM CHLORIDE 9 MG/ML
40 INJECTION, SOLUTION INTRAVENOUS AS NEEDED
Status: DISCONTINUED | OUTPATIENT
Start: 2024-06-05 | End: 2024-06-08 | Stop reason: HOSPADM

## 2024-06-05 RX ORDER — ACETAMINOPHEN 325 MG/1
650 TABLET ORAL EVERY 6 HOURS PRN
Status: DISCONTINUED | OUTPATIENT
Start: 2024-06-05 | End: 2024-06-08 | Stop reason: HOSPADM

## 2024-06-05 RX ORDER — PANTOPRAZOLE SODIUM 40 MG/1
40 TABLET, DELAYED RELEASE ORAL
Status: DISCONTINUED | OUTPATIENT
Start: 2024-06-05 | End: 2024-06-05

## 2024-06-05 RX ORDER — IPRATROPIUM BROMIDE AND ALBUTEROL SULFATE 2.5; .5 MG/3ML; MG/3ML
3 SOLUTION RESPIRATORY (INHALATION)
Status: DISCONTINUED | OUTPATIENT
Start: 2024-06-05 | End: 2024-06-08 | Stop reason: HOSPADM

## 2024-06-05 RX ORDER — ONDANSETRON 2 MG/ML
4 INJECTION INTRAMUSCULAR; INTRAVENOUS ONCE
Status: COMPLETED | OUTPATIENT
Start: 2024-06-05 | End: 2024-06-05

## 2024-06-05 RX ORDER — HYDROMORPHONE HYDROCHLORIDE 1 MG/ML
0.25 INJECTION, SOLUTION INTRAMUSCULAR; INTRAVENOUS; SUBCUTANEOUS ONCE
Status: COMPLETED | OUTPATIENT
Start: 2024-06-05 | End: 2024-06-05

## 2024-06-05 RX ORDER — SODIUM CHLORIDE 0.9 % (FLUSH) 0.9 %
10 SYRINGE (ML) INJECTION EVERY 12 HOURS SCHEDULED
Status: DISCONTINUED | OUTPATIENT
Start: 2024-06-05 | End: 2024-06-08 | Stop reason: HOSPADM

## 2024-06-05 RX ORDER — DONEPEZIL HYDROCHLORIDE 5 MG/1
5 TABLET, FILM COATED ORAL NIGHTLY
Status: DISCONTINUED | OUTPATIENT
Start: 2024-06-05 | End: 2024-06-08 | Stop reason: HOSPADM

## 2024-06-05 RX ORDER — BISACODYL 5 MG/1
5 TABLET, DELAYED RELEASE ORAL DAILY PRN
Status: DISCONTINUED | OUTPATIENT
Start: 2024-06-05 | End: 2024-06-06

## 2024-06-05 RX ORDER — METRONIDAZOLE 500 MG/100ML
500 INJECTION, SOLUTION INTRAVENOUS EVERY 8 HOURS
Qty: 1500 ML | Refills: 0 | Status: DISCONTINUED | OUTPATIENT
Start: 2024-06-05 | End: 2024-06-08 | Stop reason: HOSPADM

## 2024-06-05 RX ORDER — FOLIC ACID 1 MG/1
1 TABLET ORAL DAILY
Status: DISCONTINUED | OUTPATIENT
Start: 2024-06-06 | End: 2024-06-08 | Stop reason: HOSPADM

## 2024-06-05 RX ORDER — IPRATROPIUM BROMIDE AND ALBUTEROL SULFATE 2.5; .5 MG/3ML; MG/3ML
3 SOLUTION RESPIRATORY (INHALATION) ONCE
Status: COMPLETED | OUTPATIENT
Start: 2024-06-05 | End: 2024-06-05

## 2024-06-05 RX ORDER — FENTANYL CITRATE 50 UG/ML
25 INJECTION, SOLUTION INTRAMUSCULAR; INTRAVENOUS ONCE
Status: COMPLETED | OUTPATIENT
Start: 2024-06-05 | End: 2024-06-05

## 2024-06-05 RX ORDER — BISACODYL 10 MG
10 SUPPOSITORY, RECTAL RECTAL DAILY PRN
Status: DISCONTINUED | OUTPATIENT
Start: 2024-06-05 | End: 2024-06-06

## 2024-06-05 RX ORDER — BUDESONIDE AND FORMOTEROL FUMARATE DIHYDRATE 160; 4.5 UG/1; UG/1
2 AEROSOL RESPIRATORY (INHALATION)
Status: DISCONTINUED | OUTPATIENT
Start: 2024-06-05 | End: 2024-06-08 | Stop reason: HOSPADM

## 2024-06-05 RX ORDER — ENOXAPARIN SODIUM 100 MG/ML
40 INJECTION SUBCUTANEOUS DAILY
Status: DISCONTINUED | OUTPATIENT
Start: 2024-06-05 | End: 2024-06-08 | Stop reason: HOSPADM

## 2024-06-05 RX ORDER — ROSUVASTATIN CALCIUM 20 MG/1
40 TABLET, COATED ORAL NIGHTLY
Status: DISCONTINUED | OUTPATIENT
Start: 2024-06-05 | End: 2024-06-08 | Stop reason: HOSPADM

## 2024-06-05 RX ADMIN — IPRATROPIUM BROMIDE AND ALBUTEROL SULFATE 3 ML: 2.5; .5 SOLUTION RESPIRATORY (INHALATION) at 09:48

## 2024-06-05 RX ADMIN — HYDROMORPHONE HYDROCHLORIDE 0.25 MG: 1 INJECTION, SOLUTION INTRAMUSCULAR; INTRAVENOUS; SUBCUTANEOUS at 08:56

## 2024-06-05 RX ADMIN — METRONIDAZOLE 500 MG: 500 INJECTION, SOLUTION INTRAVENOUS at 22:13

## 2024-06-05 RX ADMIN — FENTANYL CITRATE 25 MCG: 50 INJECTION, SOLUTION INTRAMUSCULAR; INTRAVENOUS at 06:24

## 2024-06-05 RX ADMIN — SODIUM CHLORIDE 2000 MG: 900 INJECTION INTRAVENOUS at 14:11

## 2024-06-05 RX ADMIN — ONDANSETRON 4 MG: 2 INJECTION INTRAMUSCULAR; INTRAVENOUS at 05:03

## 2024-06-05 RX ADMIN — MORPHINE SULFATE 2 MG: 2 INJECTION, SOLUTION INTRAMUSCULAR; INTRAVENOUS at 05:03

## 2024-06-05 RX ADMIN — Medication 10 ML: at 15:41

## 2024-06-05 RX ADMIN — Medication 10 ML: at 21:56

## 2024-06-05 RX ADMIN — PIPERACILLIN AND TAZOBACTAM 3.38 G: 3; .375 INJECTION, POWDER, LYOPHILIZED, FOR SOLUTION INTRAVENOUS at 06:24

## 2024-06-05 RX ADMIN — ONDANSETRON 4 MG: 2 INJECTION INTRAMUSCULAR; INTRAVENOUS at 08:54

## 2024-06-05 RX ADMIN — METRONIDAZOLE 500 MG: 500 INJECTION, SOLUTION INTRAVENOUS at 14:52

## 2024-06-05 RX ADMIN — IPRATROPIUM BROMIDE AND ALBUTEROL SULFATE 3 ML: 2.5; .5 SOLUTION RESPIRATORY (INHALATION) at 15:34

## 2024-06-05 RX ADMIN — ROSUVASTATIN CALCIUM 40 MG: 20 TABLET, COATED ORAL at 21:56

## 2024-06-05 RX ADMIN — ENOXAPARIN SODIUM 40 MG: 100 INJECTION SUBCUTANEOUS at 15:41

## 2024-06-05 RX ADMIN — DONEPEZIL HYDROCHLORIDE 5 MG: 5 TABLET, FILM COATED ORAL at 21:56

## 2024-06-05 RX ADMIN — ACETAMINOPHEN 650 MG: 325 TABLET ORAL at 23:52

## 2024-06-05 RX ADMIN — Medication 1 PATCH: at 09:17

## 2024-06-05 NOTE — H&P
[facility] Medicine Services  HISTORY AND PHYSICAL    Patient Name: Irma Pedraza  : 1950  MRN: [id number]  Primary Care Physician: Eve Navarrete MD  Date of admission: 2024      Subjective   Subjective     Chief Complaint:  Abd pain    HPI:  Irma Pedraza is a 74 y.o. female with a past medical history of rheumatoid arthritis, polymyalgia rheumatica, giant cell arteritis and COPD who presents with a 2-day history of abdominal pain.  Patient states that it is generally all over, slightly worse on the right side.  She has had some associated nausea with this but denies any vomiting, diarrhea, or constipation.  She also has had no fever, no dysuria and no history of blood in her stools.  Patient states that she remembers having a colonoscopy in the past.  On chart review it appears she had 1 in 2020, of which she had some polyps that were removed and multiple diverticulum.  Repeat colonoscopy was recommended in 5 years.  She does not have a history of diverticulitis in the past.  She is on chronic low-dose prednisone for her PMR.  She denies any shortness of breath.  Does use inhalers at home.  Has had a cough recently over the past couple weeks but states that it is a dry cough.  Denies any congestion.    In the ED she was being worked up for her abdominal pain.  Incidentally noted that she was hypoxic on room air.  They placed her on 4 L nasal cannula which did bring up her oxygen saturations.  Hypoxic on room air.  They placed her on 4 L nasal cannula which did bring up her oxygen saturations.  She is being admitted for possible COPD exacerbation in combination to the diverticulitis.      Personal History     Past Medical History:   Diagnosis Date    Adenomyomatosis of gallbladder 2009    by ultrasound, Dr. Yomi Tai     Arthritis     Arthritis of lumbosacral spine     Asthma     Cataract     Remove  and     Chronic hoarseness     Class 1  obesity due to excess calories with serious comorbidity and body mass index (BMI) of 30.0 to 30.9 in adult 07/16/2020 2/1/2021 Eve Navarrete MD      Closed displaced fracture of shaft of right clavicle with malunion     Diverticulitis 02/28/2019    Diverticulosis     Years ago, mot sure of date    Dyslipidemia 04/23/2024    GCA (giant cell arteritis)     GERD (gastroesophageal reflux disease)     HL (hearing loss) 2022    Hyperlipidemia Unk    Low back pain ?    Medicare annual wellness visit, subsequent 02/01/2021    Lloyd's neuroma of left foot     surgery    Ocular histoplasmosis     BCC 2/6/2017    Panic disorder 02/28/2019    PMR (polymyalgia rheumatica)            Past Surgical History:   Procedure Laterality Date    COLONOSCOPY  02/2009    COSMETIC SURGERY  1994    face lift    COSMETIC SURGERY  1993    TUMMY TUCK    COSMETIC SURGERY  1980    Birth kenny removal    ELBOW PROCEDURE Left     x2    EYE SURGERY  7/20 and 7/25/2023    Removed cataracts and incerted lenses in bot eyes    THROAT SURGERY      remote removal of growth from throat    TONSILLECTOMY  1964       Family History: family history includes Arthritis in her paternal grandmother; Brain cancer in her sister; Breast cancer (age of onset: 90) in her maternal grandmother; Hearing loss in her father; Migraines in her father; Osteoporosis in her father and mother; Other in her brother, daughter, sister, and sister; Stroke in her brother, brother, father, mother, paternal aunt, paternal grandmother, sister, sister, sister, and sister.     Social History:  reports that she has been smoking cigarettes. She has a 25 pack-year smoking history. She has been exposed to tobacco smoke. She has never used smokeless tobacco. She reports current alcohol use. She reports that she does not use drugs.  Social History     Social History Narrative    Lives with        Medications:  Available home medication information reviewed.  CoQ-10,  Fluticasone-Umeclidin-Vilant, Lactobacillus Rhamnosus (GG), albuterol sulfate HFA, aspirin, buPROPion SR, busPIRone, clobetasol propionate, cyanocobalamin, cyclobenzaprine, dexlansoprazole, donepezil, erythromycin, ezetimibe, famotidine, folic acid, montelukast, omeprazole, predniSONE, rosuvastatin, tocilizumab, and zoledronic acid    Allergies   Allergen Reactions    Bactrim [Sulfamethoxazole-Trimethoprim] Other (See Comments)     Hypokalemia, hyponatremia    Atorvastatin Unknown (See Comments)     Lipitor      Bupropion Hcl [Bupropion] Unknown (See Comments)     wellbutrin    Calcium Unknown (See Comments)     unknown       Objective   Objective     Vital Signs:   Temp:  [97.8 °F (36.6 °C)] 97.8 °F (36.6 °C)  Heart Rate:  [60-68] 61  Resp:  [17-18] 18  BP: (133-160)/(48-88) 138/53  Flow (L/min):  [2-4] 3       Physical Exam   Constitutional: Awake, alert  Eyes: PERRLA, sclerae anicteric, no conjunctival injection  HENT: NCAT, mucous membranes moist  Neck: Supple, no thyromegaly, no lymphadenopathy, trachea midline  Respiratory: Clear to auscultation bilaterally with some diminished BS at bilateral base, nonlabored respirations   Cardiovascular: RRR, no murmurs, rubs, or gallops, palpable pedal pulses bilaterally  Gastrointestinal: Positive bowel sounds, soft, tender on right side and epigastric areas, nondistended  Musculoskeletal: No bilateral ankle edema, no clubbing or cyanosis to extremities  Psychiatric: Appropriate affect, cooperative  Neurologic: Oriented x 3, strength symmetric in all extremities, Cranial Nerves grossly intact to confrontation, speech clear  Skin: No rashes      Result Review:  I have personally reviewed the results from the time of this admission to 6/5/2024 14:20 EDT and agree with these findings:  [x]  Laboratory list / accordion  []  Microbiology  [x]  Radiology chest x-ray self interpretation, no acute processes  []  EKG/Telemetry   [x]  Cardiology/Vascular   []  Pathology  [x]  Old  records  []  Other:        LAB RESULTS:      Lab 06/05/24  0502   WBC 7.32   HEMOGLOBIN 14.8   HEMATOCRIT 43.6   PLATELETS 142   NEUTROS ABS 4.76   IMMATURE GRANS (ABS) 0.12*   LYMPHS ABS 1.75   MONOS ABS 0.61   EOS ABS 0.07   MCV 94.2   LACTATE 1.1   PROTIME 13.0   INR 0.97         Lab 06/05/24  0502   SODIUM 141   POTASSIUM 3.5   CHLORIDE 101   CO2 30.0*   ANION GAP 10.0   BUN 8   CREATININE 0.74   EGFR 85.0   GLUCOSE 106*   CALCIUM 9.4   MAGNESIUM 1.6         Lab 06/05/24  0502   TOTAL PROTEIN 6.1   ALBUMIN 4.3   GLOBULIN 1.8   ALT (SGPT) 24   AST (SGOT) 27   BILIRUBIN 0.5   ALK PHOS 61   LIPASE 36                     UA          8/14/2023    11:12 2/27/2024    14:32 6/5/2024    06:54   Urinalysis   Squamous Epithelial Cells, UA   3-6    Specific Gravity, UA   1.021    Ketones, UA   Negative    Blood, UA Negative  Negative  Negative    Leukocytes, UA Trace  Trace  Small (1+)    Nitrite, UA Negative  Negative  Negative    RBC, UA 0-2  0-2  0-2    WBC, UA   6-10    Bacteria, UA Comment  Comment  None Seen        Microbiology Results (last 10 days)       Procedure Component Value - Date/Time    COVID PRE-OP / PRE-PROCEDURE SCREENING ORDER (NO ISOLATION) - Swab, Nasopharynx [[id number]]  (Abnormal) Collected: 06/05/24 0857    Lab Status: Final result Specimen: Swab from Nasopharynx Updated: 06/05/24 1008    Narrative:      The following orders were created for panel order COVID PRE-OP / PRE-PROCEDURE SCREENING ORDER (NO ISOLATION) - Swab, Nasopharynx.  Procedure                               Abnormality         Status                     ---------                               -----------         ------                     Respiratory Panel PCR w/...[[id number]]  Abnormal            Final result                 Please view results for these tests on the individual orders.    Respiratory Panel PCR w/COVID-19(SARS-CoV-2) JOLEEN/SHAVONNE/WERNER/PAD/COR/MARTINE In-House, NP Swab in UTM/VTM, 2 HR TAT - Swab, Nasopharynx [[id number]]   (Abnormal) Collected: 06/05/24 0857    Lab Status: Final result Specimen: Swab from Nasopharynx Updated: 06/05/24 1008     ADENOVIRUS, PCR Not Detected     Coronavirus 229E Not Detected     Coronavirus HKU1 Not Detected     Coronavirus NL63 Not Detected     Coronavirus OC43 Not Detected     COVID19 Not Detected     Human Metapneumovirus Not Detected     Human Rhinovirus/Enterovirus Detected     Influenza A PCR Not Detected     Influenza B PCR Not Detected     Parainfluenza Virus 1 Not Detected     Parainfluenza Virus 2 Not Detected     Parainfluenza Virus 3 Not Detected     Parainfluenza Virus 4 Not Detected     RSV, PCR Not Detected     Bordetella pertussis pcr Not Detected     Bordetella parapertussis PCR Not Detected     Chlamydophila pneumoniae PCR Not Detected     Mycoplasma pneumo by PCR Not Detected    Narrative:      In the setting of a positive respiratory panel with a viral infection PLUS a negative procalcitonin without other underlying concern for bacterial infection, consider observing off antibiotics or discontinuation of antibiotics and continue supportive care. If the respiratory panel is positive for atypical bacterial infection (Bordetella pertussis, Chlamydophila pneumoniae, or Mycoplasma pneumoniae), consider antibiotic de-escalation to target atypical bacterial infection.            XR Chest 1 View    Result Date: 6/5/2024  XR CHEST 1 VW Date of Exam: 6/5/2024 5:32 AM EDT Indication: soa Comparison: 5/22/2024. Findings: There are no airspace consolidations. No pleural fluid. No pneumothorax. The pulmonary vasculature appears within normal limits. The cardiac and mediastinal silhouette appear unremarkable. No acute osseous abnormality identified.     Impression: Impression: No acute cardiopulmonary process. Electronically Signed: Jigna Olson MD  6/5/2024 5:42 AM EDT  Workstation ID: [id number]    CT Abdomen Pelvis Without Contrast    Result Date: 6/5/2024  CT ABDOMEN PELVIS WO CONTRAST Date of  Exam: 6/5/2024 5:09 AM EDT Indication: right lower quadrant abdominal pain and tenderness.. Comparison: 10/20/2012. Technique: Axial CT images were obtained of the abdomen and pelvis without the administration of contrast. Reconstructed coronal and sagittal images were also obtained. Automated exposure control and iterative construction methods were used. Findings: Lung Bases:   The visualized lung bases and lower mediastinal structures demonstrate mild patchy atelectatic changes within the lung bases bilaterally. Mild infiltrates cannot be excluded.. Limited evaluation of the solid organs due to lack of intravenous contrast. Liver: Liver is normal in size and CT density. No focal lesions. Biliary/Gallbladder:  The gallbladder is normal without evidence of radiopaque stones. The biliary tree is nondilated. Spleen: Spleen is normal in size and CT density. Pancreas:  Pancreas is normal. There is no evidence of pancreatic mass or peripancreatic fluid. Kidneys:  Kidneys are normal in size. There are no stones or hydronephrosis. Adrenals:  Adrenal glands are unremarkable. Retroperitoneal/Lymph Nodes/Vasculature:  No retroperitoneal adenopathy is identified. Gastrointestinal/Mesentery:  The bowel loops are non-dilated. There is diverticulosis of the sigmoid colon extending to the descending colon. There are focal areas of inflammatory change present within the distal descending colon (series 2 image 21) as well as the mid sigmoid colon with stranding present. A trace amount of free fluid is present. No evidence of perforation. Mild to moderate stool burden present. No additional inflammatory changes identified. The appendix appears within normal limits. No evidence of obstruction. No free air. No mesenteric fluid collections identified. A fluid collection which appears hyperdense is present within the right rectus abdominal musculature. This measures approximately 6.7 x 3.5 cm (series 2 image 50). Remaining musculature  appears unremarkable. Subcutaneous tissues appear within normal limits. Bladder:  The bladder is normal. Genital:   Unremarkable       Bony Structures:   Visualized bony structures are consistent with the patient's age.     Impression: Impression: 1. Hematoma within the right rectus abdominal musculature as above, likely posttraumatic. Remaining musculature appears unremarkable. No acute osseous abnormality. 2. Significant diverticulosis of the left hemicolon with focal inflammatory changes present within the distal descending colon as well as the mid sigmoid colon consistent with acute uncomplicated diverticulitis. There is somewhat masslike appearance seen  within the descending colon within the area of inflammatory change and underlying mass cannot be excluded. Colonoscopic correlation is recommended. No additional inflammatory changes present. 3. Mild infiltrates versus mild atelectatic changes present within the bilateral lower lobes. 4. Ancillary findings as described above. Electronically Signed: Jigna Olson MD  6/5/2024 5:16 AM EDT  Workstation ID: [id number]     Results for orders placed during the hospital encounter of 04/23/24    Adult Transthoracic Echo Complete W/ Cont if Necessary Per Protocol (With Agitated Saline)    Interpretation Summary    Left ventricular systolic function is normal.  Left ventricular ejection fraction appears to be 61 - 65%.    Left ventricular diastolic function is consistent with (grade I) impaired relaxation.    There is calcification of the mitral valve.    Saline test results are negative.      Assessment & Plan   Assessment & Plan       Diverticulitis large intestine w/o perforation or abscess w/bleeding    Polymyalgia rheumatica    HTN (hypertension)    GERD (gastroesophageal reflux disease)    Tobacco dependence syndrome    Rheumatoid arthritis, seropositive    COPD exacerbation      Irma Pedraza is a 74 y.o. F with a past medical history of rheumatoid arthritis, PMR,  giant cell arteritis and COPD who presents with an acute episode of diverticulitis, and noted to be hypoxic in the ED so coming in for a COPD exacerbation and pain control.    Diverticulitis:  -- Change antibiotics to Rocephin and Flagyl.  Blood cultures pending  --Pain control with tylenol for now.  Can add more if needed.  --If doesn't improve, would consider GI consult  -- CT read with a questionable area of a mass versus just inflammation, may benefit from a repeat colonoscopy in the near future once this infection has resolved.    COPD exacerbation:  Acute hypoxic resp failure  -- DuoNebs 4 times daily  -- Will hold off on increasing her prednisone at this point due to the acute infection  --Wean O2 as tolerated  -- Would benefit with outpatient PFTs    RA  PMR  Giant cell arteritis  --Continue low-dose prednisone.  Patient also gets monthly infusions.    Tob abuse  --NRT  --Cessation education    ETOH abuse  --Monitor CIWA scores.  Can add meds if having high scores    GERD  --protonix    HLD  --crestor      DVT prophylaxis:  Medical DVT prophylaxis orders are present.          CODE STATUS:    Code Status and Medical Interventions:   Ordered at: 06/05/24 0959     Level Of Support Discussed With:    Patient     Code Status (Patient has no pulse and is not breathing):    CPR (Attempt to Resuscitate)     Medical Interventions (Patient has pulse or is breathing):    Full Support       Expected Discharge   Expected Discharge Date: 6/6/2024; Expected Discharge Time:      Miladis Mooney MD  06/05/24

## 2024-06-05 NOTE — CASE MANAGEMENT/SOCIAL WORK
Discharge Planning Assessment  [facility]     Patient Name: Irma Pedraza  MRN: [id number]  Today's Date: 6/5/2024    Admit Date: 6/5/2024    Plan: Home   Discharge Needs Assessment       Row Name 06/05/24 1236       Living Environment    People in Home spouse    Current Living Arrangements home    Primary Care Provided by self    Provides Primary Care For no one    Family Caregiver if Needed spouse    Family Caregiver Names Cesar Pedraza,     Quality of Family Relationships helpful;involved;supportive    Able to Return to Prior Arrangements yes       Transition Planning    Patient/Family Anticipates Transition to home with family    Patient/Family Anticipated Services at Transition        Discharge Needs Assessment    Equipment Currently Used at Home none    Concerns to be Addressed denies needs/concerns at this time    Discharge Coordination/Progress Lives in a house in [place] with her , independent with ADLs.  No DME or history of home health.  Has an advanced directive.                   Discharge Plan       Row Name 06/05/24 1237       Plan    Plan Home    Patient/Family in Agreement with Plan yes    Plan Comments I spoke with Ms Pedraza at bedside.  Ms Pedraza resides in a house in [place] with her  and is independent with ADLs.  She denies using any DME at home and has not had home health in the past.  She does have an advanced directive.  Her insurance is [employer], and she denies any issues or lapses in coverage.  Her insurance helps to cover prescriptions.  Her PCP is Eve Navarrete, and she gets her prescriptions filled at [facility]   At this time, she denies any discharge needs.    Final Discharge Disposition Code 01 - home or self-care                  Continued Care and Services - Admitted Since 6/5/2024    No active coordination exists for this encounter.          Demographic Summary    No documentation.                   Functional Status       Row Name 06/05/24 1235       Functional Status    Usual Activity Tolerance good    Current Activity Tolerance good       Functional Status, IADL    Medications independent    Meal Preparation independent    Housekeeping independent    Laundry independent    Shopping independent       Mental Status    General Appearance WDL WDL                   Psychosocial    No documentation.                  Abuse/Neglect    No documentation.                  Legal    No documentation.                  Substance Abuse    No documentation.                  Patient Forms    No documentation.                     Ilsa Streeter RN

## 2024-06-05 NOTE — ED NOTES
Irma Pedraza    Nursing Report ED to Floor:  Mental status: A&O X4  Ambulatory status: up with 1  Oxygen Therapy:  3L NC  Cardiac Rhythm: NSR  Admitted from: ED  Safety Concerns:  none  Social Issues: none  ED Room #:  15    ED Nurse Phone Extension - [phone] or may call [phone].      HPI:   Chief Complaint   Patient presents with    Abdominal Pain       Past Medical History:  Past Medical History:   Diagnosis Date    Adenomyomatosis of gallbladder 2009    by ultrasound, Dr. Yomi Tai 2009    Arthritis     Arthritis of lumbosacral spine     Asthma     Cataract     Remove 7-20 and 7-25 2023    Chronic hoarseness     Class 1 obesity due to excess calories with serious comorbidity and body mass index (BMI) of 30.0 to 30.9 in adult 07/16/2020 2/1/2021 Eve Navarrete MD      Closed displaced fracture of shaft of right clavicle with malunion     Diverticulitis 02/28/2019    Diverticulosis     Years ago, mot sure of date    Dyslipidemia 04/23/2024    GCA (giant cell arteritis)     GERD (gastroesophageal reflux disease)     HL (hearing loss) 2022    Hyperlipidemia Unk    Low back pain ?    Medicare annual wellness visit, subsequent 02/01/2021    Lloyd's neuroma of left foot     surgery    Ocular histoplasmosis     BCC 2/6/2017    Panic disorder 02/28/2019    PMR (polymyalgia rheumatica)         Past Surgical History:  Past Surgical History:   Procedure Laterality Date    COLONOSCOPY  02/2009    COSMETIC SURGERY  1994    face lift    COSMETIC SURGERY  1993    TUMMY TUCK    COSMETIC SURGERY  1980    Birth kenny removal    ELBOW PROCEDURE Left     x2    EYE SURGERY  7/20 and 7/25/2023    Removed cataracts and incerted lenses in bot eyes    THROAT SURGERY      remote removal of growth from throat    TONSILLECTOMY  1964        Admitting Doctor:   Miladis Mooney MD    Consulting Provider(s):  Consults       No orders found from 5/7/2024 to 6/6/2024.             Admitting Diagnosis:   The primary encounter diagnosis  was Right lower quadrant abdominal pain. Diagnoses of Diverticulitis, Nontraumatic rectus hematoma, Acute respiratory failure with hypoxia, Tobacco use disorder, Shortness of breath, COPD with acute exacerbation, and Nausea were also pertinent to this visit.    Most Recent Vitals:   Vitals:    06/05/24 1500 06/05/24 1530 06/05/24 1534 06/05/24 1600   BP: 131/54 144/63  128/58   BP Location:       Patient Position:       Pulse: 64 64 65 69   Resp:   17    Temp:       TempSrc:       SpO2: 95% 94% 93% 93%   Weight:       Height:           Active LDAs/IV Access:   Lines, Drains & Airways       Active LDAs       Name Placement date Placement time Site Days    Peripheral IV 06/05/24 0504 Anterior;Left Forearm 06/05/24  0504  Forearm  less than 1                    Labs (abnormal labs have a star):   Labs Reviewed   RESPIRATORY PANEL PCR W/ COVID-19 (SARS-COV-2), NP SWAB IN UTM/VTP, 2 HR TAT - Abnormal; Notable for the following components:       Result Value    Human Rhinovirus/Enterovirus Detected (*)     All other components within normal limits    Narrative:     In the setting of a positive respiratory panel with a viral infection PLUS a negative procalcitonin without other underlying concern for bacterial infection, consider observing off antibiotics or discontinuation of antibiotics and continue supportive care. If the respiratory panel is positive for atypical bacterial infection (Bordetella pertussis, Chlamydophila pneumoniae, or Mycoplasma pneumoniae), consider antibiotic de-escalation to target atypical bacterial infection.   COMPREHENSIVE METABOLIC PANEL - Abnormal; Notable for the following components:    Glucose 106 (*)     CO2 30.0 (*)     All other components within normal limits    Narrative:     GFR Normal >60  Chronic Kidney Disease <60  Kidney Failure <15    The GFR formula is only valid for adults with stable renal function between ages 18 and 70.   URINALYSIS W/ MICROSCOPIC IF INDICATED (NO CULTURE) -  Abnormal; Notable for the following components:    Protein, UA Trace (*)     Leuk Esterase, UA Small (1+) (*)     All other components within normal limits   CBC WITH AUTO DIFFERENTIAL - Abnormal; Notable for the following components:    Immature Grans % 1.6 (*)     Immature Grans, Absolute 0.12 (*)     All other components within normal limits   URINALYSIS, MICROSCOPIC ONLY - Abnormal; Notable for the following components:    WBC, UA 6-10 (*)     Squamous Epithelial Cells, UA 3-6 (*)     All other components within normal limits   LIPASE - Normal   MAGNESIUM - Normal   LACTIC ACID, PLASMA - Normal   PROTIME-INR - Normal   COVID PRE-OP / PRE-PROCEDURE SCREENING ORDER (NO ISOLATION)    Narrative:     The following orders were created for panel order COVID PRE-OP / PRE-PROCEDURE SCREENING ORDER (NO ISOLATION) - Swab, Nasopharynx.  Procedure                               Abnormality         Status                     ---------                               -----------         ------                     Respiratory Panel PCR w/...[[id number]]  Abnormal            Final result                 Please view results for these tests on the individual orders.   BLOOD CULTURE   BLOOD CULTURE   RAINBOW DRAW    Narrative:     The following orders were created for panel order Simon Draw.  Procedure                               Abnormality         Status                     ---------                               -----------         ------                     Green Top (Gel)[[id number]]                                                             Lavender Top[[id number]]                                                                Gold Top - SST[[id number]]                                                              Sy Top[[id number]]                                                                    Light Blue Top[[id number]]                                   Final result                 Please view results for these tests on the  individual orders.   CBC AND DIFFERENTIAL    Narrative:     The following orders were created for panel order CBC & Differential.  Procedure                               Abnormality         Status                     ---------                               -----------         ------                     CBC Auto Differential[[id number]]        Abnormal            Final result                 Please view results for these tests on the individual orders.   LIGHT BLUE TOP   GREEN TOP   LAVENDER TOP   GOLD TOP - SST   GRAY TOP       Meds Given in ED:   Medications   sodium chloride 0.9 % flush 10 mL (has no administration in time range)   nicotine (NICODERM CQ) 21 MG/24HR patch 1 patch (1 patch Transdermal Medication Applied 6/5/24 0917)   aspirin tablet 325 mg (has no administration in time range)   donepezil (ARICEPT) tablet 5 mg (has no administration in time range)   folic acid (FOLVITE) tablet 1 mg (has no administration in time range)   predniSONE (DELTASONE) tablet 2.5 mg (has no administration in time range)   rosuvastatin (CRESTOR) tablet 40 mg (has no administration in time range)   sodium chloride 0.9 % flush 10 mL (10 mL Intravenous Given 6/5/24 1541)   sodium chloride 0.9 % flush 10 mL (has no administration in time range)   sodium chloride 0.9 % infusion 40 mL (has no administration in time range)   Enoxaparin Sodium (LOVENOX) syringe 40 mg (40 mg Subcutaneous Given 6/5/24 1541)   Potassium Replacement - Follow Nurse / BPA Driven Protocol (has no administration in time range)   Magnesium Standard Dose Replacement - Follow Nurse / BPA Driven Protocol (has no administration in time range)   Phosphorus Replacement - Follow Nurse / BPA Driven Protocol (has no administration in time range)   sennosides-docusate (PERICOLACE) 8.6-50 MG per tablet 2 tablet (has no administration in time range)     And   polyethylene glycol (MIRALAX) packet 17 g (has no administration in time range)     And   bisacodyl (DULCOLAX) EC  tablet 5 mg (has no administration in time range)     And   bisacodyl (DULCOLAX) suppository 10 mg (has no administration in time range)   ipratropium-albuterol (DUO-NEB) nebulizer solution 3 mL (3 mL Nebulization Given 6/5/24 1534)   cefTRIAXone (ROCEPHIN) 2,000 mg in sodium chloride 0.9 % 100 mL MBP (0 mg Intravenous Stopped 6/5/24 1452)   metroNIDAZOLE (FLAGYL) IVPB 500 mg (0 mg Intravenous Stopped 6/5/24 1550)   budesonide-formoterol (SYMBICORT) 160-4.5 MCG/ACT inhaler 2 puff ( Inhalation Canceled Entry 6/5/24 1500)     And   tiotropium (SPIRIVA RESPIMAT) 2.5 mcg/act aerosol solution inhaler ( Inhalation Canceled Entry 6/5/24 1500)   acetaminophen (TYLENOL) tablet 650 mg (has no administration in time range)   pantoprazole (PROTONIX) EC tablet 40 mg (has no administration in time range)   ondansetron (ZOFRAN) injection 4 mg (4 mg Intravenous Given 6/5/24 0503)   morphine injection 2 mg (2 mg Intravenous Given 6/5/24 0503)   piperacillin-tazobactam (ZOSYN) 3.375 g IVPB in 100 mL NS MBP (CD) (0 g Intravenous Stopped 6/5/24 0657)   fentaNYL citrate (PF) (SUBLIMAZE) injection 25 mcg (25 mcg Intravenous Given 6/5/24 0624)   ipratropium-albuterol (DUO-NEB) nebulizer solution 3 mL (3 mL Nebulization Given 6/5/24 0948)   ondansetron (ZOFRAN) injection 4 mg (4 mg Intravenous Given 6/5/24 0854)   HYDROmorphone (DILAUDID) injection 0.25 mg (0.25 mg Intravenous Given 6/5/24 0856)           Last NIH score:                                                          Dysphagia screening results:  Patient Factors Component (Dysphagia:Stroke or Rule-out)  Best Eye Response: 4-->(E4) spontaneous (06/05/24 0658)  Best Motor Response: 6-->(M6) obeys commands (06/05/24 0658)  Best Verbal Response: 5-->(V5) oriented (06/05/24 0658)  [place] Coma Scale Score: 15 (06/05/24 0658)     [place] Coma Scale:  No data recorded     CIWA:        Restraint Type:            Isolation Status:  No active isolations

## 2024-06-05 NOTE — ED PROVIDER NOTES
Subjective   History of Present Illness  74 year old female presents to the emergency department brought by EMS accompanied by her  with concerns about severe right lower quadrant abdominal pain with associated nausea worsening over the past 7 days.  She denies recent fever or vomiting.  She denies recent injury or trauma.      Review of Systems   Constitutional:  Negative for diaphoresis and fever.   HENT:  Negative for facial swelling.    Eyes:  Negative for photophobia and discharge.   Respiratory:  Positive for shortness of breath. Negative for stridor.    Gastrointestinal:  Positive for abdominal pain and nausea.   Musculoskeletal:  Positive for back pain.   Neurological:  Negative for facial asymmetry and speech difficulty.       Past Medical History:   Diagnosis Date    Adenomyomatosis of gallbladder 2009    by ultrasound, Dr. Yomi Tai 2009    Arthritis     Arthritis of lumbosacral spine     Asthma     Cataract     Remove 7-20 and 7-25 2023    Chronic hoarseness     Class 1 obesity due to excess calories with serious comorbidity and body mass index (BMI) of 30.0 to 30.9 in adult 07/16/2020 2/1/2021 Eve Navarrete MD      Closed displaced fracture of shaft of right clavicle with malunion     Diverticulitis 02/28/2019    Diverticulosis     Years ago, mot sure of date    Dyslipidemia 04/23/2024    GCA (giant cell arteritis)     GERD (gastroesophageal reflux disease)     HL (hearing loss) 2022    Hyperlipidemia Unk    Low back pain ?    Medicare annual wellness visit, subsequent 02/01/2021    Lloyd's neuroma of left foot     surgery    Ocular histoplasmosis     BCC 2/6/2017    Panic disorder 02/28/2019    PMR (polymyalgia rheumatica)        Allergies   Allergen Reactions    Bactrim [Sulfamethoxazole-Trimethoprim] Other (See Comments)     Hypokalemia, hyponatremia    Atorvastatin Unknown (See Comments)     Lipitor      Bupropion Hcl [Bupropion] Unknown (See Comments)     wellbutrin    Calcium  Unknown (See Comments)     unknown       Past Surgical History:   Procedure Laterality Date    COLONOSCOPY  02/2009    COSMETIC SURGERY  1994    face lift    COSMETIC SURGERY  1993    TUMMY TUCK    COSMETIC SURGERY  1980    Birth kenny removal    ELBOW PROCEDURE Left     x2    EYE SURGERY  7/20 and 7/25/2023    Removed cataracts and incerted lenses in bot eyes    THROAT SURGERY      remote removal of growth from throat    TONSILLECTOMY  1964       Family History   Problem Relation Age of Onset    Osteoporosis Mother     Stroke Mother         Alzhiemers    Osteoporosis Father     Migraines Father     Hearing loss Father     Stroke Father         TIA    Brain cancer Sister     Stroke Sister         CVA    Stroke Brother         CVA    Breast cancer Maternal Grandmother 90    Stroke Paternal Grandmother         CVA    Arthritis Paternal Grandmother         Ostioperosis    Stroke Paternal Aunt         TIA    Stroke Sister         CVA    Stroke Sister         TIA    Stroke Brother         CVA    Stroke Sister         Glioblastoma    Other Sister         Rheumatoid Arthritis    Other Brother         no issues    Other Daughter         no issues    Other Sister         no issues    Ovarian cancer Neg Hx     Endometrial cancer Neg Hx        Social History     Socioeconomic History    Marital status:    Tobacco Use    Smoking status: Every Day     Current packs/day: 0.50     Average packs/day: 0.5 packs/day for 50.0 years (25.0 ttl pk-yrs)     Types: Cigarettes     Passive exposure: Current    Smokeless tobacco: Never    Tobacco comments:     patient refused smoking literature   Vaping Use    Vaping status: Never Used   Substance and Sexual Activity    Alcohol use: Yes     Comment: 2 drinks per night    Drug use: No    Sexual activity: Not Currently     Partners: Male           Objective   Physical Exam  Vitals and nursing note reviewed.   Constitutional:       Appearance: She is not diaphoretic.      Comments: BMI 27.   Appears uncomfortable.   Eyes:      General: No scleral icterus.     Comments: No photophobia or nystagmus.   Pulmonary:      Effort: Pulmonary effort is normal. No respiratory distress.      Breath sounds: No stridor.   Abdominal:      General: There is no distension.      Palpations: Abdomen is soft.      Tenderness: There is abdominal tenderness in the right lower quadrant and suprapubic area.   Musculoskeletal:      Comments: No midline L spine tenderness or step off. Low back unremarkable to inspection.   Skin:     General: Skin is warm and dry.      Coloration: Skin is not jaundiced.   Neurological:      Mental Status: She is alert.      Comments: Normal speech, no dysarthria. No facial droop. Moves all extremities.         Procedures           ED Course  ED Course as of 06/05/24 1142   Wed Jun 05, 2024   0843 I reassessed the patient.  She reports persistent abdominal pain as well as nausea. Pain radiates to her right low back. Right low back is unremarkable to inspection. She has 5/5 power bilateral lower extremities, symmetric. She reports a history of COPD but does not use nebulizers at home or any chronic home oxygen.  She does report some shortness of breath.  She is alert.  Her oxygen saturation at this time is 86% on room air.  She has decreased air entry throughout.  She has scant bilateral expiratory wheezing with prolonged expiratory phase.  She has mild tachypnea.  She is agreeable to further treatment in the hospital for acute diverticulitis, with severe abdominal pain and nausea, as well as COPD exacerbation with acute respiratory failure with hypoxia. [LD]   0846 She denies any recent abdominal  trauma. [LD]      ED Course User Index  [LD] Kayy Casper MD                                             Medical Decision Making  Differential diagnosis includes acute appendicitis, diverticulitis, musculoskeletal pain, IBS, IBD, biliary disease, pancreatitis, functional abdominal pain, foodborne  illness, and others.    Problems Addressed:  Acute respiratory failure with hypoxia: complicated acute illness or injury  COPD with acute exacerbation: chronic illness or injury  Diverticulitis: complicated acute illness or injury  Nausea: chronic illness or injury  Nontraumatic rectus hematoma: complicated acute illness or injury  Right lower quadrant abdominal pain: complicated acute illness or injury  Shortness of breath: chronic illness or injury  Tobacco use disorder: complicated acute illness or injury    Amount and/or Complexity of Data Reviewed  Independent Historian: spouse and EMS  Labs: ordered. Decision-making details documented in ED Course.  Radiology: ordered. Decision-making details documented in ED Course.  ECG/medicine tests: ordered and independent interpretation performed. Decision-making details documented in ED Course.  Discussion of management or test interpretation with external provider(s): Hospitalist Dr. Klein contacted by epic chat at approximately 0900.    Risk  OTC drugs.  Prescription drug management.  Parenteral controlled substances.  Decision regarding hospitalization.      Recent Results (from the past 24 hour(s))   Comprehensive Metabolic Panel    Collection Time: 06/05/24  5:02 AM    Specimen: Blood   Result Value Ref Range    Glucose 106 (H) 65 - 99 mg/dL    BUN 8 8 - 23 mg/dL    Creatinine 0.74 0.57 - 1.00 mg/dL    Sodium 141 136 - 145 mmol/L    Potassium 3.5 3.5 - 5.2 mmol/L    Chloride 101 98 - 107 mmol/L    CO2 30.0 (H) 22.0 - 29.0 mmol/L    Calcium 9.4 8.6 - 10.5 mg/dL    Total Protein 6.1 6.0 - 8.5 g/dL    Albumin 4.3 3.5 - 5.2 g/dL    ALT (SGPT) 24 1 - 33 U/L    AST (SGOT) 27 1 - 32 U/L    Alkaline Phosphatase 61 39 - 117 U/L    Total Bilirubin 0.5 0.0 - 1.2 mg/dL    Globulin 1.8 gm/dL    A/G Ratio 2.4 g/dL    BUN/Creatinine Ratio 10.8 7.0 - 25.0    Anion Gap 10.0 5.0 - 15.0 mmol/L    eGFR 85.0 >60.0 mL/min/1.73   Lipase    Collection Time: 06/05/24  5:02 AM    Specimen:  Blood   Result Value Ref Range    Lipase 36 13 - 60 U/L   Magnesium    Collection Time: 06/05/24  5:02 AM    Specimen: Blood   Result Value Ref Range    Magnesium 1.6 1.6 - 2.4 mg/dL   Lactic Acid, Plasma    Collection Time: 06/05/24  5:02 AM    Specimen: Blood   Result Value Ref Range    Lactate 1.1 0.5 - 2.0 mmol/L   CBC Auto Differential    Collection Time: 06/05/24  5:02 AM    Specimen: Blood   Result Value Ref Range    WBC 7.32 3.40 - 10.80 10*3/mm3    RBC 4.63 3.77 - 5.28 10*6/mm3    Hemoglobin 14.8 12.0 - 15.9 g/dL    Hematocrit 43.6 34.0 - 46.6 %    MCV 94.2 79.0 - 97.0 fL    MCH 32.0 26.6 - 33.0 pg    MCHC 33.9 31.5 - 35.7 g/dL    RDW 12.3 12.3 - 15.4 %    RDW-SD 42.3 37.0 - 54.0 fl    MPV 10.3 6.0 - 12.0 fL    Platelets 142 140 - 450 10*3/mm3    Neutrophil % 65.1 42.7 - 76.0 %    Lymphocyte % 23.9 19.6 - 45.3 %    Monocyte % 8.3 5.0 - 12.0 %    Eosinophil % 1.0 0.3 - 6.2 %    Basophil % 0.1 0.0 - 1.5 %    Immature Grans % 1.6 (H) 0.0 - 0.5 %    Neutrophils, Absolute 4.76 1.70 - 7.00 10*3/mm3    Lymphocytes, Absolute 1.75 0.70 - 3.10 10*3/mm3    Monocytes, Absolute 0.61 0.10 - 0.90 10*3/mm3    Eosinophils, Absolute 0.07 0.00 - 0.40 10*3/mm3    Basophils, Absolute 0.01 0.00 - 0.20 10*3/mm3    Immature Grans, Absolute 0.12 (H) 0.00 - 0.05 10*3/mm3    nRBC 0.0 0.0 - 0.2 /100 WBC   Protime-INR    Collection Time: 06/05/24  5:02 AM    Specimen: Blood   Result Value Ref Range    Protime 13.0 12.2 - 14.5 Seconds    INR 0.97 0.89 - 1.12   Light Blue Top    Collection Time: 06/05/24  5:02 AM   Result Value Ref Range    Extra Tube Hold for add-ons.    Urinalysis With Microscopic If Indicated (No Culture) - Urine, Clean Catch    Collection Time: 06/05/24  6:54 AM    Specimen: Urine, Clean Catch   Result Value Ref Range    Color, UA Yellow Yellow, Straw    Appearance, UA Clear Clear    pH, UA 5.5 5.0 - 8.0    Specific Gravity, UA 1.021 1.001 - 1.030    Glucose, UA Negative Negative    Ketones, UA Negative Negative     Bilirubin, UA Negative Negative    Blood, UA Negative Negative    Protein, UA Trace (A) Negative    Leuk Esterase, UA Small (1+) (A) Negative    Nitrite, UA Negative Negative    Urobilinogen, UA 1.0 E.U./dL 0.2 - 1.0 E.U./dL   Urinalysis, Microscopic Only - Urine, Clean Catch    Collection Time: 06/05/24  6:54 AM    Specimen: Urine, Clean Catch   Result Value Ref Range    RBC, UA 0-2 None Seen, 0-2 /HPF    WBC, UA 6-10 (A) None Seen, 0-2 /HPF    Bacteria, UA None Seen None Seen, Trace /HPF    Squamous Epithelial Cells, UA 3-6 (A) None Seen, 0-2 /HPF    Hyaline Casts, UA 0-6 0 - 6 /LPF    Methodology Automated Microscopy    Respiratory Panel PCR w/COVID-19(SARS-CoV-2) JOLEEN/SHAVONNE/WERNER/PAD/COR/MARTINE In-House, NP Swab in UTM/VTM, 2 HR TAT - Swab, Nasopharynx    Collection Time: 06/05/24  8:57 AM    Specimen: Nasopharynx; Swab   Result Value Ref Range    ADENOVIRUS, PCR Not Detected Not Detected    Coronavirus 229E Not Detected Not Detected    Coronavirus HKU1 Not Detected Not Detected    Coronavirus NL63 Not Detected Not Detected    Coronavirus OC43 Not Detected Not Detected    COVID19 Not Detected Not Detected - Ref. Range    Human Metapneumovirus Not Detected Not Detected    Human Rhinovirus/Enterovirus Detected (A) Not Detected    Influenza A PCR Not Detected Not Detected    Influenza B PCR Not Detected Not Detected    Parainfluenza Virus 1 Not Detected Not Detected    Parainfluenza Virus 2 Not Detected Not Detected    Parainfluenza Virus 3 Not Detected Not Detected    Parainfluenza Virus 4 Not Detected Not Detected    RSV, PCR Not Detected Not Detected    Bordetella pertussis pcr Not Detected Not Detected    Bordetella parapertussis PCR Not Detected Not Detected    Chlamydophila pneumoniae PCR Not Detected Not Detected    Mycoplasma pneumo by PCR Not Detected Not Detected   ECG 12 Lead Dyspnea    Collection Time: 06/05/24  9:02 AM   Result Value Ref Range    QT Interval 456 ms    QTC Interval 456 ms     Note: In addition  to lab results from this visit, the labs listed above may include labs taken at another facility or during a different encounter within the last 24 hours. Please correlate lab times with ED admission and discharge times for further clarification of the services performed during this visit.    XR Chest 1 View   Final Result   Impression:   No acute cardiopulmonary process.         Electronically Signed: Jigna Olson MD     6/5/2024 5:42 AM EDT     Workstation ID: [id number]      CT Abdomen Pelvis Without Contrast   Final Result   Impression:      1. Hematoma within the right rectus abdominal musculature as above, likely posttraumatic. Remaining musculature appears unremarkable. No acute osseous abnormality.   2. Significant diverticulosis of the left hemicolon with focal inflammatory changes present within the distal descending colon as well as the mid sigmoid colon consistent with acute uncomplicated diverticulitis. There is somewhat masslike appearance seen    within the descending colon within the area of inflammatory change and underlying mass cannot be excluded. Colonoscopic correlation is recommended. No additional inflammatory changes present.   3. Mild infiltrates versus mild atelectatic changes present within the bilateral lower lobes.   4. Ancillary findings as described above.                  Electronically Signed: Jigna Olson MD     6/5/2024 5:16 AM EDT     Workstation ID: [id number]        Vitals:    06/05/24 1000 06/05/24 1030 06/05/24 1100 06/05/24 1130   BP: 141/66 133/48 138/53    BP Location:       Patient Position:       Pulse: 60 63 61 61   Resp:       Temp:       TempSrc:       SpO2: 94% 95% 96% 94%   Weight:       Height:         Medications   sodium chloride 0.9 % flush 10 mL (has no administration in time range)   nicotine (NICODERM CQ) 21 MG/24HR patch 1 patch (1 patch Transdermal Medication Applied 6/5/24 0808)   ondansetron (ZOFRAN) injection 4 mg (4 mg Intravenous Given 6/5/24 6265)    morphine injection 2 mg (2 mg Intravenous Given 6/5/24 8223)   piperacillin-tazobactam (ZOSYN) 3.375 g IVPB in 100 mL NS MBP (CD) (0 g Intravenous Stopped 6/5/24 0657)   fentaNYL citrate (PF) (SUBLIMAZE) injection 25 mcg (25 mcg Intravenous Given 6/5/24 0624)   ipratropium-albuterol (DUO-NEB) nebulizer solution 3 mL (3 mL Nebulization Given 6/5/24 0948)   ondansetron (ZOFRAN) injection 4 mg (4 mg Intravenous Given 6/5/24 0854)   HYDROmorphone (DILAUDID) injection 0.25 mg (0.25 mg Intravenous Given 6/5/24 0856)     ECG/EMG Results (last 24 hours)       ** No results found for the last 24 hours. **          ECG 12 Lead Dyspnea   Final Result   Test Reason : DYSPNEA   Blood Pressure :   */*   mmHG   Vent. Rate :  60 BPM     Atrial Rate :  60 BPM      P-R Int : 206 ms          QRS Dur :  68 ms       QT Int : 456 ms       P-R-T Axes :  59  46  32 degrees      QTc Int : 456 ms      Normal sinus rhythm   Low voltage QRS   Cannot rule out Anterior infarct , age undetermined   Abnormal ECG   When compared with ECG of 23-APR-2024 12:49,   Nonspecific T wave abnormality now evident in Anterior leads   QT has lengthened   Confirmed by KAYY CASPER ([id number]) on 6/5/2024 10:41:37 AM      Referred By: ROSSMD           Confirmed By: KAYY CASPER             Final diagnoses:   Right lower quadrant abdominal pain   Diverticulitis   Nontraumatic rectus hematoma   Acute respiratory failure with hypoxia   Tobacco use disorder   Shortness of breath   COPD with acute exacerbation   Nausea       ED Disposition  ED Disposition       ED Disposition   Decision to Admit    Condition   --    Comment   Level of Care: Telemetry [5]   Diagnosis: COPD exacerbation [677150]   Certification: I Certify That Inpatient Hospital Services Are Medically Necessary For Greater Than 2 Midnights                 No follow-up provider specified.       Medication List      No changes were made to your prescriptions during this visit.            Kayy Casper,  MD  06/05/24 1142       Kayy Casper MD  06/05/24 1149

## 2024-06-06 PROBLEM — S30.1XXA RECTUS SHEATH HEMATOMA: Status: ACTIVE | Noted: 2024-06-06

## 2024-06-06 LAB
ANION GAP SERPL CALCULATED.3IONS-SCNC: 12 MMOL/L (ref 5–15)
BUN SERPL-MCNC: 8 MG/DL (ref 8–23)
BUN/CREAT SERPL: 11.3 (ref 7–25)
CALCIUM SPEC-SCNC: 9 MG/DL (ref 8.6–10.5)
CHLORIDE SERPL-SCNC: 97 MMOL/L (ref 98–107)
CO2 SERPL-SCNC: 31 MMOL/L (ref 22–29)
CREAT SERPL-MCNC: 0.71 MG/DL (ref 0.57–1)
DEPRECATED RDW RBC AUTO: 44.6 FL (ref 37–54)
EGFRCR SERPLBLD CKD-EPI 2021: 89.4 ML/MIN/1.73
ERYTHROCYTE [DISTWIDTH] IN BLOOD BY AUTOMATED COUNT: 12.5 % (ref 12.3–15.4)
GLUCOSE SERPL-MCNC: 98 MG/DL (ref 65–99)
HCT VFR BLD AUTO: 41.9 % (ref 34–46.6)
HGB BLD-MCNC: 13.8 G/DL (ref 12–15.9)
MCH RBC QN AUTO: 32 PG (ref 26.6–33)
MCHC RBC AUTO-ENTMCNC: 32.9 G/DL (ref 31.5–35.7)
MCV RBC AUTO: 97.2 FL (ref 79–97)
PLATELET # BLD AUTO: 143 10*3/MM3 (ref 140–450)
PMV BLD AUTO: 10.3 FL (ref 6–12)
POTASSIUM SERPL-SCNC: 3.7 MMOL/L (ref 3.5–5.2)
RBC # BLD AUTO: 4.31 10*6/MM3 (ref 3.77–5.28)
SODIUM SERPL-SCNC: 140 MMOL/L (ref 136–145)
WBC NRBC COR # BLD AUTO: 8.31 10*3/MM3 (ref 3.4–10.8)

## 2024-06-06 PROCEDURE — 94664 DEMO&/EVAL PT USE INHALER: CPT

## 2024-06-06 PROCEDURE — 25010000002 CEFTRIAXONE PER 250 MG: Performed by: PEDIATRICS

## 2024-06-06 PROCEDURE — 80048 BASIC METABOLIC PNL TOTAL CA: CPT | Performed by: PEDIATRICS

## 2024-06-06 PROCEDURE — 25010000002 ENOXAPARIN PER 10 MG: Performed by: PEDIATRICS

## 2024-06-06 PROCEDURE — 25010000002 MORPHINE PER 10 MG: Performed by: HOSPITALIST

## 2024-06-06 PROCEDURE — 94799 UNLISTED PULMONARY SVC/PX: CPT

## 2024-06-06 PROCEDURE — 94761 N-INVAS EAR/PLS OXIMETRY MLT: CPT

## 2024-06-06 PROCEDURE — 25010000002 KETOROLAC TROMETHAMINE PER 15 MG: Performed by: NURSE PRACTITIONER

## 2024-06-06 PROCEDURE — 63710000001 PREDNISONE PER 5 MG: Performed by: PEDIATRICS

## 2024-06-06 PROCEDURE — 99232 SBSQ HOSP IP/OBS MODERATE 35: CPT | Performed by: HOSPITALIST

## 2024-06-06 PROCEDURE — 85027 COMPLETE CBC AUTOMATED: CPT | Performed by: PEDIATRICS

## 2024-06-06 PROCEDURE — 25010000002 METRONIDAZOLE 500 MG/100ML SOLUTION: Performed by: PEDIATRICS

## 2024-06-06 RX ORDER — KETOROLAC TROMETHAMINE 15 MG/ML
15 INJECTION, SOLUTION INTRAMUSCULAR; INTRAVENOUS ONCE
Status: COMPLETED | OUTPATIENT
Start: 2024-06-06 | End: 2024-06-06

## 2024-06-06 RX ORDER — GUAIFENESIN 200 MG/10ML
200 LIQUID ORAL EVERY 4 HOURS PRN
Status: DISCONTINUED | OUTPATIENT
Start: 2024-06-06 | End: 2024-06-08 | Stop reason: HOSPADM

## 2024-06-06 RX ORDER — POLYETHYLENE GLYCOL 3350 17 G/17G
17 POWDER, FOR SOLUTION ORAL DAILY PRN
Status: DISCONTINUED | OUTPATIENT
Start: 2024-06-06 | End: 2024-06-08 | Stop reason: HOSPADM

## 2024-06-06 RX ORDER — HYDROCODONE BITARTRATE AND ACETAMINOPHEN 7.5; 325 MG/1; MG/1
1 TABLET ORAL EVERY 6 HOURS PRN
Status: DISCONTINUED | OUTPATIENT
Start: 2024-06-06 | End: 2024-06-06

## 2024-06-06 RX ORDER — BISACODYL 5 MG/1
5 TABLET, DELAYED RELEASE ORAL DAILY PRN
Status: DISCONTINUED | OUTPATIENT
Start: 2024-06-06 | End: 2024-06-08 | Stop reason: HOSPADM

## 2024-06-06 RX ORDER — BISACODYL 10 MG
10 SUPPOSITORY, RECTAL RECTAL DAILY PRN
Status: DISCONTINUED | OUTPATIENT
Start: 2024-06-06 | End: 2024-06-08 | Stop reason: HOSPADM

## 2024-06-06 RX ORDER — AMOXICILLIN 250 MG
2 CAPSULE ORAL 2 TIMES DAILY
Status: DISCONTINUED | OUTPATIENT
Start: 2024-06-06 | End: 2024-06-08 | Stop reason: HOSPADM

## 2024-06-06 RX ORDER — MORPHINE SULFATE 2 MG/ML
2 INJECTION, SOLUTION INTRAMUSCULAR; INTRAVENOUS EVERY 4 HOURS PRN
Status: COMPLETED | OUTPATIENT
Start: 2024-06-06 | End: 2024-06-06

## 2024-06-06 RX ORDER — HYDROCODONE BITARTRATE AND ACETAMINOPHEN 7.5; 325 MG/1; MG/1
1 TABLET ORAL EVERY 6 HOURS PRN
Status: DISCONTINUED | OUTPATIENT
Start: 2024-06-06 | End: 2024-06-08 | Stop reason: HOSPADM

## 2024-06-06 RX ADMIN — IPRATROPIUM BROMIDE AND ALBUTEROL SULFATE 3 ML: 2.5; .5 SOLUTION RESPIRATORY (INHALATION) at 07:11

## 2024-06-06 RX ADMIN — ROSUVASTATIN CALCIUM 40 MG: 20 TABLET, COATED ORAL at 21:19

## 2024-06-06 RX ADMIN — MORPHINE SULFATE 2 MG: 2 INJECTION, SOLUTION INTRAMUSCULAR; INTRAVENOUS at 14:33

## 2024-06-06 RX ADMIN — KETOROLAC TROMETHAMINE 15 MG: 15 INJECTION, SOLUTION INTRAMUSCULAR; INTRAVENOUS at 04:36

## 2024-06-06 RX ADMIN — IPRATROPIUM BROMIDE AND ALBUTEROL SULFATE 3 ML: 2.5; .5 SOLUTION RESPIRATORY (INHALATION) at 11:48

## 2024-06-06 RX ADMIN — FOLIC ACID 1 MG: 1 TABLET ORAL at 08:56

## 2024-06-06 RX ADMIN — BUDESONIDE AND FORMOTEROL FUMARATE DIHYDRATE 2 PUFF: 160; 4.5 AEROSOL RESPIRATORY (INHALATION) at 08:15

## 2024-06-06 RX ADMIN — SENNOSIDES AND DOCUSATE SODIUM 2 TABLET: 50; 8.6 TABLET ORAL at 21:20

## 2024-06-06 RX ADMIN — MORPHINE SULFATE 2 MG: 2 INJECTION, SOLUTION INTRAMUSCULAR; INTRAVENOUS at 09:33

## 2024-06-06 RX ADMIN — IPRATROPIUM BROMIDE AND ALBUTEROL SULFATE 3 ML: 2.5; .5 SOLUTION RESPIRATORY (INHALATION) at 15:47

## 2024-06-06 RX ADMIN — BUDESONIDE AND FORMOTEROL FUMARATE DIHYDRATE 2 PUFF: 160; 4.5 AEROSOL RESPIRATORY (INHALATION) at 19:45

## 2024-06-06 RX ADMIN — GUAIFENESIN 200 MG: 200 SOLUTION ORAL at 04:36

## 2024-06-06 RX ADMIN — METRONIDAZOLE 500 MG: 500 INJECTION, SOLUTION INTRAVENOUS at 05:30

## 2024-06-06 RX ADMIN — HYDROCODONE BITARTRATE AND ACETAMINOPHEN 1 TABLET: 7.5; 325 TABLET ORAL at 11:04

## 2024-06-06 RX ADMIN — Medication 10 ML: at 21:20

## 2024-06-06 RX ADMIN — METRONIDAZOLE 500 MG: 500 INJECTION, SOLUTION INTRAVENOUS at 21:19

## 2024-06-06 RX ADMIN — ENOXAPARIN SODIUM 40 MG: 100 INJECTION SUBCUTANEOUS at 08:56

## 2024-06-06 RX ADMIN — PANTOPRAZOLE SODIUM 40 MG: 40 TABLET, DELAYED RELEASE ORAL at 04:40

## 2024-06-06 RX ADMIN — SODIUM CHLORIDE 2000 MG: 900 INJECTION INTRAVENOUS at 13:27

## 2024-06-06 RX ADMIN — ACETAMINOPHEN 650 MG: 325 TABLET ORAL at 08:56

## 2024-06-06 RX ADMIN — Medication 1 PATCH: at 08:55

## 2024-06-06 RX ADMIN — ASPIRIN 325 MG: 325 TABLET ORAL at 08:56

## 2024-06-06 RX ADMIN — METRONIDAZOLE 500 MG: 500 INJECTION, SOLUTION INTRAVENOUS at 14:33

## 2024-06-06 RX ADMIN — Medication 10 ML: at 08:57

## 2024-06-06 RX ADMIN — PREDNISONE 2.5 MG: 5 TABLET ORAL at 08:56

## 2024-06-06 RX ADMIN — IPRATROPIUM BROMIDE AND ALBUTEROL SULFATE 3 ML: 2.5; .5 SOLUTION RESPIRATORY (INHALATION) at 19:45

## 2024-06-06 RX ADMIN — DONEPEZIL HYDROCHLORIDE 5 MG: 5 TABLET, FILM COATED ORAL at 21:20

## 2024-06-06 RX ADMIN — TIOTROPIUM BROMIDE INHALATION SPRAY 2 PUFF: 3.12 SPRAY, METERED RESPIRATORY (INHALATION) at 08:15

## 2024-06-06 NOTE — PROGRESS NOTES
"    [facility] Medicine Services  PROGRESS NOTE    Patient Name: Irma Pedraza  : 1950  MRN: [id number]    Date of Admission: 2024  Primary Care Physician: Eve Navarrete MD    Subjective   Subjective     CC:  F/U abdominal pain    HPI:  Seen this morning. No major breathing concerns at this time. Mostly complains of right sided abdominal pain, says she feels a \"hard knot.\"       Objective   Objective     Vital Signs:   Temp:  [97.9 °F (36.6 °C)-98.7 °F (37.1 °C)] 98.7 °F (37.1 °C)  Heart Rate:  [61-75] 67  Resp:  [17-20] 20  BP: (100-169)/() 169/90  Flow (L/min):  [3-5] 4     Physical Exam:  Gen-no acute distress  HENT-NCAT, mucous membranes moist  CV-RRR, S1 S2 normal, no m/r/g  Resp-diminished bilaterally, no wheezes or rales, nonlabored  Abd-soft, there is a firm and tender area in the right central abdomen consistent with hematoma, ND, +BS  Ext-no edema  Neuro-A&Ox3, no focal deficits  Skin-no rashes  Psych-appropriate mood      Results Reviewed:  LAB RESULTS:      Lab 24  0618 24  0502   WBC 8.31 7.32   HEMOGLOBIN 13.8 14.8   HEMATOCRIT 41.9 43.6   PLATELETS 143 142   NEUTROS ABS  --  4.76   IMMATURE GRANS (ABS)  --  0.12*   LYMPHS ABS  --  1.75   MONOS ABS  --  0.61   EOS ABS  --  0.07   MCV 97.2* 94.2   LACTATE  --  1.1   PROTIME  --  13.0         Lab 24  0618 24  0502   SODIUM 140 141   POTASSIUM 3.7 3.5   CHLORIDE 97* 101   CO2 31.0* 30.0*   ANION GAP 12.0 10.0   BUN 8 8   CREATININE 0.71 0.74   EGFR 89.4 85.0   GLUCOSE 98 106*   CALCIUM 9.0 9.4   MAGNESIUM  --  1.6         Lab 24  0502   TOTAL PROTEIN 6.1   ALBUMIN 4.3   GLOBULIN 1.8   ALT (SGPT) 24   AST (SGOT) 27   BILIRUBIN 0.5   ALK PHOS 61   LIPASE 36         Lab 24  0502   PROTIME 13.0   INR 0.97                 Brief Urine Lab Results  (Last result in the past 365 days)        Color   Clarity   Blood   Leuk Est   Nitrite   Protein   CREAT   Urine HCG        24 " 0654 Yellow   Clear   Negative   Small (1+)   Negative   Trace                   Microbiology Results Abnormal       Procedure Component Value - Date/Time    Blood Culture - Blood, Arm, Left [[id number]]  (Normal) Collected: 06/05/24 0544    Lab Status: Preliminary result Specimen: Blood from Arm, Left Updated: 06/06/24 0615     Blood Culture No growth at 24 hours            XR Chest 1 View    Result Date: 6/5/2024  XR CHEST 1 VW Date of Exam: 6/5/2024 5:32 AM EDT Indication: soa Comparison: 5/22/2024. Findings: There are no airspace consolidations. No pleural fluid. No pneumothorax. The pulmonary vasculature appears within normal limits. The cardiac and mediastinal silhouette appear unremarkable. No acute osseous abnormality identified.     Impression: Impression: No acute cardiopulmonary process. Electronically Signed: Jigna Olson MD  6/5/2024 5:42 AM EDT  Workstation ID: [id number]    CT Abdomen Pelvis Without Contrast    Result Date: 6/5/2024  CT ABDOMEN PELVIS WO CONTRAST Date of Exam: 6/5/2024 5:09 AM EDT Indication: right lower quadrant abdominal pain and tenderness.. Comparison: 10/20/2012. Technique: Axial CT images were obtained of the abdomen and pelvis without the administration of contrast. Reconstructed coronal and sagittal images were also obtained. Automated exposure control and iterative construction methods were used. Findings: Lung Bases:   The visualized lung bases and lower mediastinal structures demonstrate mild patchy atelectatic changes within the lung bases bilaterally. Mild infiltrates cannot be excluded.. Limited evaluation of the solid organs due to lack of intravenous contrast. Liver: Liver is normal in size and CT density. No focal lesions. Biliary/Gallbladder:  The gallbladder is normal without evidence of radiopaque stones. The biliary tree is nondilated. Spleen: Spleen is normal in size and CT density. Pancreas:  Pancreas is normal. There is no evidence of pancreatic mass or  peripancreatic fluid. Kidneys:  Kidneys are normal in size. There are no stones or hydronephrosis. Adrenals:  Adrenal glands are unremarkable. Retroperitoneal/Lymph Nodes/Vasculature:  No retroperitoneal adenopathy is identified. Gastrointestinal/Mesentery:  The bowel loops are non-dilated. There is diverticulosis of the sigmoid colon extending to the descending colon. There are focal areas of inflammatory change present within the distal descending colon (series 2 image 21) as well as the mid sigmoid colon with stranding present. A trace amount of free fluid is present. No evidence of perforation. Mild to moderate stool burden present. No additional inflammatory changes identified. The appendix appears within normal limits. No evidence of obstruction. No free air. No mesenteric fluid collections identified. A fluid collection which appears hyperdense is present within the right rectus abdominal musculature. This measures approximately 6.7 x 3.5 cm (series 2 image 50). Remaining musculature appears unremarkable. Subcutaneous tissues appear within normal limits. Bladder:  The bladder is normal. Genital:   Unremarkable       Bony Structures:   Visualized bony structures are consistent with the patient's age.     Impression: Impression: 1. Hematoma within the right rectus abdominal musculature as above, likely posttraumatic. Remaining musculature appears unremarkable. No acute osseous abnormality. 2. Significant diverticulosis of the left hemicolon with focal inflammatory changes present within the distal descending colon as well as the mid sigmoid colon consistent with acute uncomplicated diverticulitis. There is somewhat masslike appearance seen  within the descending colon within the area of inflammatory change and underlying mass cannot be excluded. Colonoscopic correlation is recommended. No additional inflammatory changes present. 3. Mild infiltrates versus mild atelectatic changes present within the bilateral  lower lobes. 4. Ancillary findings as described above. Electronically Signed: Jigna Olson MD  6/5/2024 5:16 AM EDT  Workstation ID: [id number]     Results for orders placed during the hospital encounter of 04/23/24    Adult Transthoracic Echo Complete W/ Cont if Necessary Per Protocol (With Agitated Saline)    Interpretation Summary    Left ventricular systolic function is normal.  Left ventricular ejection fraction appears to be 61 - 65%.    Left ventricular diastolic function is consistent with (grade I) impaired relaxation.    There is calcification of the mitral valve.    Saline test results are negative.      Current medications:  Scheduled Meds:[Held by provider] aspirin, 325 mg, Oral, Daily  budesonide-formoterol, 2 puff, Inhalation, BID - RT   And  tiotropium bromide monohydrate, 2 puff, Inhalation, Daily - RT  cefTRIAXone, 2,000 mg, Intravenous, Q24H  donepezil, 5 mg, Oral, Nightly  enoxaparin, 40 mg, Subcutaneous, Daily  folic acid, 1 mg, Oral, Daily  ipratropium-albuterol, 3 mL, Nebulization, 4x Daily - RT  metroNIDAZOLE, 500 mg, Intravenous, Q8H  nicotine, 1 patch, Transdermal, Q24H  pantoprazole, 40 mg, Oral, Q AM  predniSONE, 2.5 mg, Oral, Daily  rosuvastatin, 40 mg, Oral, Nightly  sodium chloride, 10 mL, Intravenous, Q12H      Continuous Infusions:   PRN Meds:.  acetaminophen    senna-docusate sodium **AND** polyethylene glycol **AND** bisacodyl **AND** bisacodyl    guaifenesin    HYDROcodone-acetaminophen    Magnesium Standard Dose Replacement - Follow Nurse / BPA Driven Protocol    Morphine    Phosphorus Replacement - Follow Nurse / BPA Driven Protocol    Potassium Replacement - Follow Nurse / BPA Driven Protocol    [COMPLETED] Insert Peripheral IV **AND** sodium chloride    sodium chloride    sodium chloride    Assessment & Plan   Assessment & Plan     Active Hospital Problems    Diagnosis  POA    **Diverticulitis large intestine w/o perforation or abscess w/bleeding [K57.33]  Yes    Rectus sheath  "hematoma [S30.1XXA]  Yes    COPD exacerbation [J44.1]  Yes    Rheumatoid arthritis, seropositive [M05.9]  Yes    HTN (hypertension) [I10]  Yes    GERD (gastroesophageal reflux disease) [K21.9]  Yes    Tobacco dependence syndrome [F17.200]  Yes    Polymyalgia rheumatica [M35.3]  Yes      Resolved Hospital Problems   No resolved problems to display.        Brief Hospital Course to date:  Irma Pedraza is a 74 y.o. female with a past medical history of rheumatoid arthritis, polymyalgia rheumatica, giant cell arteritis, and COPD who presents with a 2 day history of abdominal pain with associated nausea. Noted to be hypoxic in the ER as well. Admitted for further management of acute diverticulitis and COPD exacerbation.     This patient's problems and plans were partially entered by my partner and updated as appropriate by me 06/06/24. Copied text in this note has been reviewed and is accurate as of today's date.     Acute diverticulitis  --CT A/P: \"Significant diverticulosis of the left hemicolon with focal inflammatory changes present within the distal descending colon as well as the mid sigmoid colon consistent with acute uncomplicated diverticulitis. There is somewhat masslike appearance seen   within the descending colon within the area of inflammatory change and underlying mass cannot be excluded.\"   --Labs unremarkable, normal WBC and lactate  --Continue Rocephin and Flagyl  --Blood culture, 1 bottle with gram positive bacilli - contaminant?  --Pain control - add PRN IV morphine and oxycodone  --Will need outpatient colonoscopy to investigate abnormal CT scan findings to rule out any malignancy, discussed with patient    Right rectus abdominis hematoma  --CT A/P: \"A fluid collection which appears hyperdense is present within the right rectus abdominal musculature. This measures approximately 6.7 x 3.5 cm. Likely post-traumatic.\"  --Patient denies any recent injuries or falls, does take full dose ASA which we " will hold  --Pain control  --Monitor H&H closely, remains stable today     COPD exacerbation  Rhinovirus infection  Acute hypoxia  --Hypoxic to 84% in the ER  --CXR negative  --Respiratory PCR positive for Rhinovirus  --DuoNebs 4 times daily  --Will hold off on increasing her prednisone at this point due to acute infection  --Wean O2 as tolerated, currently on 3-4 liters and does not wear home oxygen  --Encourage incentive spirometry, ambulate    Constipation  --Patient reports chronic constipation, CT scan showing mild to moderate stool burden  --Add scheduled bowel regimen     RA  PMR  Giant cell arteritis  --Continue chronic low-dose prednisone. Patient also gets monthly infusions.     Tobacco abuse  --NRT  --Cessation education     ETOH abuse  --Monitor CIWA scores. Can add meds if having high scores.     GERD  --Continue PPI     HLD  --Continue statin    Expected Discharge Location and Transportation: home  Expected Discharge   Expected Discharge Date: 6/8/2024; Expected Discharge Time:      DVT prophylaxis:  Medical DVT prophylaxis orders are present.         AM-PAC 6 Clicks Score (PT): 22 (06/06/24 0820)    CODE STATUS:   Code Status and Medical Interventions:   Ordered at: 06/05/24 0959     Level Of Support Discussed With:    Patient     Code Status (Patient has no pulse and is not breathing):    CPR (Attempt to Resuscitate)     Medical Interventions (Patient has pulse or is breathing):    Full Support       Rosa Neri MD  06/06/24

## 2024-06-06 NOTE — PLAN OF CARE
Goal Outcome Evaluation:  Plan of Care Reviewed With: patient, spouse        Progress: improving       Problem: Adult Inpatient Plan of Care  Goal: Plan of Care Review  Outcome: Ongoing, Progressing  Flowsheets (Taken 6/5/2024 2154)  Progress: improving  Plan of Care Reviewed With:   patient   spouse  Goal: Patient-Specific Goal (Individualized)  Outcome: Ongoing, Progressing  Goal: Absence of Hospital-Acquired Illness or Injury  Outcome: Ongoing, Progressing  Goal: Optimal Comfort and Wellbeing  Outcome: Ongoing, Progressing  Goal: Readiness for Transition of Care  Outcome: Ongoing, Progressing  Intervention: Mutually Develop Transition Plan  Recent Flowsheet Documentation  Taken 6/5/2024 2142 by Veronica Quinones, RN  Transportation Anticipated: family or friend will provide  Patient/Family Anticipated Services at Transition:   Patient/Family Anticipates Transition to: home with family  Taken 6/5/2024 2140 by Veronica Quinones, RN  Equipment Currently Used at Home: none     Problem: COPD (Chronic Obstructive Pulmonary Disease) Comorbidity  Goal: Maintenance of COPD Symptom Control  Outcome: Ongoing, Progressing

## 2024-06-06 NOTE — PLAN OF CARE
Problem: Adult Inpatient Plan of Care  Goal: Plan of Care Review  Outcome: Ongoing, Progressing  Goal: Patient-Specific Goal (Individualized)  Outcome: Ongoing, Progressing  Goal: Absence of Hospital-Acquired Illness or Injury  Outcome: Ongoing, Progressing  Intervention: Identify and Manage Fall Risk  Description: Perform standard risk assessment on admission using a validated tool or comprehensive approach appropriate to the patient; reassess fall risk frequently, with change in status or transfer to another level of care.  Communicate fall injury risk to interprofessional healthcare team.  Determine need for increased observation, equipment and environmental modification, such as low bed, signage and supportive, nonskid footwear.  Adjust safety measures to individual developmental age, stage and identified risk factors.  Reinforce the importance of safety and physical activity with patient and family.  Perform regular intentional rounding to assess need for position change, pain assessment and personal needs, including assistance with toileting.  Recent Flowsheet Documentation  Taken 6/6/2024 0400 by Mary Reid, RN  Safety Promotion/Fall Prevention:   activity supervised   assistive device/personal items within reach   clutter free environment maintained   fall prevention program maintained   lighting adjusted   mobility aid in reach   nonskid shoes/slippers when out of bed   room organization consistent   safety round/check completed  Taken 6/6/2024 0200 by Mary Reid, RN  Safety Promotion/Fall Prevention:   activity supervised   assistive device/personal items within reach   clutter free environment maintained   fall prevention program maintained   lighting adjusted   mobility aid in reach   nonskid shoes/slippers when out of bed   room organization consistent   safety round/check completed  Taken 6/6/2024 0000 by Mary Reid, RN  Safety Promotion/Fall Prevention:   activity supervised   assistive  device/personal items within reach   clutter free environment maintained   fall prevention program maintained   lighting adjusted   mobility aid in reach   nonskid shoes/slippers when out of bed   room organization consistent   safety round/check completed  Taken 6/5/2024 2200 by Mary Reid RN  Safety Promotion/Fall Prevention:   activity supervised   assistive device/personal items within reach   clutter free environment maintained   fall prevention program maintained   lighting adjusted   mobility aid in reach   nonskid shoes/slippers when out of bed   room organization consistent   safety round/check completed  Intervention: Prevent Skin Injury  Description: Perform a screening for skin injury risk, such as pressure or moisture associated skin damage on admission and at regular intervals throughout hospital stay.  Keep all areas of skin (especially folds) clean and dry.  Maintain adequate skin hydration.  Relieve and redistribute pressure and protect bony prominences; implement measures based on patient-specific risk factors.  Match turning and repositioning schedule to clinical condition.  Encourage weight shift frequently; assist with reposition if unable to complete independently.  Float heels off bed; avoid pressure on the Achilles tendon.  Keep skin free from extended contact with medical devices.  Encourage functional activity and mobility, as early as tolerated.  Use aids (e.g., slide boards, mechanical lift) during transfer.  Recent Flowsheet Documentation  Taken 6/6/2024 0400 by Mary Reid RN  Body Position: position changed independently  Taken 6/6/2024 0200 by Mary Reid RN  Body Position: position changed independently  Taken 6/6/2024 0000 by Mary Reid RN  Body Position: position changed independently  Taken 6/5/2024 2200 by aMry Reid RN  Body Position: position changed independently  Skin Protection:   adhesive use limited   tubing/devices free from skin contact    transparent dressing maintained   skin-to-device areas padded   skin-to-skin areas padded   skin sealant/moisture barrier applied  Intervention: Prevent and Manage VTE (Venous Thromboembolism) Risk  Description: Assess for VTE (venous thromboembolism) risk.  Encourage and assist with early ambulation.  Initiate and maintain compression or other therapy, as indicated, based on identified risk in accordance with organizational protocol and provider order.  Encourage both active and passive leg exercises while in bed, if unable to ambulate.  Recent Flowsheet Documentation  Taken 6/6/2024 0400 by Mary Reid RN  Activity Management: activity encouraged  Taken 6/6/2024 0200 by Mary Reid RN  Activity Management: activity encouraged  Taken 6/6/2024 0000 by Mary Reid RN  Activity Management: activity encouraged  Taken 6/5/2024 2200 by Mary Reid RN  Activity Management: activity encouraged  Intervention: Prevent Infection  Description: Maintain skin and mucous membrane integrity; promote hand, oral and pulmonary hygiene.  Optimize fluid balance, nutrition, sleep and glycemic control to maximize infection resistance.  Identify potential sources of infection early to prevent or mitigate progression of infection (e.g., wound, lines, devices).  Evaluate ongoing need for invasive devices; remove promptly when no longer indicated.  Recent Flowsheet Documentation  Taken 6/5/2024 2200 by Mary Reid, RN  Infection Prevention:   environmental surveillance performed   equipment surfaces disinfected   hand hygiene promoted   personal protective equipment utilized   rest/sleep promoted  Goal: Optimal Comfort and Wellbeing  Outcome: Ongoing, Progressing  Intervention: Monitor Pain and Promote Comfort  Description: Assess pain level, treatment efficacy and patient response at regular intervals using a consistent pain scale.  Consider the presence and impact of preexisting chronic pain.  Encourage patient and  caregiver involvement in pain assessment, interventions and safety measures.  Recent Flowsheet Documentation  Taken 6/5/2024 2200 by Mary Reid RN  Pain Management Interventions: no interventions per patient request  Goal: Readiness for Transition of Care  Outcome: Ongoing, Progressing     Problem: COPD (Chronic Obstructive Pulmonary Disease) Comorbidity  Goal: Maintenance of COPD Symptom Control  Outcome: Ongoing, Progressing  Intervention: Maintain COPD-Symptom Control  Description: Evaluate adherence to management plan (e.g., medication, trigger avoidance, infection prevention, self-monitoring).  Advocate for continuation of home regimen, including medication, method of delivery, schedule and symptom monitoring.  Anticipate the need for breathing techniques and activity pacing to minimize fatigue and breathlessness.  Assess for proper use of inhaled medication and delivery technique; assist or reinstruct if needed.  Evaluate effectiveness of coping skills; encourage expression of feelings, expectations and concerns related to disease management and quality of life; reinforce education to enhance management plan and wellbeing.  Recent Flowsheet Documentation  Taken 6/6/2024 0400 by Mary Reid RN  Medication Review/Management: medications reviewed  Taken 6/6/2024 0200 by Mary Reid RN  Medication Review/Management: medications reviewed  Taken 6/6/2024 0000 by Mary Reid RN  Medication Review/Management: medications reviewed  Taken 6/5/2024 2200 by Mary Reid RN  Medication Review/Management: medications reviewed     Problem: Hypertension Comorbidity  Goal: Blood Pressure in Desired Range  Outcome: Ongoing, Progressing  Intervention: Maintain Blood Pressure Management  Description: Evaluate adherence to home antihypertensive regimen (e.g., exercise and activity, diet modification, medication).  Provide scheduled antihypertensive medication; consider administration time and effects (e.g.,  avoid giving diuretic prior to bedtime).  Monitor response to antihypertensive medication therapy (e.g., blood pressure, electrolyte levels, medication effects).  Minimize risk of orthostatic hypotension; encourage caution with position changes, particularly if elderly.  Recent Flowsheet Documentation  Taken 6/6/2024 0400 by Mary Reid RN  Medication Review/Management: medications reviewed  Taken 6/6/2024 0200 by Mary Reid RN  Medication Review/Management: medications reviewed  Taken 6/6/2024 0000 by Mary Reid RN  Medication Review/Management: medications reviewed  Taken 6/5/2024 2200 by Mary eRid RN  Medication Review/Management: medications reviewed     Problem: Fall Injury Risk  Goal: Absence of Fall and Fall-Related Injury  Outcome: Ongoing, Progressing  Intervention: Identify and Manage Contributors  Description: Develop a fall prevention plan with the patient and caregiver/family.  Provide reorientation, appropriate sensory stimulation and routines with changes in mental status to decrease risk of fall.  Promote use of personal vision and auditory aids.  Assess assistance level required for safe and effective self-care; provide support as needed, such as toileting, mobilization. For age 65 and older, implement timed toileting with assistance.  Encourage physical activity, such as performance of mobility and self-care at highest level of patient ability, multicomponent exercise program and provision of appropriate assistive devices.  If fall occurs, assess the severity of injury; implement fall injury protocol. Determine the cause and revise fall injury prevention plan.  Regularly review medication contribution to fall risk; adjust medication administration times to minimize risk of falling.  Consider risk related to polypharmacy and age.  Balance adequate pain management with potential for oversedation.  Recent Flowsheet Documentation  Taken 6/6/2024 0400 by Mary Reid  RN  Medication Review/Management: medications reviewed  Taken 6/6/2024 0200 by Mary Reid RN  Medication Review/Management: medications reviewed  Taken 6/6/2024 0000 by Mary Reid RN  Medication Review/Management: medications reviewed  Taken 6/5/2024 2200 by Mary Reid RN  Medication Review/Management: medications reviewed  Intervention: Promote Injury-Free Environment  Description: Provide a safe, barrier-free environment that encourages independent activity.  Keep care area uncluttered and well-lighted.  Determine need for increased observation or monitoring.  Avoid use of devices that minimize mobility, such as restraints or indwelling urinary catheter.  Recent Flowsheet Documentation  Taken 6/6/2024 0400 by Mary Reid RN  Safety Promotion/Fall Prevention:   activity supervised   assistive device/personal items within reach   clutter free environment maintained   fall prevention program maintained   lighting adjusted   mobility aid in reach   nonskid shoes/slippers when out of bed   room organization consistent   safety round/check completed  Taken 6/6/2024 0200 by Mary Reid RN  Safety Promotion/Fall Prevention:   activity supervised   assistive device/personal items within reach   clutter free environment maintained   fall prevention program maintained   lighting adjusted   mobility aid in reach   nonskid shoes/slippers when out of bed   room organization consistent   safety round/check completed  Taken 6/6/2024 0000 by Mary Reid RN  Safety Promotion/Fall Prevention:   activity supervised   assistive device/personal items within reach   clutter free environment maintained   fall prevention program maintained   lighting adjusted   mobility aid in reach   nonskid shoes/slippers when out of bed   room organization consistent   safety round/check completed  Taken 6/5/2024 2200 by Mary Reid RN  Safety Promotion/Fall Prevention:   activity supervised   assistive device/personal  items within reach   clutter free environment maintained   fall prevention program maintained   lighting adjusted   mobility aid in reach   nonskid shoes/slippers when out of bed   room organization consistent   safety round/check completed   Goal Outcome Evaluation:

## 2024-06-07 LAB
ANION GAP SERPL CALCULATED.3IONS-SCNC: 6 MMOL/L (ref 5–15)
BACTERIA SPEC AEROBE CULT: ABNORMAL
BUN SERPL-MCNC: 9 MG/DL (ref 8–23)
BUN/CREAT SERPL: 13.8 (ref 7–25)
CALCIUM SPEC-SCNC: 8.7 MG/DL (ref 8.6–10.5)
CHLORIDE SERPL-SCNC: 100 MMOL/L (ref 98–107)
CO2 SERPL-SCNC: 34 MMOL/L (ref 22–29)
CREAT SERPL-MCNC: 0.65 MG/DL (ref 0.57–1)
DEPRECATED RDW RBC AUTO: 44.5 FL (ref 37–54)
EGFRCR SERPLBLD CKD-EPI 2021: 92.5 ML/MIN/1.73
ERYTHROCYTE [DISTWIDTH] IN BLOOD BY AUTOMATED COUNT: 12.6 % (ref 12.3–15.4)
GLUCOSE SERPL-MCNC: 106 MG/DL (ref 65–99)
GRAM STN SPEC: ABNORMAL
HCT VFR BLD AUTO: 33.8 % (ref 34–46.6)
HGB BLD-MCNC: 11.1 G/DL (ref 12–15.9)
ISOLATED FROM: ABNORMAL
MCH RBC QN AUTO: 32 PG (ref 26.6–33)
MCHC RBC AUTO-ENTMCNC: 32.8 G/DL (ref 31.5–35.7)
MCV RBC AUTO: 97.4 FL (ref 79–97)
PLATELET # BLD AUTO: 133 10*3/MM3 (ref 140–450)
PMV BLD AUTO: 10.2 FL (ref 6–12)
POTASSIUM SERPL-SCNC: 3.7 MMOL/L (ref 3.5–5.2)
RBC # BLD AUTO: 3.47 10*6/MM3 (ref 3.77–5.28)
SODIUM SERPL-SCNC: 140 MMOL/L (ref 136–145)
WBC NRBC COR # BLD AUTO: 5.1 10*3/MM3 (ref 3.4–10.8)

## 2024-06-07 PROCEDURE — 25010000002 ENOXAPARIN PER 10 MG: Performed by: PEDIATRICS

## 2024-06-07 PROCEDURE — 25010000002 ONDANSETRON PER 1 MG: Performed by: PHYSICIAN ASSISTANT

## 2024-06-07 PROCEDURE — 94664 DEMO&/EVAL PT USE INHALER: CPT

## 2024-06-07 PROCEDURE — 25010000002 CEFTRIAXONE PER 250 MG: Performed by: PEDIATRICS

## 2024-06-07 PROCEDURE — 94761 N-INVAS EAR/PLS OXIMETRY MLT: CPT

## 2024-06-07 PROCEDURE — 97161 PT EVAL LOW COMPLEX 20 MIN: CPT

## 2024-06-07 PROCEDURE — 94799 UNLISTED PULMONARY SVC/PX: CPT

## 2024-06-07 PROCEDURE — 63710000001 PREDNISONE PER 5 MG: Performed by: PEDIATRICS

## 2024-06-07 PROCEDURE — 80048 BASIC METABOLIC PNL TOTAL CA: CPT | Performed by: HOSPITALIST

## 2024-06-07 PROCEDURE — 99232 SBSQ HOSP IP/OBS MODERATE 35: CPT | Performed by: HOSPITALIST

## 2024-06-07 PROCEDURE — 85027 COMPLETE CBC AUTOMATED: CPT | Performed by: HOSPITALIST

## 2024-06-07 PROCEDURE — 25010000002 METRONIDAZOLE 500 MG/100ML SOLUTION: Performed by: PEDIATRICS

## 2024-06-07 RX ORDER — ONDANSETRON 2 MG/ML
4 INJECTION INTRAMUSCULAR; INTRAVENOUS EVERY 6 HOURS PRN
Status: DISCONTINUED | OUTPATIENT
Start: 2024-06-07 | End: 2024-06-08 | Stop reason: HOSPADM

## 2024-06-07 RX ADMIN — IPRATROPIUM BROMIDE AND ALBUTEROL SULFATE 3 ML: 2.5; .5 SOLUTION RESPIRATORY (INHALATION) at 16:21

## 2024-06-07 RX ADMIN — PREDNISONE 2.5 MG: 5 TABLET ORAL at 09:34

## 2024-06-07 RX ADMIN — SENNOSIDES AND DOCUSATE SODIUM 2 TABLET: 50; 8.6 TABLET ORAL at 09:34

## 2024-06-07 RX ADMIN — TIOTROPIUM BROMIDE INHALATION SPRAY 2 PUFF: 3.12 SPRAY, METERED RESPIRATORY (INHALATION) at 08:10

## 2024-06-07 RX ADMIN — Medication 1 PATCH: at 09:34

## 2024-06-07 RX ADMIN — ENOXAPARIN SODIUM 40 MG: 100 INJECTION SUBCUTANEOUS at 09:34

## 2024-06-07 RX ADMIN — BUDESONIDE AND FORMOTEROL FUMARATE DIHYDRATE 2 PUFF: 160; 4.5 AEROSOL RESPIRATORY (INHALATION) at 08:09

## 2024-06-07 RX ADMIN — HYDROCODONE BITARTRATE AND ACETAMINOPHEN 1 TABLET: 7.5; 325 TABLET ORAL at 13:51

## 2024-06-07 RX ADMIN — ROSUVASTATIN CALCIUM 40 MG: 20 TABLET, COATED ORAL at 21:23

## 2024-06-07 RX ADMIN — ONDANSETRON 4 MG: 2 INJECTION INTRAMUSCULAR; INTRAVENOUS at 21:43

## 2024-06-07 RX ADMIN — DONEPEZIL HYDROCHLORIDE 5 MG: 5 TABLET, FILM COATED ORAL at 21:23

## 2024-06-07 RX ADMIN — HYDROCODONE BITARTRATE AND ACETAMINOPHEN 1 TABLET: 7.5; 325 TABLET ORAL at 21:23

## 2024-06-07 RX ADMIN — GUAIFENESIN 200 MG: 200 SOLUTION ORAL at 21:23

## 2024-06-07 RX ADMIN — IPRATROPIUM BROMIDE AND ALBUTEROL SULFATE 3 ML: 2.5; .5 SOLUTION RESPIRATORY (INHALATION) at 11:47

## 2024-06-07 RX ADMIN — BUDESONIDE AND FORMOTEROL FUMARATE DIHYDRATE 2 PUFF: 160; 4.5 AEROSOL RESPIRATORY (INHALATION) at 19:12

## 2024-06-07 RX ADMIN — GUAIFENESIN 200 MG: 200 SOLUTION ORAL at 09:34

## 2024-06-07 RX ADMIN — Medication 10 ML: at 09:34

## 2024-06-07 RX ADMIN — GUAIFENESIN 200 MG: 200 SOLUTION ORAL at 13:42

## 2024-06-07 RX ADMIN — FOLIC ACID 1 MG: 1 TABLET ORAL at 09:34

## 2024-06-07 RX ADMIN — PANTOPRAZOLE SODIUM 40 MG: 40 TABLET, DELAYED RELEASE ORAL at 05:34

## 2024-06-07 RX ADMIN — IPRATROPIUM BROMIDE AND ALBUTEROL SULFATE 3 ML: 2.5; .5 SOLUTION RESPIRATORY (INHALATION) at 08:09

## 2024-06-07 RX ADMIN — METRONIDAZOLE 500 MG: 500 INJECTION, SOLUTION INTRAVENOUS at 21:25

## 2024-06-07 RX ADMIN — METRONIDAZOLE 500 MG: 500 INJECTION, SOLUTION INTRAVENOUS at 14:37

## 2024-06-07 RX ADMIN — Medication 10 ML: at 21:25

## 2024-06-07 RX ADMIN — IPRATROPIUM BROMIDE AND ALBUTEROL SULFATE 3 ML: 2.5; .5 SOLUTION RESPIRATORY (INHALATION) at 19:12

## 2024-06-07 RX ADMIN — METRONIDAZOLE 500 MG: 500 INJECTION, SOLUTION INTRAVENOUS at 05:34

## 2024-06-07 RX ADMIN — SODIUM CHLORIDE 2000 MG: 900 INJECTION INTRAVENOUS at 13:42

## 2024-06-07 NOTE — PLAN OF CARE
Goal Outcome Evaluation:  Plan of Care Reviewed With: patient        Progress: no change  Outcome Evaluation: Pt presents with decreased endurance with activity and requires supplemental O2 at this time. Pt ambulated 975ft independently. Pt demo mild SOA but able to tolerate ambulating on 4L O2 NC. Despite requiring supplemental O2, pt demo independence with all other mobility, will sign off. Recommend D/C home when medically appropriate.      Anticipated Discharge Disposition (PT): home

## 2024-06-07 NOTE — THERAPY DISCHARGE NOTE
Patient Name: Irma Pedraza  : 1950    MRN: [id number]                              Today's Date: 2024       Admit Date: 2024    Visit Dx:     ICD-10-CM ICD-9-CM   1. Right lower quadrant abdominal pain  R10.31 789.03   2. Diverticulitis  K57.92 562.11   3. Nontraumatic rectus hematoma  M79.81 729.92   4. Acute respiratory failure with hypoxia  J96.01 518.81   5. Tobacco use disorder  F17.200 305.1   6. Shortness of breath  R06.02 786.05   7. COPD with acute exacerbation  J44.1 491.21   8. Nausea  R11.0 787.02     Patient Active Problem List   Diagnosis    Cervicalgia    BRYAN    Mixed hyperlipidemia    Mitral valve disorder    Tinnitus    Vitamin D deficiency    Senile osteoporosis    Polymyalgia rheumatica    Primary insomnia    Chronic pain of both shoulders    Chronic upper back pain    Drug-induced hypokalemia    HTN (hypertension)    Benign paroxysmal positional vertigo    Chronic low back pain    Emphysema    GERD (gastroesophageal reflux disease)    Muscle spasms of both lower extremities    Temporal arteritis    Tobacco dependence syndrome    Nonalcoholic fatty liver disease    Spasm of thoracic back muscle    Annual physical exam    Medicare annual wellness visit, subsequent    Rheumatoid arthritis, seropositive    Lichen sclerosus of female genitalia    Lichen sclerosus    Sprain of interphalangeal joint of right thumb    Alas's esophagus without dysplasia    Overweight with body mass index (BMI) of 26 to 26.9 in adult    Mild memory disturbance    High plasma homocystine    Poor appetite    Acute CVA (cerebrovascular accident)    Bilateral carotid artery stenosis    Weakness of right upper extremity    Numbness and tingling in right hand    COPD exacerbation    Diverticulitis large intestine w/o perforation or abscess w/bleeding    Rectus sheath hematoma     Past Medical History:   Diagnosis Date    Adenomyomatosis of gallbladder 2009    by ultrasound, Dr. Yomi Tai 2009    Arthritis      Arthritis of lumbosacral spine     Asthma     Cataract     Remove 7-20 and 7-25 2023    Chronic hoarseness     Class 1 obesity due to excess calories with serious comorbidity and body mass index (BMI) of 30.0 to 30.9 in adult 07/16/2020 2/1/2021 Eve Navarrete MD      Closed displaced fracture of shaft of right clavicle with malunion     Diverticulitis 02/28/2019    Diverticulosis     Years ago, mot sure of date    Dyslipidemia 04/23/2024    GCA (giant cell arteritis)     GERD (gastroesophageal reflux disease)     HL (hearing loss) 2022    Hyperlipidemia Unk    Low back pain ?    Medicare annual wellness visit, subsequent 02/01/2021    Lloyd's neuroma of left foot     surgery    Ocular histoplasmosis     BCC 2/6/2017    Panic disorder 02/28/2019    PMR (polymyalgia rheumatica)      Past Surgical History:   Procedure Laterality Date    COLONOSCOPY  02/2009    COSMETIC SURGERY  1994    face lift    COSMETIC SURGERY  1993    TUMMY TUCK    COSMETIC SURGERY  1980    Birth kenny removal    ELBOW PROCEDURE Left     x2    EYE SURGERY  7/20 and 7/25/2023    Removed cataracts and incerted lenses in bot eyes    THROAT SURGERY      remote removal of growth from throat    TONSILLECTOMY  1964      General Information       Row Name 06/07/24 1002          Physical Therapy Time and Intention    Document Type discharge evaluation/summary  -AE     Mode of Treatment physical therapy  -AE       Row Name 06/07/24 1002          General Information    Patient Profile Reviewed yes  -AE     Prior Level of Function independent:;all household mobility;gait;transfer;community mobility;bed mobility;ADL's;bathing;dressing  -AE     Existing Precautions/Restrictions oxygen therapy device and L/min  -AE     Barriers to Rehab none identified  -AE       Row Name 06/07/24 1002          Living Environment    People in Home spouse  -AE       Row Name 06/07/24 1002          Home Main Entrance    Number of Stairs, Main Entrance none  -AE      Stair Railings, Main Entrance none  -AE       Row Name 06/07/24 1002          Stairs Within Home, Primary    Stairs, Within Home, Primary Flight to second level  -AE     Number of Stairs, Within Home, Primary twelve  -AE     Stair Railings, Within Home, Primary railings safe and in good condition  -AE       Row Name 06/07/24 1002          Cognition    Orientation Status (Cognition) oriented x 4  -AE       Row Name 06/07/24 1002          Safety Issues, Functional Mobility    Safety Issues Affecting Function (Mobility) safety precaution awareness  -AE     Impairments Affecting Function (Mobility) endurance/activity tolerance;shortness of breath;pain  -AE               User Key  (r) = Recorded By, (t) = Taken By, (c) = Cosigned By      Initials Name Provider Type    AE Josue Gordillo, PT Physical Therapist                   Mobility       Row Name 06/07/24 1006          Bed Mobility    Bed Mobility supine-sit  -AE     Supine-Sit [place] (Bed Mobility) supervision  -AE     Assistive Device (Bed Mobility) head of bed elevated  -AE     Comment, (Bed Mobility) No cues needed for bed mobility.  -AE       Row Name 06/07/24 1006          Transfers    Comment, (Transfers) No cues needed for STS, no overt LOB.  -AE       Row Name 06/07/24 1006          Sit-Stand Transfer    Sit-Stand [place] (Transfers) independent  -AE       Row Name 06/07/24 1006          Gait/Stairs (Locomotion)    [place] Level (Gait) independent  -AE     Distance in Feet (Gait) 975  -AE     Deviations/Abnormal Patterns (Gait) base of support, narrow  -AE     Comment, (Gait/Stairs) Pt demo good sequencing and balance while ambulating this session. No significant SOA noted with mobility, trialed ambulation on 4L O2 NC, SaO2 dropped to 87% but quickly recovered to 92%. Further distance limited by R leg soreness.  -AE               User Key  (r) = Recorded By, (t) = Taken By, (c) = Cosigned By      Initials Name Provider Type    ABDON Gordillo  Josue, PT Physical Therapist                   Obj/Interventions       [facility] Name 06/07/24 1012          Range of Motion Comprehensive    General Range of Motion bilateral lower extremity ROM WFL  -AE       Row Name 06/07/24 1012          Strength Comprehensive (MMT)    General Manual Muscle Testing (MMT) Assessment no strength deficits identified  -AE       Row Name 06/07/24 1012          Balance    Balance Assessment sitting static balance;sitting dynamic balance;sit to stand dynamic balance;standing static balance;standing dynamic balance  -AE     Static Sitting Balance independent  -AE     Dynamic Sitting Balance independent  -AE     Position, Sitting Balance unsupported  -AE     Sit to Stand Dynamic Balance supervision  -AE     Static Standing Balance independent  -AE     Dynamic Standing Balance independent  -AE     Position/Device Used, Standing Balance unsupported  -AE       Row Name 06/07/24 1012          Sensory Assessment (Somatosensory)    Sensory Assessment (Somatosensory) LE sensation intact  -AE               User Key  (r) = Recorded By, (t) = Taken By, (c) = Cosigned By      Initials Name Provider Type    AE Josue Gordillo, PT Physical Therapist                   Goals/Plan    No documentation.                  Clinical Impression       Row Name 06/07/24 1014          Pain    Pain Intervention(s) Repositioned;Ambulation/increased activity  -AE     Additional Documentation Pain Scale: FACES Pre/Post-Treatment (Group)  -AE       Row Name 06/07/24 1014          Pain Scale: FACES Pre/Post-Treatment    Pain: FACES Scale, Pretreatment 0-->no hurt  -AE     Posttreatment Pain Rating 2-->hurts little bit  -AE     Pain Location - Side/Orientation Right  -AE     Pain Location lower  -AE     Pain Location - extremity  -AE     Pre/Posttreatment Pain Comment tolerated  -AE       Row Name 06/07/24 1014          Plan of Care Review    Plan of Care Reviewed With patient  -AE     Progress no change  -AE     Outcome  Evaluation Pt presents with decreased endurance with activity and requires supplemental O2 at this time. Pt ambulated 975ft independently. Pt demo mild SOA but able to tolerate ambulating on 4L O2 NC. Despite requiring supplemental O2, pt demo independence with all other mobility, will sign off. Recommend D/C home when medically appropriate.  -AE       Row Name 06/07/24 1014          Therapy Assessment/Plan (PT)    Patient/Family Therapy Goals Statement (PT) Home  -AE     Criteria for Skilled Interventions Met (PT) no;no problems identified which require skilled intervention  -AE     Therapy Frequency (PT) evaluation only  -AE     Predicted Duration of Therapy Intervention (PT) Evaluation discharge  -AE       Row Name 06/07/24 1014          Vital Signs    Pre Systolic BP Rehab 112  -AE     Pre Treatment Diastolic BP 51  -AE     Pretreatment Heart Rate (beats/min) 66  -AE     Intratreatment Heart Rate (beats/min) 91  -AE     Posttreatment Heart Rate (beats/min) 65  -AE     Pre SpO2 (%) 90  -AE     O2 Delivery Pre Treatment nasal cannula  -AE     Intra SpO2 (%) 87  -AE     O2 Delivery Intra Treatment nasal cannula  -AE     Post SpO2 (%) 94  -AE     O2 Delivery Post Treatment nasal cannula  -AE     Pre Patient Position Supine  -AE     Intra Patient Position Standing  -AE     Post Patient Position Sitting  -AE       Row Name 06/07/24 1014          Positioning and Restraints    Pre-Treatment Position in bed  -AE     Post Treatment Position bed  -AE     In Bed sitting EOB;call light within reach;encouraged to call for assist;with nsg  -AE               User Key  (r) = Recorded By, (t) = Taken By, (c) = Cosigned By      Initials Name Provider Type    AE Josue Gordillo, PT Physical Therapist                   Outcome Measures       Row Name 06/07/24 1018 06/07/24 0855       How much help from another person do you currently need...    Turning from your back to your side while in flat bed without using bedrails? 4  -AE 4   -AR    Moving from lying on back to sitting on the side of a flat bed without bedrails? 4  -AE 4  -AR    Moving to and from a bed to a chair (including a wheelchair)? 4  -AE 4  -AR    Standing up from a chair using your arms (e.g., wheelchair, bedside chair)? 4  -AE 4  -AR    Climbing 3-5 steps with a railing? 4  -AE 3  -AR    To walk in hospital room? 4  -AE 4  -AR    AM-PAC 6 Clicks Score (PT) 24  -AE 23  -AR    Highest Level of Mobility Goal 8 --> Walked 250 feet or more  -AE 7 --> Walk 25 feet or more  -AR      Row Name 06/07/24 1018          Functional Assessment    Outcome Measure Options AM-PAC 6 Clicks Basic Mobility (PT)  -AE               User Key  (r) = Recorded By, (t) = Taken By, (c) = Cosigned By      Initials Name Provider Type    AR Joseline Mireles, RN Registered Nurse    Josue France PT Physical Therapist                  Physical Therapy Education       Title: PT OT SLP Therapies (In Progress)       Topic: Physical Therapy (In Progress)       Point: Mobility training (Done)       Learning Progress Summary             Patient Acceptance, E, VU by AE at 6/7/2024 0913                         Point: Home exercise program (Not Started)       Learner Progress:  Not documented in this visit.              Point: Body mechanics (Done)       Learning Progress Summary             Patient Acceptance, E, VU by AE at 6/7/2024 0913                         Point: Precautions (Done)       Learning Progress Summary             Patient Acceptance, E, VU by AE at 6/7/2024 0913                                         User Key       Initials Effective Dates Name Provider Type Discipline    AE 09/21/21 -  Josue Gordillo PT Physical Therapist PT                  PT Recommendation and Plan     Plan of Care Reviewed With: patient  Progress: no change  Outcome Evaluation: Pt presents with decreased endurance with activity and requires supplemental O2 at this time. Pt ambulated 975ft independently. Pt demo mild  SOA but able to tolerate ambulating on 4L O2 NC. Despite requiring supplemental O2, pt demo independence with all other mobility, will sign off. Recommend D/C home when medically appropriate.     Time Calculation:   PT Evaluation Complexity  History, PT Evaluation Complexity: 1-2 personal factors and/or comorbidities  Examination of Body Systems (PT Eval Complexity): 1-2 elements  Clinical Presentation (PT Evaluation Complexity): stable  Clinical Decision Making (PT Evaluation Complexity): low complexity  Overall Complexity (PT Evaluation Complexity): low complexity     PT Charges       Row Name 06/07/24 1019             Time Calculation    Start Time 0913  -AE      PT Received On 06/07/24  -AE         Untimed Charges    PT Eval/Re-eval Minutes 47  -AE         Total Minutes    Untimed Charges Total Minutes 47  -AE       Total Minutes 47  -AE                User Key  (r) = Recorded By, (t) = Taken By, (c) = Cosigned By      Initials Name Provider Type    AE Josue Gordillo, PT Physical Therapist                  Therapy Charges for Today       Code Description Service Date Service Provider Modifiers Qty    [id number]  PT EVAL LOW COMPLEXITY 4 6/7/2024 Josue Gordillo, PT GP 1            PT G-Codes  Outcome Measure Options: AM-PAC 6 Clicks Basic Mobility (PT)  AM-PAC 6 Clicks Score (PT): 24    PT Discharge Summary  Anticipated Discharge Disposition (PT): home    Josue Gordillo PT  6/7/2024

## 2024-06-07 NOTE — CASE MANAGEMENT/SOCIAL WORK
Continued Stay Note  [facility]     Patient Name: Irma Pedraza  MRN: [id number]  Today's Date: 6/7/2024    Admit Date: 6/5/2024    Plan: Home with spouse   Discharge Plan       Row Name 06/07/24 1531       Plan    Plan Home with spouse    Patient/Family in Agreement with Plan yes    Plan Comments Discussed patient in MDR. Possible discharge Saturday. Spoke to patient and spouse at bedside. Her plan remains home. Spouse will transport. Oxygen order in [facility]. Called to Benito with [employer]. Updated Benito on possible Saturday discharge. [employer] to deliver portable oxygen to bedside. No other needs noted. CM will continue to follow.    Final Discharge Disposition Code 01 - home or self-care                   Discharge Codes    No documentation.                 Expected Discharge Date and Time       Expected Discharge Date Expected Discharge Time    Jun 8, 2024               Ana Barakat RN

## 2024-06-07 NOTE — PLAN OF CARE
Problem: Adult Inpatient Plan of Care  Goal: Plan of Care Review  Outcome: Ongoing, Progressing  Goal: Patient-Specific Goal (Individualized)  Outcome: Ongoing, Progressing  Goal: Absence of Hospital-Acquired Illness or Injury  Outcome: Ongoing, Progressing  Intervention: Identify and Manage Fall Risk  Description: Perform standard risk assessment on admission using a validated tool or comprehensive approach appropriate to the patient; reassess fall risk frequently, with change in status or transfer to another level of care.  Communicate fall injury risk to interprofessional healthcare team.  Determine need for increased observation, equipment and environmental modification, such as low bed, signage and supportive, nonskid footwear.  Adjust safety measures to individual developmental age, stage and identified risk factors.  Reinforce the importance of safety and physical activity with patient and family.  Perform regular intentional rounding to assess need for position change, pain assessment and personal needs, including assistance with toileting.  Recent Flowsheet Documentation  Taken 6/7/2024 0200 by Mary Reid, RN  Safety Promotion/Fall Prevention:   activity supervised   clutter free environment maintained   assistive device/personal items within reach   fall prevention program maintained   lighting adjusted   mobility aid in reach   nonskid shoes/slippers when out of bed   room organization consistent   safety round/check completed  Taken 6/7/2024 0000 by Mary Reid, RN  Safety Promotion/Fall Prevention:   activity supervised   assistive device/personal items within reach   clutter free environment maintained   fall prevention program maintained   lighting adjusted   mobility aid in reach   nonskid shoes/slippers when out of bed   room organization consistent   safety round/check completed  Taken 6/6/2024 2200 by Mary Reid, RN  Safety Promotion/Fall Prevention:   activity supervised   assistive  device/personal items within reach   clutter free environment maintained   fall prevention program maintained   lighting adjusted   mobility aid in reach   nonskid shoes/slippers when out of bed   room organization consistent   safety round/check completed  Taken 6/6/2024 2055 by Mary Reid RN  Safety Promotion/Fall Prevention:   activity supervised   assistive device/personal items within reach   clutter free environment maintained   fall prevention program maintained   lighting adjusted   mobility aid in reach   nonskid shoes/slippers when out of bed   room organization consistent   safety round/check completed  Intervention: Prevent Skin Injury  Description: Perform a screening for skin injury risk, such as pressure or moisture associated skin damage on admission and at regular intervals throughout hospital stay.  Keep all areas of skin (especially folds) clean and dry.  Maintain adequate skin hydration.  Relieve and redistribute pressure and protect bony prominences; implement measures based on patient-specific risk factors.  Match turning and repositioning schedule to clinical condition.  Encourage weight shift frequently; assist with reposition if unable to complete independently.  Float heels off bed; avoid pressure on the Achilles tendon.  Keep skin free from extended contact with medical devices.  Encourage functional activity and mobility, as early as tolerated.  Use aids (e.g., slide boards, mechanical lift) during transfer.  Recent Flowsheet Documentation  Taken 6/7/2024 0407 by Mary Reid RN  Body Position: position changed independently  Taken 6/7/2024 0200 by Mary Reid RN  Body Position: position changed independently  Taken 6/6/2024 2200 by Mary Reid RN  Body Position: position changed independently  Taken 6/6/2024 2055 by Mary Reid RN  Body Position: position changed independently  Skin Protection:   adhesive use limited   tubing/devices free from skin contact    transparent dressing maintained   skin-to-skin areas padded   skin-to-device areas padded   skin sealant/moisture barrier applied   silicone foam dressing in place   incontinence pads utilized  Intervention: Prevent and Manage VTE (Venous Thromboembolism) Risk  Description: Assess for VTE (venous thromboembolism) risk.  Encourage and assist with early ambulation.  Initiate and maintain compression or other therapy, as indicated, based on identified risk in accordance with organizational protocol and provider order.  Encourage both active and passive leg exercises while in bed, if unable to ambulate.  Recent Flowsheet Documentation  Taken 6/7/2024 0407 by Mary Reid RN  Activity Management: up ad laney  Taken 6/7/2024 0200 by Mary Reid RN  Activity Management: up ad laney  Taken 6/7/2024 0000 by Mary Reid RN  Activity Management: up ad laney  Taken 6/6/2024 2200 by Mary Reid RN  Activity Management: up ad laney  Taken 6/6/2024 2055 by Mary Reid RN  Activity Management: up ad laney  Intervention: Prevent Infection  Description: Maintain skin and mucous membrane integrity; promote hand, oral and pulmonary hygiene.  Optimize fluid balance, nutrition, sleep and glycemic control to maximize infection resistance.  Identify potential sources of infection early to prevent or mitigate progression of infection (e.g., wound, lines, devices).  Evaluate ongoing need for invasive devices; remove promptly when no longer indicated.  Recent Flowsheet Documentation  Taken 6/6/2024 2055 by Mary Reid RN  Infection Prevention:   environmental surveillance performed   equipment surfaces disinfected   hand hygiene promoted   personal protective equipment utilized   rest/sleep promoted  Goal: Optimal Comfort and Wellbeing  Outcome: Ongoing, Progressing  Intervention: Provide Person-Centered Care  Description: Use a family-focused approach to care.  Develop trust and rapport by proactively providing information,  encouraging questions, addressing concerns and offering reassurance.  Acknowledge emotional response to hospitalization.  Recognize and utilize personal coping strategies.  Honor spiritual and cultural preferences.  Recent Flowsheet Documentation  Taken 6/6/2024 2055 by Mary Reid RN  Trust Relationship/Rapport:   care explained   choices provided   emotional support provided   empathic listening provided   questions answered   questions encouraged   thoughts/feelings acknowledged  Goal: Readiness for Transition of Care  Outcome: Ongoing, Progressing     Problem: COPD (Chronic Obstructive Pulmonary Disease) Comorbidity  Goal: Maintenance of COPD Symptom Control  Outcome: Ongoing, Progressing  Intervention: Maintain COPD-Symptom Control  Description: Evaluate adherence to management plan (e.g., medication, trigger avoidance, infection prevention, self-monitoring).  Advocate for continuation of home regimen, including medication, method of delivery, schedule and symptom monitoring.  Anticipate the need for breathing techniques and activity pacing to minimize fatigue and breathlessness.  Assess for proper use of inhaled medication and delivery technique; assist or reinstruct if needed.  Evaluate effectiveness of coping skills; encourage expression of feelings, expectations and concerns related to disease management and quality of life; reinforce education to enhance management plan and wellbeing.  Recent Flowsheet Documentation  Taken 6/7/2024 0200 by Mary Reid RN  Medication Review/Management: medications reviewed  Taken 6/7/2024 0000 by Mary Reid RN  Medication Review/Management: medications reviewed  Taken 6/6/2024 2200 by Mary Reid RN  Medication Review/Management: medications reviewed  Taken 6/6/2024 2055 by Mary Reid RN  Medication Review/Management: medications reviewed     Problem: Hypertension Comorbidity  Goal: Blood Pressure in Desired Range  Outcome: Ongoing,  Progressing  Intervention: Maintain Blood Pressure Management  Description: Evaluate adherence to home antihypertensive regimen (e.g., exercise and activity, diet modification, medication).  Provide scheduled antihypertensive medication; consider administration time and effects (e.g., avoid giving diuretic prior to bedtime).  Monitor response to antihypertensive medication therapy (e.g., blood pressure, electrolyte levels, medication effects).  Minimize risk of orthostatic hypotension; encourage caution with position changes, particularly if elderly.  Recent Flowsheet Documentation  Taken 6/7/2024 0200 by Mary Reid RN  Medication Review/Management: medications reviewed  Taken 6/7/2024 0000 by Mary Reid RN  Medication Review/Management: medications reviewed  Taken 6/6/2024 2200 by Mary Reid RN  Medication Review/Management: medications reviewed  Taken 6/6/2024 2055 by Mary Reid RN  Medication Review/Management: medications reviewed     Problem: Fall Injury Risk  Goal: Absence of Fall and Fall-Related Injury  Outcome: Ongoing, Progressing  Intervention: Identify and Manage Contributors  Description: Develop a fall prevention plan with the patient and caregiver/family.  Provide reorientation, appropriate sensory stimulation and routines with changes in mental status to decrease risk of fall.  Promote use of personal vision and auditory aids.  Assess assistance level required for safe and effective self-care; provide support as needed, such as toileting, mobilization. For age 65 and older, implement timed toileting with assistance.  Encourage physical activity, such as performance of mobility and self-care at highest level of patient ability, multicomponent exercise program and provision of appropriate assistive devices.  If fall occurs, assess the severity of injury; implement fall injury protocol. Determine the cause and revise fall injury prevention plan.  Regularly review medication  contribution to fall risk; adjust medication administration times to minimize risk of falling.  Consider risk related to polypharmacy and age.  Balance adequate pain management with potential for oversedation.  Recent Flowsheet Documentation  Taken 6/7/2024 0200 by Mary Reid RN  Medication Review/Management: medications reviewed  Taken 6/7/2024 0000 by Mary Reid RN  Medication Review/Management: medications reviewed  Taken 6/6/2024 2200 by Mary Reid RN  Medication Review/Management: medications reviewed  Taken 6/6/2024 2055 by Mary Reid RN  Medication Review/Management: medications reviewed  Intervention: Promote Injury-Free Environment  Description: Provide a safe, barrier-free environment that encourages independent activity.  Keep care area uncluttered and well-lighted.  Determine need for increased observation or monitoring.  Avoid use of devices that minimize mobility, such as restraints or indwelling urinary catheter.  Recent Flowsheet Documentation  Taken 6/7/2024 0200 by Mary Reid RN  Safety Promotion/Fall Prevention:   activity supervised   clutter free environment maintained   assistive device/personal items within reach   fall prevention program maintained   lighting adjusted   mobility aid in reach   nonskid shoes/slippers when out of bed   room organization consistent   safety round/check completed  Taken 6/7/2024 0000 by Mary Reid RN  Safety Promotion/Fall Prevention:   activity supervised   assistive device/personal items within reach   clutter free environment maintained   fall prevention program maintained   lighting adjusted   mobility aid in reach   nonskid shoes/slippers when out of bed   room organization consistent   safety round/check completed  Taken 6/6/2024 2200 by Mary Reid RN  Safety Promotion/Fall Prevention:   activity supervised   assistive device/personal items within reach   clutter free environment maintained   fall prevention program  maintained   lighting adjusted   mobility aid in reach   nonskid shoes/slippers when out of bed   room organization consistent   safety round/check completed  Taken 6/6/2024 2055 by Mary Reid RN  Safety Promotion/Fall Prevention:   activity supervised   assistive device/personal items within reach   clutter free environment maintained   fall prevention program maintained   lighting adjusted   mobility aid in reach   nonskid shoes/slippers when out of bed   room organization consistent   safety round/check completed     Problem: Skin Injury Risk Increased  Goal: Skin Health and Integrity  Outcome: Ongoing, Progressing  Intervention: Optimize Skin Protection  Description: Perform a full pressure injury risk assessment, as indicated by screening, upon admission to care unit.  Reassess skin (injury risk, full inspection) frequently (e.g., scheduled interval, with change in condition) to provide optimal early detection and prevention.  Maintain adequate tissue perfusion (e.g., encourage fluid balance; avoid crossing legs, constrictive clothing or devices) to promote tissue oxygenation.  Maintain head of bed at lowest degree of elevation tolerated, considering medical condition and other restrictions.  Avoid positioning onto an area that remains reddened.  Minimize incontinence and moisture (e.g., toileting schedule; moisture-wicking pad, diaper or incontinence collection device; skin moisture barrier).  Cleanse skin promptly and gently when soiled utilizing a pH-balanced cleanser.  Relieve and redistribute pressure (e.g., scheduled position changes, weight shifts, use of support surface, medical device repositioning, protective dressing application, use of positioning device, microclimate control, use of pressure-injury-monitor  Encourage increased activity, such as sitting in a chair at the bedside or early mobilization, when able to tolerate.  Recent Flowsheet Documentation  Taken 6/6/2024 2055 by Mary Reid,  RN  Pressure Reduction Techniques:   frequent weight shift encouraged   weight shift assistance provided   heels elevated off bed   positioned off wounds   pressure points protected  Head of Bed (HOB) Positioning: HOB at 30-45 degrees  Pressure Reduction Devices:   pressure-redistributing mattress utilized   positioning supports utilized   heel offloading device utilized  Skin Protection:   adhesive use limited   tubing/devices free from skin contact   transparent dressing maintained   skin-to-skin areas padded   skin-to-device areas padded   skin sealant/moisture barrier applied   silicone foam dressing in place   incontinence pads utilized   Goal Outcome Evaluation:

## 2024-06-07 NOTE — PROGRESS NOTES
[facility] Medicine Services  PROGRESS NOTE    Patient Name: Irma Pedraza  : 1950  MRN: [id number]    Date of Admission: 2024  Primary Care Physician: Eve Navarrete MD    Subjective   Subjective     CC:  F/U abdominal pain    HPI:  Seen this morning. Pain improved today. Currently has her oxygen off to blow her nose and for several minutes that I am in the room she stayed around 90%.       Objective   Objective     Vital Signs:   Temp:  [97 °F (36.1 °C)-98.1 °F (36.7 °C)] 98.1 °F (36.7 °C)  Heart Rate:  [70] 70  Resp:  [16-18] 18  BP: (107-117)/(45-81) 117/81  Flow (L/min):  [2-4] 3     Physical Exam:  Gen-no acute distress  HENT-NCAT, mucous membranes moist  CV-RRR, S1 S2 normal, no m/r/g  Resp-diminished bilaterally, no wheezes or rales, nonlabored  Abd-soft, there is a firm and tender area in the right central abdomen consistent with hematoma, ND, +BS  Ext-no edema  Neuro-A&Ox3, no focal deficits  Skin-no rashes  Psych-appropriate mood  No change from 24      Results Reviewed:  LAB RESULTS:      Lab 24  0502   WBC 5.10 8.31 7.32   HEMOGLOBIN 11.1* 13.8 14.8   HEMATOCRIT 33.8* 41.9 43.6   PLATELETS 133* 143 142   NEUTROS ABS  --   --  4.76   IMMATURE GRANS (ABS)  --   --  0.12*   LYMPHS ABS  --   --  1.75   MONOS ABS  --   --  0.61   EOS ABS  --   --  0.07   MCV 97.4* 97.2* 94.2   LACTATE  --   --  1.1   PROTIME  --   --  13.0         Lab 2418 24  0502   SODIUM 140 140 141   POTASSIUM 3.7 3.7 3.5   CHLORIDE 100 97* 101   CO2 34.0* 31.0* 30.0*   ANION GAP 6.0 12.0 10.0   BUN 9 8 8   CREATININE 0.65 0.71 0.74   EGFR 92.5 89.4 85.0   GLUCOSE 106* 98 106*   CALCIUM 8.7 9.0 9.4   MAGNESIUM  --   --  1.6         Lab 24  0502   TOTAL PROTEIN 6.1   ALBUMIN 4.3   GLOBULIN 1.8   ALT (SGPT) 24   AST (SGOT) 27   BILIRUBIN 0.5   ALK PHOS 61   LIPASE 36         Lab 24  0502   PROTIME 13.0   INR 0.97                  Brief Urine Lab Results  (Last result in the past 365 days)        Color   Clarity   Blood   Leuk Est   Nitrite   Protein   CREAT   Urine HCG        06/05/24 0654 Yellow   Clear   Negative   Small (1+)   Negative   Trace                   Microbiology Results Abnormal       Procedure Component Value - Date/Time    Blood Culture - Blood, Arm, Left [[id number]]  (Normal) Collected: 06/05/24 0544    Lab Status: Preliminary result Specimen: Blood from Arm, Left Updated: 06/07/24 0615     Blood Culture No growth at 2 days            Holter Monitor - 72 Hour Up To 15 Days    Result Date: 6/6/2024    A relatively benign monitor study.   No atrial fibrillation or flutter seen. Non-sustained SVT/atrial runs seen, max of 8 beats. AIVR seen, 5 beats. Rare ectopy.      Results for orders placed during the hospital encounter of 04/23/24    Adult Transthoracic Echo Complete W/ Cont if Necessary Per Protocol (With Agitated Saline)    Interpretation Summary    Left ventricular systolic function is normal.  Left ventricular ejection fraction appears to be 61 - 65%.    Left ventricular diastolic function is consistent with (grade I) impaired relaxation.    There is calcification of the mitral valve.    Saline test results are negative.      Current medications:  Scheduled Meds:[Held by provider] aspirin, 325 mg, Oral, Daily  budesonide-formoterol, 2 puff, Inhalation, BID - RT   And  tiotropium bromide monohydrate, 2 puff, Inhalation, Daily - RT  cefTRIAXone, 2,000 mg, Intravenous, Q24H  donepezil, 5 mg, Oral, Nightly  enoxaparin, 40 mg, Subcutaneous, Daily  folic acid, 1 mg, Oral, Daily  ipratropium-albuterol, 3 mL, Nebulization, 4x Daily - RT  metroNIDAZOLE, 500 mg, Intravenous, Q8H  nicotine, 1 patch, Transdermal, Q24H  pantoprazole, 40 mg, Oral, Q AM  predniSONE, 2.5 mg, Oral, Daily  rosuvastatin, 40 mg, Oral, Nightly  senna-docusate sodium, 2 tablet, Oral, BID  sodium chloride, 10 mL, Intravenous,  "Q12H      Continuous Infusions:   PRN Meds:.  acetaminophen    senna-docusate sodium **AND** polyethylene glycol **AND** bisacodyl **AND** bisacodyl    guaifenesin    HYDROcodone-acetaminophen    Magnesium Standard Dose Replacement - Follow Nurse / BPA Driven Protocol    Phosphorus Replacement - Follow Nurse / BPA Driven Protocol    Potassium Replacement - Follow Nurse / BPA Driven Protocol    [COMPLETED] Insert Peripheral IV **AND** sodium chloride    sodium chloride    sodium chloride    Assessment & Plan   Assessment & Plan     Active Hospital Problems    Diagnosis  POA    **Diverticulitis large intestine w/o perforation or abscess w/bleeding [K57.33]  Yes    Rectus sheath hematoma [S30.1XXA]  Yes    COPD exacerbation [J44.1]  Yes    Rheumatoid arthritis, seropositive [M05.9]  Yes    HTN (hypertension) [I10]  Yes    GERD (gastroesophageal reflux disease) [K21.9]  Yes    Tobacco dependence syndrome [F17.200]  Yes    Polymyalgia rheumatica [M35.3]  Yes      Resolved Hospital Problems   No resolved problems to display.        Brief Hospital Course to date:  Irma Pedraza is a 74 y.o. female with a past medical history of rheumatoid arthritis, polymyalgia rheumatica, giant cell arteritis, and COPD who presents with a 2 day history of abdominal pain with associated nausea. Noted to be hypoxic in the ER as well. Admitted for further management of acute diverticulitis and COPD exacerbation.     This patient's problems and plans were partially entered by my partner and updated as appropriate by me 06/07/24. Copied text in this note has been reviewed and is accurate as of today's date.     Acute diverticulitis  --CT A/P: \"Significant diverticulosis of the left hemicolon with focal inflammatory changes present within the distal descending colon as well as the mid sigmoid colon consistent with acute uncomplicated diverticulitis. There is somewhat masslike appearance seen   within the descending colon within the area of " "inflammatory change and underlying mass cannot be excluded.\"   --Labs unremarkable, normal WBC and lactate  --Continue Rocephin and Flagyl  --Blood cultures 1 bottle with Bacillus species - suspect contaminant  --Pain control - PRN IV morphine and oxycodone  --Will need outpatient colonoscopy to investigate abnormal CT scan findings to rule out any malignancy, discussed with patient    Right rectus abdominis hematoma  --CT A/P: \"A fluid collection which appears hyperdense is present within the right rectus abdominal musculature. This measures approximately 6.7 x 3.5 cm. Likely post-traumatic.\"  --Patient denies any recent injuries or falls, does take full dose ASA which we will hold  --Pain control  --Monitor H&H closely     COPD exacerbation  Rhinovirus infection  Acute hypoxia  --Hypoxic to 84% in the ER  --Admission CXR negative  --Respiratory PCR positive for Rhinovirus  --DuoNebs 4 times daily  --Will hold off on increasing her prednisone at this point due to acute infection  --Wean O2 as tolerated, currently on 3-4 liters and does not wear home oxygen  --Encourage incentive spirometry, ambulate  --Repeat CXR in AM    Constipation  --Patient reports chronic constipation, CT scan showing mild to moderate stool burden  --Added scheduled bowel regimen     RA  PMR  Giant cell arteritis  --Continue chronic low-dose prednisone. Patient also gets monthly infusions.     Tobacco abuse  --NRT  --Cessation education     ETOH abuse  --Monitor CIWA scores. Can add meds if having high scores.     GERD  --Continue PPI     HLD  --Continue statin    Expected Discharge Location and Transportation: home  Expected Discharge anticipate DC home tomorrow if able to wean O2  Expected Discharge Date: 6/8/2024; Expected Discharge Time:      DVT prophylaxis:  Medical DVT prophylaxis orders are present.         AM-PAC 6 Clicks Score (PT): 24 (06/07/24 1013)    CODE STATUS:   Code Status and Medical Interventions:   Ordered at: 06/05/24 " 0959     Level Of Support Discussed With:    Patient     Code Status (Patient has no pulse and is not breathing):    CPR (Attempt to Resuscitate)     Medical Interventions (Patient has pulse or is breathing):    Full Support       Rosa Neri MD  06/07/24

## 2024-06-08 ENCOUNTER — APPOINTMENT (OUTPATIENT)
Dept: GENERAL RADIOLOGY | Facility: HOSPITAL | Age: 74
DRG: 190 | End: 2024-06-08
Payer: MEDICARE

## 2024-06-08 ENCOUNTER — READMISSION MANAGEMENT (OUTPATIENT)
Dept: CALL CENTER | Facility: HOSPITAL | Age: 74
End: 2024-06-08
Payer: MEDICARE

## 2024-06-08 VITALS
DIASTOLIC BLOOD PRESSURE: 64 MMHG | SYSTOLIC BLOOD PRESSURE: 127 MMHG | TEMPERATURE: 97.1 F | BODY MASS INDEX: 27.06 KG/M2 | HEART RATE: 65 BPM | OXYGEN SATURATION: 94 % | HEIGHT: 61 IN | WEIGHT: 143.3 LBS | RESPIRATION RATE: 17 BRPM

## 2024-06-08 PROBLEM — J44.9 COPD WITH HYPOXIA: Status: ACTIVE | Noted: 2024-06-05

## 2024-06-08 LAB
ANION GAP SERPL CALCULATED.3IONS-SCNC: 6 MMOL/L (ref 5–15)
BUN SERPL-MCNC: 11 MG/DL (ref 8–23)
BUN/CREAT SERPL: 20 (ref 7–25)
CALCIUM SPEC-SCNC: 8.7 MG/DL (ref 8.6–10.5)
CHLORIDE SERPL-SCNC: 102 MMOL/L (ref 98–107)
CO2 SERPL-SCNC: 32 MMOL/L (ref 22–29)
CREAT SERPL-MCNC: 0.55 MG/DL (ref 0.57–1)
DEPRECATED RDW RBC AUTO: 43.5 FL (ref 37–54)
EGFRCR SERPLBLD CKD-EPI 2021: 96.3 ML/MIN/1.73
ERYTHROCYTE [DISTWIDTH] IN BLOOD BY AUTOMATED COUNT: 12.5 % (ref 12.3–15.4)
GLUCOSE SERPL-MCNC: 93 MG/DL (ref 65–99)
HCT VFR BLD AUTO: 32.6 % (ref 34–46.6)
HGB BLD-MCNC: 10.7 G/DL (ref 12–15.9)
MCH RBC QN AUTO: 31.4 PG (ref 26.6–33)
MCHC RBC AUTO-ENTMCNC: 32.8 G/DL (ref 31.5–35.7)
MCV RBC AUTO: 95.6 FL (ref 79–97)
PLATELET # BLD AUTO: 134 10*3/MM3 (ref 140–450)
PMV BLD AUTO: 10 FL (ref 6–12)
POTASSIUM SERPL-SCNC: 3.3 MMOL/L (ref 3.5–5.2)
RBC # BLD AUTO: 3.41 10*6/MM3 (ref 3.77–5.28)
SODIUM SERPL-SCNC: 140 MMOL/L (ref 136–145)
WBC NRBC COR # BLD AUTO: 6.51 10*3/MM3 (ref 3.4–10.8)

## 2024-06-08 PROCEDURE — 63710000001 PREDNISONE PER 5 MG: Performed by: PEDIATRICS

## 2024-06-08 PROCEDURE — 25010000002 ENOXAPARIN PER 10 MG: Performed by: PEDIATRICS

## 2024-06-08 PROCEDURE — 94799 UNLISTED PULMONARY SVC/PX: CPT

## 2024-06-08 PROCEDURE — 99239 HOSP IP/OBS DSCHRG MGMT >30: CPT | Performed by: INTERNAL MEDICINE

## 2024-06-08 PROCEDURE — 94664 DEMO&/EVAL PT USE INHALER: CPT

## 2024-06-08 PROCEDURE — 71045 X-RAY EXAM CHEST 1 VIEW: CPT

## 2024-06-08 PROCEDURE — 85027 COMPLETE CBC AUTOMATED: CPT | Performed by: HOSPITALIST

## 2024-06-08 PROCEDURE — 80048 BASIC METABOLIC PNL TOTAL CA: CPT | Performed by: HOSPITALIST

## 2024-06-08 PROCEDURE — 25010000002 METRONIDAZOLE 500 MG/100ML SOLUTION: Performed by: PEDIATRICS

## 2024-06-08 RX ORDER — CODEINE PHOSPHATE/GUAIFENESIN 10-100MG/5
5 LIQUID (ML) ORAL 3 TIMES DAILY PRN
Qty: 118 ML | Refills: 0 | Status: SHIPPED | OUTPATIENT
Start: 2024-06-08

## 2024-06-08 RX ORDER — AMOXICILLIN AND CLAVULANATE POTASSIUM 875; 125 MG/1; MG/1
1 TABLET, FILM COATED ORAL 2 TIMES DAILY
Qty: 10 TABLET | Refills: 0 | Status: SHIPPED | OUTPATIENT
Start: 2024-06-08 | End: 2024-06-08

## 2024-06-08 RX ORDER — ASPIRIN 325 MG
325 TABLET ORAL DAILY
Qty: 30 TABLET | Refills: 2 | Status: SHIPPED | OUTPATIENT
Start: 2024-06-15

## 2024-06-08 RX ORDER — NICOTINE 21 MG/24HR
1 PATCH, TRANSDERMAL 24 HOURS TRANSDERMAL
Qty: 30 EACH | Refills: 0 | Status: SHIPPED | OUTPATIENT
Start: 2024-06-09 | End: 2024-06-08

## 2024-06-08 RX ORDER — NICOTINE 21 MG/24HR
1 PATCH, TRANSDERMAL 24 HOURS TRANSDERMAL
Qty: 30 EACH | Refills: 0 | Status: SHIPPED | OUTPATIENT
Start: 2024-06-09 | End: 2024-06-10 | Stop reason: SDUPTHER

## 2024-06-08 RX ORDER — AMOXICILLIN AND CLAVULANATE POTASSIUM 875; 125 MG/1; MG/1
1 TABLET, FILM COATED ORAL 2 TIMES DAILY
Qty: 10 TABLET | Refills: 0 | Status: SHIPPED | OUTPATIENT
Start: 2024-06-08

## 2024-06-08 RX ORDER — POTASSIUM CHLORIDE 20 MEQ/1
40 TABLET, EXTENDED RELEASE ORAL EVERY 4 HOURS
Status: COMPLETED | OUTPATIENT
Start: 2024-06-08 | End: 2024-06-08

## 2024-06-08 RX ADMIN — POTASSIUM CHLORIDE 40 MEQ: 1500 TABLET, EXTENDED RELEASE ORAL at 08:44

## 2024-06-08 RX ADMIN — TIOTROPIUM BROMIDE INHALATION SPRAY 2 PUFF: 3.12 SPRAY, METERED RESPIRATORY (INHALATION) at 09:34

## 2024-06-08 RX ADMIN — GUAIFENESIN 200 MG: 200 SOLUTION ORAL at 13:26

## 2024-06-08 RX ADMIN — ENOXAPARIN SODIUM 40 MG: 100 INJECTION SUBCUTANEOUS at 08:44

## 2024-06-08 RX ADMIN — PREDNISONE 2.5 MG: 5 TABLET ORAL at 08:44

## 2024-06-08 RX ADMIN — POTASSIUM CHLORIDE 40 MEQ: 1500 TABLET, EXTENDED RELEASE ORAL at 05:45

## 2024-06-08 RX ADMIN — IPRATROPIUM BROMIDE AND ALBUTEROL SULFATE 3 ML: 2.5; .5 SOLUTION RESPIRATORY (INHALATION) at 09:34

## 2024-06-08 RX ADMIN — Medication 1 PATCH: at 08:44

## 2024-06-08 RX ADMIN — GUAIFENESIN 200 MG: 200 SOLUTION ORAL at 08:44

## 2024-06-08 RX ADMIN — Medication 10 ML: at 08:45

## 2024-06-08 RX ADMIN — FOLIC ACID 1 MG: 1 TABLET ORAL at 08:44

## 2024-06-08 RX ADMIN — PANTOPRAZOLE SODIUM 40 MG: 40 TABLET, DELAYED RELEASE ORAL at 05:45

## 2024-06-08 RX ADMIN — BUDESONIDE AND FORMOTEROL FUMARATE DIHYDRATE 2 PUFF: 160; 4.5 AEROSOL RESPIRATORY (INHALATION) at 09:34

## 2024-06-08 RX ADMIN — METRONIDAZOLE 500 MG: 500 INJECTION, SOLUTION INTRAVENOUS at 05:45

## 2024-06-08 NOTE — OUTREACH NOTE
Prep Survey      Flowsheet Row Responses   [facility] patient discharged from? [place]   Is LACE score < 7 ? No   Eligibility [facility]   Date of Admission 06/05/24   Date of Discharge 06/08/24   Discharge Disposition Home or Self Care   Discharge diagnosis Diverticulitis large intestine w/o perforation or abscess w/bleeding   Does the patient have one of the following disease processes/diagnoses(primary or secondary)? Other   Does the patient have Home health ordered? No   Is there a DME ordered? Yes   What DME was ordered? [employer] to deliver portable O2 to bedside   Prep survey completed? Yes            Dinora BAÑUELOS - Registered Nurse

## 2024-06-08 NOTE — DISCHARGE SUMMARY
[facility] Medicine Services  DISCHARGE SUMMARY    Patient Name: Irma Pedraza  : 1950  MRN: [id number]    Date of Admission: 2024  4:53 AM  Date of Discharge:  2024  Primary Care Physician: Eve Navarrete MD    Consults       No orders found from 2024 to 2024.            Hospital Course     Presenting Problem: diffuse abd pain     Active Hospital Problems    Diagnosis  POA    **Rectus sheath hematoma [S30.1XXA]  Yes     Priority: High    Diverticulitis large intestine w/o perforation or abscess w/bleeding [K57.33]  Yes     Priority: Medium    Rhinovirus infection [B34.8]  Unknown    COPD with hypoxia [J44.9]  Yes    Rheumatoid arthritis, seropositive [M05.9]  Yes    HTN (hypertension) [I10]  Yes    GERD (gastroesophageal reflux disease) [K21.9]  Yes    Tobacco dependence syndrome [F17.200]  Yes    Polymyalgia rheumatica [M35.3]  Yes      Resolved Hospital Problems   No resolved problems to display.          Hospital Course:  Irma Pedraza is a 74 y.o. female w PMH of RA, PMR giant cell arteritis and COPD.  She presented w 2 days of abdominal pain.  All over; slightly worse on R side.     R rectus sheath hematoma  - apparently spontaneous  - she takes ASA daily and prednisone 2.5mg daily.  No anticoagulants.   - Seen on admission CT scan.  Pt had large visible ecchymosis.  However, she does not remember any trauma to the area  - hgb stabilized at 10.7.      Sigmoid diverticulitis, mild  - This does not correspond to the site of pain on presentation, but was noted on CT and treated.      Hypoxia, COPD  Rhinovirus +   Active smoking  - She does not have home oxygen.   - CXR unremarkable.  CT abd showed BLL mild infiltrates vs atelectasis    - During her stay, her sats on RA were consider 86-92 though she did not note dyspnea  - she is discharged on oxygen. Unclear whether she will only need this short-term or longterm.    - discharged with nicotine patches,  will try to quit.     Blood culture grew Bacillus:  presumed contaminant       Discharge Follow Up Recommendations for outpatient labs/diagnostics:   - see PCP in 1-2 weeks:  needs hemoglobin and oxygen sat rechecked at that time     Day of Discharge     HPI:   feels pretty good though diffuse abd soreness remains.  She prefers to go home today rather than stay another day.  Lives in [place] with .  Breathing is pretty good.    Review of Systems  Eating well     Vital Signs:          Physical Exam:  Gen:  WD/WN  Neuro: alert and oriented, clear speech, follows commands, grossly nonfocal  HEENT:  NC/AT PERRL, OP benign  Neck:  Supple, no LAD  Heart RRR no murmur, rub, or gallop  Lungs - no wheeze, comfortable on RA but dropped sats to 85 after several minutes off oxygen   Abd:  Soft, large ecchymosis worst on R side.  Min tender   Extrem:  No c/c/e      Pertinent  and/or Most Recent Results     LAB RESULTS:      Lab 06/08/24 0308 06/07/24 0334 06/06/24 0618 06/05/24  0502   WBC 6.51 5.10 8.31 7.32   HEMOGLOBIN 10.7* 11.1* 13.8 14.8   HEMATOCRIT 32.6* 33.8* 41.9 43.6   PLATELETS 134* 133* 143 142   NEUTROS ABS  --   --   --  4.76   IMMATURE GRANS (ABS)  --   --   --  0.12*   LYMPHS ABS  --   --   --  1.75   MONOS ABS  --   --   --  0.61   EOS ABS  --   --   --  0.07   MCV 95.6 97.4* 97.2* 94.2   LACTATE  --   --   --  1.1   PROTIME  --   --   --  13.0         Lab 06/08/24  0308 06/07/24  0334 06/06/24 0618 06/05/24  0502   SODIUM 140 140 140 141   POTASSIUM 3.3* 3.7 3.7 3.5   CHLORIDE 102 100 97* 101   CO2 32.0* 34.0* 31.0* 30.0*   ANION GAP 6.0 6.0 12.0 10.0   BUN 11 9 8 8   CREATININE 0.55* 0.65 0.71 0.74   EGFR 96.3 92.5 89.4 85.0   GLUCOSE 93 106* 98 106*   CALCIUM 8.7 8.7 9.0 9.4   MAGNESIUM  --   --   --  1.6         Lab 06/05/24  0502   TOTAL PROTEIN 6.1   ALBUMIN 4.3   GLOBULIN 1.8   ALT (SGPT) 24   AST (SGOT) 27   BILIRUBIN 0.5   ALK PHOS 61   LIPASE 36         Lab 06/05/24  0502   PROTIME 13.0    INR 0.97                 Brief Urine Lab Results  (Last result in the past 365 days)        Color   Clarity   Blood   Leuk Est   Nitrite   Protein   CREAT   Urine HCG        06/05/24 0654 Yellow   Clear   Negative   Small (1+)   Negative   Trace                 Microbiology Results (last 10 days)       Procedure Component Value - Date/Time    COVID PRE-OP / PRE-PROCEDURE SCREENING ORDER (NO ISOLATION) - Swab, Nasopharynx [[id number]]  (Abnormal) Collected: 06/05/24 0857    Lab Status: Final result Specimen: Swab from Nasopharynx Updated: 06/05/24 1008    Narrative:      The following orders were created for panel order COVID PRE-OP / PRE-PROCEDURE SCREENING ORDER (NO ISOLATION) - Swab, Nasopharynx.  Procedure                               Abnormality         Status                     ---------                               -----------         ------                     Respiratory Panel PCR w/...[[id number]]  Abnormal            Final result                 Please view results for these tests on the individual orders.    Respiratory Panel PCR w/COVID-19(SARS-CoV-2) JOLEEN/[facility]/WERNER/PAD/COR/MARTINE In-House, NP Swab in UTM/VTM, 2 HR TAT - Swab, Nasopharynx [[id number]]  (Abnormal) Collected: 06/05/24 0857    Lab Status: Final result Specimen: Swab from Nasopharynx Updated: 06/05/24 1008     ADENOVIRUS, PCR Not Detected     Coronavirus 229E Not Detected     Coronavirus HKU1 Not Detected     Coronavirus NL63 Not Detected     Coronavirus OC43 Not Detected     COVID19 Not Detected     Human Metapneumovirus Not Detected     Human Rhinovirus/Enterovirus Detected     Influenza A PCR Not Detected     Influenza B PCR Not Detected     Parainfluenza Virus 1 Not Detected     Parainfluenza Virus 2 Not Detected     Parainfluenza Virus 3 Not Detected     Parainfluenza Virus 4 Not Detected     RSV, PCR Not Detected     Bordetella pertussis pcr Not Detected     Bordetella parapertussis PCR Not Detected     Chlamydophila pneumoniae PCR Not  Detected     Mycoplasma pneumo by PCR Not Detected    Narrative:      In the setting of a positive respiratory panel with a viral infection PLUS a negative procalcitonin without other underlying concern for bacterial infection, consider observing off antibiotics or discontinuation of antibiotics and continue supportive care. If the respiratory panel is positive for atypical bacterial infection (Bordetella pertussis, Chlamydophila pneumoniae, or Mycoplasma pneumoniae), consider antibiotic de-escalation to target atypical bacterial infection.    Blood Culture - Blood, Arm, Left [[id number]]  (Normal) Collected: 06/05/24 0544    Lab Status: Preliminary result Specimen: Blood from Arm, Left Updated: 06/09/24 0615     Blood Culture No growth at 4 days    Blood Culture - Blood, Wrist, Left [[id number]]  (Abnormal) Collected: 06/05/24 0544    Lab Status: Final result Specimen: Blood from Wrist, Left Updated: 06/07/24 0630     Blood Culture Bacillus species     Comment:   By laboratory criteria, this organism is considered a contaminant. Clinical correlation is recommended. Most Bacillus species are susceptible to vancomycin, doxycycline, fluoroquinolones, and aminoglycosides.        Isolated from Anaerobic Bottle     Gram Stain Anaerobic Bottle Gram positive bacilli    Narrative:      Probable contaminant requires clinical correlation, susceptibility not performed unless requested by physician.    Blood culture does not meet the specified criteria for PCR identification.  If pregnant, immunocompromised, or clinical concern for meningitis, call lab to run BCID for Listeria monocytogenes.            XR Chest 1 View    Result Date: 6/8/2024  XR CHEST 1 VW Date of Exam: 6/8/2024 2:21 AM EDT Indication: F/U hypoxia Comparison: 6/5/2024. Findings: There is mild bibasilar atelectasis. No significant pleural effusion or distinct pneumothorax. Normal heart and mediastinal contours.     Impression: Hypoventilatory changes without  specific evidence of acute disease. Electronically Signed: Saman Huizar MD  6/8/2024 6:50 AM EDT  Workstation ID: [id number]    Holter Monitor - 72 Hour Up To 15 Days    Result Date: 6/6/2024    A relatively benign monitor study.   No atrial fibrillation or flutter seen. Non-sustained SVT/atrial runs seen, max of 8 beats. AIVR seen, 5 beats. Rare ectopy.     XR Chest 1 View    Result Date: 6/5/2024  XR CHEST 1 VW Date of Exam: 6/5/2024 5:32 AM EDT Indication: soa Comparison: 5/22/2024. Findings: There are no airspace consolidations. No pleural fluid. No pneumothorax. The pulmonary vasculature appears within normal limits. The cardiac and mediastinal silhouette appear unremarkable. No acute osseous abnormality identified.     Impression: No acute cardiopulmonary process. Electronically Signed: Jigna Olson MD  6/5/2024 5:42 AM EDT  Workstation ID: [id number]    CT Abdomen Pelvis Without Contrast    Result Date: 6/5/2024  CT ABDOMEN PELVIS WO CONTRAST Date of Exam: 6/5/2024 5:09 AM EDT Indication: right lower quadrant abdominal pain and tenderness.. Comparison: 10/20/2012. Technique: Axial CT images were obtained of the abdomen and pelvis without the administration of contrast. Reconstructed coronal and sagittal images were also obtained. Automated exposure control and iterative construction methods were used. Findings: Lung Bases:   The visualized lung bases and lower mediastinal structures demonstrate mild patchy atelectatic changes within the lung bases bilaterally. Mild infiltrates cannot be excluded.. Limited evaluation of the solid organs due to lack of intravenous contrast. Liver: Liver is normal in size and CT density. No focal lesions. Biliary/Gallbladder:  The gallbladder is normal without evidence of radiopaque stones. The biliary tree is nondilated. Spleen: Spleen is normal in size and CT density. Pancreas:  Pancreas is normal. There is no evidence of pancreatic mass or peripancreatic fluid. Kidneys:   Kidneys are normal in size. There are no stones or hydronephrosis. Adrenals:  Adrenal glands are unremarkable. Retroperitoneal/Lymph Nodes/Vasculature:  No retroperitoneal adenopathy is identified. Gastrointestinal/Mesentery:  The bowel loops are non-dilated. There is diverticulosis of the sigmoid colon extending to the descending colon. There are focal areas of inflammatory change present within the distal descending colon (series 2 image 21) as well as the mid sigmoid colon with stranding present. A trace amount of free fluid is present. No evidence of perforation. Mild to moderate stool burden present. No additional inflammatory changes identified. The appendix appears within normal limits. No evidence of obstruction. No free air. No mesenteric fluid collections identified. A fluid collection which appears hyperdense is present within the right rectus abdominal musculature. This measures approximately 6.7 x 3.5 cm (series 2 image 50). Remaining musculature appears unremarkable. Subcutaneous tissues appear within normal limits. Bladder:  The bladder is normal. Genital:   Unremarkable       Bony Structures:   Visualized bony structures are consistent with the patient's age.     Impression: 1. Hematoma within the right rectus abdominal musculature as above, likely posttraumatic. Remaining musculature appears unremarkable. No acute osseous abnormality. 2. Significant diverticulosis of the left hemicolon with focal inflammatory changes present within the distal descending colon as well as the mid sigmoid colon consistent with acute uncomplicated diverticulitis. There is somewhat masslike appearance seen  within the descending colon within the area of inflammatory change and underlying mass cannot be excluded. Colonoscopic correlation is recommended. No additional inflammatory changes present. 3. Mild infiltrates versus mild atelectatic changes present within the bilateral lower lobes. 4. Ancillary findings as  described above. Electronically Signed: Jigna Olson MD  6/5/2024 5:16 AM EDT  Workstation ID: [id number]     Results for orders placed during the hospital encounter of 04/23/24    Duplex Carotid Ultrasound CAR    Interpretation Summary    Right internal carotid artery demonstrates a less than 50% stenosis.    Left internal carotid artery demonstrates a less than 50% stenosis.      Results for orders placed during the hospital encounter of 04/23/24    Duplex Carotid Ultrasound CAR    Interpretation Summary    Right internal carotid artery demonstrates a less than 50% stenosis.    Left internal carotid artery demonstrates a less than 50% stenosis.      Results for orders placed during the hospital encounter of 04/23/24    Adult Transthoracic Echo Complete W/ Cont if Necessary Per Protocol (With Agitated Saline)    Interpretation Summary    Left ventricular systolic function is normal.  Left ventricular ejection fraction appears to be 61 - 65%.    Left ventricular diastolic function is consistent with (grade I) impaired relaxation.    There is calcification of the mitral valve.    Saline test results are negative.      Plan for Follow-up of Pending Labs/Results: I will follow  Pending Labs       Order Current Status    Blood Culture - Blood, Arm, Left Preliminary result          Discharge Details        Discharge Medications        New Medications        Instructions Start Date   amoxicillin-clavulanate 875-125 MG per tablet  Commonly known as: AUGMENTIN   1 tablet, Oral, 2 Times Daily      guaiFENesin-codeine 100-10 MG/5ML liquid  Commonly known as: GUAIFENESIN AC   5 mL, Oral, 3 Times Daily PRN      nicotine 21 MG/24HR patch  Commonly known as: NICODERM CQ   1 patch, Transdermal, Every 24 Hours Scheduled             Changes to Medications        Instructions Start Date   aspirin 325 MG tablet  What changed: These instructions start on Raegan 15, 2024. If you are unsure what to do until then, ask your doctor or other  care provider.   325 mg, Oral, Daily   Start Date: Raegan 15, 2024     famotidine 20 MG tablet  Commonly known as: PEPCID  What changed:   how to take this  when to take this  reasons to take this   TAKE 1 TABLET BY MOUTH TWICE DAILY             Continue These Medications        Instructions Start Date   albuterol sulfate  (90 Base) MCG/ACT inhaler  Commonly known as: Ventolin HFA   2 puffs, Inhalation, Every 4 Hours PRN      clobetasol propionate 0.05 % cream  Commonly known as: TEMOVATE   Apply once-nightly application for 12 weeks. Then twice weekly for long-term LS maintenance.      CoQ-10 200 MG capsule   1 tablet, Oral, Daily      [employer] WELLNESS PO   1 capsule, Oral, Daily      cyanocobalamin 500 MCG tablet  Commonly known as: VITAMIN B-12   500 mcg, Oral, Daily      Dexilant 60 MG capsule  Generic drug: dexlansoprazole   1 capsule, Oral, Daily      donepezil 5 MG tablet  Commonly known as: Aricept   5 mg, Oral, Nightly      ezetimibe 10 MG tablet  Commonly known as: ZETIA   10 mg, Oral, Nightly      Fluticasone-Umeclidin-Vilant 100-62.5-25 MCG/ACT inhaler  Commonly known as: Trelegy Ellipta   1 puff, Inhalation, Daily      folic acid 1 MG tablet  Commonly known as: FOLVITE   1 mg, Oral, Daily      montelukast 10 MG tablet  Commonly known as: SINGULAIR   10 mg, Oral, Nightly      omeprazole 40 MG capsule  Commonly known as: priLOSEC   40 mg, Oral, Daily      predniSONE 2.5 MG tablet  Commonly known as: DELTASONE   1 tablet, Oral, Daily      rosuvastatin 40 MG tablet  Commonly known as: CRESTOR   40 mg, Oral, Nightly      tocilizumab 200 MG/10ML solution injection  Commonly known as: ACTEMRA   8 mg/kg, Intravenous, Every 30 Days      zoledronic acid 5 MG/100ML solution infusion  Commonly known as: RECLAST   Administer ZOLEDRONIC ACID 5mg IV once annually             Stop These Medications      buPROPion  MG 12 hr tablet  Commonly known as: WELLBUTRIN SR     busPIRone 5 MG  tablet  Commonly known as: BUSPAR     cyclobenzaprine 10 MG tablet  Commonly known as: FLEXERIL     erythromycin 5 MG/GM ophthalmic ointment  Commonly known as: ROMYCIN              Allergies   Allergen Reactions    Bactrim [Sulfamethoxazole-Trimethoprim] Other (See Comments)     Hypokalemia, hyponatremia    Atorvastatin Unknown (See Comments)     Lipitor      Bupropion Hcl [Bupropion] Unknown (See Comments)     wellbutrin    Calcium Unknown (See Comments)     unknown         Discharge Disposition:  Home or Self Care    Diet:  Hospital:  No active diet order           Activity:           CODE STATUS:    Code Status and Medical Interventions:   Ordered at: 06/05/24 0959     Level Of Support Discussed With:    Patient     Code Status (Patient has no pulse and is not breathing):    CPR (Attempt to Resuscitate)     Medical Interventions (Patient has pulse or is breathing):    Full Support       Future Appointments   Date Time Provider Department Center   6/10/2024 10:30 AM Eve Navarrete MD [facility]   7/26/2024 11:30 AM Amanda Kenny APRN [facility]   8/27/2024  1:15 PM Eve Navarrete MD [facility]   9/30/2024  3:20 PM Maida Martinez MD [facility]   3/4/2025  1:15 PM Eve Navarrete MD [facility]       Additional Instructions for the Follow-ups that You Need to Schedule       Discharge Follow-up with PCP   As directed       Currently Documented PCP:    Eve Navarrete MD    PCP Phone Number:    [phone]     Follow Up Details: 1 week                      Michelle Finn MD  06/09/24      Time Spent on Discharge:  I spent  40  minutes on this discharge activity which included: face-to-face encounter with the patient, reviewing the data in the system, coordination of the care with the nursing staff as well as consultants, documentation, and entering orders.

## 2024-06-08 NOTE — PLAN OF CARE
Problem: Adult Inpatient Plan of Care  Goal: Plan of Care Review  Outcome: Ongoing, Progressing  Goal: Patient-Specific Goal (Individualized)  Outcome: Ongoing, Progressing  Goal: Absence of Hospital-Acquired Illness or Injury  Outcome: Ongoing, Progressing  Intervention: Identify and Manage Fall Risk  Description: Perform standard risk assessment on admission using a validated tool or comprehensive approach appropriate to the patient; reassess fall risk frequently, with change in status or transfer to another level of care.  Communicate fall injury risk to interprofessional healthcare team.  Determine need for increased observation, equipment and environmental modification, such as low bed, signage and supportive, nonskid footwear.  Adjust safety measures to individual developmental age, stage and identified risk factors.  Reinforce the importance of safety and physical activity with patient and family.  Perform regular intentional rounding to assess need for position change, pain assessment and personal needs, including assistance with toileting.  Recent Flowsheet Documentation  Taken 6/8/2024 0418 by Mary Reid, RN  Safety Promotion/Fall Prevention:   activity supervised   assistive device/personal items within reach   clutter free environment maintained   fall prevention program maintained   lighting adjusted   mobility aid in reach   nonskid shoes/slippers when out of bed   room organization consistent   safety round/check completed  Taken 6/8/2024 0209 by Mary Reid, RN  Safety Promotion/Fall Prevention:   activity supervised   assistive device/personal items within reach   clutter free environment maintained   fall prevention program maintained   lighting adjusted   mobility aid in reach   nonskid shoes/slippers when out of bed   room organization consistent   safety round/check completed  Taken 6/8/2024 0000 by Mary Reid, RN  Safety Promotion/Fall Prevention:   activity supervised   assistive  device/personal items within reach   clutter free environment maintained   fall prevention program maintained   lighting adjusted   mobility aid in reach   nonskid shoes/slippers when out of bed   room organization consistent   safety round/check completed  Taken 6/7/2024 2200 by Mary Reid RN  Safety Promotion/Fall Prevention:   activity supervised   assistive device/personal items within reach   clutter free environment maintained   fall prevention program maintained   lighting adjusted   mobility aid in reach   nonskid shoes/slippers when out of bed   room organization consistent   safety round/check completed  Taken 6/7/2024 2000 by Mary Reid RN  Safety Promotion/Fall Prevention:   activity supervised   assistive device/personal items within reach   clutter free environment maintained   fall prevention program maintained   lighting adjusted   mobility aid in reach   nonskid shoes/slippers when out of bed   room organization consistent   safety round/check completed  Intervention: Prevent Skin Injury  Description: Perform a screening for skin injury risk, such as pressure or moisture associated skin damage on admission and at regular intervals throughout hospital stay.  Keep all areas of skin (especially folds) clean and dry.  Maintain adequate skin hydration.  Relieve and redistribute pressure and protect bony prominences; implement measures based on patient-specific risk factors.  Match turning and repositioning schedule to clinical condition.  Encourage weight shift frequently; assist with reposition if unable to complete independently.  Float heels off bed; avoid pressure on the Achilles tendon.  Keep skin free from extended contact with medical devices.  Encourage functional activity and mobility, as early as tolerated.  Use aids (e.g., slide boards, mechanical lift) during transfer.  Recent Flowsheet Documentation  Taken 6/8/2024 0209 by Mary Reid RN  Body Position: position changed  independently  Taken 6/8/2024 0000 by Mary Reid RN  Body Position: position changed independently  Taken 6/7/2024 2200 by Mary Reid RN  Body Position: position changed independently  Skin Protection:   adhesive use limited   tubing/devices free from skin contact   transparent dressing maintained   skin-to-skin areas padded   skin-to-device areas padded   skin sealant/moisture barrier applied   incontinence pads utilized   silicone foam dressing in place  Taken 6/7/2024 2000 by Mary Reid RN  Body Position: position changed independently  Skin Protection:   adhesive use limited   tubing/devices free from skin contact   transparent dressing maintained   skin-to-skin areas padded   skin-to-device areas padded   skin sealant/moisture barrier applied   incontinence pads utilized   silicone foam dressing in place  Intervention: Prevent and Manage VTE (Venous Thromboembolism) Risk  Description: Assess for VTE (venous thromboembolism) risk.  Encourage and assist with early ambulation.  Initiate and maintain compression or other therapy, as indicated, based on identified risk in accordance with organizational protocol and provider order.  Encourage both active and passive leg exercises while in bed, if unable to ambulate.  Recent Flowsheet Documentation  Taken 6/8/2024 0418 by Mary Reid RN  Activity Management: up ad laney  Taken 6/8/2024 0209 by Mary Reid RN  Activity Management: up ad laney  Taken 6/8/2024 0000 by Mary Reid RN  Activity Management: up ad laney  Taken 6/7/2024 2200 by Mary Reid RN  Activity Management: up ad laney  Taken 6/7/2024 2000 by Mary Reid RN  Activity Management: up ad laney  Intervention: Prevent Infection  Description: Maintain skin and mucous membrane integrity; promote hand, oral and pulmonary hygiene.  Optimize fluid balance, nutrition, sleep and glycemic control to maximize infection resistance.  Identify potential sources of infection early to prevent or  mitigate progression of infection (e.g., wound, lines, devices).  Evaluate ongoing need for invasive devices; remove promptly when no longer indicated.  Recent Flowsheet Documentation  Taken 6/7/2024 2200 by Mary Reid RN  Infection Prevention:   environmental surveillance performed   equipment surfaces disinfected   hand hygiene promoted   personal protective equipment utilized   rest/sleep promoted  Taken 6/7/2024 2000 by Mary Reid RN  Infection Prevention:   environmental surveillance performed   equipment surfaces disinfected   hand hygiene promoted   personal protective equipment utilized   rest/sleep promoted  Goal: Optimal Comfort and Wellbeing  Outcome: Ongoing, Progressing  Intervention: Provide Person-Centered Care  Description: Use a family-focused approach to care.  Develop trust and rapport by proactively providing information, encouraging questions, addressing concerns and offering reassurance.  Acknowledge emotional response to hospitalization.  Recognize and utilize personal coping strategies.  Honor spiritual and cultural preferences.  Recent Flowsheet Documentation  Taken 6/7/2024 2000 by Mary Reid RN  Trust Relationship/Rapport:   care explained   choices provided   emotional support provided   empathic listening provided   questions answered   questions encouraged   thoughts/feelings acknowledged  Goal: Readiness for Transition of Care  Outcome: Ongoing, Progressing     Problem: COPD (Chronic Obstructive Pulmonary Disease) Comorbidity  Goal: Maintenance of COPD Symptom Control  Outcome: Ongoing, Progressing  Intervention: Maintain COPD-Symptom Control  Description: Evaluate adherence to management plan (e.g., medication, trigger avoidance, infection prevention, self-monitoring).  Advocate for continuation of home regimen, including medication, method of delivery, schedule and symptom monitoring.  Anticipate the need for breathing techniques and activity pacing to minimize fatigue  and breathlessness.  Assess for proper use of inhaled medication and delivery technique; assist or reinstruct if needed.  Evaluate effectiveness of coping skills; encourage expression of feelings, expectations and concerns related to disease management and quality of life; reinforce education to enhance management plan and wellbeing.  Recent Flowsheet Documentation  Taken 6/8/2024 0418 by Mary Reid RN  Medication Review/Management: medications reviewed  Taken 6/8/2024 0209 by Mary Reid RN  Medication Review/Management: medications reviewed  Taken 6/8/2024 0000 by Mary Reid RN  Medication Review/Management: medications reviewed  Taken 6/7/2024 2200 by Mary Reid RN  Medication Review/Management: medications reviewed  Taken 6/7/2024 2000 by Mary Reid RN  Medication Review/Management: medications reviewed     Problem: Hypertension Comorbidity  Goal: Blood Pressure in Desired Range  Outcome: Ongoing, Progressing  Intervention: Maintain Blood Pressure Management  Description: Evaluate adherence to home antihypertensive regimen (e.g., exercise and activity, diet modification, medication).  Provide scheduled antihypertensive medication; consider administration time and effects (e.g., avoid giving diuretic prior to bedtime).  Monitor response to antihypertensive medication therapy (e.g., blood pressure, electrolyte levels, medication effects).  Minimize risk of orthostatic hypotension; encourage caution with position changes, particularly if elderly.  Recent Flowsheet Documentation  Taken 6/8/2024 0418 by Mary Reid RN  Medication Review/Management: medications reviewed  Taken 6/8/2024 0209 by Mary Reid RN  Medication Review/Management: medications reviewed  Taken 6/8/2024 0000 by Mary Reid RN  Medication Review/Management: medications reviewed  Taken 6/7/2024 2200 by Mary Reid RN  Medication Review/Management: medications reviewed  Taken 6/7/2024 2000 by Mary Reid  RN  Medication Review/Management: medications reviewed     Problem: Fall Injury Risk  Goal: Absence of Fall and Fall-Related Injury  Outcome: Ongoing, Progressing  Intervention: Identify and Manage Contributors  Description: Develop a fall prevention plan with the patient and caregiver/family.  Provide reorientation, appropriate sensory stimulation and routines with changes in mental status to decrease risk of fall.  Promote use of personal vision and auditory aids.  Assess assistance level required for safe and effective self-care; provide support as needed, such as toileting, mobilization. For age 65 and older, implement timed toileting with assistance.  Encourage physical activity, such as performance of mobility and self-care at highest level of patient ability, multicomponent exercise program and provision of appropriate assistive devices.  If fall occurs, assess the severity of injury; implement fall injury protocol. Determine the cause and revise fall injury prevention plan.  Regularly review medication contribution to fall risk; adjust medication administration times to minimize risk of falling.  Consider risk related to polypharmacy and age.  Balance adequate pain management with potential for oversedation.  Recent Flowsheet Documentation  Taken 6/8/2024 0418 by Mary Reid RN  Medication Review/Management: medications reviewed  Taken 6/8/2024 0209 by Mary Reid RN  Medication Review/Management: medications reviewed  Taken 6/8/2024 0000 by Mary Reid RN  Medication Review/Management: medications reviewed  Taken 6/7/2024 2200 by Mary Reid RN  Medication Review/Management: medications reviewed  Taken 6/7/2024 2000 by Mary Reid RN  Medication Review/Management: medications reviewed  Intervention: Promote Injury-Free Environment  Description: Provide a safe, barrier-free environment that encourages independent activity.  Keep care area uncluttered and well-lighted.  Determine need for  increased observation or monitoring.  Avoid use of devices that minimize mobility, such as restraints or indwelling urinary catheter.  Recent Flowsheet Documentation  Taken 6/8/2024 0418 by Mary Reid RN  Safety Promotion/Fall Prevention:   activity supervised   assistive device/personal items within reach   clutter free environment maintained   fall prevention program maintained   lighting adjusted   mobility aid in reach   nonskid shoes/slippers when out of bed   room organization consistent   safety round/check completed  Taken 6/8/2024 0209 by Mary Reid RN  Safety Promotion/Fall Prevention:   activity supervised   assistive device/personal items within reach   clutter free environment maintained   fall prevention program maintained   lighting adjusted   mobility aid in reach   nonskid shoes/slippers when out of bed   room organization consistent   safety round/check completed  Taken 6/8/2024 0000 by Mary Reid RN  Safety Promotion/Fall Prevention:   activity supervised   assistive device/personal items within reach   clutter free environment maintained   fall prevention program maintained   lighting adjusted   mobility aid in reach   nonskid shoes/slippers when out of bed   room organization consistent   safety round/check completed  Taken 6/7/2024 2200 by Mary Reid RN  Safety Promotion/Fall Prevention:   activity supervised   assistive device/personal items within reach   clutter free environment maintained   fall prevention program maintained   lighting adjusted   mobility aid in reach   nonskid shoes/slippers when out of bed   room organization consistent   safety round/check completed  Taken 6/7/2024 2000 by Mary Reid RN  Safety Promotion/Fall Prevention:   activity supervised   assistive device/personal items within reach   clutter free environment maintained   fall prevention program maintained   lighting adjusted   mobility aid in reach   nonskid shoes/slippers when out of  bed   room organization consistent   safety round/check completed     Problem: Skin Injury Risk Increased  Goal: Skin Health and Integrity  Outcome: Ongoing, Progressing  Intervention: Optimize Skin Protection  Description: Perform a full pressure injury risk assessment, as indicated by screening, upon admission to care unit.  Reassess skin (injury risk, full inspection) frequently (e.g., scheduled interval, with change in condition) to provide optimal early detection and prevention.  Maintain adequate tissue perfusion (e.g., encourage fluid balance; avoid crossing legs, constrictive clothing or devices) to promote tissue oxygenation.  Maintain head of bed at lowest degree of elevation tolerated, considering medical condition and other restrictions.  Avoid positioning onto an area that remains reddened.  Minimize incontinence and moisture (e.g., toileting schedule; moisture-wicking pad, diaper or incontinence collection device; skin moisture barrier).  Cleanse skin promptly and gently when soiled utilizing a pH-balanced cleanser.  Relieve and redistribute pressure (e.g., scheduled position changes, weight shifts, use of support surface, medical device repositioning, protective dressing application, use of positioning device, microclimate control, use of pressure-injury-monitor  Encourage increased activity, such as sitting in a chair at the bedside or early mobilization, when able to tolerate.  Recent Flowsheet Documentation  Taken 6/7/2024 2200 by Mary Reid RN  Pressure Reduction Techniques:   frequent weight shift encouraged   weight shift assistance provided   heels elevated off bed   positioned off wounds   pressure points protected  Head of Bed (HOB) Positioning: HOB at 30-45 degrees  Pressure Reduction Devices:   pressure-redistributing mattress utilized   positioning supports utilized   heel offloading device utilized  Skin Protection:   adhesive use limited   tubing/devices free from skin contact    transparent dressing maintained   skin-to-skin areas padded   skin-to-device areas padded   skin sealant/moisture barrier applied   incontinence pads utilized   silicone foam dressing in place  Taken 6/7/2024 2000 by Mary Reid RN  Pressure Reduction Techniques:   frequent weight shift encouraged   weight shift assistance provided   heels elevated off bed   positioned off wounds   pressure points protected  Head of Bed (HOB) Positioning: HOB at 30-45 degrees  Pressure Reduction Devices:   pressure-redistributing mattress utilized   positioning supports utilized   heel offloading device utilized  Skin Protection:   adhesive use limited   tubing/devices free from skin contact   transparent dressing maintained   skin-to-skin areas padded   skin-to-device areas padded   skin sealant/moisture barrier applied   incontinence pads utilized   silicone foam dressing in place   Goal Outcome Evaluation:

## 2024-06-09 PROBLEM — B34.8 RHINOVIRUS INFECTION: Status: ACTIVE | Noted: 2024-06-09

## 2024-06-10 ENCOUNTER — TRANSITIONAL CARE MANAGEMENT TELEPHONE ENCOUNTER (OUTPATIENT)
Dept: CALL CENTER | Facility: HOSPITAL | Age: 74
End: 2024-06-10
Payer: MEDICARE

## 2024-06-10 ENCOUNTER — OFFICE VISIT (OUTPATIENT)
Dept: INTERNAL MEDICINE | Facility: CLINIC | Age: 74
End: 2024-06-10
Payer: MEDICARE

## 2024-06-10 VITALS
OXYGEN SATURATION: 89 % | SYSTOLIC BLOOD PRESSURE: 134 MMHG | DIASTOLIC BLOOD PRESSURE: 62 MMHG | BODY MASS INDEX: 27.08 KG/M2 | HEIGHT: 61 IN | TEMPERATURE: 97.3 F | HEART RATE: 103 BPM | WEIGHT: 143.4 LBS

## 2024-06-10 DIAGNOSIS — F17.200 TOBACCO DEPENDENCE SYNDROME: ICD-10-CM

## 2024-06-10 DIAGNOSIS — K57.33 DIVERTICULITIS LARGE INTESTINE W/O PERFORATION OR ABSCESS W/BLEEDING: ICD-10-CM

## 2024-06-10 DIAGNOSIS — I10 PRIMARY HYPERTENSION: Chronic | ICD-10-CM

## 2024-06-10 DIAGNOSIS — J44.9 COPD WITH HYPOXIA: ICD-10-CM

## 2024-06-10 DIAGNOSIS — S30.1XXA HEMATOMA OF RECTUS SHEATH, INITIAL ENCOUNTER: Primary | ICD-10-CM

## 2024-06-10 LAB — BACTERIA SPEC AEROBE CULT: NORMAL

## 2024-06-10 PROCEDURE — 3075F SYST BP GE 130 - 139MM HG: CPT | Performed by: INTERNAL MEDICINE

## 2024-06-10 PROCEDURE — 1160F RVW MEDS BY RX/DR IN RCRD: CPT | Performed by: INTERNAL MEDICINE

## 2024-06-10 PROCEDURE — 1159F MED LIST DOCD IN RCRD: CPT | Performed by: INTERNAL MEDICINE

## 2024-06-10 PROCEDURE — 99496 TRANSJ CARE MGMT HIGH F2F 7D: CPT | Performed by: INTERNAL MEDICINE

## 2024-06-10 PROCEDURE — 1126F AMNT PAIN NOTED NONE PRSNT: CPT | Performed by: INTERNAL MEDICINE

## 2024-06-10 PROCEDURE — 3078F DIAST BP <80 MM HG: CPT | Performed by: INTERNAL MEDICINE

## 2024-06-10 PROCEDURE — 1111F DSCHRG MED/CURRENT MED MERGE: CPT | Performed by: INTERNAL MEDICINE

## 2024-06-10 RX ORDER — NICOTINE 21 MG/24HR
1 PATCH, TRANSDERMAL 24 HOURS TRANSDERMAL
Qty: 30 EACH | Refills: 3 | Status: SHIPPED | OUTPATIENT
Start: 2024-06-10

## 2024-06-10 RX ORDER — DONEPEZIL HYDROCHLORIDE 10 MG/1
10 TABLET, FILM COATED ORAL NIGHTLY
Qty: 90 TABLET | Refills: 1 | Status: SHIPPED | OUTPATIENT
Start: 2024-06-10

## 2024-06-10 NOTE — PROGRESS NOTES
Transitional Care Follow Up Visit  Subjective     Irma Pedraza is a 74 y.o. female who presents for a transitional care management visit.    Within 48 business hours after discharge our office contacted her via telephone to coordinate her care and needs.      I reviewed and discussed the details of that call along with the discharge summary, hospital problems, inpatient lab results, inpatient diagnostic studies, and consultation reports with [place].     Current outpatient and discharge medications have been reconciled for the patient.  Reviewed by: Sergio Valenzuela MA    Current outpatient and discharge medications have been reconciled for the patient.  Reviewed by: Eve Navarrete MD            4/25/2024     5:07 PM 6/8/2024     4:55 PM   Date of TCM Phone Call   [facility]   Date of Admission 4/23/2024 6/5/2024   Date of Discharge 4/25/2024 6/8/2024   Discharge Disposition Home or Self Care Home or Self Care     Risk for Readmission (LACE) Score: 13 (6/8/2024  6:00 AM)      History of Present Illness   Course During Hospital Stay: Patient presented to the ER on 6/5/2024 with 2 days of abdominal pain, diffuse, slightly worse on the right side.  She had diffuse hematoma covering the entire abdomen.  She did not remember any trauma to the area.  She is on aspirin and prednisone daily.  She was also found to have mild sigmoid diverticulitis.  She was found to be hypoxic.  Rhinovirus positive.  Active smoking.  Room air oxygen saturation ran 86 to 92% during admission though the patient denied any dyspnea.  She was discharged on oxygen per nasal cannula.  She was also discharged with nicotine patches and encouraged to quit smoking.  Blood culture grew bacillus which was presumed contaminant.  She was discharged home on 6/8/2024.  History of Present Illness  The patient is here for a hospital follow-up.     The patient was diagnosed with diverticulitis and received  antibiotic treatment upon discharge on Saturday. Despite her condition, she continues to experience severe abdominal pain, which is tender to touch. She reports abdominal distension and irregular bowel movements, with one bowel movement post-discharge and one during her hospital stay. Her bowel movements have been softer and liquid since her discharge. Her fluid intake is primarily water, although she acknowledges inadequate intake. She denies experiencing constipation or heartburn.    The patient has a hematoma that developed abruptly following an anticoagulant injection. The hematoma extends to her right heel. Her anticoagulant therapy was discontinued, and she was reintroduced aspirin 325 upon discharge. She has been utilizing a heating pad for pain management. She is currently on a regimen of aspirin 325.    The patient has not been monitoring her blood pressure at home, despite owning a blood pressure cuff.    The patient was discharged with a temporary oxygen supply, which is currently depleted. She was informed that a representative would be responsible for installing the device. She has not utilized her albuterol inhaler or Trelegy inhaler today.    The patient abstained from smoking during her hospital stay, having consumed a couple of cigarettes yesterday and 1 today.    Supplemental Information  She does not have an appointment scheduled with rheumatologist, Dr. Swift. She is still doing her infusions. Her memory has gotten worse. She was started on donepezil 5 mg after her stroke.   Her mother had Alzheimer's.      The following portions of the patient's history were reviewed and updated as appropriate: allergies, current medications, past family history, past medical history, past social history, past surgical history, and problem list.    Vitals:    06/10/24 1038   BP: 134/62   BP Location: Left arm   Patient Position: Sitting   Cuff Size: Adult   Pulse: 103   Temp: 97.3 °F (36.3 °C)   TempSrc:  "Infrared   SpO2: (!) 89%  Comment: pt felt short of breath coming to pt room and pt's fingers were cold to touch   Weight: 65 kg (143 lb 6.4 oz)   Height: 154.9 cm (60.98\")     Body mass index is 27.11 kg/m².       Review of Systems    Objective   Physical Exam  Vitals and nursing note reviewed.   Constitutional:       Appearance: She is well-developed and overweight.   HENT:      Head: Normocephalic.   Eyes:      Conjunctiva/sclera: Conjunctivae normal.      Pupils: Pupils are equal, round, and reactive to light.   Neck:      Thyroid: No thyromegaly.   Cardiovascular:      Rate and Rhythm: Normal rate and regular rhythm.      Heart sounds: Normal heart sounds.   Pulmonary:      Effort: Pulmonary effort is normal.      Breath sounds: Normal breath sounds. No wheezing.   Abdominal:      Palpations: Abdomen is soft.      Tenderness: There is abdominal tenderness.          Comments: Entire abdomen with extensive hematoma and diffuse abdominal wall tenderness, rectus sheath hematoma   Musculoskeletal:         General: Normal range of motion.      Cervical back: Normal range of motion and neck supple.   Lymphadenopathy:      Cervical: No cervical adenopathy.   Skin:     General: Skin is warm and dry.   Neurological:      Mental Status: She is alert and oriented to person, place, and time.   Psychiatric:         Thought Content: Thought content normal.         Cognition and Memory: Cognition is impaired. Memory is impaired.         Assessment & Plan     Patient Instructions   Problem List Items Addressed This Visit          Cardiac and Vasculature    HTN (hypertension) (Chronic)    Overview     Amlodipine stopped in hospital 4/2024.            Gastrointestinal Abdominal     Rectus sheath hematoma - Primary    Diverticulitis large intestine w/o perforation or abscess w/bleeding       Pulmonary and Pneumonias    COPD with hypoxia    Overview     Sent home from hospital 6/24 with O2 ordered.         Relevant Medications    " predniSONE (DELTASONE) 2.5 MG tablet    albuterol sulfate HFA (Ventolin HFA) 108 (90 Base) MCG/ACT inhaler    montelukast (SINGULAIR) 10 MG tablet    Fluticasone-Umeclidin-Vilant (Trelegy Ellipta) 100-62.5-25 MCG/ACT inhaler    guaiFENesin-codeine (GUAIFENESIN AC) 100-10 MG/5ML liquid       Tobacco    Tobacco dependence syndrome    Overview     Nicotine patch started 4/2024.         Relevant Medications    nicotine (NICODERM CQ) 21 MG/24HR patch          Diagnosis Plan   1. Hematoma of rectus sheath, initial encounter        2. Primary hypertension        3. Diverticulitis large intestine w/o perforation or abscess w/bleeding        4. COPD with hypoxia        5. Tobacco dependence syndrome          Assessment & Plan  1. Diverticulitis.  The patient is advised to complete the prescribed course of Augmentin. She is also advised to increase her fluid intake, consume soft cooked vegetables such as green beans, peas, or zucchini, and consume soft foods such as chicken soup. Additionally, she should increase her intake of vegetables and fruits, and increase her water intake.    2. Chronic Obstructive Pulmonary Disease (COPD) with hypoxia.  The patient is advised to consistently use oxygen and to cease smoking. She is to use the Trelegy inhaler daily and continue the albuterol inhaler as needed.    3. Rectus sheath hematoma.  The patient is advised to take Tylenol as needed for pain management, apply a heat pack or heating pad to the abdomen, and avoid heavy lifting beyond a 0.5 gallon of milk.    4. Hypertension.  The patient is advised to continue avoiding salt in her diet. She is encouraged to monitor her blood pressure at home to ensure it remains around 120/70 or less.    5. Tobacco dependence.  The patient is encouraged to abstain from smoking, discard cigarettes, ashwagandha, and use the nicotine patch daily.        Patient or patient representative verbalized consent for the use of Ambient Listening during the  visit with  Eve Navarrete MD for chart documentation. 6/13/2024  20:45 EDT

## 2024-06-10 NOTE — OUTREACH NOTE
Call Center TCM Note      Flowsheet Row Responses   [facility] patient discharged from? [place]   Does the patient have one of the following disease processes/diagnoses(primary or secondary)? Other   TCM attempt successful? Yes  [Patient was seen in the PCP office today 6/10/2024. This fulfils the TCM requirement, No phone call needed per unit protocol.]   Discharge diagnosis Diverticulitis large intestine w/o perforation or abscess w/bleeding   TCM call completed? Yes   Would this patient benefit from a Referral to [facility] Social Work? No   Is the patient interested in additional calls from an ambulatory ? No            Lalo Contreras RN    6/10/2024, 16:03 EDT

## 2024-06-12 ENCOUNTER — TELEPHONE (OUTPATIENT)
Dept: INTERNAL MEDICINE | Facility: CLINIC | Age: 74
End: 2024-06-12
Payer: MEDICARE

## 2024-06-12 NOTE — TELEPHONE ENCOUNTER
Caller: Irma Pedraza    Relationship: Self    Best call back number: [phone]     What is the best time to reach you: ANYTIME     Who are you requesting to speak with (clinical staff, provider,  specific staff member): CLINICAL STAFF    What was the call regarding: PATIENT STATES THAT WHILE SHE WAS AT THE HOSPITAL SHE WAS GIVEN CODINE-GUAIFENESIN. SHE SAYS THAT IT MAKES HER FEEL SICK. SHE WOULD LIKE TO SEE ABOUT GETTING A DIFFERENT MEDICATION CALLED IN.     Is it okay if the provider responds through MyChart: YES

## 2024-06-14 NOTE — PATIENT INSTRUCTIONS
Patient Instructions  Problem List Items Addressed This Visit          Cardiac and Vasculature    HTN (hypertension) (Chronic)    Overview     Amlodipine stopped in hospital 4/2024.            Gastrointestinal Abdominal     Rectus sheath hematoma - Primary    Diverticulitis large intestine w/o perforation or abscess w/bleeding       Pulmonary and Pneumonias    COPD with hypoxia    Overview     Sent home from hospital 6/24 with O2 ordered.         Relevant Medications    predniSONE (DELTASONE) 2.5 MG tablet    albuterol sulfate HFA (Ventolin HFA) 108 (90 Base) MCG/ACT inhaler    montelukast (SINGULAIR) 10 MG tablet    Fluticasone-Umeclidin-Vilant (Trelegy Ellipta) 100-62.5-25 MCG/ACT inhaler    guaiFENesin-codeine (GUAIFENESIN AC) 100-10 MG/5ML liquid       Tobacco    Tobacco dependence syndrome    Overview     Nicotine patch started 4/2024.         Relevant Medications    nicotine (NICODERM CQ) 21 MG/24HR patch       Exercising to Stay Healthy  To become healthy and stay healthy, it is recommended that you do moderate-intensity and vigorous-intensity exercise. You can tell that you are exercising at a moderate intensity if your heart starts beating faster and you start breathing faster but can still hold a conversation. You can tell that you are exercising at a vigorous intensity if you are breathing much harder and faster and cannot hold a conversation while exercising.  How can exercise benefit me?  Exercising regularly is important. It has many health benefits, such as:  Improving overall fitness, flexibility, and endurance.  Increasing bone density.  Helping with weight control.  Decreasing body fat.  Increasing muscle strength and endurance.  Reducing stress and tension, anxiety, depression, or anger.  Improving overall health.  What guidelines should I follow while exercising?  Before you start a new exercise program, talk with your health care provider.  Do not exercise so much that you hurt yourself, feel  dizzy, or get very short of breath.  Wear comfortable clothes and wear shoes with good support.  Drink plenty of water while you exercise to prevent dehydration or heat stroke.  Work out until your breathing and your heartbeat get faster (moderate intensity).  How often should I exercise?  Choose an activity that you enjoy, and set realistic goals. Your health care provider can help you make an activity plan that is individually designed and works best for you.  Exercise regularly as told by your health care provider. This may include:  Doing strength training two times a week, such as:  Lifting weights.  Using resistance bands.  Push-ups.  Sit-ups.  Yoga.  Doing a certain intensity of exercise for a given amount of time. Choose from these options:  A total of 150 minutes of moderate-intensity exercise every week.  A total of 75 minutes of vigorous-intensity exercise every week.  A mix of moderate-intensity and vigorous-intensity exercise every week.  Children, pregnant women, people who have not exercised regularly, people who are overweight, and older adults may need to talk with a health care provider about what activities are safe to perform. If you have a medical condition, be sure to talk with your health care provider before you start a new exercise program.  What are some exercise ideas?  Moderate-intensity exercise ideas include:  Walking 1 mile (1.6 km) in about 15 minutes.  Biking.  Hiking.  Golfing.  Dancing.  Water aerobics.  Vigorous-intensity exercise ideas include:  Walking 4.5 miles (7.2 km) or more in about 1 hour.  Jogging or running 5 miles (8 km) in about 1 hour.  Biking 10 miles (16.1 km) or more in about 1 hour.  Lap swimming.  Roller-skating or in-line skating.  Cross-country skiing.  Vigorous competitive sports, such as football, basketball, and soccer.  Jumping rope.  Aerobic dancing.  What are some everyday activities that can help me get exercise?  Yard work, such as:  Pushing a lawn  mower.  Raking and bagging leaves.  Washing your car.  Pushing a stroller.  Shoveling snow.  Gardening.  Washing windows or floors.  How can I be more active in my day-to-day activities?  Use stairs instead of an elevator.  Take a walk during your lunch break.  If you drive, park your car farther away from your work or school.  If you take public transportation, get off one stop early and walk the rest of the way.  Stand up or walk around during all of your indoor phone calls.  Get up, stretch, and walk around every 30 minutes throughout the day.  Enjoy exercise with a friend. Support to continue exercising will help you keep a regular routine of activity.  Where to find more information  You can find more information about exercising to stay healthy from:  U.S. Department of Health and Human Services: www.hhs.gov  Centers for Disease Control and Prevention (CDC): www.cdc.gov  Summary  Exercising regularly is important. It will improve your overall fitness, flexibility, and endurance.  Regular exercise will also improve your overall health. It can help you control your weight, reduce stress, and improve your bone density.  Do not exercise so much that you hurt yourself, feel dizzy, or get very short of breath.  Before you start a new exercise program, talk with your health care provider.  This information is not intended to replace advice given to you by your health care provider. Make sure you discuss any questions you have with your health care provider.  Document Revised: 04/15/2022 Document Reviewed: 04/15/2022  [employer] Patient Education © 2023 [employer] BMI for Adults  Body mass index (BMI) is a number found using a person's weight and height. BMI can help tell how much of a person's weight is made up of fat. BMI does not measure body fat directly. It is used instead of tests that directly measure body fat, which can be difficult and expensive.  What are BMI measurements used for?  BMI is useful to:  Find  "out if your weight puts you at higher risk for medical problems.  Help recommend changes, such as in diet and exercise. This can help you reach a healthy weight. BMI screening can be done again to see if these changes are working.  How is BMI calculated?  Your height and weight are measured. The BMI is found from those numbers. This can be done with U[place] or metric measurements. Note that charts and online BMI calculators are available to help you find your BMI quickly and easily without doing these calculations.  To calculate your BMI in U.[place] measurements:  Measure your weight in pounds (lb).  Multiply the number of pounds by 703.  So, for an adult who weighs 150 lb, multiply that number by 703: 150 x 703, which equals 105,450.  Measure your height in inches. Then multiply that number by itself to get a measurement called \"inches squared.\"  So, for an adult who is 70 inches tall, the \"inches squared\" measurement is 70 inches x 70 inches, which equals 4,900 inches squared.  Divide the total from step 2 (number of lb x 703) by the total from step 3 (inches squared): 105,450 ÷ 4,900 = 21.5. This is your BMI.  To calculate your BMI in metric measurements:    Measure your weight in kilograms (kg).  For this example, the weight is 70 kg.  Measure your height in meters (m). Then multiply that number by itself to get a measurement called \"meters squared.\"  So, for an adult who is 1.75 m tall, the \"meters squared\" measurement is 1.75 m x 1.75 m, which equals 3.1 meters squared.  Divide the number of kilograms (your weight) by the meters squared number. In this example: 70 ÷ 3.1 = 22.6. This is your BMI.  What do the results mean?  BMI charts are used to see if you are underweight, normal weight, overweight, or obese. The following guidelines will be used:  Underweight: BMI less than 18.5.  Normal weight: BMI between 18.5 and 24.9.  Overweight: BMI between 25 and 29.9.  Obese: BMI of 30 or above.  BMI is a tool and cannot " diagnose a condition. Talk with your health care provider about what your BMI means for you. Keep these notes in mind:  Weight includes fat and muscle. Someone with a muscular build, such as an athlete, may have a BMI that is higher than 24.9. In cases like these, BMI is not a correct measure of body fat.  If you have a BMI of 25 or higher, your provider may need to do more testing to find out if excess body fat is the cause.  BMI is measured the same way for males and females. Females usually have more body fat than males of the same height and weight.  Where to find more information  For more information about BMI, including tools to quickly find your BMI, go to:  Centers for Disease Control and Prevention: cdc.gov  American Heart Association: heart.org  National Heart, Lung, and Blood [employer]: nhlbi.nih.gov  This information is not intended to replace advice given to you by your health care provider. Make sure you discuss any questions you have with your health care provider.  Document Revised: 09/07/2023 Document Reviewed: 08/31/2023  [employer] Patient Education © 2024 [employer] Inc.

## 2024-06-20 ENCOUNTER — TELEPHONE (OUTPATIENT)
Dept: INTERNAL MEDICINE | Facility: CLINIC | Age: 74
End: 2024-06-20
Payer: MEDICARE

## 2024-06-20 ENCOUNTER — READMISSION MANAGEMENT (OUTPATIENT)
Dept: CALL CENTER | Facility: HOSPITAL | Age: 74
End: 2024-06-20
Payer: MEDICARE

## 2024-06-20 NOTE — TELEPHONE ENCOUNTER
[facility] for a return call. HUB can relay,   I saw the patient on the 10th for her TCM visit.  At that time I believed her abdominal pain to be from the abdominal wall hematoma.  If she is having other abdominal pain, or heartburn indigestion or diarrhea constipation etc. she should be seen by [facility]       Pt returned call and verbalized understanding.

## 2024-06-20 NOTE — OUTREACH NOTE
Medical Week 2 Survey      Flowsheet Row Responses   [facility] patient discharged from? [place]   Does the patient have one of the following disease processes/diagnoses(primary or secondary)? Other   Week 2 attempt successful? Yes   Call start time 1149   Call end time 1153   List who call center can speak with pt   Meds reviewed with patient/caregiver? Yes   Is the patient having any side effects they believe may be caused by any medication additions or changes? No   Does the patient have all medications ordered at discharge? Yes   Is the patient taking all medications as directed (includes completed medication regime)? Yes   Comments regarding appointments Pt has not made pcp f/u appointment.   Has the patient kept scheduled appointments due by today? N/A   Has home health visited the patient within 72 hours of discharge? N/A   What DME was ordered? Pt is not requiring 02 at this time.   Psychosocial issues? No   What is the patient's perception of their health status since discharge? Improving   Is the patient/caregiver able to teach back signs and symptoms related to disease process for when to call PCP? Yes   Is the patient/caregiver able to teach back signs and symptoms related to disease process for when to call 911? Yes   Is the patient/caregiver able to teach back the hierarchy of who to call/visit for symptoms/problems? PCP, Specialist, Home health nurse, Urgent Care, ED, 911 Yes   Week 2 Call Completed? Yes   Graduated Yes   Graduated/Revoked comments Pt feeling well. Pt has all medications. Pt has not made pcp f/u, pt encouraged to do so. Pt is not requiring 02.   Call end time 1153            Leana PIKE - Registered Nurse

## 2024-06-20 NOTE — TELEPHONE ENCOUNTER
Provider: DR VIDAL    Caller: MUNIRA RANDHAWA    Relationship to Patient: SELF    Phone Number: [phone]     Reason for Call: THE PATIENT'S HOME HEALTH NURSE WANTED HER TO CALL AND LET DR VIDAL KNOW THAT SHE WAS IN [facility] JUNE 5-8TH WITH DIVERTICULITIS. SHE IS STILL HAVING STOMACH PAIN BUT IT NOT SEVERE.

## 2024-06-28 ENCOUNTER — OFFICE VISIT (OUTPATIENT)
Dept: CARDIOLOGY | Facility: CLINIC | Age: 74
End: 2024-06-28
Payer: MEDICARE

## 2024-06-28 VITALS
BODY MASS INDEX: 25.98 KG/M2 | WEIGHT: 137.6 LBS | HEART RATE: 69 BPM | DIASTOLIC BLOOD PRESSURE: 60 MMHG | HEIGHT: 61 IN | SYSTOLIC BLOOD PRESSURE: 122 MMHG | OXYGEN SATURATION: 98 %

## 2024-06-28 DIAGNOSIS — I25.10 CORONARY ARTERY DISEASE INVOLVING NATIVE CORONARY ARTERY OF NATIVE HEART WITHOUT ANGINA PECTORIS: Primary | ICD-10-CM

## 2024-06-28 DIAGNOSIS — F17.200 CURRENT EVERY DAY SMOKER: ICD-10-CM

## 2024-06-28 DIAGNOSIS — E78.2 MIXED HYPERLIPIDEMIA: ICD-10-CM

## 2024-06-28 DIAGNOSIS — I10 PRIMARY HYPERTENSION: ICD-10-CM

## 2024-06-28 DIAGNOSIS — R00.2 PALPITATIONS: ICD-10-CM

## 2024-06-28 PROCEDURE — 99203 OFFICE O/P NEW LOW 30 MIN: CPT | Performed by: INTERNAL MEDICINE

## 2024-06-28 PROCEDURE — 3074F SYST BP LT 130 MM HG: CPT | Performed by: INTERNAL MEDICINE

## 2024-06-28 PROCEDURE — 3078F DIAST BP <80 MM HG: CPT | Performed by: INTERNAL MEDICINE

## 2024-06-28 NOTE — PROGRESS NOTES
[facility] Cardiology  Consultation H&P  [place] MAYTE Pedraza  1950  [address]  [facility] [address]     VISIT DATE:  06/28/24    PCP: Eve Navarrete MD  [address]  [facility] [address]    IDENTIFICATION: A 74 y.o. female     PROBLEM LIST:  CVA     4/24 TNK    4/24 echo EF >60% MV calc no pfo    4/24 CUS <50% bilat    6/24 holter 51/68/151 <1% pac/pvc no af  PMR  Giant cell arteritis  RA  Spontaneous rectus sheath hematoma 6/24   Smoker  Diverticular dz    CC:  Chief Complaint   Patient presents with    CVA    HLD    FATTY LIVER DISEASE       Allergies  Allergies   Allergen Reactions    Bactrim [Sulfamethoxazole-Trimethoprim] Other (See Comments)     Hypokalemia, hyponatremia    Codeine GI Intolerance    Atorvastatin Unknown (See Comments)     Lipitor      Bupropion Hcl [Bupropion] Unknown (See Comments)     wellbutrin    Calcium Unknown (See Comments)     unknown       Current Medications    Current Outpatient Medications:     albuterol sulfate HFA (Ventolin HFA) 108 (90 Base) MCG/ACT inhaler, Inhale 2 puffs Every 4 (Four) Hours As Needed for Wheezing., Disp: 18 g, Rfl: 5    amoxicillin-clavulanate (AUGMENTIN) 875-125 MG per tablet, Take 1 tablet by mouth 2 (Two) Times a Day., Disp: 10 tablet, Rfl: 0    aspirin 325 MG tablet, Take 1 tablet by mouth Daily., Disp: 30 tablet, Rfl: 2    clobetasol (TEMOVATE) 0.05 % cream, Apply once-nightly application for 12 weeks. Then twice weekly for long-term LS maintenance., Disp: 60 g, Rfl: 5    Coenzyme Q10 (COQ-10) 200 MG capsule, Take 1 tablet by mouth Daily., Disp: , Rfl:     cyanocobalamin (VITAMIN B-12) 500 MCG tablet, Take 1 tablet by mouth Daily., Disp: 90 tablet, Rfl: 3    dexlansoprazole (Dexilant) 60 MG capsule, Take 1 capsule by mouth Daily., Disp: , Rfl:     donepezil (Aricept) 10 MG tablet, Take 1 tablet by mouth Every Night., Disp: 90 tablet, Rfl: 1    ezetimibe (ZETIA) 10 MG tablet, Take 1 tablet by mouth Every  Night., Disp: 90 tablet, Rfl: 1    famotidine (PEPCID) 20 MG tablet, TAKE 1 TABLET BY MOUTH TWICE DAILY (Patient taking differently: 1 tablet 2 (Two) Times a Day As Needed.), Disp: 180 tablet, Rfl: 1    Fluticasone-Umeclidin-Vilant (Trelegy Ellipta) 100-62.5-25 MCG/ACT inhaler, Inhale 1 puff Daily., Disp: 3 each, Rfl: 3    folic acid (FOLVITE) 1 MG tablet, TAKE 1 TABLET BY MOUTH DAILY, Disp: 90 tablet, Rfl: 3    Lactobacillus-Inulin ([facility] [employer] PO), Take 1 capsule by mouth Daily., Disp: , Rfl:     montelukast (SINGULAIR) 10 MG tablet, TAKE 1 TABLET BY MOUTH EVERY NIGHT, Disp: 90 tablet, Rfl: 1    nicotine (NICODERM CQ) 21 MG/24HR patch, Place 1 patch on the skin as directed by provider Daily., Disp: 30 each, Rfl: 3    omeprazole (priLOSEC) 40 MG capsule, Take 1 capsule by mouth Daily., Disp: 90 capsule, Rfl: 1    rosuvastatin (CRESTOR) 40 MG tablet, Take 1 tablet by mouth Every Night., Disp: 90 tablet, Rfl: 1    tocilizumab (ACTEMRA) 200 MG/10ML solution injection, Infuse 8 mg/kg into a venous catheter Every 30 (Thirty) Days., Disp: , Rfl:     zoledronic acid (RECLAST) 5 MG/100ML solution infusion, Administer ZOLEDRONIC ACID 5mg IV once annually, Disp: , Rfl:      History of Present Illness   HPI  Irma Pedraza is a 74 y.o. year old female with the above mentioned PMH who presents for consult from Dr. ROBERTO Navarrete for evaluation of stroke risk factor control  Patient had interrupted stroke in April of this year with TNK.  She has had no residual.  She is having benign echo carotid duplex and Holter monitor.  Unfortunately she continues to smoke  Her daughter who is a sonographer in [place] was teleconferenced during the interview today  Pt denies any chest pain, dyspnea at rest, dyspnea on exertion, orthopnea, PND, palpitations, lower extremity edema, or claudication. Pt denies history of CHF, DVT, PE, MI, CVA, TIA, or rheumatic fever.       ROS  Review of Systems   Cardiovascular:  Positive for  "dyspnea on exertion.   All other systems reviewed and are negative.      SOCIAL HX  Social History     Socioeconomic History    Marital status:    Tobacco Use    Smoking status: Every Day     Current packs/day: 0.50     Average packs/day: 0.5 packs/day for 50.0 years (25.0 ttl pk-yrs)     Types: Cigarettes     Passive exposure: Current    Smokeless tobacco: Never    Tobacco comments:     patient refused smoking literature. Pt uses nicotine patch in addition   Vaping Use    Vaping status: Never Used   Substance and Sexual Activity    Alcohol use: Yes     Comment: 2 drinks per night    Drug use: No    Sexual activity: Not Currently     Partners: Male       FAMILY HX  Family History   Problem Relation Age of Onset    Osteoporosis Mother     Stroke Mother         Alzhiemers    Osteoporosis Father     Migraines Father     Hearing loss Father     Stroke Father         TIA    Brain cancer Sister     Stroke Sister         CVA    Stroke Brother         CVA    Breast cancer Maternal Grandmother 90    Stroke Paternal Grandmother         CVA    Arthritis Paternal Grandmother         Ostioperosis    Stroke Paternal Aunt         TIA    Stroke Sister         CVA    Stroke Sister         TIA    Stroke Brother         CVA    Stroke Sister         Glioblastoma    Other Sister         Rheumatoid Arthritis    Other Brother         no issues    Other Daughter         no issues    Other Sister         no issues    Ovarian cancer Neg Hx     Endometrial cancer Neg Hx        Vitals:    06/28/24 1017   BP: 122/60   BP Location: Left arm   Patient Position: Sitting   Cuff Size: Adult   Pulse: 69   SpO2: 98%   Weight: 62.4 kg (137 lb 9.6 oz)   Height: 154.9 cm (61\")     Body mass index is 26 kg/m².     PHYSICAL EXAMINATION:  Constitutional:       Appearance: Healthy appearance. Not in distress.   Neck:      Vascular: No JVR. JVD normal.   Pulmonary:      Effort: Pulmonary effort is normal.      Breath sounds: Normal breath sounds. No " wheezing. No rhonchi. No rales.   Chest:      Chest wall: Not tender to palpatation.   Cardiovascular:      PMI at left midclavicular line. Normal rate. Regular rhythm. Normal S1. Normal S2.       Murmurs: There is no murmur.      S3 No gallop.  No click. No rub.   Pulses:     Intact distal pulses.   Edema:     Peripheral edema absent.   Abdominal:      General: Bowel sounds are normal.      Palpations: Abdomen is soft.      Tenderness: There is no abdominal tenderness.   Musculoskeletal: Normal range of motion.         General: No tenderness. Skin:     General: Skin is warm and dry.   Neurological:      General: No focal deficit present.      Mental Status: Alert and oriented to person, place and time.         Diagnostic Data:  Procedures    Lab Results   Component Value Date    CHLPL 154 02/27/2024    TRIG 124 04/24/2024    HDL 50 04/24/2024     Lab Results   Component Value Date    GLUCOSE 93 06/08/2024    BUN 11 06/08/2024    CREATININE 0.55 (L) 06/08/2024     06/08/2024    K 3.3 (L) 06/08/2024     06/08/2024    CO2 32.0 (H) 06/08/2024     Lab Results   Component Value Date    HGBA1C 5.50 04/23/2024     Lab Results   Component Value Date    WBC 6.51 06/08/2024    HGB 10.7 (L) 06/08/2024    HCT 32.6 (L) 06/08/2024     (L) 06/08/2024         Advance Care Planning   ACP discussion was held with the patient during this visit. Patient has an advance directive (not in EMR), copy requested.         ASSESSMENT:   Diagnosis Plan   1. Coronary artery disease involving native coronary artery of native heart without angina pectoris        2. Palpitations        3. Primary hypertension        4. Mixed hyperlipidemia        5. Current every day smoker            PLAN:  CAD as manifested coronary calcification continue GDMT with preserved LVEF    Hypertension controlled currently off medication    Mixed dyslipidemia on current combination rosuvastatin and Zetia controlled    Smoking cessation counseling  greater than 10 minutes in the office to        No ref. provider found, thank you for referring Ms. Pedraza for evaluation.  I have forwarded my electronically generated recommendations to you for review.  Please do not hesitate to call with any questions.      Omer Cabrera MD, FACC

## 2024-07-10 ENCOUNTER — CALL CENTER PROGRAMS (OUTPATIENT)
Dept: CALL CENTER | Facility: HOSPITAL | Age: 74
End: 2024-07-10
Payer: MEDICARE

## 2024-07-10 NOTE — OUTREACH NOTE
Stroke Balko Survey      Flowsheet Row Responses   Facility patient discharged from? [facility]   Attempt successful Yes   Call start time 1223   Person spoke with today (if not patient) and relationship Caregiver  [Pt's daughter, Luanne]   Did the patient die within 30 days of admission? No   Did the patient die within 30 days of discharge? No   Call end time 1240   Patient location 30 days post discharge if known Home   Was the patient readmitted within 30 days of discharge? No   Could you live alone without any help from another person? Yes   Can you do everything that you were doing right before your stroke even if slower and not as much? Yes  [Patient's daughter reports that the patient's handwriting is no longer the same as the patient's  is weaker than normal.]   Are you completely back to the way you were right before your stroke? Yes  [Per pt's dtr, the pt at baseline has some memory issues with repeating things, losing items and forgetting to pay bills or attemd appointments.Pt's dtr reports that the pt is at baseline.]   Can you walk from one room to another without help from another person? Yes   Can the patient sit up in bed without any help? Yes   Call Center Balko Score 0   Balko score call completed Yes            Ximena MEJIA - Registered Nurse

## 2024-07-24 ENCOUNTER — OFFICE VISIT (OUTPATIENT)
Dept: NEUROLOGY | Facility: CLINIC | Age: 74
End: 2024-07-24
Payer: MEDICARE

## 2024-07-24 VITALS
HEIGHT: 61 IN | HEART RATE: 71 BPM | SYSTOLIC BLOOD PRESSURE: 136 MMHG | DIASTOLIC BLOOD PRESSURE: 78 MMHG | TEMPERATURE: 98.4 F | OXYGEN SATURATION: 94 % | WEIGHT: 138 LBS | BODY MASS INDEX: 26.06 KG/M2

## 2024-07-24 DIAGNOSIS — R41.89 COGNITIVE DECLINE: Primary | ICD-10-CM

## 2024-07-24 NOTE — PROGRESS NOTES
Follow Up Office Visit      Encounter Date: 2024   Patient Name: Irma Pedraza  : 1950   MRN: [id number]   PCP: Eve Navarrete MD    Chief Complaint:    Chief Complaint   Patient presents with    Follow-up    Stroke       History of Present Illness: Irma Pedraza is a 74 y.o. female who is here today to establish care.  Patient has prior medical history significant for hypertension, hyperlipidemia, tobacco abuse, COPD, chronic back pain and rheumatoid arthritis.  She presented to [facility] ED on 2024 with right facial droop, slurred speech and right arm weakness.  NIH was noted to be a 5.  CT of the head was negative for acute intracranial abnormality.  TNK was given.  CTA of the head and neck demonstrates bilateral carotid bulb atherosclerosis.  No LVO identified.  MRI of the brain revealed a right MCA territory infarct, cortical infarct surrounding the left central sulcus.  Carotid ultrasound was completed which shows less than 50% stenosis in bilateral ICA.  Echocardiogram shows EF of 61 to 65%, normal left atrial cavity and negative saline test.  Etiology felt to be cardioembolic versus atheroembolic.  Recommended to discharge on aspirin 325 mg, rosuvastatin 40 mg and Zetia for secondary stroke prevention.  He was recommended to complete an outpatient Holter monitor which the results are still pending.  Encouraged to stop smoking.     Clinic visit 2024: Patient presents to clinic today accompanied by her daughter.  She reports that she has been doing well overall.  No new or worsening strokelike symptoms.  She reports that she has been struggling with brain fog and some forgetfulness.  Her daughter shares that she was having some forgetfulness prior to the stroke.  Reviewed patient's workup, imaging, possible etiologies and treatment plan with patient and family.  Answered all questions.  Patient is taking her medication without issue or side effect.  She just recently completed  her Holter monitor and will be sending that back today or tomorrow.  Explained to the patient that if any atrial fibrillation was found that we would recommend Eliquis with a baby aspirin for secondary stroke prevention.  If no atrial fibrillation is found we will discuss at that time if any further cardiac workup is warranted.     Clinic visit 7/24/2024: Patient presents for routine follow-up today.  Her daughter is unable to be present for the appointment so she is listening on speaker phone.  Patient denies any new or worsening strokelike symptoms.  She just returned from a trip to [place] in [facility].  She enjoyed her time with her family.  She has been taking her medications without issues or side effects.  We reviewed the results of her cardiac monitor which were negative for atrial fibrillation.  She is already had follow-up with Dr. Cabrera who did not recommend any further long-term monitoring.  I am in agreement with this plan as patient does have some traditional risk factors for possible small vessel disease or atheroemboli as a etiology.  We discussed the need for good blood pressure control, optimizing cholesterol and stopping smoking.  Should she experience heart palpitations I have asked her to notify our office or Dr. Isabel's office for further workup at that time    Subjective        I have reviewed and the following portions of the patient's history were updated as appropriate: past family history, past medical history, past social history, past surgical history and problem list.    Medications:     Current Outpatient Medications:     albuterol sulfate HFA (Ventolin HFA) 108 (90 Base) MCG/ACT inhaler, Inhale 2 puffs Every 4 (Four) Hours As Needed for Wheezing., Disp: 18 g, Rfl: 5    aspirin 325 MG tablet, Take 1 tablet by mouth Daily., Disp: 30 tablet, Rfl: 2    Coenzyme Q10 (COQ-10) 200 MG capsule, Take 1 tablet by mouth Daily., Disp: , Rfl:     cyanocobalamin (VITAMIN B-12) 500 MCG  tablet, Take 1 tablet by mouth Daily., Disp: 90 tablet, Rfl: 3    dexlansoprazole (Dexilant) 60 MG capsule, Take 1 capsule by mouth Daily., Disp: , Rfl:     ezetimibe (ZETIA) 10 MG tablet, Take 1 tablet by mouth Every Night., Disp: 90 tablet, Rfl: 1    famotidine (PEPCID) 20 MG tablet, TAKE 1 TABLET BY MOUTH TWICE DAILY (Patient taking differently: 1 tablet 2 (Two) Times a Day As Needed.), Disp: 180 tablet, Rfl: 1    Fluticasone-Umeclidin-Vilant (Trelegy Ellipta) 100-62.5-25 MCG/ACT inhaler, Inhale 1 puff Daily., Disp: 3 each, Rfl: 3    folic acid (FOLVITE) 1 MG tablet, TAKE 1 TABLET BY MOUTH DAILY, Disp: 90 tablet, Rfl: 3    Lactobacillus-Inulin ([facility] PO), Take 1 capsule by mouth Daily., Disp: , Rfl:     montelukast (SINGULAIR) 10 MG tablet, TAKE 1 TABLET BY MOUTH EVERY NIGHT, Disp: 90 tablet, Rfl: 1    omeprazole (priLOSEC) 40 MG capsule, Take 1 capsule by mouth Daily., Disp: 90 capsule, Rfl: 1    rosuvastatin (CRESTOR) 40 MG tablet, Take 1 tablet by mouth Every Night., Disp: 90 tablet, Rfl: 1    tocilizumab (ACTEMRA) 200 MG/10ML solution injection, Infuse 8 mg/kg into a venous catheter Every 30 (Thirty) Days., Disp: , Rfl:     zoledronic acid (RECLAST) 5 MG/100ML solution infusion, Administer ZOLEDRONIC ACID 5mg IV once annually, Disp: , Rfl:     amoxicillin-clavulanate (AUGMENTIN) 875-125 MG per tablet, Take 1 tablet by mouth 2 (Two) Times a Day., Disp: 10 tablet, Rfl: 0    clobetasol (TEMOVATE) 0.05 % cream, Apply once-nightly application for 12 weeks. Then twice weekly for long-term LS maintenance. (Patient not taking: Reported on 7/24/2024), Disp: 60 g, Rfl: 5    donepezil (Aricept) 10 MG tablet, Take 1 tablet by mouth Every Night., Disp: 90 tablet, Rfl: 1    nicotine (NICODERM CQ) 21 MG/24HR patch, Place 1 patch on the skin as directed by provider Daily. (Patient not taking: Reported on 7/24/2024), Disp: 30 each, Rfl: 3    Allergies:   Allergies   Allergen Reactions    Bactrim  "[Sulfamethoxazole-Trimethoprim] Other (See Comments)     Hypokalemia, hyponatremia    Codeine GI Intolerance    Atorvastatin Unknown (See Comments)     Lipitor      Bupropion Hcl [Bupropion] Unknown (See Comments)     wellbutrin    Calcium Unknown (See Comments)     unknown       Objective     Physical Exam:   Vital Signs:   Vitals:    07/24/24 1520   BP: 136/78   Pulse: 71   Temp: 98.4 °F (36.9 °C)   SpO2: 94%   Weight: 62.6 kg (138 lb)   Height: 154.9 cm (60.98\")     Body mass index is 26.09 kg/m².    Physical Exam  Vitals and nursing note reviewed.   Constitutional:       General: She is awake. She is not in acute distress.     Appearance: Normal appearance. She is normal weight. She is not ill-appearing.      Comments: 74-year-old  female   HENT:      Head: Normocephalic and atraumatic.      Nose: Nose normal.      Mouth/Throat:      Mouth: Mucous membranes are moist.   Eyes:      General: Lids are normal.      Extraocular Movements: Extraocular movements intact.      Pupils: Pupils are equal, round, and reactive to light.   Cardiovascular:      Rate and Rhythm: Normal rate and regular rhythm.      Pulses: Normal pulses.   Pulmonary:      Effort: Pulmonary effort is normal. No respiratory distress.   Skin:     General: Skin is warm and dry.   Neurological:      General: No focal deficit present.      Mental Status: She is alert and oriented to person, place, and time.      Motor: Motor strength is normal.     Coordination: Coordination is intact.   Psychiatric:         Mood and Affect: Mood normal.         Speech: Speech normal.         Behavior: Behavior normal.       Neurological Exam  Mental Status  Awake and alert. Oriented to person, place, time and situation. Oriented to person, place, and time. Speech is normal. Language is fluent with no aphasia. Attention and concentration are normal. Fund of knowledge is appropriate for level of education.    Cranial Nerves  CN II: Right visual acuity: Counts " fingers. Left visual acuity: Counts fingers. Visual fields full to confrontation.  CN III, IV, VI: Extraocular movements intact bilaterally. Normal lids and orbits bilaterally. Pupils equal round and reactive to light bilaterally.  CN V: Facial sensation is normal.  CN VII: Full and symmetric facial movement.  CN VIII: Hearing is normal to speech .  CN XI: Shoulder shrug strength is normal.  CN XII: Tongue midline without atrophy or fasciculations.    Motor  Normal muscle bulk throughout. No fasciculations present. Normal muscle tone. Strength is 5/5 throughout all four extremities.    Sensory  Light touch is normal in upper and lower extremities. Pinprick is normal in upper and lower extremities.     Coordination    Finger-to-nose, rapid alternating movements and heel-to-shin normal bilaterally without dysmetria.  No obvious dysmetria .    Gait  Casual gait is normal including stance, stride, and arm swing.     NIH 0    Modified [place] Score: 0        0  No Symptoms    1 No significant disability. Able to carry out all usual activities, despite some symptoms.    2 Slight disability. Able to look after own affairs without assistance, but unable to carry out all previous activities.    3 Moderate disability. Requires some help, but able to walk unassisted.    4 Moderately severe disability. Unable to attend to own bodily needs without assistance, and unable to walk unassisted.    5 Severe disability. Requires constant nursing care and attention, bedridden, incontinent.    6 Dead      PHQ-9 Depression Screening  Little interest or pleasure in doing things? 0-->not at all   Feeling down, depressed, or hopeless? 0-->not at all   Trouble falling or staying asleep, or sleeping too much?     Feeling tired or having little energy?     Poor appetite or overeating?     Feeling bad about yourself - or that you are a failure or have let yourself or your family down?     Trouble concentrating on things, such as reading the  newspaper or watching television?     Moving or speaking so slowly that other people could have noticed? Or the opposite - being so fidgety or restless that you have been moving around a lot more than usual?     Thoughts that you would be better off dead, or of hurting yourself in some way?     PHQ-9 Total Score 0   If you checked off any problems, how difficult have these problems made it for you to do your work, take care of things at home, or get along with other people?          ANA LAURA Fall Risk Clinician Key Questions   Have you fallen in the past year?: No  Do you feel unsteady with walking?: No  Are you worried about falling?: No      Lab Results   Component Value Date    GLUCOSE 93 06/08/2024    BUN 11 06/08/2024    CREATININE 0.55 (L) 06/08/2024    EGFRRESULT 69.1 02/27/2024    EGFR 96.3 06/08/2024    BCR 20.0 06/08/2024    K 3.3 (L) 06/08/2024    CO2 32.0 (H) 06/08/2024    CALCIUM 8.7 06/08/2024    PROTENTOTREF 6.7 02/27/2024    ALBUMIN 4.3 06/05/2024    BILITOT 0.5 06/05/2024    AST 27 06/05/2024    ALT 24 06/05/2024     Lipid Panel          8/14/2023    11:12 2/27/2024    14:32 4/24/2024    02:41   Lipid Panel   Total Cholesterol   130    Total Cholesterol 138  154     Triglycerides 80  117  124    HDL Cholesterol 50  57  50    VLDL Cholesterol 16  21  22    LDL Cholesterol  72  76  58    LDL/HDL Ratio   1.10       Most Recent A1C          4/23/2024    12:32   HGBA1C Most Recent   Hemoglobin A1C 5.50         Assessment / Plan      Assessment/Plan:   # Acute Left MCA territory infarct s/p TNK: etiology cardio-embolic versus atheroembolic  # Tobacco use disorder  # Hypertension  # Carotid bulb atherosclerotic disease     -Continue aspirin 325 mg daily  -Continue home rosuvastatin 40 mg daily and ezetimibe (LDL 58)  -Carotid ultrasound less than 50% stenosis bilateral ICA  -14 day holter is negative for Afib. She has already had follow up with cardiology. No further cardiac monitoring recommended.    -Tobacco cessation encouraged and counseling provided.   -Normalize blood pressure goals, <130/80. Today's BP is 136/78.   -discussed lifestyle modifications such as heart healthy diet and increased activity   -reviewed s/sxs of stroke and when to call 911 or return to the ED  -follow up in October for 6 month appointment       Discussed the importance of medication compliance and lifestyle modifications (adequate blood pressure control, adequate control of hyperlipidemia, adequate glycemic control, increase physical activity, and healthy diet) to help reduce the risk of future cerebrovascular events.  Also discussed the signs symptoms that would warrant the patient return back to the emergency department including unilateral weakness, unilateral numbness, visual disturbances, loss of balance, speech difficulties, and/or a sudden severe headache.   Follow Up:   Return in about 3 months (around 10/24/2024).    DEVI Dawn  [facility] Stroke

## 2024-08-13 PROBLEM — B34.8 RHINOVIRUS INFECTION: Status: RESOLVED | Noted: 2024-06-09 | Resolved: 2024-08-13

## 2024-08-15 ENCOUNTER — TELEPHONE (OUTPATIENT)
Dept: INTERNAL MEDICINE | Facility: CLINIC | Age: 74
End: 2024-08-15
Payer: MEDICARE

## 2024-08-15 NOTE — TELEPHONE ENCOUNTER
Caller: Irma Pedraza    Relationship: Self    Best call back number: [phone]     What medication are you requesting: PER PCP    What are your current symptoms: BACK PAIN FOLLOWING A RECENT FALL    How long have you been experiencing symptoms: FELL ON 8/11/24    Have you had these symptoms before:    [] Yes  [] No    Have you been treated for these symptoms before:   [] Yes  [] No    If a prescription is needed, what is your preferred pharmacy and phone number: [id number] [id number]  AT [facility] [address] [facility] [address] - [phone] [facility] [phone]      Additional notes:

## 2024-08-16 ENCOUNTER — OFFICE VISIT (OUTPATIENT)
Dept: INTERNAL MEDICINE | Facility: CLINIC | Age: 74
End: 2024-08-16
Payer: MEDICARE

## 2024-08-16 VITALS
HEIGHT: 61 IN | HEART RATE: 96 BPM | TEMPERATURE: 96.4 F | WEIGHT: 131 LBS | BODY MASS INDEX: 24.73 KG/M2 | DIASTOLIC BLOOD PRESSURE: 76 MMHG | SYSTOLIC BLOOD PRESSURE: 108 MMHG

## 2024-08-16 DIAGNOSIS — M54.6 ACUTE LEFT-SIDED THORACIC BACK PAIN: Primary | ICD-10-CM

## 2024-08-16 DIAGNOSIS — M62.830 MUSCLE SPASM OF BACK: ICD-10-CM

## 2024-08-16 PROCEDURE — 1160F RVW MEDS BY RX/DR IN RCRD: CPT

## 2024-08-16 PROCEDURE — 3078F DIAST BP <80 MM HG: CPT

## 2024-08-16 PROCEDURE — 3074F SYST BP LT 130 MM HG: CPT

## 2024-08-16 PROCEDURE — 1159F MED LIST DOCD IN RCRD: CPT

## 2024-08-16 PROCEDURE — 99214 OFFICE O/P EST MOD 30 MIN: CPT

## 2024-08-16 PROCEDURE — 1125F AMNT PAIN NOTED PAIN PRSNT: CPT

## 2024-08-16 PROCEDURE — G2211 COMPLEX E/M VISIT ADD ON: HCPCS

## 2024-08-16 RX ORDER — CYCLOBENZAPRINE HCL 10 MG
10 TABLET ORAL NIGHTLY PRN
Qty: 30 TABLET | Refills: 0 | Status: SHIPPED | OUTPATIENT
Start: 2024-08-16

## 2024-08-16 RX ORDER — BROMPHENIRAMINE MALEATE, PSEUDOEPHEDRINE HYDROCHLORIDE, AND DEXTROMETHORPHAN HYDROBROMIDE 2; 30; 10 MG/5ML; MG/5ML; MG/5ML
5 SYRUP ORAL 4 TIMES DAILY PRN
COMMUNITY
Start: 2024-08-12

## 2024-08-16 RX ORDER — IBUPROFEN 800 MG/1
800 TABLET ORAL EVERY 6 HOURS PRN
Qty: 60 TABLET | Refills: 0 | Status: SHIPPED | OUTPATIENT
Start: 2024-08-16

## 2024-08-16 NOTE — PROGRESS NOTES
Acute Office Visit      Name: Irma Pedraza    : 1950     MRN: [id number]   Care Team: Patient Care Team:  Eve Navarrete MD as PCP - General (Internal Medicine)  Sabrina Angelo MD as Consulting Physician (Plastic Surgery)  Omer Cabrera MD as Consulting Physician (Cardiology)  Gold Swift DO as Consulting Physician (Rheumatology)  Juarez Pedersen MD as Consulting Physician (Otolaryngology)    Chief Complaint  Back Pain (Patient fell on Monday and landed on her back )    Subjective     History of Present Illness:    Virginia is a pleasant 74-year-old female who comes to the office today accompanied by her .  She states that she had a fall on Monday and has had significant back pain over the last couple of days.    She states that she tripped and fell on Monday hitting her left hip and back.  She went to the emergency room and had an x-ray completed.  No fractures were noted.  While there, she was diagnosed with a hematoma of the scalp.  She was prescribed Fioricet which she has taken over the last couple of days with little to no improvement.    Virginia states that her upper and middle back, towards the left side, have been hurting over the last couple of days.  She is having difficulty with her range of motion of her left shoulder and arm.  She states that she can feel it locking up.      Past Medical History:   Diagnosis Date    Adenomyomatosis of gallbladder     by ultrasound, Dr. Yomi Tai     Arthritis     Arthritis of lumbosacral spine     Asthma     Cataract     Remove  and     Chronic hoarseness     Class 1 obesity due to excess calories with serious comorbidity and body mass index (BMI) of 30.0 to 30.9 in adult 2020 Eve Navarrete MD      Closed displaced fracture of shaft of right clavicle with malunion     Diverticulitis 2019    Diverticulosis     Years ago, mot sure of date    Dyslipidemia 2024     GCA (giant cell arteritis)     GERD (gastroesophageal reflux disease)     HL (hearing loss) 2022    Hyperlipidemia Unk    Low back pain ?    Medicare annual wellness visit, subsequent 02/01/2021    Lloyd's neuroma of left foot     surgery    Ocular histoplasmosis     BCC 2/6/2017    Panic disorder 02/28/2019    PMR (polymyalgia rheumatica)     Rhinovirus infection 06/09/2024       Past Surgical History:   Procedure Laterality Date    COLONOSCOPY  02/2009    COSMETIC SURGERY  1994    face lift    COSMETIC SURGERY  1993    TUMMY TUCK    COSMETIC SURGERY  1980    Birth kenny removal    ELBOW PROCEDURE Left     x2    EYE SURGERY  7/20 and 7/25/2023    Removed cataracts and incerted lenses in bot eyes    THROAT SURGERY      remote removal of growth from throat    TONSILLECTOMY  1964       Social History     Socioeconomic History    Marital status:    Tobacco Use    Smoking status: Every Day     Current packs/day: 0.50     Average packs/day: 0.5 packs/day for 50.6 years (25.3 ttl pk-yrs)     Types: Cigarettes     Start date: 1974     Passive exposure: Current    Smokeless tobacco: Never    Tobacco comments:     patient refused smoking literature. Pt uses nicotine patch in addition   Vaping Use    Vaping status: Never Used   Substance and Sexual Activity    Alcohol use: Yes     Comment: 2 drinks per night    Drug use: No    Sexual activity: Not Currently     Partners: Male         Current Outpatient Medications:     albuterol sulfate HFA (Ventolin HFA) 108 (90 Base) MCG/ACT inhaler, Inhale 2 puffs Every 4 (Four) Hours As Needed for Wheezing., Disp: 18 g, Rfl: 5    aspirin 325 MG tablet, Take 1 tablet by mouth Daily., Disp: 90 tablet, Rfl: 3    brompheniramine-pseudoephedrine-DM 30-2-10 MG/5ML syrup, Take 5 mL by mouth 4 (Four) Times a Day As Needed., Disp: , Rfl:     clobetasol (TEMOVATE) 0.05 % cream, Apply once-nightly application for 12 weeks. Then twice weekly for long-term LS maintenance., Disp: 60 g, Rfl: 5     Coenzyme Q10 (COQ-10) 200 MG capsule, Take 1 tablet by mouth Daily., Disp: , Rfl:     cyanocobalamin (VITAMIN B-12) 500 MCG tablet, Take 1 tablet by mouth Daily., Disp: 90 tablet, Rfl: 3    dexlansoprazole (Dexilant) 60 MG capsule, Take 1 capsule by mouth Daily., Disp: , Rfl:     donepezil (Aricept) 10 MG tablet, Take 1 tablet by mouth Every Night., Disp: 90 tablet, Rfl: 1    ezetimibe (ZETIA) 10 MG tablet, Take 1 tablet by mouth Every Night., Disp: 90 tablet, Rfl: 1    famotidine (PEPCID) 20 MG tablet, TAKE 1 TABLET BY MOUTH TWICE DAILY (Patient taking differently: 1 tablet 2 (Two) Times a Day As Needed.), Disp: 180 tablet, Rfl: 1    Fluticasone-Umeclidin-Vilant (Trelegy Ellipta) 100-62.5-25 MCG/ACT inhaler, Inhale 1 puff Daily., Disp: 3 each, Rfl: 3    folic acid (FOLVITE) 1 MG tablet, TAKE 1 TABLET BY MOUTH DAILY, Disp: 90 tablet, Rfl: 3    Lactobacillus-Inulin ([facility] PO), Take 1 capsule by mouth Daily., Disp: , Rfl:     montelukast (SINGULAIR) 10 MG tablet, TAKE 1 TABLET BY MOUTH EVERY NIGHT, Disp: 90 tablet, Rfl: 1    nicotine (NICODERM CQ) 21 MG/24HR patch, Place 1 patch on the skin as directed by provider Daily. (Patient taking differently: Place 1 patch on the skin as directed by provider Daily As Needed.), Disp: 30 each, Rfl: 3    omeprazole (priLOSEC) 40 MG capsule, Take 1 capsule by mouth Daily., Disp: 90 capsule, Rfl: 1    rosuvastatin (CRESTOR) 40 MG tablet, Take 1 tablet by mouth Every Night., Disp: 90 tablet, Rfl: 1    tocilizumab (ACTEMRA) 200 MG/10ML solution injection, Infuse 8 mg/kg into a venous catheter Every 30 (Thirty) Days., Disp: , Rfl:     zoledronic acid (RECLAST) 5 MG/100ML solution infusion, Administer ZOLEDRONIC ACID 5mg IV once annually, Disp: , Rfl:     cyclobenzaprine (FLEXERIL) 10 MG tablet, Take 1 tablet by mouth At Night As Needed for Muscle Spasms., Disp: 30 tablet, Rfl: 0    ibuprofen (ADVIL,MOTRIN) 800 MG tablet, Take 1 tablet by mouth Every 6 (Six) Hours  "As Needed for Mild Pain., Disp: 60 tablet, Rfl: 0    Procedures    PHQ-9 Total Score:      Objective     Vital Signs  /76 (BP Location: Right arm, Patient Position: Sitting, Cuff Size: Adult)   Pulse 96   Temp 96.4 °F (35.8 °C) (Temporal)   Ht 154.9 cm (60.98\")   Wt 59.4 kg (131 lb)   BMI 24.77 kg/m²   Estimated body mass index is 24.77 kg/m² as calculated from the following:    Height as of this encounter: 154.9 cm (60.98\").    Weight as of this encounter: 59.4 kg (131 lb).            Physical Exam  Vitals and nursing note reviewed.   HENT:      Head: Normocephalic.   Musculoskeletal:      Right shoulder: Normal.      Left shoulder: Tenderness and bony tenderness present. Decreased range of motion.      Thoracic back: Spasms and tenderness present. Decreased range of motion.   Skin:     General: Skin is warm and dry.   Neurological:      General: No focal deficit present.      Mental Status: She is alert and oriented to person, place, and time.   Psychiatric:         Mood and Affect: Mood normal.         Behavior: Behavior normal.         Thought Content: Thought content normal.         Judgment: Judgment normal.          Assessment and Plan     Assessment/Plan:  Diagnoses and all orders for this visit:    1. Acute left-sided thoracic back pain (Primary)  -     cyclobenzaprine (FLEXERIL) 10 MG tablet; Take 1 tablet by mouth At Night As Needed for Muscle Spasms.  Dispense: 30 tablet; Refill: 0  -     ibuprofen (ADVIL,MOTRIN) 800 MG tablet; Take 1 tablet by mouth Every 6 (Six) Hours As Needed for Mild Pain.  Dispense: 60 tablet; Refill: 0  -     Ambulatory Referral to Physical Therapy for Evaluation & Treatment  -Take ibuprofen as needed during the day, cyclobenzaprine as needed at night for pain or discomfort.  -May use ice or moist heat as needed for pain or discomfort.  -Start physical therapy.  Patient will call with preferred location.    2. Muscle spasm of back  -     cyclobenzaprine (FLEXERIL) 10 MG " tablet; Take 1 tablet by mouth At Night As Needed for Muscle Spasms.  Dispense: 30 tablet; Refill: 0  -     ibuprofen (ADVIL,MOTRIN) 800 MG tablet; Take 1 tablet by mouth Every 6 (Six) Hours As Needed for Mild Pain.  Dispense: 60 tablet; Refill: 0  -     Ambulatory Referral to Physical Therapy for Evaluation & Treatment  -Same as above.     There are no Patient Instructions on file for this visit.  Plan of care reviewed with patient at the conclusion of today's visit. Education was provided regarding diagnosis, management and any prescribed or recommended OTC medications.  Patient verbalizes understanding of and agreement with management plan.    Follow Up  Return for Next Scheduled Follow up. 8/27/2024 for continued management of chronic conditions.    Savanna Hillman, APRN

## 2024-09-20 ENCOUNTER — OFFICE VISIT (OUTPATIENT)
Dept: INTERNAL MEDICINE | Facility: CLINIC | Age: 74
End: 2024-09-20
Payer: MEDICARE

## 2024-09-20 ENCOUNTER — TELEPHONE (OUTPATIENT)
Age: 74
End: 2024-09-20
Payer: MEDICARE

## 2024-09-20 ENCOUNTER — TELEPHONE (OUTPATIENT)
Dept: INTERNAL MEDICINE | Facility: CLINIC | Age: 74
End: 2024-09-20

## 2024-09-20 VITALS
OXYGEN SATURATION: 95 % | TEMPERATURE: 98.4 F | WEIGHT: 129 LBS | BODY MASS INDEX: 24.35 KG/M2 | SYSTOLIC BLOOD PRESSURE: 120 MMHG | HEART RATE: 80 BPM | DIASTOLIC BLOOD PRESSURE: 72 MMHG | HEIGHT: 61 IN

## 2024-09-20 DIAGNOSIS — R41.3 MILD MEMORY DISTURBANCE: Chronic | ICD-10-CM

## 2024-09-20 DIAGNOSIS — K22.70 BARRETT'S ESOPHAGUS WITHOUT DYSPLASIA: Chronic | ICD-10-CM

## 2024-09-20 DIAGNOSIS — E78.2 MIXED HYPERLIPIDEMIA: ICD-10-CM

## 2024-09-20 DIAGNOSIS — D64.9 ANEMIA, UNSPECIFIED TYPE: ICD-10-CM

## 2024-09-20 DIAGNOSIS — I63.9 ACUTE CVA (CEREBROVASCULAR ACCIDENT): ICD-10-CM

## 2024-09-20 DIAGNOSIS — F17.200 TOBACCO DEPENDENCE SYNDROME: ICD-10-CM

## 2024-09-20 DIAGNOSIS — R63.4 ABNORMAL WEIGHT LOSS: Chronic | ICD-10-CM

## 2024-09-20 DIAGNOSIS — I10 PRIMARY HYPERTENSION: Primary | Chronic | ICD-10-CM

## 2024-09-20 PROCEDURE — 1160F RVW MEDS BY RX/DR IN RCRD: CPT | Performed by: INTERNAL MEDICINE

## 2024-09-20 PROCEDURE — 3078F DIAST BP <80 MM HG: CPT | Performed by: INTERNAL MEDICINE

## 2024-09-20 PROCEDURE — 90662 IIV NO PRSV INCREASED AG IM: CPT | Performed by: INTERNAL MEDICINE

## 2024-09-20 PROCEDURE — 1159F MED LIST DOCD IN RCRD: CPT | Performed by: INTERNAL MEDICINE

## 2024-09-20 PROCEDURE — 99214 OFFICE O/P EST MOD 30 MIN: CPT | Performed by: INTERNAL MEDICINE

## 2024-09-20 PROCEDURE — G0008 ADMIN INFLUENZA VIRUS VAC: HCPCS | Performed by: INTERNAL MEDICINE

## 2024-09-20 PROCEDURE — 1126F AMNT PAIN NOTED NONE PRSNT: CPT | Performed by: INTERNAL MEDICINE

## 2024-09-20 PROCEDURE — 3074F SYST BP LT 130 MM HG: CPT | Performed by: INTERNAL MEDICINE

## 2024-09-20 RX ORDER — MONTELUKAST SODIUM 10 MG/1
10 TABLET ORAL NIGHTLY
Qty: 90 TABLET | Refills: 1 | Status: SHIPPED | OUTPATIENT
Start: 2024-09-20

## 2024-09-20 RX ORDER — ROSUVASTATIN CALCIUM 40 MG/1
40 TABLET, COATED ORAL NIGHTLY
Qty: 90 TABLET | Refills: 1 | Status: SHIPPED | OUTPATIENT
Start: 2024-09-20

## 2024-09-20 RX ORDER — OMEPRAZOLE 40 MG/1
40 CAPSULE, DELAYED RELEASE ORAL DAILY
Qty: 90 CAPSULE | Refills: 1 | Status: SHIPPED | OUTPATIENT
Start: 2024-09-20

## 2024-09-20 RX ORDER — EZETIMIBE 10 MG/1
10 TABLET ORAL NIGHTLY
Qty: 90 TABLET | Refills: 1 | Status: SHIPPED | OUTPATIENT
Start: 2024-09-20

## 2024-09-20 RX ORDER — ALBUTEROL SULFATE 90 UG/1
2 AEROSOL, METERED RESPIRATORY (INHALATION) EVERY 4 HOURS PRN
Qty: 18 G | Refills: 5 | Status: SHIPPED | OUTPATIENT
Start: 2024-09-20

## 2024-09-30 ENCOUNTER — OFFICE VISIT (OUTPATIENT)
Dept: OBSTETRICS AND GYNECOLOGY | Facility: CLINIC | Age: 74
End: 2024-09-30
Payer: MEDICARE

## 2024-09-30 VITALS
DIASTOLIC BLOOD PRESSURE: 74 MMHG | SYSTOLIC BLOOD PRESSURE: 122 MMHG | HEIGHT: 61 IN | WEIGHT: 130.8 LBS | BODY MASS INDEX: 24.7 KG/M2

## 2024-09-30 DIAGNOSIS — Z01.419 ROUTINE GYNECOLOGICAL EXAMINATION: ICD-10-CM

## 2024-09-30 DIAGNOSIS — N64.4 BREAST PAIN, LEFT: Primary | ICD-10-CM

## 2024-09-30 PROCEDURE — 1160F RVW MEDS BY RX/DR IN RCRD: CPT | Performed by: NURSE PRACTITIONER

## 2024-09-30 PROCEDURE — 1159F MED LIST DOCD IN RCRD: CPT | Performed by: NURSE PRACTITIONER

## 2024-09-30 PROCEDURE — 3074F SYST BP LT 130 MM HG: CPT | Performed by: NURSE PRACTITIONER

## 2024-09-30 PROCEDURE — 99397 PER PM REEVAL EST PAT 65+ YR: CPT | Performed by: NURSE PRACTITIONER

## 2024-09-30 PROCEDURE — 3078F DIAST BP <80 MM HG: CPT | Performed by: NURSE PRACTITIONER

## 2024-09-30 RX ORDER — NYSTATIN 100000 U/G
1 CREAM TOPICAL 2 TIMES DAILY
Qty: 30 G | Refills: 0 | Status: SHIPPED | OUTPATIENT
Start: 2024-09-30

## 2024-09-30 NOTE — PROGRESS NOTES
Gynecologic Annual Exam Note        GYN Annual Exam     CC - Here for annual exam.        HPI  Irma Pedraza is a 74 y.o. female, , who presents for annual well woman exam as a established patient.  She is postmenopausal. Denies vaginal bleeding.   There were no changes to her medical or surgical history since her last visit.     Marital Status: .  She is not currently sexually active. STD testing recommendations have been explained to the patient and she declines STD testing.    The patient would like to discuss the following complaints today: left breast. Patient reports pain and redness with burning pain only in the nipple area.    Patient reports that she is not currently experiencing any symptoms of urinary incontinence.      Additional OB/GYN History   On HRT? No    Last Pap : 2023. Results: negative. HPV:  none  .   Last Completed Pap Smear            Discontinued - PAP SMEAR  Discontinued      2023  LIQUID-BASED PAP SMEAR WITH HPV GENOTYPING IF ASCUS ([facility])    2020  Done - negative                  History of abnormal Pap smear: no  Family history of uterine, colon, breast, or ovarian cancer: no  Performs monthly Self-Breast Exam: yes  Last mammogram: 2023. Done at . There is a copy in the chart.    Last Completed Mammogram            Ordered - MAMMOGRAM (Every 2 Years) Ordered on 2023  Outside Claim:  MAMMOGRAM SCREENING BILAT DIGITAL W CAD    2023  Outside Claim: G SCREENING DIGITAL BREAST TOMOSYNTHESIS BI    2023  Mammo Screening Digital Tomosynthesis Bilateral With CAD    2022  Mammo Screening Digital Tomosynthesis Bilateral With CAD    2021  Mammo Screening Digital Tomosynthesis Bilateral With CAD    Only the first 5 history entries have been loaded, but more history exists.                  Last colonoscopy:  2020    Last Completed Colonoscopy       This patient has no relevant Health  Maintenance data.            06/06/2023- normal per patient  Exercises Regularly: some  Feelings of Anxiety or Depression: no      Tobacco Usage?: Yes Irma Pedraza  reports that she has been smoking cigarettes. She started smoking about 50 years ago. She has a 25.4 pack-year smoking history. She has been exposed to tobacco smoke. She has never used smokeless tobacco. I have educated her on the risk of diseases from using tobacco products such as cancer, COPD, and heart disease.     I advised her to quit and she is not willing to quit.    I spent 4.5 minutes counseling the patient.            Current Outpatient Medications:     albuterol sulfate HFA (Ventolin HFA) 108 (90 Base) MCG/ACT inhaler, Inhale 2 puffs Every 4 (Four) Hours As Needed for Wheezing., Disp: 18 g, Rfl: 5    aspirin 325 MG tablet, Take 1 tablet by mouth Daily., Disp: 90 tablet, Rfl: 3    brompheniramine-pseudoephedrine-DM 30-2-10 MG/5ML syrup, Take 5 mL by mouth 4 (Four) Times a Day As Needed., Disp: , Rfl:     Coenzyme Q10 (COQ-10) 200 MG capsule, Take 1 tablet by mouth Daily., Disp: , Rfl:     cyanocobalamin (VITAMIN B-12) 500 MCG tablet, Take 1 tablet by mouth Daily., Disp: 90 tablet, Rfl: 3    cyclobenzaprine (FLEXERIL) 10 MG tablet, Take 1 tablet by mouth At Night As Needed for Muscle Spasms., Disp: 30 tablet, Rfl: 0    donepezil (Aricept) 10 MG tablet, Take 1 tablet by mouth Every Night., Disp: 90 tablet, Rfl: 1    famotidine (PEPCID) 20 MG tablet, TAKE 1 TABLET BY MOUTH TWICE DAILY (Patient taking differently: 1 tablet 2 (Two) Times a Day As Needed.), Disp: 180 tablet, Rfl: 1    folic acid (FOLVITE) 1 MG tablet, TAKE 1 TABLET BY MOUTH DAILY, Disp: 90 tablet, Rfl: 3    ibuprofen (ADVIL,MOTRIN) 800 MG tablet, Take 1 tablet by mouth Every 6 (Six) Hours As Needed for Mild Pain., Disp: 60 tablet, Rfl: 0    montelukast (SINGULAIR) 10 MG tablet, Take 1 tablet by mouth Every Night., Disp: 90 tablet, Rfl: 1    omeprazole (priLOSEC) 40 MG capsule,  Take 1 capsule by mouth Daily., Disp: 90 capsule, Rfl: 1    rosuvastatin (CRESTOR) 40 MG tablet, Take 1 tablet by mouth Every Night., Disp: 90 tablet, Rfl: 1    clobetasol (TEMOVATE) 0.05 % cream, Apply once-nightly application for 12 weeks. Then twice weekly for long-term LS maintenance. (Patient not taking: Reported on 2024), Disp: 60 g, Rfl: 5    ezetimibe (ZETIA) 10 MG tablet, Take 1 tablet by mouth Every Night., Disp: 90 tablet, Rfl: 1    Fluticasone-Umeclidin-Vilant (Trelegy Ellipta) 100-62.5-25 MCG/ACT inhaler, Inhale 1 puff Daily., Disp: 3 each, Rfl: 3    Lactobacillus-Inulin ([facility] PO), Take 1 capsule by mouth Daily. (Patient not taking: Reported on 2024), Disp: , Rfl:     nicotine (NICODERM CQ) 21 MG/24HR patch, Place 1 patch on the skin as directed by provider Daily. (Patient not taking: Reported on 2024), Disp: 30 each, Rfl: 3    nystatin (MYCOSTATIN) 060404 UNIT/GM cream, Apply 1 Application topically to the appropriate area as directed 2 (Two) Times a Day., Disp: 30 g, Rfl: 0    tocilizumab (ACTEMRA) 200 MG/10ML solution injection, Infuse 8 mg/kg into a venous catheter Every 30 (Thirty) Days. (Patient not taking: Reported on 2024), Disp: , Rfl:     zoledronic acid (RECLAST) 5 MG/100ML solution infusion, Administer ZOLEDRONIC ACID 5mg IV once annually (Patient not taking: Reported on 2024), Disp: , Rfl:     Patient denies the need for medication refills today.    OB History          2    Para   1    Term   1            AB   1    Living   1         SAB        IAB        Ectopic        Molar        Multiple        Live Births   1                Past Medical History:   Diagnosis Date    Adenomyomatosis of gallbladder 2009    by ultrasound, Dr. Yomi Tai     Arthritis     Arthritis of lumbosacral spine     Asthma     Cataract     Remove  and     Chronic hoarseness     Class 1 obesity due to excess calories with serious  comorbidity and body mass index (BMI) of 30.0 to 30.9 in adult 07/16/2020 2/1/2021 Eve Navarrete MD      Closed displaced fracture of shaft of right clavicle with malunion     Diverticulitis 02/28/2019    Diverticulosis     Years ago, mot sure of date    Dyslipidemia 04/23/2024    GCA (giant cell arteritis)     GERD (gastroesophageal reflux disease)     HL (hearing loss) 2022    Hyperlipidemia Unk    Low back pain ?    Medicare annual wellness visit, subsequent 02/01/2021    Lloyd's neuroma of left foot     surgery    Ocular histoplasmosis     BCC 2/6/2017    Panic disorder 02/28/2019    PMR (polymyalgia rheumatica)     Rhinovirus infection 06/09/2024        Past Surgical History:   Procedure Laterality Date    COLONOSCOPY  02/2009    COSMETIC SURGERY  1994    face lift    COSMETIC SURGERY  1993    TUMMY TUCK    COSMETIC SURGERY  1980    Birth kenny removal    ELBOW PROCEDURE Left     x2    EYE SURGERY  7/20 and 7/25/2023    Removed cataracts and incerted lenses in bot eyes    THROAT SURGERY      remote removal of growth from throat    TONSILLECTOMY  1964       Health Maintenance   Topic Date Due    TDAP/TD VACCINES (2 - Td or Tdap) 04/11/2022    COVID-19 Vaccine (5 - 2023-24 season) 09/01/2024    COLORECTAL CANCER SCREENING  03/09/2025    ANNUAL WELLNESS VISIT  02/27/2025    LIPID PANEL  04/24/2025    LUNG CANCER SCREENING  05/22/2025    DXA SCAN  06/06/2025    MAMMOGRAM  11/13/2025    HEPATITIS C SCREENING  Completed    INFLUENZA VACCINE  Completed    Pneumococcal Vaccine 65+  Completed    ZOSTER VACCINE  Completed    PAP SMEAR  Discontinued       The additional following portions of the patient's history were reviewed and updated as appropriate: allergies, current medications, past family history, past medical history, past social history, and past surgical history.    Review of Systems   Constitutional: Negative.    HENT: Negative.     Eyes: Negative.    Respiratory: Negative.     Cardiovascular:  "Negative.    Gastrointestinal: Negative.    Endocrine: Negative.    Genitourinary: Negative.  Positive for breast pain (left nipple burning).   Musculoskeletal: Negative.    Skin: Negative.    Allergic/Immunologic: Negative.    Neurological: Negative.    Hematological: Negative.    Psychiatric/Behavioral: Negative.         I have reviewed and agree with the HPI, ROS, and historical information as entered above. Gene Beltran, APRN      Objective   /74 (BP Location: Right arm, Patient Position: Sitting, Cuff Size: Adult)   Ht 154.9 cm (60.98\")   Wt 59.3 kg (130 lb 12.8 oz)   BMI 24.73 kg/m²     Physical Exam  Vitals and nursing note reviewed. Exam conducted with a chaperone present.   Constitutional:       Appearance: Normal appearance. She is well-developed.   HENT:      Head: Normocephalic and atraumatic.   Neck:      Thyroid: No thyroid mass or thyromegaly.   Cardiovascular:      Rate and Rhythm: Normal rate.      Heart sounds: No murmur heard.  Pulmonary:      Effort: Pulmonary effort is normal. No retractions.      Breath sounds: No wheezing, rhonchi or rales.   Chest:      Chest wall: No mass or tenderness.   Breasts:     Right: Normal. No mass, nipple discharge, skin change or tenderness.      Left: Normal. No mass, nipple discharge, skin change or tenderness.      Comments: Normal breast exam, no redness of left nipple where pt c/o burning  Abdominal:      Palpations: Abdomen is soft. Abdomen is not rigid. There is no mass.      Tenderness: There is no abdominal tenderness. There is no guarding.      Hernia: No hernia is present.   Genitourinary:     General: Normal vulva.      Exam position: Lithotomy position.      Labia:         Right: No rash, tenderness or lesion.         Left: No rash, tenderness or lesion.       Vagina: Normal. No vaginal discharge or lesions.      Cervix: Normal. No cervical motion tenderness, discharge, lesion or cervical bleeding.      Uterus: Normal. Not enlarged, " not fixed and not tender.       Adnexa: Right adnexa normal and left adnexa normal.        Right: No mass or tenderness.          Left: No mass or tenderness.        Rectum: Normal. No external hemorrhoid.   Musculoskeletal:      Cervical back: Normal range of motion. No muscular tenderness.   Neurological:      Mental Status: She is alert and oriented to person, place, and time.   Psychiatric:         Behavior: Behavior normal.            Assessment and Plan    Problem List Items Addressed This Visit    None  Visit Diagnoses       Breast pain, left    -  Primary    Relevant Medications    nystatin (MYCOSTATIN) 754192 UNIT/GM cream    Other Relevant Orders    Mammo Diagnostic Digital Tomosynthesis Bilateral With CAD    US Breast Bilateral Complete    Routine gynecological examination                GYN annual well woman exam.   Pap guidelines reviewed. Pt no longer needs pap smears.  Left nipple pain: Normal breast exam today. Diagnostic mammogram and prn US ordered.  Left nipple burning: nystatin cream to area twice daily  Reviewed monthly self breast exams.  Instructed to call with lumps, pain, or breast discharge.  Yearly mammograms ordered.  Ordered mammogram today.  Reviewed exercise as a preventative health measures.   Recommended Flu Vaccine in Fall of each year.  RTC in 1 year or PRN with problems.  Return in about 1 year (around 9/30/2025) for Annual physical.         Gene Beltran, APRN  09/30/2024

## 2024-10-24 ENCOUNTER — OFFICE VISIT (OUTPATIENT)
Dept: NEUROLOGY | Facility: CLINIC | Age: 74
End: 2024-10-24
Payer: MEDICARE

## 2024-10-24 VITALS
OXYGEN SATURATION: 95 % | TEMPERATURE: 98.5 F | HEIGHT: 61 IN | HEART RATE: 92 BPM | DIASTOLIC BLOOD PRESSURE: 64 MMHG | BODY MASS INDEX: 23.79 KG/M2 | SYSTOLIC BLOOD PRESSURE: 124 MMHG | WEIGHT: 126 LBS

## 2024-10-24 DIAGNOSIS — E78.2 MIXED HYPERLIPIDEMIA: ICD-10-CM

## 2024-10-24 DIAGNOSIS — Z86.73 HISTORY OF STROKE: Primary | ICD-10-CM

## 2024-11-01 ENCOUNTER — TELEPHONE (OUTPATIENT)
Age: 74
End: 2024-11-01
Payer: MEDICARE

## 2024-11-04 ENCOUNTER — TELEPHONE (OUTPATIENT)
Dept: INTERNAL MEDICINE | Facility: CLINIC | Age: 74
End: 2024-11-04

## 2024-11-04 RX ORDER — NITROFURANTOIN 25; 75 MG/1; MG/1
100 CAPSULE ORAL 2 TIMES DAILY
Qty: 6 CAPSULE | Refills: 0 | Status: SHIPPED | OUTPATIENT
Start: 2024-11-04 | End: 2024-11-07

## 2024-11-04 NOTE — TELEPHONE ENCOUNTER
Caller: Irma Pedraza    Relationship: Self    Best call back number: [phone]     What medication are you requesting: SOMETHING FOR UTI    What are your current symptoms: BURNING AND FREQUENT URINATION    How long have you been experiencing symptoms: 3 DAYS    Have you had these symptoms before:    [] Yes  [x] No    Have you been treated for these symptoms before:   [] Yes  [x] No    If a prescription is needed, what is your preferred pharmacy and phone number: [id number] [id number]  AT [facility] - [phone] [facility] [phone] FX

## 2024-11-06 ENCOUNTER — LAB (OUTPATIENT)
Dept: LAB | Facility: HOSPITAL | Age: 74
End: 2024-11-06
Payer: MEDICARE

## 2024-11-06 DIAGNOSIS — R35.0 URINARY FREQUENCY: ICD-10-CM

## 2024-11-06 DIAGNOSIS — R35.0 URINARY FREQUENCY: Primary | ICD-10-CM

## 2024-11-06 LAB — HOLD SPECIMEN: NORMAL

## 2024-11-06 PROCEDURE — 87086 URINE CULTURE/COLONY COUNT: CPT

## 2024-11-06 PROCEDURE — 81001 URINALYSIS AUTO W/SCOPE: CPT

## 2024-11-06 PROCEDURE — 87186 SC STD MICRODIL/AGAR DIL: CPT

## 2024-11-06 PROCEDURE — 87077 CULTURE AEROBIC IDENTIFY: CPT

## 2024-11-07 LAB
BACTERIA UR QL AUTO: ABNORMAL /HPF
BILIRUB UR QL STRIP: NEGATIVE
CLARITY UR: ABNORMAL
COLOR UR: YELLOW
GLUCOSE UR STRIP-MCNC: NEGATIVE MG/DL
HGB UR QL STRIP.AUTO: ABNORMAL
HYALINE CASTS UR QL AUTO: ABNORMAL /LPF
KETONES UR QL STRIP: NEGATIVE
LEUKOCYTE ESTERASE UR QL STRIP.AUTO: ABNORMAL
NITRITE UR QL STRIP: POSITIVE
PH UR STRIP.AUTO: 6 [PH] (ref 5–8)
PROT UR QL STRIP: ABNORMAL
RBC # UR STRIP: ABNORMAL /HPF
REF LAB TEST METHOD: ABNORMAL
SP GR UR STRIP: 1.02 (ref 1–1.03)
SQUAMOUS #/AREA URNS HPF: ABNORMAL /HPF
UROBILINOGEN UR QL STRIP: ABNORMAL
WBC # UR STRIP: ABNORMAL /HPF
WBC CLUMPS # UR AUTO: PRESENT /HPF

## 2024-11-08 LAB — BACTERIA SPEC AEROBE CULT: ABNORMAL

## 2024-11-13 RX ORDER — CIPROFLOXACIN 500 MG/1
500 TABLET, FILM COATED ORAL 2 TIMES DAILY
Qty: 10 TABLET | Refills: 0 | Status: SHIPPED | OUTPATIENT
Start: 2024-11-13 | End: 2024-11-18

## 2024-11-20 ENCOUNTER — OFFICE VISIT (OUTPATIENT)
Dept: NEUROLOGY | Facility: CLINIC | Age: 74
End: 2024-11-20
Payer: MEDICARE

## 2024-11-20 ENCOUNTER — LAB (OUTPATIENT)
Dept: LAB | Facility: HOSPITAL | Age: 74
End: 2024-11-20
Payer: MEDICARE

## 2024-11-20 VITALS
BODY MASS INDEX: 23.79 KG/M2 | WEIGHT: 126 LBS | DIASTOLIC BLOOD PRESSURE: 60 MMHG | SYSTOLIC BLOOD PRESSURE: 118 MMHG | HEIGHT: 61 IN | HEART RATE: 64 BPM

## 2024-11-20 DIAGNOSIS — D64.9 ANEMIA, UNSPECIFIED TYPE: ICD-10-CM

## 2024-11-20 DIAGNOSIS — E78.2 MIXED HYPERLIPIDEMIA: ICD-10-CM

## 2024-11-20 DIAGNOSIS — G31.84 MCI (MILD COGNITIVE IMPAIRMENT): ICD-10-CM

## 2024-11-20 DIAGNOSIS — G31.84 MCI (MILD COGNITIVE IMPAIRMENT): Primary | ICD-10-CM

## 2024-11-20 LAB
ALBUMIN SERPL-MCNC: 4.8 G/DL (ref 3.5–5.2)
ALBUMIN/GLOB SERPL: 2.3 G/DL
ALP SERPL-CCNC: 64 U/L (ref 39–117)
ALT SERPL W P-5'-P-CCNC: 28 U/L (ref 1–33)
ANION GAP SERPL CALCULATED.3IONS-SCNC: 13.1 MMOL/L (ref 5–15)
AST SERPL-CCNC: 29 U/L (ref 1–32)
BILIRUB SERPL-MCNC: 1.3 MG/DL (ref 0–1.2)
BUN SERPL-MCNC: 14 MG/DL (ref 8–23)
BUN/CREAT SERPL: 14.3 (ref 7–25)
CALCIUM SPEC-SCNC: 10.1 MG/DL (ref 8.6–10.5)
CHLORIDE SERPL-SCNC: 98 MMOL/L (ref 98–107)
CO2 SERPL-SCNC: 28.9 MMOL/L (ref 22–29)
CREAT SERPL-MCNC: 0.98 MG/DL (ref 0.57–1)
EGFRCR SERPLBLD CKD-EPI 2021: 60.7 ML/MIN/1.73
FERRITIN SERPL-MCNC: 616 NG/ML (ref 13–150)
FOLATE SERPL-MCNC: 12.9 NG/ML (ref 4.78–24.2)
GLOBULIN UR ELPH-MCNC: 2.1 GM/DL
GLUCOSE SERPL-MCNC: 116 MG/DL (ref 65–99)
HCYS SERPL-MCNC: 19 UMOL/L (ref 0–15)
IRON 24H UR-MRATE: 296 MCG/DL (ref 37–145)
IRON SATN MFR SERPL: 78 % (ref 20–50)
MAGNESIUM SERPL-MCNC: 1.4 MG/DL (ref 1.6–2.4)
POTASSIUM SERPL-SCNC: 3.7 MMOL/L (ref 3.5–5.2)
PROT SERPL-MCNC: 6.9 G/DL (ref 6–8.5)
SODIUM SERPL-SCNC: 140 MMOL/L (ref 136–145)
T4 FREE SERPL-MCNC: 1.24 NG/DL (ref 0.92–1.68)
TIBC SERPL-MCNC: 381 MCG/DL (ref 298–536)
TRANSFERRIN SERPL-MCNC: 256 MG/DL (ref 200–360)
TSH SERPL DL<=0.05 MIU/L-ACNC: 1.03 UIU/ML (ref 0.27–4.2)
VIT B12 BLD-MCNC: 409 PG/ML (ref 211–946)

## 2024-11-20 PROCEDURE — 83090 ASSAY OF HOMOCYSTEINE: CPT

## 2024-11-20 PROCEDURE — 83921 ORGANIC ACID SINGLE QUANT: CPT

## 2024-11-20 PROCEDURE — 80053 COMPREHEN METABOLIC PANEL: CPT

## 2024-11-20 PROCEDURE — 84443 ASSAY THYROID STIM HORMONE: CPT

## 2024-11-20 PROCEDURE — 83540 ASSAY OF IRON: CPT

## 2024-11-20 PROCEDURE — 82728 ASSAY OF FERRITIN: CPT

## 2024-11-20 PROCEDURE — 82746 ASSAY OF FOLIC ACID SERUM: CPT

## 2024-11-20 PROCEDURE — 36415 COLL VENOUS BLD VENIPUNCTURE: CPT

## 2024-11-20 PROCEDURE — 84439 ASSAY OF FREE THYROXINE: CPT

## 2024-11-20 PROCEDURE — 86592 SYPHILIS TEST NON-TREP QUAL: CPT

## 2024-11-20 PROCEDURE — 83735 ASSAY OF MAGNESIUM: CPT

## 2024-11-20 PROCEDURE — 84466 ASSAY OF TRANSFERRIN: CPT

## 2024-11-20 PROCEDURE — 82607 VITAMIN B-12: CPT

## 2024-11-20 PROCEDURE — 85025 COMPLETE CBC W/AUTO DIFF WBC: CPT

## 2024-11-20 RX ORDER — DONEPEZIL HYDROCHLORIDE 5 MG/1
5 TABLET, FILM COATED ORAL NIGHTLY
Qty: 30 TABLET | Refills: 2 | Status: SHIPPED | OUTPATIENT
Start: 2024-11-20 | End: 2025-02-18

## 2024-11-20 RX ORDER — TOCILIZUMAB 20 MG/ML
INJECTION, SOLUTION, CONCENTRATE INTRAVENOUS
COMMUNITY
Start: 2024-11-14

## 2024-11-20 NOTE — PROGRESS NOTES
Neuro Office Visit      Encounter Date: 2024   Patient Name: Irma Pedraza  : 1950   MRN: [id number]   PCP:  Eve Navarrete MD     Chief Complaint:    Chief Complaint   Patient presents with    cognitive decline       History of Present Illness: Irma Pedraza is a 74 y.o. female who is here today in Neurology for  cognitive decline    Per Stroke Clinic:   History of Present Illness: Irma Pedraza is a 74 y.o. female who is here today to establish care.  Patient has prior medical history significant for hypertension, hyperlipidemia, tobacco abuse, COPD, chronic back pain and rheumatoid arthritis.  She presented to [facility] ED on 2024 with right facial droop, slurred speech and right arm weakness.  NIH was noted to be a 5.  CT of the head was negative for acute intracranial abnormality.  TNK was given.  CTA of the head and neck demonstrates bilateral carotid bulb atherosclerosis.  No LVO identified.  MRI of the brain revealed a right MCA territory infarct, cortical infarct surrounding the left central sulcus.  Carotid ultrasound was completed which shows less than 50% stenosis in bilateral ICA.  Echocardiogram shows EF of 61 to 65%, normal left atrial cavity and negative saline test.  Etiology felt to be cardioembolic versus atheroembolic.  Recommended to discharge on aspirin 325 mg, rosuvastatin 40 mg and Zetia for secondary stroke prevention.  He was recommended to complete an outpatient Holter monitor which the results are still pending.  Encouraged to stop smoking.     Clinic visit 2024: Patient presents to clinic today accompanied by her daughter.  She reports that she has been doing well overall.  No new or worsening strokelike symptoms.  She reports that she has been struggling with brain fog and some forgetfulness.  Her daughter shares that she was having some forgetfulness prior to the stroke.  Reviewed patient's workup, imaging, possible etiologies and treatment plan with  patient and family.  Answered all questions.  Patient is taking her medication without issue or side effect.  She just recently completed her Holter monitor and will be sending that back today or tomorrow.  Explained to the patient that if any atrial fibrillation was found that we would recommend Eliquis with a baby aspirin for secondary stroke prevention.  If no atrial fibrillation is found we will discuss at that time if any further cardiac workup is warranted.     Clinic visit 7/24/2024: Patient presents for routine follow-up today.  Her daughter is unable to be present for the appointment so she is listening on speaker phone.  Patient denies any new or worsening strokelike symptoms.  She just returned from a trip to [place] in the [facility].  She enjoyed her time with her family.  She has been taking her medications without issues or side effects.  We reviewed the results of her cardiac monitor which were negative for atrial fibrillation.  She is already had follow-up with Dr. Cabrera who did not recommend any further long-term monitoring.  I am in agreement with this plan as patient does have some traditional risk factors for possible small vessel disease or atheroemboli as a etiology.  We discussed the need for good blood pressure control, optimizing cholesterol and stopping smoking.  Should she experience heart palpitations I have asked her to notify our office or Dr. Isabel's office for further workup at that time  _______________________________________________________________________________________________________________________  Initial neurology clinic visit 11/20/2024:  Irma Pedraza is a 74 y.o. female who comes to clinic today for evaluation of cognitive impairment. She has noted symptoms since at least 2 years marked initially by forgetfulness  and repetitiveness . This has gradually worsened  over time. Additional symptoms have included impairments in short term memory . There have been no  associated  symptoms of anxiety  and depression . She denies  impairments in ADL's. She manages her medications  and finances. She continues to drive.  . She is currently residing at home with . Her daughter noted that she was missing some appointments previously but she feels this has improved since utilizing iPad.     Family History: Mother with Alzheimer's disease, brother may have Alzheimer's disease  Personal Neurological History: CVA in April 2024  Education & Work History: , 1 year college   Psychological History: No anxiety or depression   Sleep Habits & History: Sleeping well overall per patient, her daughter feels that she has a hard time falling asleep  Chronic Pain: Arthritis - does take infusions for arthritis   Hearing or Vision Impairments: Tinnitus in ears, R >L  Personal History of Cancer: None    Subjective      Review of Systems   Constitutional: Negative.    HENT: Negative.     Eyes: Negative.    Respiratory: Negative.     Cardiovascular: Negative.    Gastrointestinal: Negative.    Genitourinary: Negative.    Musculoskeletal:  Positive for arthralgias.   Skin: Negative.    Neurological:         Mild memory loss     Psychiatric/Behavioral:  Positive for sleep disturbance.           Past Medical History:   Past Medical History:   Diagnosis Date    Adenomyomatosis of gallbladder 2009    by ultrasound, Dr. Yomi Tai 2009    Arthritis     Arthritis of lumbosacral spine     Asthma     Cataract     Remove 7-20 and 7-25 2023    Chronic hoarseness     Class 1 obesity due to excess calories with serious comorbidity and body mass index (BMI) of 30.0 to 30.9 in adult 07/16/2020 2/1/2021 Eve Navarrete MD      Closed displaced fracture of shaft of right clavicle with malunion     Diverticulitis 02/28/2019    Diverticulosis     Years ago, mot sure of date    Dyslipidemia 04/23/2024    GCA (giant cell arteritis)     GERD (gastroesophageal reflux disease)     HL (hearing loss) 2022     Hyperlipidemia Unk    Low back pain ?    Medicare annual wellness visit, subsequent 02/01/2021    Gabino's neuroma of left foot     surgery    Ocular histoplasmosis     BCC 2/6/2017    Panic disorder 02/28/2019    PMR (polymyalgia rheumatica)     Rhinovirus infection 06/09/2024       Past Surgical History:   Past Surgical History:   Procedure Laterality Date    COLONOSCOPY  02/2009    COSMETIC SURGERY  1994    face lift    COSMETIC SURGERY  1993    TUMMY TUCK    COSMETIC SURGERY  1980    Birth kenny removal    ELBOW PROCEDURE Left     x2    EYE SURGERY  7/20 and 7/25/2023    Removed cataracts and incerted lenses in bot eyes    THROAT SURGERY      remote removal of growth from throat    TONSILLECTOMY  1964       Family History:   Family History   Problem Relation Age of Onset    Osteoporosis Mother     Stroke Mother         Alzhiemers    Osteoporosis Father     Migraines Father     Hearing loss Father     Stroke Father         TIA    Brain cancer Sister     Stroke Sister         CVA    Stroke Brother         CVA    Breast cancer Maternal Grandmother 90    Stroke Paternal Grandmother         CVA    Arthritis Paternal Grandmother         Ostioperosis    Stroke Paternal Aunt         TIA    Stroke Sister         CVA    Stroke Sister         TIA    Stroke Brother         CVA    Stroke Sister         Glioblastoma    Other Sister         Rheumatoid Arthritis    Other Brother         no issues    Other Daughter         no issues    Other Sister         no issues    Ovarian cancer Neg Hx     Endometrial cancer Neg Hx        Social History:   Social History     Socioeconomic History    Marital status:    Tobacco Use    Smoking status: Every Day     Current packs/day: 0.50     Average packs/day: 0.5 packs/day for 50.9 years (25.4 ttl pk-yrs)     Types: Cigarettes     Start date: 1974     Passive exposure: Current    Smokeless tobacco: Never    Tobacco comments:     patient refused smoking literature. Pt uses nicotine  patch in addition   Vaping Use    Vaping status: Never Used   Substance and Sexual Activity    Alcohol use: Yes     Comment: 2 drinks per night    Drug use: No    Sexual activity: Not Currently     Partners: Male       Medications:     Current Outpatient Medications:     Actemra 80 MG/4ML solution injection, , Disp: , Rfl:     albuterol sulfate HFA (Ventolin HFA) 108 (90 Base) MCG/ACT inhaler, Inhale 2 puffs Every 4 (Four) Hours As Needed for Wheezing., Disp: 18 g, Rfl: 5    aspirin 325 MG tablet, Take 1 tablet by mouth Daily., Disp: 90 tablet, Rfl: 3    Coenzyme Q10 (COQ-10) 200 MG capsule, Take 1 tablet by mouth Daily., Disp: , Rfl:     cyanocobalamin (VITAMIN B-12) 500 MCG tablet, Take 1 tablet by mouth Daily., Disp: 90 tablet, Rfl: 3    cyclobenzaprine (FLEXERIL) 10 MG tablet, Take 1 tablet by mouth At Night As Needed for Muscle Spasms., Disp: 30 tablet, Rfl: 0    donepezil (Aricept) 5 MG tablet, Take 1 tablet by mouth Every Night for 90 days., Disp: 30 tablet, Rfl: 2    ezetimibe (ZETIA) 10 MG tablet, Take 1 tablet by mouth Every Night., Disp: 90 tablet, Rfl: 1    famotidine (PEPCID) 20 MG tablet, TAKE 1 TABLET BY MOUTH TWICE DAILY (Patient taking differently: 1 tablet 2 (Two) Times a Day As Needed.), Disp: 180 tablet, Rfl: 1    Fluticasone-Umeclidin-Vilant (Trelegy Ellipta) 100-62.5-25 MCG/ACT inhaler, Inhale 1 puff Daily., Disp: 3 each, Rfl: 3    folic acid (FOLVITE) 1 MG tablet, TAKE 1 TABLET BY MOUTH DAILY, Disp: 90 tablet, Rfl: 3    ibuprofen (ADVIL,MOTRIN) 800 MG tablet, Take 1 tablet by mouth Every 6 (Six) Hours As Needed for Mild Pain., Disp: 60 tablet, Rfl: 0    Lactobacillus-Inulin ([facility] PO), Take 1 capsule by mouth Daily., Disp: , Rfl:     montelukast (SINGULAIR) 10 MG tablet, Take 1 tablet by mouth Every Night., Disp: 90 tablet, Rfl: 1    nicotine (NICODERM CQ) 21 MG/24HR patch, Place 1 patch on the skin as directed by provider Daily., Disp: 30 each, Rfl: 3    nystatin  "(MYCOSTATIN) 586414 UNIT/GM cream, Apply 1 Application topically to the appropriate area as directed 2 (Two) Times a Day., Disp: 30 g, Rfl: 0    omeprazole (priLOSEC) 40 MG capsule, Take 1 capsule by mouth Daily., Disp: 90 capsule, Rfl: 1    rosuvastatin (CRESTOR) 40 MG tablet, Take 1 tablet by mouth Every Night., Disp: 90 tablet, Rfl: 1    zoledronic acid (RECLAST) 5 MG/100ML solution infusion, , Disp: , Rfl:     Allergies:   Allergies   Allergen Reactions    Bactrim [Sulfamethoxazole-Trimethoprim] Other (See Comments)     Hypokalemia, hyponatremia    Codeine GI Intolerance    Atorvastatin Unknown (See Comments)     Lipitor      Bupropion Hcl [Bupropion] Unknown (See Comments)     wellbutrin    Calcium Unknown (See Comments)     unknown         STEADI Fall Risk Assessment was completed, and patient is at HIGH risk for falls. Assessment completed on:11/20/2024    Objective     Objective:    /60   Pulse 64   Ht 154.9 cm (60.98\")   Wt 57.2 kg (126 lb)   BMI 23.82 kg/m²   Body mass index is 23.82 kg/m².    Physical Exam  Vitals reviewed.   Constitutional:       Appearance: Normal appearance.   HENT:      Head: Normocephalic and atraumatic.      Mouth/Throat:      Mouth: Mucous membranes are moist.      Pharynx: Oropharynx is clear.   Pulmonary:      Effort: Pulmonary effort is normal. No respiratory distress.   Skin:     General: Skin is warm and dry.   Neurological:      Mental Status: She is alert.          Neurology Exam:    General apperance: NAD.     Mental status: Alert, awake and oriented to time place and person.    Fund of knowledge:  Mildly limited.     Language and Speech: No aphasia or dysarthria.    Naming , Repetition and Comprehension:  Can name objects, repeat a sentence and follow commands. Speech is clear and fluent with good repetition, comprehension, and naming.    Cranial Nerves:   CN II: Visual fields are full. Intact. Pupils - PERRLA  CN III, IV and VI: Extraocular movements are intact. " "Normal saccades.   CN V: Facial sensation is intact.   CN VII: Muscles of facial expression reveal no asymmetry. Intact.   CN VIII: Hearing is intact.   CN IX and X: Palate elevates symmetrically. Intact  CN XI: Shoulder shrug is intact.   CN XII: Tongue is midline without evidence of atrophy or fasciculation.     Motor:  Right UE muscle strength 5/5. Normal tone.     Left UE muscle strength 5/5. Normal tone.      Right LE muscle strength 5/5. Normal tone.     Left LE muscle strength 5/5. Normal tone.      Sensory: Normal light touch sensation bilaterally.    DTRs: 2+ bilaterally in upper and lower extremities.    Coordination: Normal finger-to-nose, heel to camp B/L.    Romberg: Negative.    Gait: Normal for age. No assistive device      MMSE 26/30, recall 1/3      Results:   Imaging:   CT Head Without Contrast    Result Date: 8/11/2024  1. Large left parietal scalp hematoma. 2. No intracranial acute abnormalities. Images reviewed, interpreted, dictated and electronically signed by Broderick Blanco MD Voice transcription technology (Power [employer]arr[employer]) is used for the dictation of this note and \"sound-alike\" words might be erroneously placed despite reviewing this note for accuracy. Errors in dictation may reflect use of voice recognition software and not all errors in transcription may have been detected prior to signing.    XR Spine Lumbar AP & Lateral    Result Date: 8/11/2024  Degenerative changes with no subluxation or fracture. Images reviewed, interpreted, dictated and electronically signed by Broderick Blanco MD Voice transcription technology (Power [employer]) is used for the dictation of this note and \"sound-alike\" words might be erroneously placed despite reviewing this note for accuracy. Errors in dictation may reflect use of voice recognition software and not all errors in transcription may have been detected prior to signing.       Labs:   Lab Results   Component Value Date    GLUCOSE 116 (H) 11/20/2024    BUN 14 " 11/20/2024    CREATININE 0.98 11/20/2024     11/20/2024    K 3.7 11/20/2024    CL 98 11/20/2024    CALCIUM 10.1 11/20/2024    PROTEINTOT 6.9 11/20/2024    ALBUMIN 4.8 11/20/2024    ALT 28 11/20/2024    AST 29 11/20/2024    ALKPHOS 64 11/20/2024    BILITOT 1.3 (H) 11/20/2024    GLOB 2.1 11/20/2024    AGRATIO 2.3 11/20/2024    BCR 14.3 11/20/2024    ANIONGAP 13.1 11/20/2024    EGFR 60.7 11/20/2024         Assessment / Plan      Assessment/Plan:   Diagnoses and all orders for this visit:    1. MCI (mild cognitive impairment) (Primary)  -     Vitamin B12; Future  -     Folate; Future  -     Methylmalonic Acid, Serum; Future  -     TSH; Future  -     T4, free; Future  -     RPR Qualitative with Reflex to Quant; Future  -     donepezil (Aricept) 5 MG tablet; Take 1 tablet by mouth Every Night for 90 days.  Dispense: 30 tablet; Refill: 2       Irma Pedraza is in neurology clinic to establish care for cognitive decline, MMSE today 26/30, recall 1/3 consistent with MCI. Reviewed MRI brain from 4/24/2024 which in addition to stroke findings of cluster of small acute cortical infarcts surrounding the left central sulcus. Punctate likely acute infarct in the left occipital lobe, there was also noted scattered subcortical and periventricular white matter FLAIR/T2 hyperintensities which are nonspecific and can be seen in the setting of chronic small vessel ischemic change. I have ordered additional labwork today to further look into etiology of her symptoms, given her CVA history vascular dementia is a consideration, we also discussed screening for Alzheimer's with Phosphorylated Tau 217, she would like to wait on Alzheimer's blood work at this time. We also elected to start Donepezil 5 mg nightly for cognitive protection given progressive memory loss and family history of dementia. Will plan to see Virginia back in clinic in 8 weeks or sooner for any questions or concerns.     Patient Education:       Reviewed  medications, potential side effects and signs and symptoms to report. Discussed risk versus benefits of treatment plan with patient and/or family-including medications, labs and radiology that may be ordered. Addressed questions and concerns during visit. Patient and/or family verbalized understanding and agree with plan. Instructed to call the office with any questions and report to ER with any life-threatening symptoms.     Follow Up:   Return in about 8 weeks (around 1/15/2025).    I spent 30  minutes face to face with the patient. I personally spent 50 percent of this time counseling and discussing diagnosis, diagnostic testing, evaluation, treatment options, and management .       During this visit the following were done:  Labs Reviewed [x]    Labs Ordered [x]    Radiology Reports Reviewed [x]    Radiology Ordered []    PCP Records Reviewed []    Referring Provider Records Reviewed [x]    ER Records Reviewed []    Hospital Records Reviewed []    History Obtained From Family []    Radiology Images Reviewed [x]    Other Reviewed []    Records Requested []      DEVI Bradshaw  [facility]  [address] [address] [phone]

## 2024-11-21 ENCOUNTER — TELEPHONE (OUTPATIENT)
Dept: NEUROLOGY | Facility: CLINIC | Age: 74
End: 2024-11-21
Payer: MEDICARE

## 2024-11-21 LAB
BASOPHILS # BLD AUTO: 0.03 10*3/MM3 (ref 0–0.2)
BASOPHILS NFR BLD AUTO: 0.5 % (ref 0–1.5)
DEPRECATED RDW RBC AUTO: 50.1 FL (ref 37–54)
EOSINOPHIL # BLD AUTO: 0.05 10*3/MM3 (ref 0–0.4)
EOSINOPHIL NFR BLD AUTO: 0.8 % (ref 0.3–6.2)
ERYTHROCYTE [DISTWIDTH] IN BLOOD BY AUTOMATED COUNT: 14.4 % (ref 12.3–15.4)
HCT VFR BLD AUTO: 46.7 % (ref 34–46.6)
HGB BLD-MCNC: 16.1 G/DL (ref 12–15.9)
IMM GRANULOCYTES # BLD AUTO: 0.02 10*3/MM3 (ref 0–0.05)
IMM GRANULOCYTES NFR BLD AUTO: 0.3 % (ref 0–0.5)
LYMPHOCYTES # BLD AUTO: 1.99 10*3/MM3 (ref 0.7–3.1)
LYMPHOCYTES NFR BLD AUTO: 30.4 % (ref 19.6–45.3)
MCH RBC QN AUTO: 33.3 PG (ref 26.6–33)
MCHC RBC AUTO-ENTMCNC: 34.5 G/DL (ref 31.5–35.7)
MCV RBC AUTO: 96.5 FL (ref 79–97)
MONOCYTES # BLD AUTO: 0.45 10*3/MM3 (ref 0.1–0.9)
MONOCYTES NFR BLD AUTO: 6.9 % (ref 5–12)
NEUTROPHILS NFR BLD AUTO: 4 10*3/MM3 (ref 1.7–7)
NEUTROPHILS NFR BLD AUTO: 61.1 % (ref 42.7–76)
NRBC BLD AUTO-RTO: 0 /100 WBC (ref 0–0.2)
PLATELET # BLD AUTO: 142 10*3/MM3 (ref 140–450)
PMV BLD AUTO: 10.5 FL (ref 6–12)
RBC # BLD AUTO: 4.84 10*6/MM3 (ref 3.77–5.28)
RPR SER QL: NORMAL
WBC NRBC COR # BLD AUTO: 6.54 10*3/MM3 (ref 3.4–10.8)

## 2024-11-21 NOTE — TELEPHONE ENCOUNTER
----- Message from Jaimee Noel sent at 11/21/2024  2:40 PM EST -----  Please notify Virginia that her lab work overall was normal, it did show that vitamin B12 was on the lower end of normal, she should start taking vitamin B12 1000 mcg daily over the counter.

## 2024-11-25 ENCOUNTER — OFFICE VISIT (OUTPATIENT)
Dept: INTERNAL MEDICINE | Facility: CLINIC | Age: 74
End: 2024-11-25
Payer: MEDICARE

## 2024-11-25 VITALS
OXYGEN SATURATION: 94 % | DIASTOLIC BLOOD PRESSURE: 70 MMHG | HEIGHT: 61 IN | BODY MASS INDEX: 23.83 KG/M2 | TEMPERATURE: 98 F | SYSTOLIC BLOOD PRESSURE: 130 MMHG | HEART RATE: 62 BPM | WEIGHT: 126.2 LBS

## 2024-11-25 DIAGNOSIS — R63.4 ABNORMAL WEIGHT LOSS: Chronic | ICD-10-CM

## 2024-11-25 DIAGNOSIS — R73.09 ABNORMAL GLUCOSE: ICD-10-CM

## 2024-11-25 DIAGNOSIS — E61.2 MAGNESIUM DEFICIENCY: ICD-10-CM

## 2024-11-25 DIAGNOSIS — I63.9 ACUTE CVA (CEREBROVASCULAR ACCIDENT): ICD-10-CM

## 2024-11-25 DIAGNOSIS — F17.200 TOBACCO DEPENDENCE SYNDROME: ICD-10-CM

## 2024-11-25 DIAGNOSIS — I10 PRIMARY HYPERTENSION: Chronic | ICD-10-CM

## 2024-11-25 DIAGNOSIS — R41.3 MILD MEMORY DISTURBANCE: Primary | Chronic | ICD-10-CM

## 2024-11-25 DIAGNOSIS — E78.2 MIXED HYPERLIPIDEMIA: ICD-10-CM

## 2024-11-25 PROBLEM — E66.3 OVERWEIGHT WITH BODY MASS INDEX (BMI) OF 27 TO 27.9 IN ADULT: Chronic | Status: RESOLVED | Noted: 2022-08-03 | Resolved: 2024-11-25

## 2024-11-25 LAB — METHYLMALONATE SERPL-SCNC: 217 NMOL/L (ref 0–378)

## 2024-11-25 RX ORDER — NICOTINE 21 MG/24HR
1 PATCH, TRANSDERMAL 24 HOURS TRANSDERMAL
Start: 2024-11-25

## 2024-11-25 RX ORDER — MEMANTINE HYDROCHLORIDE 10 MG/1
10 TABLET ORAL 2 TIMES DAILY
Qty: 180 TABLET | Refills: 1 | Status: SHIPPED | OUTPATIENT
Start: 2024-11-25

## 2024-11-25 RX ORDER — FOLIC ACID 1 MG/1
1 TABLET ORAL DAILY
Qty: 90 TABLET | Refills: 3 | Status: SHIPPED | OUTPATIENT
Start: 2024-11-25

## 2024-11-25 RX ORDER — MAGNESIUM GLUCONATE 30 MG(550)
500 TABLET ORAL NIGHTLY
Qty: 90 TABLET | Refills: 3 | Status: SHIPPED | OUTPATIENT
Start: 2024-11-25

## 2024-11-25 RX ORDER — CYANOCOBALAMIN (VITAMIN B-12) 500 MCG
500 TABLET ORAL DAILY
Qty: 90 TABLET | Refills: 3 | Status: SHIPPED | OUTPATIENT
Start: 2024-11-25

## 2024-11-25 RX ORDER — MEMANTINE HYDROCHLORIDE 5 MG/1
TABLET ORAL
Qty: 70 TABLET | Refills: 0 | Status: SHIPPED | OUTPATIENT
Start: 2024-11-25

## 2024-11-25 NOTE — PROGRESS NOTES
[facility]     Irma Pedraza  1950   [id number]      Patient Care Team:  Eve Navarrete MD as PCP - General (Internal Medicine)  Sabrina Angelo MD as Consulting Physician (Plastic Surgery)  Omer Cabrera MD as Consulting Physician (Cardiology)  Gold Swift DO as Consulting Physician (Rheumatology)  Juarez Pedersen MD as Consulting Physician (Otolaryngology)    Chief Complaint   Patient presents with    2 mo f/u    Hyperlipidemia        Patient is a 74 y.o. female.   History of Present Illness  The patient is a 74-year-old female who presents for a follow-up visit.    Her family has expressed concern about her memory, but she believes it has remained stable. She was previously prescribed donepezil 5 mg to be taken every evening, but this medication was discontinued for reasons unknown to her. She resumed taking it about a week ago and does not recall experiencing any side effects. She has not tried memantine or Namenda. Additionally, she takes vitamin B12 daily.    She engages in regular walking for exercise. She has been making efforts to quit smoking and has reduced her daily intake to 10 cigarettes. She has tried nicotine patches in the past but is not currently using them.    She has experienced weight loss and currently weighs 130 pounds. She monitors her weight at home. She typically eats two meals a day, often skipping breakfast.    For cholesterol management, she is on rosuvastatin and Zetia and has refills available for both. She does not take amlodipine for blood pressure control and does not monitor her blood pressure at home.    She does not take iron or magnesium supplements and reports no history of diarrhea.         CHRONIC CONDITIONS      Past Medical History:   Diagnosis Date    Adenomyomatosis of gallbladder 2009    by ultrasound, Dr. Yomi Tai 2009    Arthritis     Arthritis of lumbosacral spine     Asthma     Cataract     Remove 7-20 and 7-25  2023    Chronic hoarseness     Class 1 obesity due to excess calories with serious comorbidity and body mass index (BMI) of 30.0 to 30.9 in adult 07/16/2020 2/1/2021 Eve Navarrete MD      Closed displaced fracture of shaft of right clavicle with malunion     Diverticulitis 02/28/2019    Diverticulosis     Years ago, mot sure of date    Dyslipidemia 04/23/2024    GCA (giant cell arteritis)     GERD (gastroesophageal reflux disease)     HL (hearing loss) 2022    Hyperlipidemia Unk    Low back pain ?    Medicare annual wellness visit, subsequent 02/01/2021    Lloyd's neuroma of left foot     surgery    Ocular histoplasmosis     BCC 2/6/2017    Overweight with body mass index (BMI) of 27 to 27.9 in adult 08/03/2022    Panic disorder 02/28/2019    PMR (polymyalgia rheumatica)     Rhinovirus infection 06/09/2024       Past Surgical History:   Procedure Laterality Date    COLONOSCOPY  02/2009    COSMETIC SURGERY  1994    face lift    COSMETIC SURGERY  1993    TUMMY TUCK    COSMETIC SURGERY  1980    Birth kenny removal    ELBOW PROCEDURE Left     x2    EYE SURGERY  7/20 and 7/25/2023    Removed cataracts and incerted lenses in bot eyes    THROAT SURGERY      remote removal of growth from throat    TONSILLECTOMY  1964       Family History   Problem Relation Age of Onset    Osteoporosis Mother     Stroke Mother         Alzhiemers    Osteoporosis Father     Migraines Father     Hearing loss Father     Stroke Father         TIA    Brain cancer Sister     Stroke Sister         CVA    Stroke Brother         CVA    Breast cancer Maternal Grandmother 90    Stroke Paternal Grandmother         CVA    Arthritis Paternal Grandmother         Ostioperosis    Stroke Paternal Aunt         TIA    Stroke Sister         CVA    Stroke Sister         TIA    Stroke Brother         CVA    Stroke Sister         Glioblastoma    Other Sister         Rheumatoid Arthritis    Other Brother         no issues    Other Daughter         no issues     "Other Sister         no issues    Ovarian cancer Neg Hx     Endometrial cancer Neg Hx        Social History     Socioeconomic History    Marital status:    Tobacco Use    Smoking status: Every Day     Current packs/day: 0.50     Average packs/day: 0.5 packs/day for 50.9 years (25.5 ttl pk-yrs)     Types: Cigarettes     Start date: 1974     Passive exposure: Current    Smokeless tobacco: Never    Tobacco comments:     patient refused smoking literature. Pt uses nicotine patch in addition   Vaping Use    Vaping status: Never Used   Substance and Sexual Activity    Alcohol use: Yes     Comment: 2 drinks per night    Drug use: No    Sexual activity: Not Currently     Partners: Male       Allergies   Allergen Reactions    Bactrim [Sulfamethoxazole-Trimethoprim] Other (See Comments)     Hypokalemia, hyponatremia    Codeine GI Intolerance    Atorvastatin Unknown (See Comments)     Lipitor      Bupropion Hcl [Bupropion] Unknown (See Comments)     wellbutrin    Calcium Unknown (See Comments)     unknown       Review of Systems:     Review of Systems    Vital Signs  Vitals:    11/25/24 1102   BP: 130/70   BP Location: Right arm   Patient Position: Sitting   Cuff Size: Adult   Pulse: 62   Temp: 98 °F (36.7 °C)   TempSrc: Infrared   SpO2: 94%   Weight: 57.2 kg (126 lb 3.2 oz)   Height: 154.9 cm (60.98\")   PainSc: 0-No pain     Body mass index is 23.86 kg/m².  BMI is within normal parameters. No other follow-up for BMI required.          Current Outpatient Medications:     Actemra 80 MG/4ML solution injection, , Disp: , Rfl:     albuterol sulfate HFA (Ventolin HFA) 108 (90 Base) MCG/ACT inhaler, Inhale 2 puffs Every 4 (Four) Hours As Needed for Wheezing., Disp: 18 g, Rfl: 5    aspirin 325 MG tablet, Take 1 tablet by mouth Daily., Disp: 90 tablet, Rfl: 3    Coenzyme Q10 (COQ-10) 200 MG capsule, Take 1 tablet by mouth Daily., Disp: , Rfl:     cyanocobalamin (VITAMIN B-12) 500 MCG tablet, Take 1 tablet by mouth Daily., Disp: " 90 tablet, Rfl: 3    cyclobenzaprine (FLEXERIL) 10 MG tablet, Take 1 tablet by mouth At Night As Needed for Muscle Spasms., Disp: 30 tablet, Rfl: 0    donepezil (Aricept) 5 MG tablet, Take 1 tablet by mouth Every Night for 90 days., Disp: 30 tablet, Rfl: 2    ezetimibe (ZETIA) 10 MG tablet, Take 1 tablet by mouth Every Night., Disp: 90 tablet, Rfl: 1    famotidine (PEPCID) 20 MG tablet, TAKE 1 TABLET BY MOUTH TWICE DAILY (Patient taking differently: 1 tablet 2 (Two) Times a Day As Needed.), Disp: 180 tablet, Rfl: 1    Fluticasone-Umeclidin-Vilant (Trelegy Ellipta) 100-62.5-25 MCG/ACT inhaler, Inhale 1 puff Daily., Disp: 3 each, Rfl: 3    folic acid (FOLVITE) 1 MG tablet, Take 1 tablet by mouth Daily., Disp: 90 tablet, Rfl: 3    ibuprofen (ADVIL,MOTRIN) 800 MG tablet, Take 1 tablet by mouth Every 6 (Six) Hours As Needed for Mild Pain., Disp: 60 tablet, Rfl: 0    Lactobacillus-Inulin ([facility]), Take 1 capsule by mouth Daily., Disp: , Rfl:     montelukast (SINGULAIR) 10 MG tablet, Take 1 tablet by mouth Every Night., Disp: 90 tablet, Rfl: 1    nicotine (NICODERM CQ) 21 MG/24HR patch, Place 1 patch on the skin as directed by provider Daily., Disp: , Rfl:     nystatin (MYCOSTATIN) 463160 UNIT/GM cream, Apply 1 Application topically to the appropriate area as directed 2 (Two) Times a Day., Disp: 30 g, Rfl: 0    omeprazole (priLOSEC) 40 MG capsule, Take 1 capsule by mouth Daily., Disp: 90 capsule, Rfl: 1    rosuvastatin (CRESTOR) 40 MG tablet, Take 1 tablet by mouth Every Night., Disp: 90 tablet, Rfl: 1    zoledronic acid (RECLAST) 5 MG/100ML solution infusion, , Disp: , Rfl:     Magnesium Gluconate 550 MG tablet, Take 1 tablet by mouth Every Night., Disp: 90 tablet, Rfl: 3    memantine (NAMENDA) 10 MG tablet, Take 1 tablet by mouth 2 (Two) Times a Day., Disp: 180 tablet, Rfl: 1    memantine (NAMENDA) 5 MG tablet, Take 1 tab daily for 7 days, the 1 tab twice a day for 7 d,then 1 in am and 2 tabs in  pm for 7 d, then 2 tabs twice daily., Disp: 70 tablet, Rfl: 0    Physical Exam:    Physical Exam  Vitals and nursing note reviewed.   Constitutional:       Appearance: She is well-developed.   HENT:      Head: Normocephalic.   Eyes:      Conjunctiva/sclera: Conjunctivae normal.      Pupils: Pupils are equal, round, and reactive to light.   Neck:      Thyroid: No thyromegaly.   Cardiovascular:      Rate and Rhythm: Normal rate and regular rhythm.      Heart sounds: Normal heart sounds.   Pulmonary:      Effort: Pulmonary effort is normal.      Breath sounds: Normal breath sounds. No wheezing.   Musculoskeletal:         General: Normal range of motion.      Cervical back: Normal range of motion and neck supple.   Lymphadenopathy:      Cervical: No cervical adenopathy.   Skin:     General: Skin is warm and dry.   Neurological:      Mental Status: She is alert and oriented to person, place, and time.   Psychiatric:         Attention and Perception: Attention normal.         Mood and Affect: Mood normal.         Speech: Speech normal.         Thought Content: Thought content normal.         Cognition and Memory: Memory is impaired.          ACE III MINI        Results Review:    I reviewed the patient's new clinical results.  Results  Laboratory Studies  B12 level is 409. Blood sugar is 116. Kidney function is normal. Liver enzymes are normal. Magnesium is low. Folate is low. Thyroid function is normal. Syphilis test is negative. Blood cell counts acceptable .       CMP:  Lab Results   Component Value Date    Glucose 116 (H) 11/20/2024    Glucose 84 04/24/2024    Glucose, UA Negative 11/06/2024    BUN 14 11/20/2024    BUN 11 04/23/2024    BUN/Creatinine Ratio 14.3 11/20/2024    Creatinine 0.98 11/20/2024    Creatinine 0.80 04/23/2024    Creatinine, Urine 241.9 02/27/2024    Ketones, UA Negative 11/06/2024    CO2 28.9 11/20/2024    Calcium 10.1 11/20/2024    Albumin 4.8 11/20/2024    AST (SGOT) 29 11/20/2024    ALT (SGPT)  28 11/20/2024     HbA1c:  Lab Results   Component Value Date    HGBA1C 5.50 04/23/2024     Microalbumin:  Lab Results   Component Value Date    MICROALBUR 30.6 02/27/2024     Lipid Panel  Lab Results   Component Value Date    CHOL 130 04/24/2024    TRIG 124 04/24/2024    HDL 50 04/24/2024    LDL 58 04/24/2024    AST 29 11/20/2024    ALT 28 11/20/2024       Medication Review: Medications reviewed and noted  Patient Instructions   Problem List Items Addressed This Visit          Cardiac and Vasculature    HTN (hypertension) (Chronic)    Overview     Amlodipine stopped in hospital 4/2024.  Lifestyle control.         Mixed hyperlipidemia    Overview     Taking rosuvastatin 40 mg and zetia 10mg daily.          Relevant Medications    ezetimibe (ZETIA) 10 MG tablet    rosuvastatin (CRESTOR) 40 MG tablet       Endocrine and Metabolic    Abnormal weight loss (Chronic)    Magnesium deficiency    Abnormal glucose       Neuro    Mild memory disturbance - Primary (Chronic)    Acute CVA (cerebrovascular accident)    Overview     4/23/24 acute L MCA infarct with RUE weakness and dysmetria and slurred speech.            Tobacco    Tobacco dependence syndrome    Overview     Nicotine patch started 4/2024.         Relevant Medications    nicotine (NICODERM CQ) 21 MG/24HR patch          Diagnosis Plan   1. Mild memory disturbance        2. Mixed hyperlipidemia        3. Acute CVA (cerebrovascular accident)        4. Tobacco dependence syndrome        5. Primary hypertension        6. Abnormal weight loss        7. Magnesium deficiency        8. Abnormal glucose          Assessment & Plan  Mild Memory loss and history of acute CVA (stroke).  She has resumed taking donepezil 5 mg every evening about a week ago. If well-tolerated, the dose can be increased to 10 mg. A prescription for memantine has been sent to her pharmacy, starting with a low dose and gradually increasing to 10 mg twice daily. She is advised to engage in daily  physical activity, such as walking or using an exercise bike, to improve brain function and memory. Reading is also recommended.    Smoking cessation.  She is currently smoking about 10 cigarettes a day. She is advised to reduce her smoking habit to nine cigarettes per day and to start using nicotine patches daily. She has nicotine patches at home and is advised to resume using them, applying one every morning.  Physical activity and walking also helps quit smoking.    Mixed hyperlipidemia  She is advised to continue taking rosuvastatin and Zetia for cholesterol management.  Continue to improve low-fat healthy diet.  Increase exercise with walking and using small hand weights while watching TV at home.     Hypertension.  Her blood pressure today is 130/70. She is advised to monitor her blood pressure at home to ensure it remains around 120/70 or less.  Continue to avoid salt in the diet.  Continue to avoid sodas.    Abnormal weight loss  Her weight is currently stable at 126  lbs. She is advised to monitor her weight and report any further loss. She is encouraged to eat three meals a day, including breakfast, and to include protein-rich foods like peanut butter or eggs in her morning meal.     Magnesium deficiency.  Her magnesium levels are low. A prescription for magnesium gluconate has been sent to her pharmacy, to be taken once daily in the evening to aid sleep.    Abnormal glucose  Her blood glucose level is slightly elevated at 116, with a normal range being around 80 to 100. She is advised to eat less sugars.  Maintain a healthy diet, including protein three times a day, vegetables, fruits, and whole grains.    Folate deficiency.  Her folate levels are low. She is advised to continue taking folic acid daily.    Low serum Vitamin B12   Her B12 level has decreased to 409. A prescription for vitamin B12 has been sent to her pharmacy. She is advised to take it daily to increase her levels closer to 900.    Health  Maintenance.  Kidney function, liver enzymes, and bilirubin levels are all within normal limits. Ferritin levels are slightly high, suggesting inflammation, but this is not a cause for concern. Iron levels are satisfactory. Thyroid function is normal. Syphilis testing, which can be a potential cause of memory loss, was negative. Blood cell counts are within normal range.    Follow-up  Return in 02/2025 for her annual wellness examination.         Plan of care reviewed with patient at the conclusion of today's visit. Education was provided regarding diagnosis, management, and any prescribed or recommended OTC medications. Patient verbalizes understanding of and agreement with management plan.         11/25/24   11:07 EST    Patient or patient representative verbalized consent for the use of Ambient Listening during the visit with  Eve Navarrete MD for chart documentation. 11/30/2024  11:56 EST

## 2024-11-26 ENCOUNTER — TELEPHONE (OUTPATIENT)
Dept: NEUROLOGY | Facility: CLINIC | Age: 74
End: 2024-11-26
Payer: MEDICARE

## 2024-11-26 NOTE — TELEPHONE ENCOUNTER
----- Message from Jaimee Noel sent at 11/25/2024  4:55 PM EST -----  Methylmalonic acid level was normal.

## 2024-11-30 PROBLEM — R73.09 ABNORMAL GLUCOSE: Status: ACTIVE | Noted: 2024-11-30

## 2024-11-30 PROBLEM — E61.2 MAGNESIUM DEFICIENCY: Status: ACTIVE | Noted: 2024-11-30

## 2024-11-30 NOTE — PATIENT INSTRUCTIONS
Patient Instructions  Problem List Items Addressed This Visit          Cardiac and Vasculature    HTN (hypertension) (Chronic)    Overview     Amlodipine stopped in hospital 4/2024.  Lifestyle control.         Mixed hyperlipidemia    Overview     Taking rosuvastatin 40 mg and zetia 10mg daily.          Relevant Medications    ezetimibe (ZETIA) 10 MG tablet    rosuvastatin (CRESTOR) 40 MG tablet       Endocrine and Metabolic    Abnormal weight loss (Chronic)    Magnesium deficiency    Abnormal glucose       Neuro    Mild memory disturbance - Primary (Chronic)    Acute CVA (cerebrovascular accident)    Overview     4/23/24 acute L MCA infarct with RUE weakness and dysmetria and slurred speech.            Tobacco    Tobacco dependence syndrome    Overview     Nicotine patch started 4/2024.         Relevant Medications    nicotine (NICODERM CQ) 21 MG/24HR patch       Exercising to Stay Healthy  To become healthy and stay healthy, it is recommended that you do moderate-intensity and vigorous-intensity exercise. You can tell that you are exercising at a moderate intensity if your heart starts beating faster and you start breathing faster but can still hold a conversation. You can tell that you are exercising at a vigorous intensity if you are breathing much harder and faster and cannot hold a conversation while exercising.  How can exercise benefit me?  Exercising regularly is important. It has many health benefits, such as:  Improving overall fitness, flexibility, and endurance.  Increasing bone density.  Helping with weight control.  Decreasing body fat.  Increasing muscle strength and endurance.  Reducing stress and tension, anxiety, depression, or anger.  Improving overall health.  What guidelines should I follow while exercising?  Before you start a new exercise program, talk with your health care provider.  Do not exercise so much that you hurt yourself, feel dizzy, or get very short of breath.  Wear comfortable  clothes and wear shoes with good support.  Drink plenty of water while you exercise to prevent dehydration or heat stroke.  Work out until your breathing and your heartbeat get faster (moderate intensity).  How often should I exercise?  Choose an activity that you enjoy, and set realistic goals. Your health care provider can help you make an activity plan that is individually designed and works best for you.  Exercise regularly as told by your health care provider. This may include:  Doing strength training two times a week, such as:  Lifting weights.  Using resistance bands.  Push-ups.  Sit-ups.  Yoga.  Doing a certain intensity of exercise for a given amount of time. Choose from these options:  A total of 150 minutes of moderate-intensity exercise every week.  A total of 75 minutes of vigorous-intensity exercise every week.  A mix of moderate-intensity and vigorous-intensity exercise every week.  Children, pregnant women, people who have not exercised regularly, people who are overweight, and older adults may need to talk with a health care provider about what activities are safe to perform. If you have a medical condition, be sure to talk with your health care provider before you start a new exercise program.  What are some exercise ideas?  Moderate-intensity exercise ideas include:  Walking 1 mile (1.6 km) in about 15 minutes.  Biking.  Hiking.  Golfing.  Dancing.  Water aerobics.  Vigorous-intensity exercise ideas include:  Walking 4.5 miles (7.2 km) or more in about 1 hour.  Jogging or running 5 miles (8 km) in about 1 hour.  Biking 10 miles (16.1 km) or more in about 1 hour.  Lap swimming.  Roller-skating or in-line skating.  Cross-country skiing.  Vigorous competitive sports, such as football, basketball, and soccer.  Jumping rope.  Aerobic dancing.  What are some everyday activities that can help me get exercise?  Yard work, such as:  Pushing a .  Raking and bagging leaves.  Washing your  car.  Pushing a stroller.  Shoveling snow.  Gardening.  Washing windows or floors.  How can I be more active in my day-to-day activities?  Use stairs instead of an elevator.  Take a walk during your lunch break.  If you drive, park your car farther away from your work or school.  If you take public transportation, get off one stop early and walk the rest of the way.  Stand up or walk around during all of your indoor phone calls.  Get up, stretch, and walk around every 30 minutes throughout the day.  Enjoy exercise with a friend. Support to continue exercising will help you keep a regular routine of activity.  Where to find more information  You can find more information about exercising to stay healthy from:  [place] Department of Health and Human Services: www.hhs.gov  Centers for Disease Control and Prevention (CDC): www.cdc.gov  Summary  Exercising regularly is important. It will improve your overall fitness, flexibility, and endurance.  Regular exercise will also improve your overall health. It can help you control your weight, reduce stress, and improve your bone density.  Do not exercise so much that you hurt yourself, feel dizzy, or get very short of breath.  Before you start a new exercise program, talk with your health care provider.  This information is not intended to replace advice given to you by your health care provider. Make sure you discuss any questions you have with your health care provider.  Document Revised: 04/15/2022 Document Reviewed: 04/15/2022  [employer] Patient Education © 2023 [employer] Heart-Healthy Eating Plan  Many factors influence your heart (coronary) health, including eating and exercise habits. Coronary risk increases with abnormal blood fat (lipid) levels. Heart-healthy meal planning includes limiting unhealthy fats, increasing healthy fats, and making other diet and lifestyle changes.  What is my plan?  Your health care provider may recommend that you:  Limit your fat intake to  _________% or less of your total calories each day.  Limit your saturated fat intake to _________% or less of your total calories each day.  Limit the amount of cholesterol in your diet to less than _________ mg per day.  What are tips for following this plan?  Cooking  Cook foods using methods other than frying. Baking, boiling, grilling, and broiling are all good options. Other ways to reduce fat include:  Removing the skin from poultry.  Removing all visible fats from meats.  Steaming vegetables in water or broth.  Meal planning  A plate with examples of foods in a healthy diet.      At meals, imagine dividing your plate into fourths:  Fill one-half of your plate with vegetables and green salads.  Fill one-fourth of your plate with whole grains.  Fill one-fourth of your plate with lean protein foods.  Eat 4-5 servings of vegetables per day. One serving equals 1 cup raw or cooked vegetable, or 2 cups raw leafy greens.  Eat 4-5 servings of fruit per day. One serving equals 1 medium whole fruit, ¼ cup dried fruit, ½ cup fresh, frozen, or canned fruit, or ½ cup 100% fruit juice.  Eat more foods that contain soluble fiber. Examples include apples, broccoli, carrots, beans, peas, and barley. Aim to get 25-30 g of fiber per day.  Increase your consumption of legumes, nuts, and seeds to 4-5 servings per week. One serving of dried beans or legumes equals ½ cup cooked, 1 serving of nuts is ¼ cup, and 1 serving of seeds equals 1 tablespoon.  Fats  Choose healthy fats more often. Choose monounsaturated and polyunsaturated fats, such as olive and canola oils, flaxseeds, walnuts, almonds, and seeds.  Eat more omega-3 fats. Choose salmon, mackerel, sardines, tuna, flaxseed oil, and ground flaxseeds. Aim to eat fish at least 2 times each week.  Check food labels carefully to identify foods with trans fats or high amounts of saturated fat.  Limit saturated fats. These are found in animal products, such as meats, butter, and  cream. Plant sources of saturated fats include palm oil, palm kernel oil, and coconut oil.  Avoid foods with partially hydrogenated oils in them. These contain trans fats. Examples are stick margarine, some tub margarines, cookies, crackers, and other baked goods.  Avoid fried foods.  General information  Eat more home-cooked food and less restaurant, buffet, and fast food.  Limit or avoid alcohol.  Limit foods that are high in starch and sugar.  Lose weight if you are overweight. Losing just 5-10% of your body weight can help your overall health and prevent diseases such as diabetes and heart disease.  Monitor your salt (sodium) intake, especially if you have high blood pressure. Talk with your health care provider about your sodium intake.  Try to incorporate more vegetarian meals weekly.  What foods can I eat?  Fruits  All fresh, canned (in natural juice), or frozen fruits.  Vegetables  Fresh or frozen vegetables (raw, steamed, roasted, or grilled). Green salads.  Grains  Most grains. Choose whole wheat and whole grains most of the time. Rice and pasta, including brown rice and pastas made with whole wheat.  Meats and other proteins  Lean, well-trimmed beef, veal, pork, and lamb. Chicken and turkey without skin. All fish and shellfish. Wild duck, rabbit, pheasant, and venison. Egg whites or low-cholesterol egg substitutes. Dried beans, peas, lentils, and tofu. Seeds and most nuts.  Dairy  Low-fat or nonfat cheeses, including ricotta and mozzarella. Skim or 1% milk (liquid, powdered, or evaporated). Buttermilk made with low-fat milk. Nonfat or low-fat yogurt.  Fats and oils  Non-hydrogenated (trans-free) margarines. Vegetable oils, including soybean, sesame, sunflower, olive, peanut, safflower, corn, canola, and cottonseed. Salad dressings or mayonnaise made with a vegetable oil.  Beverages  Water (mineral or sparkling). Coffee and tea. Diet carbonated beverages.  Sweets and desserts  Sherbet, gelatin, and fruit  ice. Small amounts of dark chocolate.  Limit all sweets and desserts.  Seasonings and condiments  All seasonings and condiments.  The items listed above may not be a complete list of foods and beverages you can eat. Contact a dietitian for more options.  What foods are not recommended?  Fruits  Canned fruit in heavy syrup. Fruit in cream or butter sauce. Fried fruit. Limit coconut.  Vegetables  Vegetables cooked in cheese, cream, or butter sauce. Fried vegetables.  Grains  Breads made with saturated or trans fats, oils, or whole milk. Croissants. Sweet rolls. Donuts. High-fat crackers, such as cheese crackers.  Meats and other proteins  Fatty meats, such as hot dogs, ribs, sausage, herrera, rib-eye roast or steak. High-fat deli meats, such as salami and bologna. Caviar. Domestic duck and goose. Organ meats, such as liver.  Dairy  Cream, sour cream, cream cheese, and creamed cottage cheese. Whole-milk cheeses. Whole or 2% milk (liquid, evaporated, or condensed). Whole buttermilk. Cream sauce or high-fat cheese sauce. Whole-milk yogurt.  Fats and oils  Meat fat, or shortening. Cocoa butter, hydrogenated oils, palm oil, coconut oil, palm kernel oil. Solid fats and shortenings, including herrera fat, salt pork, lard, and butter. Nondairy cream substitutes. Salad dressings with cheese or sour cream.  Beverages  Regular sodas and any drinks with added sugar.  Sweets and desserts  Frosting. Pudding. Cookies. Cakes. Pies. Milk chocolate or white chocolate. Buttered syrups. Full-fat ice cream or ice cream drinks.  The items listed above may not be a complete list of foods and beverages to avoid. Contact a dietitian for more information.  Summary  Heart-healthy meal planning includes limiting unhealthy fats, increasing healthy fats, and making other diet and lifestyle changes.  Lose weight if you are overweight. Losing just 5-10% of your body weight can help your overall health and prevent diseases such as diabetes and heart  disease.  Focus on eating a balance of foods, including fruits and vegetables, low-fat or nonfat dairy, lean protein, nuts and legumes, whole grains, and heart-healthy oils and fats.  This information is not intended to replace advice given to you by your health care provider. Make sure you discuss any questions you have with your health care provider.  Document Revised: 04/28/2022 Document Reviewed: 04/28/2022  [employer] Patient Education © 2022 [employer]

## 2025-01-07 ENCOUNTER — DOCUMENTATION (OUTPATIENT)
Dept: NEUROLOGY | Facility: CLINIC | Age: 75
End: 2025-01-07
Payer: MEDICARE

## 2025-01-07 PROBLEM — Z86.73 HISTORY OF STROKE: Status: ACTIVE | Noted: 2025-01-07

## 2025-01-14 DIAGNOSIS — G31.84 MCI (MILD COGNITIVE IMPAIRMENT): ICD-10-CM

## 2025-01-14 NOTE — TELEPHONE ENCOUNTER
Rx Refill Note  Requested Prescriptions     Pending Prescriptions Disp Refills    donepezil (ARICEPT) 5 MG tablet [Pharmacy Med Name: DONEPEZIL 5MG TABLETS] 30 tablet 2     Sig: TAKE 1 TABLET BY MOUTH EVERY NIGHT      Last filled:11/20/24 2 refill  Last office visit with prescribing clinician: 11/20/2024      Next office visit with prescribing clinician: 1/15/2025     ANAHY SAM  01/14/25, 14:07 EST      The original prescription was discontinued on 1/15/2025 by Jaimee Noel APRN for the following reason: Patient Reported Not Taking. Renewing this prescription may not be appropriate.     LVM with pharmacy letting them know that donepezil has been discontinued for [place].

## 2025-01-15 ENCOUNTER — TELEPHONE (OUTPATIENT)
Dept: NEUROLOGY | Facility: CLINIC | Age: 75
End: 2025-01-15

## 2025-01-15 ENCOUNTER — OFFICE VISIT (OUTPATIENT)
Dept: NEUROLOGY | Facility: CLINIC | Age: 75
End: 2025-01-15
Payer: MEDICARE

## 2025-01-15 VITALS
HEART RATE: 59 BPM | DIASTOLIC BLOOD PRESSURE: 68 MMHG | SYSTOLIC BLOOD PRESSURE: 130 MMHG | BODY MASS INDEX: 23.81 KG/M2 | HEIGHT: 61 IN | WEIGHT: 126.1 LBS | OXYGEN SATURATION: 96 %

## 2025-01-15 DIAGNOSIS — G31.84 MCI (MILD COGNITIVE IMPAIRMENT): Primary | ICD-10-CM

## 2025-01-15 RX ORDER — DONEPEZIL HYDROCHLORIDE 5 MG/1
5 TABLET, FILM COATED ORAL NIGHTLY
Qty: 30 TABLET | Refills: 2 | OUTPATIENT
Start: 2025-01-15

## 2025-01-15 NOTE — TELEPHONE ENCOUNTER
Provider: HUMBERTO LOWE    Caller: VERONA REYNA    Relationship to Patient: Emergency Contact    Phone Number: [phone]    Reason for Call: PATIENT SAW PROVIDER THIS MORNING, STATES THERE WERE SOME QUESTIONS ABOUT PATIENT'S CURRENT MEDICATIONS & SOME CONFUSION. SHE IS REQUESTING A CALL FROM PROVIDER TO DISCUSS PATIENT'S CURRENT MEDICATIONS & SOME OTHER INFO SHE HAS NOW. PLEASE REVIEW & ADVISE, THANK YOU.

## 2025-01-15 NOTE — PROGRESS NOTES
Neuro Office Visit      Encounter Date: 01/15/2025   Patient Name: Irma Pedraza  : 1950   MRN: [id number]   PCP:  Eve Navarrete MD     Chief Complaint:    Chief Complaint   Patient presents with    Follow-up     8 week       History of Present Illness: Irma Pedraza is a 74 y.o. female who is here today in Neurology for  cognitive decline    Per Stroke Clinic:   History of Present Illness: Irma Pedraza is a 74 y.o. female who is here today to establish care.  Patient has prior medical history significant for hypertension, hyperlipidemia, tobacco abuse, COPD, chronic back pain and rheumatoid arthritis.  She presented to [facility] ED on 2024 with right facial droop, slurred speech and right arm weakness.  NIH was noted to be a 5.  CT of the head was negative for acute intracranial abnormality.  TNK was given.  CTA of the head and neck demonstrates bilateral carotid bulb atherosclerosis.  No LVO identified.  MRI of the brain revealed a right MCA territory infarct, cortical infarct surrounding the left central sulcus.  Carotid ultrasound was completed which shows less than 50% stenosis in bilateral ICA.  Echocardiogram shows EF of 61 to 65%, normal left atrial cavity and negative saline test.  Etiology felt to be cardioembolic versus atheroembolic.  Recommended to discharge on aspirin 325 mg, rosuvastatin 40 mg and Zetia for secondary stroke prevention.  He was recommended to complete an outpatient Holter monitor which the results are still pending.  Encouraged to stop smoking.     Clinic visit 2024: Patient presents to clinic today accompanied by her daughter.  She reports that she has been doing well overall.  No new or worsening strokelike symptoms.  She reports that she has been struggling with brain fog and some forgetfulness.  Her daughter shares that she was having some forgetfulness prior to the stroke.  Reviewed patient's workup, imaging, possible etiologies and treatment plan with  patient and family.  Answered all questions.  Patient is taking her medication without issue or side effect.  She just recently completed her Holter monitor and will be sending that back today or tomorrow.  Explained to the patient that if any atrial fibrillation was found that we would recommend Eliquis with a baby aspirin for secondary stroke prevention.  If no atrial fibrillation is found we will discuss at that time if any further cardiac workup is warranted.     Clinic visit 7/24/2024: Patient presents for routine follow-up today.  Her daughter is unable to be present for the appointment so she is listening on speaker phone.  Patient denies any new or worsening strokelike symptoms.  She just returned from a trip to [place] in the [facility].  She enjoyed her time with her family.  She has been taking her medications without issues or side effects.  We reviewed the results of her cardiac monitor which were negative for atrial fibrillation.  She is already had follow-up with Dr. Cabrera who did not recommend any further long-term monitoring.  I am in agreement with this plan as patient does have some traditional risk factors for possible small vessel disease or atheroemboli as a etiology.  We discussed the need for good blood pressure control, optimizing cholesterol and stopping smoking.  Should she experience heart palpitations I have asked her to notify our office or Dr. Isabel's office for further workup at that time  _______________________________________________________________________________________________________________________  Initial neurology clinic visit 11/20/2024:  Irma Pedraza is a 74 y.o. female who comes to clinic today for evaluation of cognitive impairment. She has noted symptoms since at least 2 years marked initially by forgetfulness  and repetitiveness . This has gradually worsened  over time. Additional symptoms have included impairments in short term memory . There have been no  associated  symptoms of anxiety  and depression . She denies  impairments in ADL's. She manages her medications  and finances. She continues to drive.  . She is currently residing at home with . Her daughter noted that she was missing some appointments previously but she feels this has improved since utilizing iPad.     Family History: Mother with Alzheimer's disease, brother may have Alzheimer's disease  Personal Neurological History: CVA in April 2024  Education & Work History: , 1 year college   Psychological History: No anxiety or depression   Sleep Habits & History: Sleeping well overall per patient, her daughter feels that she has a hard time falling asleep  Chronic Pain: Arthritis - does take infusions for arthritis   Hearing or Vision Impairments: Tinnitus in ears, R >L  Personal History of Cancer: None    Current visit 1/15/2025:  Irma Pedraza returns to neurology clinic for continued evaluation of memory loss. Lab work from 1120/2024 showed RPR nonreactive, free T4 normal at 1.24, TSH was normal at 1.030, methylmalonic acid level was normal at 217, folate was 12.90, vitamin B12 was 409, homocystine was elevated at 19. Donepezil 5 mg was started at last clinic visit for cognitive protection. PCP also started Memantine. She had picked up Donepezil but has not been taking Donepezil. She uses a pill box but did lose it previously. She does live at home with her . She is in clinic today with her daughter who voices concern regarding medication consistency and keeping up with appointments. Overall she feels that her memory change is mild and has been stable since last clinic visit. No acute concerns from last visit to today.       Subjective      Review of Systems   Constitutional: Negative.    HENT: Negative.     Eyes: Negative.    Respiratory: Negative.     Cardiovascular: Negative.    Gastrointestinal: Negative.    Genitourinary: Negative.    Musculoskeletal:  Positive for  arthralgias.   Skin: Negative.    Neurological:         Mild memory loss     Psychiatric/Behavioral:  Positive for sleep disturbance.           Past Medical History:   Past Medical History:   Diagnosis Date    Adenomyomatosis of gallbladder 2009    by ultrasound, Dr. Yomi Tai 2009    Arthritis     Arthritis of lumbosacral spine     Asthma     Cataract     Remove 7-20 and 7-25 2023    Chronic hoarseness     Class 1 obesity due to excess calories with serious comorbidity and body mass index (BMI) of 30.0 to 30.9 in adult 07/16/2020 2/1/2021 Eve Navarrete MD      Closed displaced fracture of shaft of right clavicle with malunion     Diverticulitis 02/28/2019    Diverticulosis     Years ago, mot sure of date    Dyslipidemia 04/23/2024    GCA (giant cell arteritis)     GERD (gastroesophageal reflux disease)     HL (hearing loss) 2022    Hyperlipidemia Unk    Low back pain ?    Medicare annual wellness visit, subsequent 02/01/2021    Lloyd's neuroma of left foot     surgery    Ocular histoplasmosis     BCC 2/6/2017    Overweight with body mass index (BMI) of 27 to 27.9 in adult 08/03/2022    Panic disorder 02/28/2019    PMR (polymyalgia rheumatica)     Rhinovirus infection 06/09/2024       Past Surgical History:   Past Surgical History:   Procedure Laterality Date    COLONOSCOPY  02/2009    COSMETIC SURGERY  1994    face lift    COSMETIC SURGERY  1993    TUMMY TUCK    COSMETIC SURGERY  1980    Birth kenny removal    ELBOW PROCEDURE Left     x2    EYE SURGERY  7/20 and 7/25/2023    Removed cataracts and incerted lenses in bot eyes    THROAT SURGERY      remote removal of growth from throat    TONSILLECTOMY  1964       Family History:   Family History   Problem Relation Age of Onset    Osteoporosis Mother     Stroke Mother         Alzhiemers    Osteoporosis Father     Migraines Father     Hearing loss Father     Stroke Father         TIA    Brain cancer Sister     Stroke Sister         CVA    Stroke Brother          CVA    Breast cancer Maternal Grandmother 90    Stroke Paternal Grandmother         CVA    Arthritis Paternal Grandmother         Ostioperosis    Stroke Paternal Aunt         TIA    Stroke Sister         CVA    Stroke Sister         TIA    Stroke Brother         CVA    Stroke Sister         Glioblastoma    Other Sister         Rheumatoid Arthritis    Other Brother         no issues    Other Daughter         no issues    Other Sister         no issues    Ovarian cancer Neg Hx     Endometrial cancer Neg Hx        Social History:   Social History     Socioeconomic History    Marital status:    Tobacco Use    Smoking status: Every Day     Current packs/day: 0.50     Average packs/day: 0.5 packs/day for 51.0 years (25.5 ttl pk-yrs)     Types: Cigarettes     Start date: 1974     Passive exposure: Current    Smokeless tobacco: Never    Tobacco comments:     patient refused smoking literature. Pt uses nicotine patch in addition   Vaping Use    Vaping status: Never Used   Substance and Sexual Activity    Alcohol use: Yes     Comment: 2 drinks per night    Drug use: No    Sexual activity: Not Currently     Partners: Male       Medications:     Current Outpatient Medications:     Actemra 80 MG/4ML solution injection, , Disp: , Rfl:     albuterol sulfate HFA (Ventolin HFA) 108 (90 Base) MCG/ACT inhaler, Inhale 2 puffs Every 4 (Four) Hours As Needed for Wheezing., Disp: 18 g, Rfl: 5    aspirin 325 MG tablet, Take 1 tablet by mouth Daily., Disp: 90 tablet, Rfl: 3    Coenzyme Q10 (COQ-10) 200 MG capsule, Take 1 tablet by mouth Daily., Disp: , Rfl:     cyanocobalamin (VITAMIN B-12) 500 MCG tablet, Take 1 tablet by mouth Daily., Disp: 90 tablet, Rfl: 3    cyclobenzaprine (FLEXERIL) 10 MG tablet, Take 1 tablet by mouth At Night As Needed for Muscle Spasms., Disp: 30 tablet, Rfl: 0    ezetimibe (ZETIA) 10 MG tablet, Take 1 tablet by mouth Every Night., Disp: 90 tablet, Rfl: 1    famotidine (PEPCID) 20 MG tablet, TAKE 1 TABLET  "BY MOUTH TWICE DAILY (Patient taking differently: 1 tablet 2 (Two) Times a Day As Needed.), Disp: 180 tablet, Rfl: 1    Fluticasone-Umeclidin-Vilant (Trelegy Ellipta) 100-62.5-25 MCG/ACT inhaler, Inhale 1 puff Daily., Disp: 3 each, Rfl: 3    folic acid (FOLVITE) 1 MG tablet, Take 1 tablet by mouth Daily., Disp: 90 tablet, Rfl: 3    ibuprofen (ADVIL,MOTRIN) 800 MG tablet, Take 1 tablet by mouth Every 6 (Six) Hours As Needed for Mild Pain., Disp: 60 tablet, Rfl: 0    Lactobacillus-Inulin ([facility] [employer] ), Take 1 capsule by mouth Daily., Disp: , Rfl:     Magnesium Gluconate 550 MG tablet, Take 1 tablet by mouth Every Night., Disp: 90 tablet, Rfl: 3    montelukast (SINGULAIR) 10 MG tablet, Take 1 tablet by mouth Every Night., Disp: 90 tablet, Rfl: 1    omeprazole (priLOSEC) 40 MG capsule, Take 1 capsule by mouth Daily., Disp: 90 capsule, Rfl: 1    rosuvastatin (CRESTOR) 40 MG tablet, Take 1 tablet by mouth Every Night., Disp: 90 tablet, Rfl: 1    zoledronic acid (RECLAST) 5 MG/100ML solution infusion, , Disp: , Rfl:     memantine (NAMENDA) 10 MG tablet, Take 1 tablet by mouth 2 (Two) Times a Day., Disp: 180 tablet, Rfl: 1    memantine (NAMENDA) 5 MG tablet, Take 1 tab daily for 7 days, the 1 tab twice a day for 7 d,then 1 in am and 2 tabs in pm for 7 d, then 2 tabs twice daily., Disp: 70 tablet, Rfl: 0    Allergies:   Allergies   Allergen Reactions    Bactrim [Sulfamethoxazole-Trimethoprim] Other (See Comments)     Hypokalemia, hyponatremia    Codeine GI Intolerance    Atorvastatin Unknown (See Comments)     Lipitor      Bupropion Hcl [Bupropion] Unknown (See Comments)     wellbutrin    Calcium Unknown (See Comments)     unknown       Objective     Objective:    /68   Pulse 59   Ht 154.9 cm (60.98\")   Wt 57.2 kg (126 lb 1.7 oz)   SpO2 96%   BMI 23.84 kg/m²   Body mass index is 23.84 kg/m².    Physical Exam  Vitals reviewed.   Constitutional:       Appearance: Normal appearance.   HENT:      " Head: Normocephalic and atraumatic.      Mouth/Throat:      Mouth: Mucous membranes are moist.      Pharynx: Oropharynx is clear.   Pulmonary:      Effort: Pulmonary effort is normal. No respiratory distress.   Skin:     General: Skin is warm and dry.   Neurological:      Mental Status: She is alert.          Neurology Exam:    General apperance: NAD.     Mental status: Alert, awake and oriented to person, year, season, day, state, county, city, hospital, floor    Fund of knowledge:  Mildly limited.     Language and Speech: No aphasia or dysarthria.    Naming , Repetition and Comprehension:  Can name objects, repeat a sentence and follow commands. Speech is clear and fluent with good repetition, comprehension, and naming.    Cranial Nerves:   CN II: Visual fields are full. Intact. Pupils - PERRLA  CN III, IV and VI: Extraocular movements are intact. Normal saccades.   CN V: Facial sensation is intact.   CN VII: Muscles of facial expression reveal no asymmetry. Intact.   CN VIII: Hearing is intact.   CN IX and X: Palate elevates symmetrically. Intact  CN XI: Shoulder shrug is intact.   CN XII: Tongue is midline without evidence of atrophy or fasciculation.     Motor:  Right UE muscle strength 5/5. Normal tone.     Left UE muscle strength 5/5. Normal tone.      Right LE muscle strength 5/5. Normal tone.     Left LE muscle strength 5/5. Normal tone.      Sensory: Normal light touch sensation bilaterally.    DTRs: 2+ bilaterally in upper and lower extremities.    Coordination: Normal finger-to-nose    Romberg: Negative.    Gait: Normal for age. No assistive device      MMSE 25/30, recall 0/3      Results:   Imaging:   CT Head Without Contrast    Result Date: 8/11/2024  1. Large left parietal scalp hematoma. 2. No intracranial acute abnormalities. Images reviewed, interpreted, dictated and electronically signed by Broderick Blanco MD Voice transcription technology (Power Scribe) is used for the dictation of this note and  "\"sound-alike\" words might be erroneously placed despite reviewing this note for accuracy. Errors in dictation may reflect use of voice recognition software and not all errors in transcription may have been detected prior to signing.    XR Spine Lumbar AP & Lateral    Result Date: 8/11/2024  Degenerative changes with no subluxation or fracture. Images reviewed, interpreted, dictated and electronically signed by Broderick Blanco MD Voice transcription technology (Power Scribe) is used for the dictation of this note and \"sound-alike\" words might be erroneously placed despite reviewing this note for accuracy. Errors in dictation may reflect use of voice recognition software and not all errors in transcription may have been detected prior to signing.       Labs:   Lab Results   Component Value Date    GLUCOSE 116 (H) 11/20/2024    BUN 14 11/20/2024    CREATININE 0.98 11/20/2024     11/20/2024    K 3.7 11/20/2024    CL 98 11/20/2024    CALCIUM 10.1 11/20/2024    PROTEINTOT 6.9 11/20/2024    ALBUMIN 4.8 11/20/2024    ALT 28 11/20/2024    AST 29 11/20/2024    ALKPHOS 64 11/20/2024    BILITOT 1.3 (H) 11/20/2024    GLOB 2.1 11/20/2024    AGRATIO 2.3 11/20/2024    BCR 14.3 11/20/2024    ANIONGAP 13.1 11/20/2024    EGFR 60.7 11/20/2024         Assessment / Plan      Assessment/Plan:   Diagnoses and all orders for this visit:    1. MCI (mild cognitive impairment) (Primary)      Irma Pedraza returns to neurology clinic for continued evaluation of MCI, at initial clinic visit with me I had ordered donepezil 5 mg nightly, patient believes that she picks this up but does not think she ever started it, she was seen by her primary care a few days after her visit with me who also started memantine titration, patient is unsure how much memantine she is currently taking, given her mild memory change and MMSE of 25 out of 30, recall 0 out of 3, I have told her that if she has not started donepezil yet to not start that medication and " will proceed with memantine instead, she is to contact clinic to let me know what dose of memantine she is taking and we will further advise on how to continue to titrate this medication.  I have also printed out AVS for her today so that her daughter can assist with pillbox at home, her daughter is also planning on signing up for [employer] so that she can additionally aid with managing appointments.  Will plan to see Ms. Pedraza back in clinic in 3 months or sooner for any additional questions or concerns.         Follow Up:   Return in about 3 months (around 4/15/2025).    I spent 30  minutes face to face with the patient. I personally spent 50 percent of this time counseling and discussing diagnosis, diagnostic testing, evaluation, treatment options, and management .       During this visit the following were done:  Labs Reviewed [x]    Labs Ordered []    Radiology Reports Reviewed []    Radiology Ordered []    PCP Records Reviewed [x]    Referring Provider Records Reviewed []    ER Records Reviewed []    Hospital Records Reviewed []    History Obtained From Family []    Radiology Images Reviewed []    Other Reviewed []    Records Requested []      DEVI Bradshaw  [facility]  [address]  [facility] [place] [address] [phone]

## 2025-01-15 NOTE — TELEPHONE ENCOUNTER
Spoke with Luanne, she stated that Virginia had been taking donepezil (not consistently) and not started memantine yet.  Luanne discarded the donepezil and picked up memantine from pharmacy.  Per Jaimee she is to follow the titration schedule on the 5 mg bottle and once she is taking 10 mg BID she is to switch to the 10 mg tablets.  She will call in 1-2 weeks with update on how mom is doing on memantine.       Also, at last visit the discussed blood test for alzheimers and had decided to have that done.  Right now they are wanting to hold off on that test unless Jaimee feels it is something that needs to be pursued more urgently.

## 2025-01-16 NOTE — TELEPHONE ENCOUNTER
"Left detailed VM.    Relay:\" In regards to the alzheimer's blood test, Jaimee Noel states It is not an urgent need, however the sooner it is tested for the sooner we will have that additional information, she would advise getting it done if you are agreeable to that, please let us know if you would like for her to place the lab work order.\"   "

## 2025-02-24 RX ORDER — MEMANTINE HYDROCHLORIDE 5 MG/1
TABLET ORAL
Qty: 70 TABLET | Refills: 0 | OUTPATIENT
Start: 2025-02-24

## 2025-03-16 ENCOUNTER — PATIENT MESSAGE (OUTPATIENT)
Dept: NEUROLOGY | Facility: CLINIC | Age: 75
End: 2025-03-16
Payer: MEDICARE

## 2025-03-17 DIAGNOSIS — G31.84 MCI (MILD COGNITIVE IMPAIRMENT): Primary | ICD-10-CM

## 2025-03-28 ENCOUNTER — LAB (OUTPATIENT)
Dept: LAB | Facility: HOSPITAL | Age: 75
End: 2025-03-28
Payer: MEDICARE

## 2025-03-28 DIAGNOSIS — G31.84 MCI (MILD COGNITIVE IMPAIRMENT): ICD-10-CM

## 2025-03-28 PROCEDURE — 83520 IMMUNOASSAY QUANT NOS NONAB: CPT

## 2025-03-28 PROCEDURE — 36415 COLL VENOUS BLD VENIPUNCTURE: CPT

## 2025-04-01 LAB
LABORATORY COMMENT REPORT: ABNORMAL
P-TAU217: 0.58 PG/ML (ref 0–0.18)

## 2025-04-02 ENCOUNTER — RESULTS FOLLOW-UP (OUTPATIENT)
Dept: NEUROLOGY | Facility: CLINIC | Age: 75
End: 2025-04-02
Payer: MEDICARE

## 2025-04-03 ENCOUNTER — TELEPHONE (OUTPATIENT)
Dept: NEUROLOGY | Facility: CLINIC | Age: 75
End: 2025-04-03
Payer: MEDICARE

## 2025-04-03 NOTE — TELEPHONE ENCOUNTER
Relayed results to patient and let her know that Jaimee wanted her to continue the Memantine as she has been taking it. I asked if she wanted me to call her  and let him know and she said no and that she would. She wanted to make an appointment but I reminded her she already had one.

## 2025-04-03 NOTE — TELEPHONE ENCOUNTER
Patient ad called back multiple times to get reminded of what had been discussed. Went over the information again. Patient is concerned because her mother had Alzheimer's. We did talk about that not every patient has the same process and that everyone does different. She stated understanding.

## 2025-04-04 ENCOUNTER — TELEPHONE (OUTPATIENT)
Dept: INTERNAL MEDICINE | Facility: CLINIC | Age: 75
End: 2025-04-04

## 2025-04-04 NOTE — TELEPHONE ENCOUNTER
PATIENT HAS CALLED AND STATED SHE HAS BEEN EXPOSED TO HER SPOUSE WHO HAS TESTED POSITIVE FOR COVID. PATIENT STATES SHE IS NOT SHOWING ANY COVID SYMPTOMS AT THIS TIME AND IS REQUESTING A CALL BACK TO ADVISE ON WHAT SHE SHOULD DO.    CALL BACK NUMBER [phone]

## 2025-04-04 NOTE — TELEPHONE ENCOUNTER
CULLEN  Pt  is in the hospital. She had already given him a kiss when they told her at the hospital that he tested positive. I advised that should  a test to have one on hand at home. Advised her to monitor herself for symptoms, if symptoms develop over the weekend she could test and call on-call. If symptoms develop next week we could work her in. She verbalized understanding.

## 2025-04-07 ENCOUNTER — TELEPHONE (OUTPATIENT)
Dept: INTERNAL MEDICINE | Facility: CLINIC | Age: 75
End: 2025-04-07
Payer: MEDICARE

## 2025-04-07 NOTE — TELEPHONE ENCOUNTER
Pt states she got tested at [facility].  She has a headache, chills, cough and fatigue.  Her  is in the hospital, COVID positive.  She wanting to know what she can take for her symptoms.  Please advise.

## 2025-04-07 NOTE — TELEPHONE ENCOUNTER
Caller: Irma Pedraza    Relationship: Self    Best call back number: [phone]     What medication are you requesting: SOMETHING FOR SYMPTOMS    What are your current symptoms: POSITIVE FOR COVID        If a prescription is needed, what is your preferred pharmacy and phone number: [id number] [place], [place] - [address]  AT [address] - [phone]  - [phone] FX     Additional notes:

## 2025-04-07 NOTE — TELEPHONE ENCOUNTER
Caller: [id number] - [facility] - [address]  AT [address] - [phone]  - [phone] FX    Relationship to patient: Pharmacy    Patient is needing: Nirmatrelvir & Ritonavir, 300mg/100mg, (PAXLOVID) [[id number]] (Order [id number]) , IS THIS REGULAR STRENGTH. PLEASE CALL TO CLARIFY TYPE OF MEDICATION.

## 2025-04-09 ENCOUNTER — TELEPHONE (OUTPATIENT)
Dept: NEUROLOGY | Facility: CLINIC | Age: 75
End: 2025-04-09
Payer: MEDICARE

## 2025-04-09 NOTE — TELEPHONE ENCOUNTER
Provider: HUMBERTO LOWE    Caller: VERONA REYNA    Relationship to Patient: Emergency Contact    Phone Number: [phone]    Reason for Call: REQUESTING A CALL FROM PROVIDER TO DISCUSS SOME CONCERNS BEFORE PATIENT'S VISIT NEXT WEEK THAT SHE DOESN'T WANT TO BRING UP IN FRONT OF PATIENT. PREFERS TO SPEAK WITH PROVIDER DIRECTLY RATHER THAN RELAYING. PLEASE CALL, THANK YOU.

## 2025-04-16 ENCOUNTER — OFFICE VISIT (OUTPATIENT)
Dept: NEUROLOGY | Facility: CLINIC | Age: 75
End: 2025-04-16
Payer: MEDICARE

## 2025-04-16 VITALS — HEART RATE: 56 BPM | DIASTOLIC BLOOD PRESSURE: 66 MMHG | OXYGEN SATURATION: 97 % | SYSTOLIC BLOOD PRESSURE: 126 MMHG

## 2025-04-16 DIAGNOSIS — R41.3 MEMORY LOSS OR IMPAIRMENT: ICD-10-CM

## 2025-04-16 DIAGNOSIS — G31.84 MCI (MILD COGNITIVE IMPAIRMENT): Primary | ICD-10-CM

## 2025-04-16 RX ORDER — DONEPEZIL HYDROCHLORIDE 5 MG/1
5 TABLET, FILM COATED ORAL NIGHTLY
Qty: 30 TABLET | Refills: 2 | Status: SHIPPED | OUTPATIENT
Start: 2025-04-16 | End: 2025-07-15

## 2025-04-16 NOTE — PROGRESS NOTES
Neuro Office Visit      Encounter Date: 2025   Patient Name: Irma Pedraza  : 1950   MRN: [id number]   PCP:  Eve Navarrete MD     Chief Complaint:    Chief Complaint   Patient presents with   • MCI (mild cognitive impairment)       History of Present Illness: Irma Pedraza is a 75 y.o. female who is here today in Neurology for  cognitive decline    Per Stroke Clinic:   History of Present Illness: Irma Pedraza is a 74 y.o. female who is here today to establish care.  Patient has prior medical history significant for hypertension, hyperlipidemia, tobacco abuse, COPD, chronic back pain and rheumatoid arthritis.  She presented to [facility] ED on 2024 with right facial droop, slurred speech and right arm weakness.  NIH was noted to be a 5.  CT of the head was negative for acute intracranial abnormality.  TNK was given.  CTA of the head and neck demonstrates bilateral carotid bulb atherosclerosis.  No LVO identified.  MRI of the brain revealed a right MCA territory infarct, cortical infarct surrounding the left central sulcus.  Carotid ultrasound was completed which shows less than 50% stenosis in bilateral ICA.  Echocardiogram shows EF of 61 to 65%, normal left atrial cavity and negative saline test.  Etiology felt to be cardioembolic versus atheroembolic.  Recommended to discharge on aspirin 325 mg, rosuvastatin 40 mg and Zetia for secondary stroke prevention.  He was recommended to complete an outpatient Holter monitor which the results are still pending.  Encouraged to stop smoking.     Clinic visit 2024: Patient presents to clinic today accompanied by her daughter.  She reports that she has been doing well overall.  No new or worsening strokelike symptoms.  She reports that she has been struggling with brain fog and some forgetfulness.  Her daughter shares that she was having some forgetfulness prior to the stroke.  Reviewed patient's workup, imaging, possible etiologies and  treatment plan with patient and family.  Answered all questions.  Patient is taking her medication without issue or side effect.  She just recently completed her Holter monitor and will be sending that back today or tomorrow.  Explained to the patient that if any atrial fibrillation was found that we would recommend Eliquis with a baby aspirin for secondary stroke prevention.  If no atrial fibrillation is found we will discuss at that time if any further cardiac workup is warranted.     Clinic visit 7/24/2024: Patient presents for routine follow-up today.  Her daughter is unable to be present for the appointment so she is listening on speaker phone.  Patient denies any new or worsening strokelike symptoms.  She just returned from a trip to [place] in the [facility]  She enjoyed her time with her family.  She has been taking her medications without issues or side effects.  We reviewed the results of her cardiac monitor which were negative for atrial fibrillation.  She is already had follow-up with Dr. Cabrera who did not recommend any further long-term monitoring.  I am in agreement with this plan as patient does have some traditional risk factors for possible small vessel disease or atheroemboli as a etiology.  We discussed the need for good blood pressure control, optimizing cholesterol and stopping smoking.  Should she experience heart palpitations I have asked her to notify our office or Dr. Isabel's office for further workup at that time  _______________________________________________________________________________________________________________________  Initial neurology clinic visit 11/20/2024:  Irma Pedraza is a 75 y.o. female who comes to clinic today for evaluation of cognitive impairment. She has noted symptoms since at least 2 years marked initially by forgetfulness  and repetitiveness . This has gradually worsened  over time. Additional symptoms have included impairments in short term memory .  There have been no associated  symptoms of anxiety  and depression . She denies  impairments in ADL's. She manages her medications  and finances. She continues to drive.  . She is currently residing at home with . Her daughter noted that she was missing some appointments previously but she feels this has improved since utilizing iPad.     Family History: Mother with Alzheimer's disease, brother may have Alzheimer's disease  Personal Neurological History: CVA in April 2024  Education & Work History: , 1 year college   Psychological History: No anxiety or depression   Sleep Habits & History: Sleeping well overall per patient, her daughter feels that she has a hard time falling asleep  Chronic Pain: Arthritis - does take infusions for arthritis   Hearing or Vision Impairments: Tinnitus in ears, R >L  Personal History of Cancer: None    Follow up visit 1/15/2025:  Irma Pedraza returns to neurology clinic for continued evaluation of memory loss. Lab work from 1120/2024 showed RPR nonreactive, free T4 normal at 1.24, TSH was normal at 1.030, methylmalonic acid level was normal at 217, folate was 12.90, vitamin B12 was 409, homocystine was elevated at 19. Donepezil 5 mg was started at last clinic visit for cognitive protection. PCP also started Memantine. She had picked up Donepezil but has not been taking Donepezil. She uses a pill box but did lose it previously. She does live at home with her . She is in clinic today with her daughter who voices concern regarding medication consistency and keeping up with appointments. Overall she feels that her memory change is mild and has been stable since last clinic visit. No acute concerns from last visit to today.       Current visit 4/16/2025:  Irma Pedraza returns to neurology clinic for ongoing evaluation. She returns today with her daughter.  She was to start Memantine at last visit. Phosphorylated Tau 217 on 3/28/2025 was elevated at 0.58.   Her  fell in February and broke his hip, eventually went to hospital and had hip replacement, then was sent to [facility], he had some post hospital delusion, then went to [place], and has had several subsequent falls. She had COVID-19 in the interim as well. She has been helping to provide care for her  and has been physically/emotionally exhausted. Her daughter feels like this has taken a significant toll on [place], she feels like her memory has been worse in the wake of her 's accident. She has gotten locked out of the house several times, she has had difficulty finding her car coming in/out of the hospital. She also reports that several family members have voiced their concern regarding her memory and that she has started to become mildly paranoid about her neighbor (her neighbor had been given a key years ago in case it was ever needed and now she is concerned that her neighbor is going to get into the house). Not getting lost while driving, sleeping well at night. Mood has been okay. No recurrence of stroke symptoms. She does have appointment with stroke clinic next week. She has been off of all medication for 1-2 months as she did not keep up with it while caring for her .     Subjective      Review of Systems   Constitutional: Negative.    HENT: Negative.     Eyes: Negative.    Respiratory: Negative.     Cardiovascular: Negative.    Gastrointestinal: Negative.    Genitourinary: Negative.    Musculoskeletal:  Positive for arthralgias.   Skin: Negative.    Neurological:         Mild memory loss     Psychiatric/Behavioral:  Positive for sleep disturbance.           Past Medical History:   Past Medical History:   Diagnosis Date   • Adenomyomatosis of gallbladder 2009    by ultrasound, Dr. Yomi Tai 2009   • Arthritis    • Arthritis of lumbosacral spine    • Asthma    • Cataract     Remove 7-20 and 7-25 2023   • Chronic hoarseness    • Class 1 obesity due to excess calories with  serious comorbidity and body mass index (BMI) of 30.0 to 30.9 in adult 07/16/2020 2/1/2021 Eve Navarrete MD     • Closed displaced fracture of shaft of right clavicle with malunion    • Diverticulitis 02/28/2019   • Diverticulosis     Years ago, mot sure of date   • Dyslipidemia 04/23/2024   • GCA (giant cell arteritis)    • GERD (gastroesophageal reflux disease)    • HL (hearing loss) 2022   • Hyperlipidemia Unk   • Low back pain ?   • Medicare annual wellness visit, subsequent 02/01/2021   • Lloyd's neuroma of left foot     surgery   • Ocular histoplasmosis     BCC 2/6/2017   • Overweight with body mass index (BMI) of 27 to 27.9 in adult 08/03/2022   • Panic disorder 02/28/2019   • PMR (polymyalgia rheumatica)    • Rhinovirus infection 06/09/2024       Past Surgical History:   Past Surgical History:   Procedure Laterality Date   • COLONOSCOPY  02/2009   • COSMETIC SURGERY  1994    face lift   • COSMETIC SURGERY  1993    JASONMY JATINDER   • COSMETIC SURGERY  1980    Birth kenny removal   • ELBOW PROCEDURE Left     x2   • EYE SURGERY  7/20 and 7/25/2023    Removed cataracts and incerted lenses in bot eyes   • THROAT SURGERY      remote removal of growth from throat   • TONSILLECTOMY  1964       Family History:   Family History   Problem Relation Age of Onset   • Osteoporosis Mother    • Stroke Mother         Alzhiemers   • Osteoporosis Father    • Migraines Father    • Hearing loss Father    • Stroke Father         TIA   • Brain cancer Sister    • Stroke Sister         CVA   • Stroke Brother         CVA   • Breast cancer Maternal Grandmother 90   • Stroke Paternal Grandmother         CVA   • Arthritis Paternal Grandmother         Ostioperosis   • Stroke Paternal Aunt         TIA   • Stroke Sister         CVA   • Stroke Sister         TIA   • Stroke Brother         CVA   • Stroke Sister         Glioblastoma   • Other Sister         Rheumatoid Arthritis   • Other Brother         no issues   • Other Daughter          no issues   • Other Sister         no issues   • Ovarian cancer Neg Hx    • Endometrial cancer Neg Hx        Social History:   Social History     Socioeconomic History   • Marital status:    Tobacco Use   • Smoking status: Every Day     Current packs/day: 0.50     Average packs/day: 0.5 packs/day for 51.3 years (25.6 ttl pk-yrs)     Types: Cigarettes     Start date: 1974     Passive exposure: Current   • Smokeless tobacco: Never   • Tobacco comments:     patient refused smoking literature. Pt uses nicotine patch in addition   Vaping Use   • Vaping status: Never Used   Substance and Sexual Activity   • Alcohol use: Yes     Comment: 2 drinks per night   • Drug use: No   • Sexual activity: Not Currently     Partners: Male       Medications:     Current Outpatient Medications:   •  Actemra 80 MG/4ML solution injection, , Disp: , Rfl:   •  albuterol sulfate HFA (Ventolin HFA) 108 (90 Base) MCG/ACT inhaler, Inhale 2 puffs Every 4 (Four) Hours As Needed for Wheezing. (Patient not taking: Reported on 4/16/2025), Disp: 18 g, Rfl: 5  •  aspirin 325 MG tablet, Take 1 tablet by mouth Daily. (Patient not taking: Reported on 4/16/2025), Disp: 90 tablet, Rfl: 3  •  Coenzyme Q10 (COQ-10) 200 MG capsule, Take 1 tablet by mouth Daily. (Patient not taking: Reported on 4/16/2025), Disp: , Rfl:   •  cyanocobalamin (VITAMIN B-12) 500 MCG tablet, Take 1 tablet by mouth Daily. (Patient not taking: Reported on 4/16/2025), Disp: 90 tablet, Rfl: 3  •  cyclobenzaprine (FLEXERIL) 10 MG tablet, Take 1 tablet by mouth At Night As Needed for Muscle Spasms. (Patient not taking: Reported on 4/16/2025), Disp: 30 tablet, Rfl: 0  •  donepezil (Aricept) 5 MG tablet, Take 1 tablet by mouth Every Night for 90 days., Disp: 30 tablet, Rfl: 2  •  ezetimibe (ZETIA) 10 MG tablet, Take 1 tablet by mouth Every Night. (Patient not taking: Reported on 4/16/2025), Disp: 90 tablet, Rfl: 1  •  famotidine (PEPCID) 20 MG tablet, TAKE 1 TABLET BY MOUTH TWICE  DAILY (Patient not taking: Reported on 4/16/2025), Disp: 180 tablet, Rfl: 1  •  Fluticasone-Umeclidin-Vilant (Trelegy Ellipta) 100-62.5-25 MCG/ACT inhaler, Inhale 1 puff Daily. (Patient not taking: Reported on 4/16/2025), Disp: 3 each, Rfl: 3  •  folic acid (FOLVITE) 1 MG tablet, Take 1 tablet by mouth Daily. (Patient not taking: Reported on 4/16/2025), Disp: 90 tablet, Rfl: 3  •  ibuprofen (ADVIL,MOTRIN) 800 MG tablet, Take 1 tablet by mouth Every 6 (Six) Hours As Needed for Mild Pain. (Patient not taking: Reported on 4/16/2025), Disp: 60 tablet, Rfl: 0  •  Lactobacillus-Inulin ([facility] PO), Take 1 capsule by mouth Daily. (Patient not taking: Reported on 4/16/2025), Disp: , Rfl:   •  Magnesium Gluconate 550 MG tablet, Take 1 tablet by mouth Every Night. (Patient not taking: Reported on 4/16/2025), Disp: 90 tablet, Rfl: 3  •  montelukast (SINGULAIR) 10 MG tablet, Take 1 tablet by mouth Every Night. (Patient not taking: Reported on 4/16/2025), Disp: 90 tablet, Rfl: 1  •  omeprazole (priLOSEC) 40 MG capsule, Take 1 capsule by mouth Daily. (Patient not taking: Reported on 4/16/2025), Disp: 90 capsule, Rfl: 1  •  rosuvastatin (CRESTOR) 40 MG tablet, Take 1 tablet by mouth Every Night. (Patient not taking: Reported on 4/16/2025), Disp: 90 tablet, Rfl: 1  •  zoledronic acid (RECLAST) 5 MG/100ML solution infusion, , Disp: , Rfl:     Allergies:   Allergies   Allergen Reactions   • Bactrim [Sulfamethoxazole-Trimethoprim] Other (See Comments)     Hypokalemia, hyponatremia   • Codeine GI Intolerance   • Atorvastatin Unknown (See Comments)     Lipitor     • Bupropion Hcl [Bupropion] Unknown (See Comments)     wellbutrin   • Calcium Unknown (See Comments)     unknown       Objective     Objective:    /66   Pulse 56   SpO2 97%       Physical Exam  Vitals reviewed.   Constitutional:       Appearance: Normal appearance.   HENT:      Head: Normocephalic and atraumatic.      Mouth/Throat:      Mouth:  "Mucous membranes are moist.      Pharynx: Oropharynx is clear.   Pulmonary:      Effort: Pulmonary effort is normal. No respiratory distress.   Skin:     General: Skin is warm and dry.   Neurological:      Mental Status: She is alert.          Neurology Exam:    General apperance: NAD.     Mental status: Alert, awake and oriented to person, year, season, month, state, county, city, hospital, floor. Disoriented to date and day.    Fund of knowledge:  Mildly limited.     Language and Speech: No aphasia or dysarthria.    Naming , Repetition and Comprehension:  Can name objects, repeat a sentence and follow commands. Speech is clear and fluent with good repetition, comprehension, and naming.    Cranial Nerves:   CN II: Visual fields are full. Intact. Pupils - PERRLA  CN III, IV and VI: Extraocular movements are intact. Normal saccades.   CN V: Facial sensation is intact.   CN VII: Muscles of facial expression reveal no asymmetry. Intact.   CN VIII: Hearing is intact.   CN IX and X: Palate elevates symmetrically. Intact  CN XI: Shoulder shrug is intact.   CN XII: Tongue is midline without evidence of atrophy or fasciculation.     Motor:  Right UE muscle strength 5/5. Normal tone.     Left UE muscle strength 5/5. Normal tone.      Right LE muscle strength 5/5. Normal tone.     Left LE muscle strength 5/5. Normal tone.      Sensory: Normal light touch sensation bilaterally.    DTRs: 2+ bilaterally in upper and lower extremities.    Coordination: Normal finger-to-nose    Gait: Normal for age. No assistive device      MMSE 27/30, recall 2/3      Results:   Imaging:   CT Head Without Contrast    Result Date: 8/11/2024  1. Large left parietal scalp hematoma. 2. No intracranial acute abnormalities. Images reviewed, interpreted, dictated and electronically signed by Broderick Blanco MD Voice transcription technology (Power Scribe) is used for the dictation of this note and \"sound-alike\" words might be erroneously placed despite " "reviewing this note for accuracy. Errors in dictation may reflect use of voice recognition software and not all errors in transcription may have been detected prior to signing.    XR Spine Lumbar AP & Lateral    Result Date: 8/11/2024  Degenerative changes with no subluxation or fracture. Images reviewed, interpreted, dictated and electronically signed by Broderick Blanco MD Voice transcription technology (Power Scribe) is used for the dictation of this note and \"sound-alike\" words might be erroneously placed despite reviewing this note for accuracy. Errors in dictation may reflect use of voice recognition software and not all errors in transcription may have been detected prior to signing.       Labs:   Lab Results   Component Value Date    GLUCOSE 116 (H) 11/20/2024    BUN 14 11/20/2024    CREATININE 0.98 11/20/2024     11/20/2024    K 3.7 11/20/2024    CL 98 11/20/2024    CALCIUM 10.1 11/20/2024    PROTEINTOT 6.9 11/20/2024    ALBUMIN 4.8 11/20/2024    ALT 28 11/20/2024    AST 29 11/20/2024    ALKPHOS 64 11/20/2024    BILITOT 1.3 (H) 11/20/2024    GLOB 2.1 11/20/2024    AGRATIO 2.3 11/20/2024    BCR 14.3 11/20/2024    ANIONGAP 13.1 11/20/2024    EGFR 60.7 11/20/2024         Assessment / Plan      Assessment/Plan:   Diagnoses and all orders for this visit:    1. MCI (mild cognitive impairment) (Primary)  -     donepezil (Aricept) 5 MG tablet; Take 1 tablet by mouth Every Night for 90 days.  Dispense: 30 tablet; Refill: 2  -     Ambulatory Referral to Neurology  -     NM Pet Brain Metabolic Evaluation; Future    2. Memory loss or impairment  -     NM Pet Brain Metabolic Evaluation; Future        Irma Pedraza returns to neurology clinic with her daughter for routine follow-up of memory loss.  MMSE today is 27 out of 30 with 2 out of 3 for word recall.  In between clinic visits she reports that unfortunately her 's health has been poor and she has needed to help provide care for him, she has not taken any " of her medications for the last 1 to 2 months.  We did discuss today the importance of medication compliance specifically in a patient who was previously had a stroke to help prevent recurrence of stroke.  Thankfully she has not had any recurrence of any strokelike symptoms.  She reports that she had difficulty with the titration schedule of memantine.  Patient's daughter would prefer a easier option to aid with compliance, we discussed that we can proceed with donepezil instead as the onboarding schedule is more straightforward.  She will start donepezil 5 mg nightly and pending her response to this likely will increase to 10 mg in the future.  We did discuss potential side effects such as diarrhea or vivid dreams, she is to notify clinic with any new or different symptoms upon initiating donepezil.  Given that her phosphorylated tau 217 was elevated we discussed that this is a biologic marker for Alzheimer's disease, we have elected today to proceed with further referral to [place] neurology to discuss consideration for antiamyloid monoclonal antibody therapy.  I have also ordered an Amyvid Amyloid PET scan to further clarify if she does have Alzheimer's disease.  I have also provided additional resources to her daughter today and provided contact information for our memory care nurse navigator.  Will plan to see [place] back in clinic in 8 weeks or sooner for any questions or concerns.         Follow Up:   Return in about 8 weeks (around 6/11/2025).    I spent 43  minutes face to face with the patient. I personally spent 50 percent of this time counseling and discussing diagnosis, diagnostic testing, evaluation, treatment options, and management .       During this visit the following were done:  Labs Reviewed []    Labs Ordered []    Radiology Reports Reviewed []    Radiology Ordered []    PCP Records Reviewed []    Referring Provider Records Reviewed []    ER Records Reviewed []    Hospital Records Reviewed []     History Obtained From Family []    Radiology Images Reviewed []    Other Reviewed []    Records Requested []      DEVI Bradshaw  [facility]  [address] [address] [phone]

## 2025-04-17 ENCOUNTER — TELEPHONE (OUTPATIENT)
Dept: NEUROLOGY | Facility: CLINIC | Age: 75
End: 2025-04-17
Payer: MEDICARE

## 2025-04-17 NOTE — TELEPHONE ENCOUNTER
CALLED [facility] AND GOT THE PET SCAN SCHEDULED FOR 05/21/25 AT 3:00PM - PATIENT NEEDS TO BE THERE BY 2:30PM FOR CHECK IN - NO PREP FOR PROCEDURE -  PATIENT WILL GO TO [facility] ON [address] AND HAVE THE SHUTTLE TAKE THEM TO [facility] - CALLED THE DAUGHTER (ON VERBAL) TO RELAY APPOINTMENT INFORMATION AND RECEIVED VOICEMAIL - LEFT MESSAGE FOR A CALLBACK    no history of blood product transfusion

## 2025-04-17 NOTE — TELEPHONE ENCOUNTER
Hub staff attempted to follow warm transfer process and was unsuccessful     Caller: VERONA REYNA    Relationship to patient: Emergency Contact    Best call back number: [phone]    Patient is needing:   RETURNING JANI'S CALL

## 2025-04-17 NOTE — TELEPHONE ENCOUNTER
PATIENT'S DAUGHTER VERONA RETURNED CALL - RELAYED THE PET SCAN APPOINTMENT INFORMATION TO HER - GAVE HER [facility]'S PHONE NUMBER IN CASE SHE HAD ANY QUESTIONS CONCERNING THE EXAM

## 2025-04-23 ENCOUNTER — TELEPHONE (OUTPATIENT)
Dept: NEUROLOGY | Facility: CLINIC | Age: 75
End: 2025-04-23
Payer: MEDICARE

## 2025-04-23 ENCOUNTER — OFFICE VISIT (OUTPATIENT)
Dept: NEUROLOGY | Facility: CLINIC | Age: 75
End: 2025-04-23
Payer: MEDICARE

## 2025-04-23 VITALS
TEMPERATURE: 98.8 F | WEIGHT: 124 LBS | BODY MASS INDEX: 23.41 KG/M2 | HEIGHT: 61 IN | SYSTOLIC BLOOD PRESSURE: 114 MMHG | HEART RATE: 48 BPM | OXYGEN SATURATION: 99 % | DIASTOLIC BLOOD PRESSURE: 60 MMHG

## 2025-04-23 DIAGNOSIS — E78.2 MIXED HYPERLIPIDEMIA: ICD-10-CM

## 2025-04-23 DIAGNOSIS — G31.84 MCI (MILD COGNITIVE IMPAIRMENT): ICD-10-CM

## 2025-04-23 DIAGNOSIS — Z86.73 HISTORY OF STROKE: Primary | ICD-10-CM

## 2025-04-23 NOTE — TELEPHONE ENCOUNTER
Caller: VERONA REYNA    Relationship: Emergency Contact    Best call back number: [phone]   What was the call regarding: VERONA CALLED THE [facility] WITH MAIKEL DAILEY REGARDING THE REFERRAL S LOWE SENT, BUT THEY STILL ARE STATING THAT IT WAS NOT RECEIVED AND IT NEEDS TO BE SENT BEFORE THEY CAN MOVE FORWARD.   THEY WANT THE PT IS IN THE SOONEST LOCATION PLEASE  PLEASE FAX TO: [phone]    PLEASE REVIEW  THANK YOU

## 2025-04-23 NOTE — TELEPHONE ENCOUNTER
I let Luanne know that we resent the order/ referral. I will call and make sure they received it. I asked for if there were any dates that she couldn't do and which location. She wants first available.

## 2025-04-23 NOTE — PROGRESS NOTES
Follow Up Office Visit      Encounter Date: 2025   Patient Name: Irma Pedraza  : 1950   MRN: [id number]   PCP: Eve Navarrete MD    Chief Complaint:    Chief Complaint   Patient presents with    Follow-up       History of Present Illness: Irma Pedraza is a 74 y.o. female who is here today to establish care.  Patient has prior medical history significant for hypertension, hyperlipidemia, tobacco abuse, COPD, chronic back pain and rheumatoid arthritis.  She presented to [facility] ED on 2024 with right facial droop, slurred speech and right arm weakness.  NIH was noted to be a 5.  CT of the head was negative for acute intracranial abnormality.  TNK was given.  CTA of the head and neck demonstrates bilateral carotid bulb atherosclerosis.  No LVO identified.  MRI of the brain revealed a right MCA territory infarct, cortical infarct surrounding the left central sulcus.  Carotid ultrasound was completed which shows less than 50% stenosis in bilateral ICA.  Echocardiogram shows EF of 61 to 65%, normal left atrial cavity and negative saline test.  Etiology felt to be cardioembolic versus atheroembolic.  Recommended to discharge on aspirin 325 mg, rosuvastatin 40 mg and Zetia for secondary stroke prevention.  He was recommended to complete an outpatient Holter monitor which the results are still pending.  Encouraged to stop smoking.     Clinic visit 2024: Patient presents to clinic today accompanied by her daughter.  She reports that she has been doing well overall.  No new or worsening strokelike symptoms.  She reports that she has been struggling with brain fog and some forgetfulness.  Her daughter shares that she was having some forgetfulness prior to the stroke.  Reviewed patient's workup, imaging, possible etiologies and treatment plan with patient and family.  Answered all questions.  Patient is taking her medication without issue or side effect.  She just recently completed her Holter  monitor and will be sending that back today or tomorrow.  Explained to the patient that if any atrial fibrillation was found that we would recommend Eliquis with a baby aspirin for secondary stroke prevention.  If no atrial fibrillation is found we will discuss at that time if any further cardiac workup is warranted.     Clinic visit 7/24/2024: Patient presents for routine follow-up today.  Her daughter is unable to be present for the appointment so she is listening on speaker phone.  Patient denies any new or worsening strokelike symptoms.  She just returned from a trip to [place] in the [facility].  She enjoyed her time with her family.  She has been taking her medications without issues or side effects.  We reviewed the results of her cardiac monitor which were negative for atrial fibrillation.  She is already had follow-up with Dr. Cabrera who did not recommend any further long-term monitoring.  I am in agreement with this plan as patient does have some traditional risk factors for possible small vessel disease or atheroemboli as a etiology.  We discussed the need for good blood pressure control, optimizing cholesterol and stopping smoking.  Should she experience heart palpitations I have asked her to notify our office or Dr. Isabel's office for further workup at that time    Clinic visit 4/23/2025: Patient presents to clinic for routine follow up accompanied by her daughter. No new stroke-like symptoms or other neurological issues are reported. Currently under the care of DEVI Shaw, for cognitive impairment/memory. She has an upcoming PET scan at  scheduled for 05/22/2025. Referral to [facility] for potential initiation of specialty memory medication. The next appointment with Jaimee Noel is in 07/2025, with expectations of being seen again prior to admission into the [facility] system. If deemed suitable for infusion therapy, subsequent visits will be conducted at [facility] instead of with  Jaimee Noel. Aspirin therapy has been prescribed, but due to recent stressful events, deviation from the medication regimen occurred. Efforts are being made to resume the regular medication schedule.  Her daughter reports that she is helping her mother get her medications organized and get back on track with medication compliance.  I stressed the importance of this and prevention of future stroke. Smoking continues at a rate of at least a pack a day, with no consideration of cutting back or quitting.    .    Subjective        I have reviewed and the following portions of the patient's history were updated as appropriate: past family history, past medical history, past social history, past surgical history and problem list.    Medications:     Current Outpatient Medications:     Actemra 80 MG/4ML solution injection, , Disp: , Rfl:     albuterol sulfate HFA (Ventolin HFA) 108 (90 Base) MCG/ACT inhaler, Inhale 2 puffs Every 4 (Four) Hours As Needed for Wheezing., Disp: 18 g, Rfl: 5    aspirin 325 MG tablet, Take 1 tablet by mouth Daily., Disp: 90 tablet, Rfl: 3    Coenzyme Q10 (COQ-10) 200 MG capsule, Take 1 tablet by mouth Daily., Disp: , Rfl:     cyclobenzaprine (FLEXERIL) 10 MG tablet, Take 1 tablet by mouth At Night As Needed for Muscle Spasms., Disp: 30 tablet, Rfl: 0    donepezil (Aricept) 5 MG tablet, Take 1 tablet by mouth Every Night for 90 days., Disp: 30 tablet, Rfl: 2    ezetimibe (ZETIA) 10 MG tablet, Take 1 tablet by mouth Every Night., Disp: 90 tablet, Rfl: 1    famotidine (PEPCID) 20 MG tablet, TAKE 1 TABLET BY MOUTH TWICE DAILY, Disp: 180 tablet, Rfl: 1    Fluticasone-Umeclidin-Vilant (Trelegy Ellipta) 100-62.5-25 MCG/ACT inhaler, Inhale 1 puff Daily., Disp: 3 each, Rfl: 3    folic acid (FOLVITE) 1 MG tablet, Take 1 tablet by mouth Daily., Disp: 90 tablet, Rfl: 3    ibuprofen (ADVIL,MOTRIN) 800 MG tablet, Take 1 tablet by mouth Every 6 (Six) Hours As Needed for Mild Pain., Disp: 60 tablet, Rfl: 0     "Lactobacillus-Inulin ([facility] PO), Take 1 capsule by mouth Daily., Disp: , Rfl:     montelukast (SINGULAIR) 10 MG tablet, Take 1 tablet by mouth Every Night., Disp: 90 tablet, Rfl: 1    omeprazole (priLOSEC) 40 MG capsule, Take 1 capsule by mouth Daily., Disp: 90 capsule, Rfl: 1    rosuvastatin (CRESTOR) 40 MG tablet, Take 1 tablet by mouth Every Night., Disp: 90 tablet, Rfl: 1    zoledronic acid (RECLAST) 5 MG/100ML solution infusion, , Disp: , Rfl:     cyanocobalamin (VITAMIN B-12) 500 MCG tablet, Take 1 tablet by mouth Daily. (Patient not taking: Reported on 4/23/2025), Disp: 90 tablet, Rfl: 3    Magnesium Gluconate 550 MG tablet, Take 1 tablet by mouth Every Night. (Patient not taking: Reported on 4/23/2025), Disp: 90 tablet, Rfl: 3    Allergies:   Allergies   Allergen Reactions    Bactrim [Sulfamethoxazole-Trimethoprim] Other (See Comments)     Hypokalemia, hyponatremia    Codeine GI Intolerance    Atorvastatin Unknown (See Comments)     Lipitor      Bupropion Hcl [Bupropion] Unknown (See Comments)     wellbutrin    Calcium Unknown (See Comments)     unknown       Objective     Physical Exam:   Vital Signs:   Vitals:    04/23/25 1318   BP: 114/60   Pulse: (!) 48   Temp: 98.8 °F (37.1 °C)   SpO2: 99%   Weight: 56.2 kg (124 lb)   Height: 154.9 cm (60.98\")     Body mass index is 23.44 kg/m².    Physical Exam  Vitals and nursing note reviewed.   Constitutional:       General: She is awake. She is not in acute distress.     Appearance: Normal appearance. She is normal weight. She is not ill-appearing.   HENT:      Head: Normocephalic and atraumatic.      Nose: Nose normal.      Mouth/Throat:      Mouth: Mucous membranes are moist.   Eyes:      General: Lids are normal.      Extraocular Movements: Extraocular movements intact.      Pupils: Pupils are equal, round, and reactive to light.   Cardiovascular:      Rate and Rhythm: Normal rate and regular rhythm.      Pulses: Normal pulses. "   Pulmonary:      Effort: Pulmonary effort is normal. No respiratory distress.   Skin:     General: Skin is warm and dry.   Neurological:      General: No focal deficit present.      Mental Status: She is alert and oriented to person, place, and time. Mental status is at baseline.      Cranial Nerves: No cranial nerve deficit.      Sensory: No sensory deficit.      Motor: Motor strength is normal.No weakness.      Coordination: Coordination normal.      Gait: Gait normal.   Psychiatric:         Mood and Affect: Mood normal.         Speech: Speech normal.         Behavior: Behavior normal.       Neurological Exam  Mental Status  Awake and alert. Oriented to person, place and time. Oriented to person, place, and time. Speech is normal. Language is fluent with no aphasia. Fund of knowledge is abnormal. Knowledge: Able to answer all orientation questions but reports ongoing short-term memory loss.    Cranial Nerves  CN II: Right visual acuity: Counts fingers. Left visual acuity: Counts fingers. Visual fields full to confrontation.  CN III, IV, VI: Extraocular movements intact bilaterally. Normal lids and orbits bilaterally. Pupils equal round and reactive to light bilaterally.  CN V: Facial sensation is normal.  CN VII: Full and symmetric facial movement.  CN VIII: Hearing is normal to speech .  CN XI: Shoulder shrug strength is normal.  CN XII: Tongue midline without atrophy or fasciculations.    Motor  Normal muscle bulk throughout. No fasciculations present. Normal muscle tone. Strength is 5/5 throughout all four extremities.    Sensory  Light touch is normal in upper and lower extremities.     Coordination  Right: Finger-to-nose normal.Left: Finger-to-nose normal.  No obvious dysmetria .    Gait   Normal gait.Casual gait is normal including stance, stride, and arm swing.     NIH 0    Modified [place] Score: 0        0  No Symptoms    1 No significant disability. Able to carry out all usual activities, despite some  symptoms.    2 Slight disability. Able to look after own affairs without assistance, but unable to carry out all previous activities.    3 Moderate disability. Requires some help, but able to walk unassisted.    4 Moderately severe disability. Unable to attend to own bodily needs without assistance, and unable to walk unassisted.    5 Severe disability. Requires constant nursing care and attention, bedridden, incontinent.    6 Dead      PHQ-9 Depression Screening  Little interest or pleasure in doing things? Not at all   Feeling down, depressed, or hopeless? Not at all   PHQ-2 Total Score 0   Trouble falling or staying asleep, or sleeping too much?     Feeling tired or having little energy?     Poor appetite or overeating?     Feeling bad about yourself - or that you are a failure or have let yourself or your family down?     Trouble concentrating on things, such as reading the newspaper or watching television?     Moving or speaking so slowly that other people could have noticed? Or the opposite - being so fidgety or restless that you have been moving around a lot more than usual?       Thoughts that you would be better off dead, or of hurting yourself in some way?     PHQ-9 Total Score     If you checked off any problems, how difficult have these problems made it for you to do your work, take care of things at home, or get along with other people?             ANA LAURA Fall Risk Clinician Key Questions   Have you fallen in the past year?: Yes      Hemoglobin   Date Value Ref Range Status   11/20/2024 16.1 (H) 12.0 - 15.9 g/dL Final     Hematocrit   Date Value Ref Range Status   11/20/2024 46.7 (H) 34.0 - 46.6 % Final     Platelets   Date Value Ref Range Status   11/20/2024 142 140 - 450 10*3/mm3 Final     Hemoglobin A1C   Date Value Ref Range Status   04/23/2024 5.50 4.80 - 5.60 % Final     LDL Cholesterol    Date Value Ref Range Status   04/24/2024 58 0 - 100 mg/dL Final     LDL Chol Calc (NIH)   Date Value Ref  Range Status   02/27/2024 76 0 - 100 mg/dL Final     AST (SGOT)   Date Value Ref Range Status   11/20/2024 29 1 - 32 U/L Final     ALT (SGPT)   Date Value Ref Range Status   11/20/2024 28 1 - 33 U/L Final          Assessment / Plan      Assessment/Plan:   # History of stroke. Acute Left MCA territory infarct s/p TNK in April 2024  #Tobacco use disorder  # Hypertension, improving   # Carotid bulb atherosclerotic disease  -etiology cardio-embolic versus more likely atheroembolic  -Continue aspirin 325 mg daily. Patient had briefly stopped this medication due to stress and overwhelm.  She is now committed to getting back on track with her medications.  Stressed medication compliance for stroke prevention.  -Continue home rosuvastatin 40 mg daily and ezetimibe   -Carotid ultrasound less than 50% stenosis bilateral ICA.  No current need for ongoing repeat ultrasounds at this time.  -14 day holter is negative for Afib. She has already had follow up with cardiology. No further cardiac monitoring recommended per Dr. Cabrera.   -Tobacco cessation encouraged and counseling provided. Continues to smoke 1 PPD  -Blood pressure goals, <130/80. Today's BP is 114/60. Of note, patient is no longer requiring antihypertensives.   -Discussed lifestyle modifications such as heart healthy diet and increased activity   -Reviewed s/sxs of stroke and when to call 911 or return to the ED    #MCI  -continue to follow with Jaimee QUINONEZ  -upcoming PET scan  -considering infusion therapy when an opening becomes available     Discussed the importance of medication compliance and lifestyle modifications (adequate blood pressure control, adequate control of hyperlipidemia, adequate glycemic control, increase physical activity, and healthy diet) to help reduce the risk of future cerebrovascular events.  Also discussed the signs symptoms that would warrant the patient return back to the emergency department including unilateral weakness,  unilateral numbness, visual disturbances, loss of balance, speech difficulties, and/or a sudden severe headache.    Follow Up:   Return in about 1 year (around 4/23/2026).    Patient or patient representative verbalized consent for the use of Ambient Listening during the visit with  DEVI Dawn for chart documentation. 4/23/2025  09:28 EDT      DEVI Dawn  [facility]

## 2025-04-24 ENCOUNTER — TELEPHONE (OUTPATIENT)
Dept: NEUROLOGY | Facility: CLINIC | Age: 75
End: 2025-04-24
Payer: MEDICARE

## 2025-04-24 NOTE — TELEPHONE ENCOUNTER
Called to let her know that yesterday I talked to Dionisio and at that time they hadn't scanned the documents in so unsure if they had received it. Will call back tomorrow to see if they received it.

## 2025-04-28 ENCOUNTER — TELEPHONE (OUTPATIENT)
Dept: NEUROLOGY | Facility: CLINIC | Age: 75
End: 2025-04-28
Payer: MEDICARE

## 2025-04-28 NOTE — TELEPHONE ENCOUNTER
Talked to Luanne about the appointment. Let her know that they had availability if needed to change. Got the June 5th at 1:00 pm in [place] for her.

## 2025-04-28 NOTE — TELEPHONE ENCOUNTER
Talked with scheduling at [facility]. They have received the referral.While on the phone, she was able to look over the referral and let me make an appointment then. It is for June 5th at 1:00.

## 2025-05-05 ENCOUNTER — OFFICE VISIT (OUTPATIENT)
Dept: INTERNAL MEDICINE | Facility: CLINIC | Age: 75
End: 2025-05-05
Payer: MEDICARE

## 2025-05-05 VITALS
HEART RATE: 76 BPM | WEIGHT: 122.8 LBS | SYSTOLIC BLOOD PRESSURE: 128 MMHG | BODY MASS INDEX: 23.18 KG/M2 | TEMPERATURE: 96.9 F | HEIGHT: 61 IN | DIASTOLIC BLOOD PRESSURE: 62 MMHG

## 2025-05-05 DIAGNOSIS — Z00.00 MEDICARE ANNUAL WELLNESS VISIT, SUBSEQUENT: Primary | ICD-10-CM

## 2025-05-05 DIAGNOSIS — F17.210 CIGARETTE NICOTINE DEPENDENCE WITHOUT COMPLICATION: Chronic | ICD-10-CM

## 2025-05-05 DIAGNOSIS — K76.0 NONALCOHOLIC FATTY LIVER DISEASE: Chronic | ICD-10-CM

## 2025-05-05 DIAGNOSIS — M05.9 RHEUMATOID ARTHRITIS, SEROPOSITIVE: Chronic | ICD-10-CM

## 2025-05-05 DIAGNOSIS — E78.2 MIXED HYPERLIPIDEMIA: ICD-10-CM

## 2025-05-05 DIAGNOSIS — M54.50 CHRONIC BILATERAL LOW BACK PAIN WITHOUT SCIATICA: ICD-10-CM

## 2025-05-05 DIAGNOSIS — G89.29 CHRONIC BILATERAL LOW BACK PAIN WITHOUT SCIATICA: ICD-10-CM

## 2025-05-05 DIAGNOSIS — K21.00 GASTROESOPHAGEAL REFLUX DISEASE WITH ESOPHAGITIS WITHOUT HEMORRHAGE: ICD-10-CM

## 2025-05-05 DIAGNOSIS — Z00.00 ANNUAL PHYSICAL EXAM: ICD-10-CM

## 2025-05-05 DIAGNOSIS — R41.3 MILD MEMORY DISTURBANCE: Chronic | ICD-10-CM

## 2025-05-05 DIAGNOSIS — J44.9 COPD WITH HYPOXIA: ICD-10-CM

## 2025-05-05 DIAGNOSIS — I10 PRIMARY HYPERTENSION: Chronic | ICD-10-CM

## 2025-05-05 DIAGNOSIS — M81.0 SENILE OSTEOPOROSIS: ICD-10-CM

## 2025-05-05 DIAGNOSIS — Z86.73 HISTORY OF STROKE: ICD-10-CM

## 2025-05-05 DIAGNOSIS — Z12.11 COLON CANCER SCREENING: ICD-10-CM

## 2025-05-05 PROBLEM — K57.33 DIVERTICULITIS LARGE INTESTINE W/O PERFORATION OR ABSCESS W/BLEEDING: Status: RESOLVED | Noted: 2024-06-05 | Resolved: 2025-05-05

## 2025-05-05 RX ORDER — MEMANTINE HYDROCHLORIDE 5 MG/1
TABLET ORAL
Qty: 70 TABLET | Refills: 0 | Status: SHIPPED | OUTPATIENT
Start: 2025-05-05

## 2025-05-05 RX ORDER — FAMOTIDINE 20 MG/1
20 TABLET, FILM COATED ORAL 2 TIMES DAILY
Qty: 180 TABLET | Refills: 3 | Status: SHIPPED | OUTPATIENT
Start: 2025-05-05

## 2025-05-05 RX ORDER — EZETIMIBE 10 MG/1
10 TABLET ORAL NIGHTLY
Qty: 90 TABLET | Refills: 3 | Status: SHIPPED | OUTPATIENT
Start: 2025-05-05

## 2025-05-05 RX ORDER — ASPIRIN 325 MG
325 TABLET ORAL DAILY
Qty: 90 TABLET | Refills: 3 | Status: SHIPPED | OUTPATIENT
Start: 2025-05-05

## 2025-05-05 RX ORDER — OMEPRAZOLE 40 MG/1
40 CAPSULE, DELAYED RELEASE ORAL DAILY
Qty: 90 CAPSULE | Refills: 3 | Status: SHIPPED | OUTPATIENT
Start: 2025-05-05

## 2025-05-05 RX ORDER — ROSUVASTATIN CALCIUM 40 MG/1
40 TABLET, COATED ORAL NIGHTLY
Qty: 90 TABLET | Refills: 3 | Status: SHIPPED | OUTPATIENT
Start: 2025-05-05

## 2025-05-05 NOTE — PROGRESS NOTES
Subjective   The ABCs of the Annual Wellness Visit  Medicare Wellness Visit      Irma Pedraza is a 75 y.o. patient who presents for a Medicare Wellness Visit.    The following portions of the patient's history were reviewed and   updated as appropriate: allergies, current medications, past family history, past medical history, past social history, past surgical history, and problem list.    Compared to one year ago, the patient's physical   health is the same.  Compared to one year ago, the patient's mental   health is the same.    Recent Hospitalizations:  This patient has had a [facility] admission record on file within the last 365 days.  Current Medical Providers:  Patient Care Team:  Eve Navarrete MD as PCP - General (Internal Medicine)  Sabrina Angelo MD as Consulting Physician (Plastic Surgery)  Omer Cabrera MD as Consulting Physician (Cardiology)  Gold Swift DO as Consulting Physician (Rheumatology)  Juarez Pedersen MD as Consulting Physician (Otolaryngology)  Amanda Kenny APRN as Nurse Practitioner (Neurology)  Jaimee Noel APRN as Nurse Practitioner (Neurology)    Outpatient Medications Prior to Visit   Medication Sig Dispense Refill    Actemra 80 MG/4ML solution injection       albuterol sulfate HFA (Ventolin HFA) 108 (90 Base) MCG/ACT inhaler Inhale 2 puffs Every 4 (Four) Hours As Needed for Wheezing. 18 g 5    Coenzyme Q10 (COQ-10) 200 MG capsule Take 1 tablet by mouth Daily.      cyanocobalamin (VITAMIN B-12) 500 MCG tablet Take 1 tablet by mouth Daily. 90 tablet 3    cyclobenzaprine (FLEXERIL) 10 MG tablet Take 1 tablet by mouth At Night As Needed for Muscle Spasms. 30 tablet 0    donepezil (Aricept) 5 MG tablet Take 1 tablet by mouth Every Night for 90 days. 30 tablet 2    folic acid (FOLVITE) 1 MG tablet Take 1 tablet by mouth Daily. 90 tablet 3    ibuprofen (ADVIL,MOTRIN) 800 MG tablet Take 1 tablet by mouth Every 6 (Six) Hours As Needed for  Mild Pain. 60 tablet 0    Lactobacillus-Inulin ([facility] PO) Take 1 capsule by mouth Daily.      montelukast (SINGULAIR) 10 MG tablet Take 1 tablet by mouth Every Night. 90 tablet 1    zoledronic acid (RECLAST) 5 MG/100ML solution infusion       ezetimibe (ZETIA) 10 MG tablet Take 1 tablet by mouth Every Night. 90 tablet 1    famotidine (PEPCID) 20 MG tablet TAKE 1 TABLET BY MOUTH TWICE DAILY 180 tablet 1    Fluticasone-Umeclidin-Vilant (Trelegy Ellipta) 100-62.5-25 MCG/ACT inhaler Inhale 1 puff Daily. 3 each 3    omeprazole (priLOSEC) 40 MG capsule Take 1 capsule by mouth Daily. 90 capsule 1    rosuvastatin (CRESTOR) 40 MG tablet Take 1 tablet by mouth Every Night. 90 tablet 1    aspirin 325 MG tablet Take 1 tablet by mouth Daily. (Patient not taking: Reported on 5/5/2025) 90 tablet 3    Magnesium Gluconate 550 MG tablet Take 1 tablet by mouth Every Night. (Patient not taking: Reported on 4/16/2025) 90 tablet 3     No facility-administered medications prior to visit.     No opioid medication identified on active medication list. I have reviewed chart for other potential  high risk medication/s and harmful drug interactions in the elderly.      Aspirin is on active medication list. Aspirin use is indicated based on review of current medical condition/s. Pros and cons of this therapy have been discussed today. Benefits of this medication outweigh potential harm.  Patient has been encouraged to continue taking this medication.  .      Patient Active Problem List   Diagnosis    Cervicalgia    BRYAN    Mixed hyperlipidemia    Mitral valve disorder    Tinnitus    Vitamin D deficiency    Senile osteoporosis    Polymyalgia rheumatica    Primary insomnia    Chronic pain of both shoulders    Chronic upper back pain    Drug-induced hypokalemia    HTN (hypertension)    Benign paroxysmal positional vertigo    Chronic low back pain    Emphysema    GERD (gastroesophageal reflux disease)    Muscle spasms of both  "lower extremities    Temporal arteritis    Cigarette nicotine dependence without complication    Nonalcoholic fatty liver disease    Spasm of thoracic back muscle    Annual physical exam    Medicare annual wellness visit, subsequent    Rheumatoid arthritis, seropositive    Lichen sclerosus of female genitalia    Lichen sclerosus    Sprain of interphalangeal joint of right thumb    Alas's esophagus without dysplasia    Mild memory disturbance    High plasma homocystine    Poor appetite    Acute CVA (cerebrovascular accident)    Bilateral carotid artery stenosis    Weakness of right upper extremity    Numbness and tingling in right hand    COPD with hypoxia    Rectus sheath hematoma    Anemia    Abnormal weight loss    Magnesium deficiency    Abnormal glucose    History of stroke: R MCA territory AIS     Advance Care Planning Advance Directive is not on file.  ACP discussion was held with the patient during this visit. Patient does not have an advance directive, information provided.            Objective   Vitals:    05/05/25 1606   BP: 128/62   BP Location: Left arm   Patient Position: Sitting   Cuff Size: Adult   Pulse: 76   Temp: 96.9 °F (36.1 °C)   TempSrc: Infrared   Weight: 55.7 kg (122 lb 12.8 oz)   Height: 154.9 cm (60.98\")   PainSc: 0-No pain       Estimated body mass index is 23.22 kg/m² as calculated from the following:    Height as of this encounter: 154.9 cm (60.98\").    Weight as of this encounter: 55.7 kg (122 lb 12.8 oz).    BMI is within normal parameters. No other follow-up for BMI required.           Does the patient have evidence of cognitive impairment? No                                                                                                Health  Risk Assessment    Smoking Status:  Social History     Tobacco Use   Smoking Status Every Day    Current packs/day: 0.50    Average packs/day: 0.5 packs/day for 51.3 years (25.7 ttl pk-yrs)    Types: Cigarettes    Start date: 1974    Passive " exposure: Current   Smokeless Tobacco Never   Tobacco Comments    patient refused smoking literature. Pt uses nicotine patch in addition     Alcohol Consumption:  Social History     Substance and Sexual Activity   Alcohol Use Yes    Comment: 2 drinks per night       Fall Risk Screen  STEADI Fall Risk Assessment was completed, and patient is at LOW risk for falls.Assessment completed on:2025    Depression Screening   Little interest or pleasure in doing things? Not at all   Feeling down, depressed, or hopeless? Not at all   PHQ-2 Total Score 0      Health Habits and Functional and Cognitive Screenin/5/2025     4:02 PM   Functional & Cognitive Status   Do you have difficulty preparing food and eating? No   Do you have difficulty bathing yourself, getting dressed or grooming yourself? No   Do you have difficulty using the toilet? No   Do you have difficulty moving around from place to place? No   Do you have trouble with steps or getting out of a bed or a chair? No   Current Diet Well Balanced Diet   Dental Exam Up to date   Eye Exam Up to date   Exercise (times per week) 3 times per week   Current Exercises Include Walking   Do you need help using the phone?  No   Are you deaf or do you have serious difficulty hearing?  No   Do you need help to go to places out of walking distance? No   Do you need help shopping? No   Do you need help preparing meals?  No   Do you need help with housework?  No   Do you need help with laundry? No   Do you need help taking your medications? No   Do you need help managing money? No   Do you ever drive or ride in a car without wearing a seat belt? No   Have you felt unusual stress, anger or loneliness in the last month? No   Who do you live with? Spouse   If you need help, do you have trouble finding someone available to you? No   Have you been bothered in the last four weeks by sexual problems? No   Do you have difficulty concentrating, remembering or making decisions? No            Age-appropriate Screening Schedule:  Refer to the list below for future screening recommendations based on patient's age, sex and/or medical conditions. Orders for these recommended tests are listed in the plan section. The patient has been provided with a written plan.    Health Maintenance List  Health Maintenance   Topic Date Due    TDAP/TD VACCINES (2 - Td or Tdap) 04/11/2022    COVID-19 Vaccine (5 - 2024-25 season) 09/01/2024    RSV Vaccine - Adults (1 - 1-dose 75+ series) Never done    COLORECTAL CANCER SCREENING  03/09/2025    LIPID PANEL  04/24/2025    LUNG CANCER SCREENING  05/22/2025    DXA SCAN  06/06/2025    INFLUENZA VACCINE  07/01/2025    ANNUAL WELLNESS VISIT  05/05/2026    HEPATITIS C SCREENING  Completed    Pneumococcal Vaccine 50+  Completed    ZOSTER VACCINE  Completed    MAMMOGRAM  Discontinued    PAP SMEAR  Discontinued                                                                                                                                                CMS Preventative Services Quick Reference  Risk Factors Identified During Encounter  Tobacco Use/Dependance Risk (use dotphrase .tobaccocessation for documentation)    The above risks/problems have been discussed with the patient.  Pertinent information has been shared with the patient in the After Visit Summary.  An After Visit Summary and PPPS were made available to the patient.    Follow Up:   Next Medicare Wellness visit to be scheduled in 1 year.         Additional E&M Note during same encounter follows:  Patient has additional, significant, and separately identifiable condition(s)/problem(s) that require work above and beyond the Medicare Wellness Visit     Chief Complaint  Medicare Wellness-subsequent, Hypertension, Allergies, Heartburn, and Hyperlipidemia    Subjective    HPI  [place] is also being seen today for an annual adult preventative physical exam.        The patient presents for an annual wellness visit.    He  "reports no recent health concerns and maintains a good appetite, with his grandpa primarily responsible for meal preparation. He consumes approximately three sodas daily and engages in regular walking exercises. He has not been taking aspirin. He has seen the dentist but is unsure if he has seen the eye doctor in the last year. He has been managing his medications effectively using a pill box and adheres to a low-fat diet. He reports no recent episodes of heartburn or indigestion. He is not experiencing any respiratory symptoms such as shortness of breath, wheezing, or coughing. He does not have any foot swelling. He is up to date on his pneumonia vaccine. He is due for a colonoscopy this year. He is currently on omeprazole and requires a refill. He also takes famotidine twice daily.    He experienced a stroke approximately one year ago and is not currently on aspirin therapy.    He is on donepezil for memory management, administered in the evening, and had a consultation with a neurologist last month. He has not yet started memantine therapy.    For cholesterol management, he is on Zetia and rosuvastatin, both of which require refills.    He reports mid-back pain, which he attributes to prolonged sitting. He does not engage in any stretching exercises at home.    He is on daily Trelegy inhaler and montelukast for respiratory issues.          Objective   Vital Signs:  /62 (BP Location: Left arm, Patient Position: Sitting, Cuff Size: Adult)   Pulse 76   Temp 96.9 °F (36.1 °C) (Infrared)   Ht 154.9 cm (60.98\")   Wt 55.7 kg (122 lb 12.8 oz)   BMI 23.22 kg/m²   Physical Exam      Respiratory: Clear to auscultation, no wheezing, rales or rhonchi  Extremities: No edema, no cyanosis    The following data was reviewed by: Eve Navarrete MD on 05/05/2025:  Data reviewed : Radiologic studies CT head  CMP          6/7/2024    03:34 6/8/2024    03:08 11/20/2024    16:00   CMP   Glucose 106  93  116    BUN 9  11  " 14    Creatinine 0.65  0.55  0.98    EGFR 92.5  96.3  60.7    Sodium 140  140  140    Potassium 3.7  3.3  3.7    Chloride 100  102  98    Calcium 8.7  8.7  10.1    Total Protein   6.9    Albumin   4.8    Globulin   2.1    Total Bilirubin   1.3    Alkaline Phosphatase   64    AST (SGOT)   29    ALT (SGPT)   28    Albumin/Globulin Ratio   2.3    BUN/Creatinine Ratio 13.8  20.0  14.3    Anion Gap 6.0  6.0  13.1      CBC          6/7/2024    03:34 6/8/2024    03:08 11/20/2024    16:00   CBC   WBC 5.10  6.51  6.54    RBC 3.47  3.41  4.84    Hemoglobin 11.1  10.7  16.1    Hematocrit 33.8  32.6  46.7    MCV 97.4  95.6  96.5    MCH 32.0  31.4  33.3    MCHC 32.8  32.8  34.5    RDW 12.6  12.5  14.4    Platelets 133  134  142      CBC w/diff          6/7/2024    03:34 6/8/2024    03:08 11/20/2024    16:00   CBC w/Diff   WBC 5.10  6.51  6.54    RBC 3.47  3.41  4.84    Hemoglobin 11.1  10.7  16.1    Hematocrit 33.8  32.6  46.7    MCV 97.4  95.6  96.5    MCH 32.0  31.4  33.3    MCHC 32.8  32.8  34.5    RDW 12.6  12.5  14.4    Platelets 133  134  142    Neutrophil Rel %   61.1    Immature Granulocyte Rel %   0.3    Lymphocyte Rel %   30.4    Monocyte Rel %   6.9    Eosinophil Rel %   0.8    Basophil Rel %   0.5        TSH          11/20/2024    16:00   TSH   TSH 1.030        Results             Assessment and Plan Additional age appropriate preventative wellness advice topics were discussed during today's preventative wellness exam(some topics already addressed during AWV portion of the note above):    Physical Activity: Advised cardiovascular activity 150 minutes per week as tolerated. (example brisk walk for 30 minutes, 5 days a week).     Nutrition: Discussed nutrition plan with patient. Information shared in after visit summary. Goal is for a well balanced diet to enhance overall health.     Healthy Weight: Discussed current and goal BMI with patient. Steps to attain this goal discussed. Information shared in after visit  summary.       Medicare annual wellness visit.  Annual physical.  - Blood pressure readings are within normal range at 128/62.  - Advised to limit soda intake to a maximum of one per day due to high sodium content.  - Encouraged to schedule an appointment with an ophthalmologist.  - Tdap vaccine recommended; colonoscopy ordered at [facility], with a call to be received for the date and time.    Benign essential hypertension  She will continue to decrease salt in the diet.  Get some walking and exercise every day.    Mixed hyperlipidemia  - Currently taking Zetia and rosuvastatin for cholesterol management.  - Refills for both medications sent to pharmacy.  - Continue to decrease fatty foods and carbohydrates in the diet.  Walk and do some other physical exercise every day.    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  - Prescription for memantine provided, starting with a low dose and gradually increasing it.  She was informed to let us know when she finishes the starter prescription then we will send in a prescription for ongoing treatment at 10 mg tablet twice a day.  - Follow-up with neurologist Ms. Noel scheduled for 07/2025.  She is encouraged to do the PET scan ordered by Ms. Noel.  She will also see Ms. Kenny again to further discuss monoclonal antibody treatment.  -She is advised to resume 325 mg coated aspirin daily.  Continue taking rosuvastatin and Zetia every evening.    History of right MCA stroke.  - History of stroke about a year ago.  - Advised to take aspirin 325 mg daily.     Gastroesophageal reflux disease.  - Continue taking omeprazole and famotidine twice a day.  - Refills for both medications sent to pharmacy.  - Continue to avoid eating close to bedtime and avoid large meals.    COPD with hypoxia  Continue Trelegy inhaler daily.  Continue albuterol inhaler as needed.  Continue taking montelukast tablet every  evening.    Nonalcoholic fatty liver  Continue taking rosuvastatin nightly.  Continue to decrease fats in the diet.    Chronic low back pain.  - Advised to incorporate more walking into routine.  - Perform back stretches before getting out of bed in the morning and do some more later in the day.  -Take Tylenol as needed.  - Use a moist heat pack on the back to relax tight muscles.    Rheumatoid arthritis  Continue Actemra and close follow-up with Dr. Swift.    Osteoporosis  Walks some every day and use small hand weights at home while sitting.  Continue taking calcium and vitamin D3.  Follow-up with Dr. Swift for Reclast injections.    Cigarette nicotine dependence  She is encouraged to quit smoking which would decrease her risk of further stroke and decrease her risk of heart attack.    Follow-up  - Follow-up appointment scheduled in 6 months.         Follow Up   Return in about 6 months (around 11/5/2025) for recheck fasting.  Patient was given instructions and counseling regarding her condition or for health maintenance advice. Please see specific information pulled into the AVS if appropriate.  Patient or patient representative verbalized consent for the use of Ambient Listening during the visit with  Eve Navarrete MD for chart documentation. 5/5/2025  17:25 EDT

## 2025-05-05 NOTE — PATIENT INSTRUCTIONS
Problem List Items Addressed This Visit          Cardiac and Vasculature    HTN (hypertension) (Chronic)    Overview   Amlodipine stopped in hospital 4/2024.  Lifestyle control.         Mixed hyperlipidemia    Overview   Taking rosuvastatin 40 mg and zetia 10mg daily.          Relevant Medications    ezetimibe (ZETIA) 10 MG tablet    rosuvastatin (CRESTOR) 40 MG tablet       Gastrointestinal Abdominal     Nonalcoholic fatty liver disease (Chronic)    GERD (gastroesophageal reflux disease) (Chronic)    Overview             Relevant Medications    omeprazole (priLOSEC) 40 MG capsule    famotidine (PEPCID) 20 MG tablet       Health Encounters    Annual physical exam    Medicare annual wellness visit, subsequent - Primary       Musculoskeletal and Injuries    Rheumatoid arthritis, seropositive (Chronic)    Overview   Seeing Dr. Swift. Actemra.         Relevant Medications    ibuprofen (ADVIL,MOTRIN) 800 MG tablet    Actemra 80 MG/4ML solution injection    Senile osteoporosis    Overview   T-scores  on 2/12/19 DEXA showed osteoporosis of the spine and osteopenia of the hips.    2021 DEXA revealed T-scores in the spine of -1.3 and in the femoral neck of -1.7.  There has been some improvement on Reclast.  Sees Dr. Swift.             Chronic low back pain    Overview   Do some back stretches every morning.  Use moist heat to relax tight back muscles.  Walk some every day.            Neuro    Mild memory disturbance (Chronic)    Overview   P-tau 217 is elevated on 4/2025 labs.  [facility] ordered Pet amyloid scan and referred her to [facility]  [facility] to consider amyloid monoclonal antibody treatment.  She just saw her 2 weeks ago and was advised to get back on her daily aspirin.  She has already stopped it again.  She is taking donepezil every evening but not taking the memantine.         History of stroke: R MCA territory AIS       Pulmonary and Pneumonias    COPD with hypoxia     Overview   Sent home from hospital 6/24 with O2 ordered.         Relevant Medications    montelukast (SINGULAIR) 10 MG tablet    albuterol sulfate HFA (Ventolin HFA) 108 (90 Base) MCG/ACT inhaler    Fluticasone-Umeclidin-Vilant (Trelegy Ellipta) 100-62.5-25 MCG/ACT inhaler       Tobacco    Cigarette nicotine dependence without complication (Chronic)    Overview   Nicotine patch started 4/2024.         Relevant Medications    donepezil (Aricept) 5 MG tablet    memantine (NAMENDA) 5 MG tablet     Other Visit Diagnoses         Colon cancer screening        Relevant Orders    Ambulatory Referral For Screening Colonoscopy          Exercising to Stay Healthy  To become healthy and stay healthy, it is recommended that you do moderate-intensity and vigorous-intensity exercise. You can tell that you are exercising at a moderate intensity if your heart starts beating faster and you start breathing faster but can still hold a conversation. You can tell that you are exercising at a vigorous intensity if you are breathing much harder and faster and cannot hold a conversation while exercising.  How can exercise benefit me?  Exercising regularly is important. It has many health benefits, such as:  Improving overall fitness, flexibility, and endurance.  Increasing bone density.  Helping with weight control.  Decreasing body fat.  Increasing muscle strength and endurance.  Reducing stress and tension, anxiety, depression, or anger.  Improving overall health.  What guidelines should I follow while exercising?  Before you start a new exercise program, talk with your health care provider.  Do not exercise so much that you hurt yourself, feel dizzy, or get very short of breath.  Wear comfortable clothes and wear shoes with good support.  Drink plenty of water while you exercise to prevent dehydration or heat stroke.  Work out until your breathing and your heartbeat get faster (moderate intensity).  How often should I exercise?  Choose  an activity that you enjoy, and set realistic goals. Your health care provider can help you make an activity plan that is individually designed and works best for you.  Exercise regularly as told by your health care provider. This may include:  Doing strength training two times a week, such as:  Lifting weights.  Using resistance bands.  Push-ups.  Sit-ups.  Yoga.  Doing a certain intensity of exercise for a given amount of time. Choose from these options:  A total of 150 minutes of moderate-intensity exercise every week.  A total of 75 minutes of vigorous-intensity exercise every week.  A mix of moderate-intensity and vigorous-intensity exercise every week.  Children, pregnant women, people who have not exercised regularly, people who are overweight, and older adults may need to talk with a health care provider about what activities are safe to perform. If you have a medical condition, be sure to talk with your health care provider before you start a new exercise program.  What are some exercise ideas?  Moderate-intensity exercise ideas include:  Walking 1 mile (1.6 km) in about 15 minutes.  Biking.  Hiking.  Golfing.  Dancing.  Water aerobics.  Vigorous-intensity exercise ideas include:  Walking 4.5 miles (7.2 km) or more in about 1 hour.  Jogging or running 5 miles (8 km) in about 1 hour.  Biking 10 miles (16.1 km) or more in about 1 hour.  Lap swimming.  Roller-skating or in-line skating.  Cross-country skiing.  Vigorous competitive sports, such as football, basketball, and soccer.  Jumping rope.  Aerobic dancing.  What are some everyday activities that can help me get exercise?  Yard work, such as:  Pushing a .  Raking and bagging leaves.  Washing your car.  Pushing a stroller.  Shoveling snow.  Gardening.  Washing windows or floors.  How can I be more active in my day-to-day activities?  Use stairs instead of an elevator.  Take a walk during your lunch break.  If you drive, park your car farther away  from your work or school.  If you take public transportation, get off one stop early and walk the rest of the way.  Stand up or walk around during all of your indoor phone calls.  Get up, stretch, and walk around every 30 minutes throughout the day.  Enjoy exercise with a friend. Support to continue exercising will help you keep a regular routine of activity.  Where to find more information  You can find more information about exercising to stay healthy from:  [place] Department of Health and Human Services: www.hhs.gov  Centers for Disease Control and Prevention (CDC): www.cdc.gov  Summary  Exercising regularly is important. It will improve your overall fitness, flexibility, and endurance.  Regular exercise will also improve your overall health. It can help you control your weight, reduce stress, and improve your bone density.  Do not exercise so much that you hurt yourself, feel dizzy, or get very short of breath.  Before you start a new exercise program, talk with your health care provider.  This information is not intended to replace advice given to you by your health care provider. Make sure you discuss any questions you have with your health care provider.  Document Revised: 04/15/2022 Document Reviewed: 04/15/2022  Elsevier Patient Education © 2023 [employer] Fall Prevention in the Home, Adult  Falls can cause injuries and affect people of all ages. There are many simple things that you can do to make your home safe and to help prevent falls.  If you need it, ask for help making these changes.  What actions can I take to prevent falls?  General information  Use good lighting in all rooms. Make sure to:  Replace any light bulbs that burn out.  Turn on lights if it is dark and use night-lights.  Keep items that you use often in easy-to-reach places. Lower the shelves around your home if needed.  Move furniture so that there are clear paths around it.  Do not keep throw rugs or other things on the floor that can  make you trip.  If any of your floors are uneven, fix them.  Add color or contrast paint or tape to clearly kenny and help you see:  Grab bars or handrails.  First and last steps of staircases.  Where the edge of each step is.  If you use a ladder or stepladder:  Make sure that it is fully opened. Do not climb a closed ladder.  Make sure the sides of the ladder are locked in place.  Have someone hold the ladder while you use it.  Know where your pets are as you move through your home.  What can I do in the bathroom?         Keep the floor dry. Clean up any water that is on the floor right away.  Remove soap buildup in the bathtub or shower. Buildup makes bathtubs and showers slippery.  Use non-skid mats or decals on the floor of the bathtub or shower.  Attach bath mats securely with double-sided, non-slip rug tape.  If you need to sit down while you are in the shower, use a non-slip stool.  Install grab bars by the toilet and in the bathtub and shower. Do not use towel bars as grab bars.  What can I do in the bedroom?  Make sure that you have a light by your bed that is easy to reach.  Do not use any sheets or blankets on your bed that hang to the floor.  Have a firm bench or chair with side arms that you can use for support when you get dressed.  What can I do in the kitchen?  Clean up any spills right away.  If you need to reach something above you, use a sturdy step stool that has a grab bar.  Keep electrical cables out of the way.  Do not use floor polish or wax that makes floors slippery.  What can I do with my stairs?  Do not leave anything on the stairs.  Make sure that you have a light switch at the top and the bottom of the stairs. Have them installed if you do not have them.  Make sure that there are handrails on both sides of the stairs. Fix handrails that are broken or loose. Make sure that handrails are as long as the staircases.  Install non-slip stair treads on all stairs in your home if they do not  have carpet.  Avoid having throw rugs at the top or bottom of stairs, or secure the rugs with carpet tape to prevent them from moving.  Choose a carpet design that does not hide the edge of steps on the stairs. Make sure that carpet is firmly attached to the stairs. Fix any carpet that is loose or worn.  What can I do on the outside of my home?  Use bright outdoor lighting.  Repair the edges of walkways and driveways and fix any cracks. Clear paths of anything that can make you trip, such as tools or rocks.  Add color or contrast paint or tape to clearly kenny and help you see high doorway thresholds.  Trim any bushes or trees on the main path into your home.  Check that handrails are securely fastened and in good repair. Both sides of all steps should have handrails.  Install guardrails along the edges of any raised decks or porches.  Have leaves, snow, and ice cleared regularly. Use sand, salt, or ice melt on walkways during winter months if you live where there is ice and snow.  In the garage, clean up any spills right away, including grease or oil spills.  What other actions can I take?  Review your medicines with your health care provider. Some medicines can make you confused or feel dizzy. This can increase your chance of falling.  Wear closed-toe shoes that fit well and support your feet. Wear shoes that have rubber soles and low heels.  Use a cane, walker, scooter, or crutches that help you move around if needed.  Talk with your provider about other ways that you can decrease your risk of falls. This may include seeing a physical therapist to learn to do exercises to improve movement and strength.  Where to find more information  Centers for Disease Control and Prevention, [place]UR[place]: cdc.gov  National [place] on Aging: jumana.nih.gov  National [place] on Aging: jumana.nih.gov  Contact a health care provider if:  You are afraid of falling at home.  You feel weak, drowsy, or dizzy at home.  You fall at home.  Get  help right away if you:  Lose consciousness or have trouble moving after a fall.  Have a fall that causes a head injury.  These symptoms may be an emergency. Get help right away. Call 911.  Do not wait to see if the symptoms will go away.  Do not drive yourself to the hospital.  This information is not intended to replace advice given to you by your health care provider. Make sure you discuss any questions you have with your health care provider.  Document Revised: 08/21/2023 Document Reviewed: 08/21/2023  Elsevier Patient Education © 2024 [employer]

## 2025-05-21 ENCOUNTER — TELEPHONE (OUTPATIENT)
Dept: NEUROLOGY | Facility: CLINIC | Age: 75
End: 2025-05-21
Payer: MEDICARE

## 2025-05-21 ENCOUNTER — TELEPHONE (OUTPATIENT)
Age: 75
End: 2025-05-21
Payer: MEDICARE

## 2025-05-21 RX ORDER — DIAZEPAM 10 MG/1
TABLET ORAL
Qty: 1 TABLET | Refills: 0 | Status: SHIPPED | OUTPATIENT
Start: 2025-05-21

## 2025-05-21 NOTE — TELEPHONE ENCOUNTER
Patient is overdue for a follow up, she is a Dr. Swift patient if we could possibly get her on the books. Thank you all so much!

## 2025-05-21 NOTE — TELEPHONE ENCOUNTER
Informed her that the pharmacy said they close at 1:30 and also that not to let her mother drive at all today, even after the scan.

## 2025-05-21 NOTE — TELEPHONE ENCOUNTER
Had a missed call from Luanne, stating her mother is having a PET SCAN today and her mother is claustrophobic. Talked with Luanne, the scan is at 3PM and was just wandering if she can get a medicine to help like Valium. Her mother has mentioned her claustrophobia a few times. Pharmacy is the same.

## 2025-05-30 ENCOUNTER — DOCUMENTATION (OUTPATIENT)
Dept: NEUROLOGY | Facility: CLINIC | Age: 75
End: 2025-05-30
Payer: MEDICARE

## 2025-05-30 NOTE — Clinical Note
Will you please call Virginia to let her know that unfortunately her PET Scan was indicative of Alzheimer's dementia. I would like to refer to Francisca Power PA-C for consideration of Leqembi. This is a newer treatment available for patient's with Alzheimer's disease, going to see Francisca does not mean that she would definitely start Leqembi infusions, but I think it is important that the conversation happens to consider this treatment modality. How is she doing with Donepezil? I also saw that it looks like her PCP restarted memantine as well.

## 2025-05-30 NOTE — PROGRESS NOTES
Received PET/CT Amyloid Brain dated 5/22/2025 : Amyvid scan demonstrates evidence of significant beta amyloid deposition in the cerebral cortex. Centioloid score of 89.3 (more than 50) strongly suggest significant amyloid burden.    Will ask office staff to contact patient regarding setting up referral to Francisca Power PA-C with [facility] for consideration of anti-amyloid therapy (Leqembi).

## 2025-06-03 ENCOUNTER — TELEPHONE (OUTPATIENT)
Dept: NEUROLOGY | Facility: CLINIC | Age: 75
End: 2025-06-03
Payer: MEDICARE

## 2025-06-03 NOTE — TELEPHONE ENCOUNTER
----- Message from Jaimee Bert sent at 5/30/2025  8:36 AM EDT -----  Will you please call Virginia to let her know that unfortunately her PET Scan was indicative of Alzheimer's dementia. I would like to refer to Francisca Power PA-C for consideration of Leqembi. This is a newer treatment available for patient's with Alzheimer's disease, going to see Francisca does not mean that she would definitely start Leqembi infusions, but I think it is important that the conversation happens to consider this treatment modality. How is she doing with Donepezil? I also saw that it looks like her PCP restarted memantine as well.

## 2025-06-04 NOTE — TELEPHONE ENCOUNTER
Caller: Irma Pedraza    Relationship: Self    Best call back number: [phone]    What is the best time to reach you: ANY    Who are you requesting to speak with (clinical staff, provider,  specific staff member): MAGED    Do you know the name of the person who called: MAGED    What was the call regarding: PET SCAN RESULTS

## 2025-06-05 NOTE — TELEPHONE ENCOUNTER
Pravin for return call on Virginia's phone.  Called daughter and relayed information.  She has appt today with Silvano with Dr. Michael and they will go over more information at the appt.  She is on her way now to get her mom and she will go over the results with her.

## 2025-06-06 ENCOUNTER — TELEPHONE (OUTPATIENT)
Age: 75
End: 2025-06-06
Payer: MEDICARE

## 2025-06-06 NOTE — TELEPHONE ENCOUNTER
Patients daughter called with questions regarding her moms Actemra compared to a different medication. I transferred her to Natividad.

## 2025-07-09 ENCOUNTER — TELEPHONE (OUTPATIENT)
Age: 75
End: 2025-07-09
Payer: MEDICARE

## 2025-07-09 NOTE — TELEPHONE ENCOUNTER
Caller: VERONA REYNA    Relationship: Emergency Contact    Best call back number: [phone]       What was the call regarding: PATIENT'S DAUGHTER CALLED TO SEE IF THERE WAS ANY WAY TO ACCOMMODATE PATIENT ON 7/15 EARLY MORNING AR AFTERNOON AS PATIENT WAS RECENTLY DIAGNOSED WITH DEMENTIA AND IS SEEING A NEUROLOGIST THAT DAY IN [place].  PATIENT'S DAUGHTER STATES SHE WANTS TO DISCUSS A POSSIBLE INFUSION FOR DEMENTIA WITH THE NEUROLOGIST BUT THEY WERE TOLD IT COULD INTERACT WITH PATIENT'S CURRENT TREATMENT OF ACTEMRA. PLEASE CONTACT PATIENT AND ADVISE IS APPOINTMENT CAN BE MADE AND/ OR ANSWER QUESTIONS PATIENT MIGHT HAVE ABOUT DEMENTIA TREATMENT INTERFERING WITH CURRENT TREATMENT WITH RHEUMATOLOGY

## 2025-07-11 PROBLEM — M05.9 RHEUMATOID ARTHRITIS WITH POSITIVE RHEUMATOID FACTOR: Status: ACTIVE | Noted: 2025-07-11

## 2025-07-11 PROBLEM — M81.0 OSTEOPOROSIS: Status: ACTIVE | Noted: 2025-07-11

## 2025-07-11 PROBLEM — T45.1X5A IMMUNODEFICIENCY DUE TO TREATMENT WITH IMMUNOSUPPRESSIVE MEDICATION: Status: ACTIVE | Noted: 2025-07-11

## 2025-07-11 PROBLEM — M15.0 PRIMARY OSTEOARTHRITIS INVOLVING MULTIPLE JOINTS: Status: ACTIVE | Noted: 2025-07-11

## 2025-07-11 PROBLEM — Z79.60 IMMUNODEFICIENCY DUE TO TREATMENT WITH IMMUNOSUPPRESSIVE MEDICATION: Status: ACTIVE | Noted: 2025-07-11

## 2025-07-11 PROBLEM — M31.5 GIANT CELL ARTERITIS WITH POLYMYALGIA RHEUMATICA: Status: ACTIVE | Noted: 2025-07-11

## 2025-07-11 PROBLEM — D84.821 IMMUNODEFICIENCY DUE TO TREATMENT WITH IMMUNOSUPPRESSIVE MEDICATION: Status: ACTIVE | Noted: 2025-07-11

## 2025-07-11 PROBLEM — Z79.52 LONG TERM CURRENT USE OF SYSTEMIC STEROIDS: Status: ACTIVE | Noted: 2025-07-11

## 2025-07-11 NOTE — ASSESSMENT & PLAN NOTE
*Actemra for RA and PMR/GCA started 5/18/2016  *QTB and hepatitis panel negative 12/6/24    Hold if she develops infection.   Avoid live vaccines while on this medication.   No recent serious infections  No infusion reactions

## 2025-07-11 NOTE — PROGRESS NOTES
Office Visit       Date: 07/14/2025   Patient Name: Irma Pedraza  MRN: [id number]  YOB: 1950    Referring Physician: No ref. provider found     Chief Complaint:   Chief Complaint   Patient presents with    Follow-up    Rheumatoid Arthritis    PMR    GCA    Osteoporosis       History of Present Illness: Irma Pedraza is a 75 y.o. female who is here today for follow up of GCA/PMR, RA, and osteoporosis. On 2/16/2016 her IgG and IgA RF tests were positive.     She has been lost to follow up since 6/6/23. She has been diagnosed with Alzheimer's dementia since her last OV. She also had a stroke in 4/2024. She is following with neurology DEVI Ratliff. She is also seeing Alzheimer's specialist at [place] and is considering experimental immunotherapy medication.     She continues on IV Actemra monthly and prednisone 2.5 mg/day. Her most recent Actemra infusion was 6/3/25.    No headaches today. No vision trouble apart from cataracts. No jaw claudication. No scalp tenderness. No joint pain or swelling. She rates her pain 2/10. She denies morning stiffness.    DXA 1/31/2017 showed that the patient has osteoporosis. She was on Fosamax in the past. She has a history of GERD. 3/2017 she got her 1st dose of IV Reclast and tolerated this well. Last dose (this was her 5th dose) was on 8/30/21. She tolerated this well. She has been on a drug holiday since 8/2021. DEXA 6/6/23 with normal bone density of left total hip and lumbar spine, osteopenia of left femoral neck.    Subjective   Review of systems:  Review of Systems not completed today    Past Medical History:   Past Medical History:   Diagnosis Date    Adenomyomatosis of gallbladder 2009    by ultrasound, Dr. Yomi Tai 2009    Arthritis     Arthritis of lumbosacral spine     Asthma     Cataract     Remove 7-20 and 7-25 2023    Chronic hoarseness     Class 1 obesity due to excess calories with serious comorbidity and body mass index  (BMI) of 30.0 to 30.9 in adult 07/16/2020 2/1/2021 Eve Navarrete MD      Closed displaced fracture of shaft of right clavicle with malunion     Diverticulitis 02/28/2019    Diverticulitis large intestine w/o perforation or abscess w/bleeding 06/05/2024    Diverticulosis     Years ago, mot sure of date    Dyslipidemia 04/23/2024    GCA (giant cell arteritis)     GERD (gastroesophageal reflux disease)     HL (hearing loss) 2022    Hyperlipidemia Unk    Low back pain ?    Medicare annual wellness visit, subsequent 02/01/2021    Lloyd's neuroma of left foot     surgery    Ocular histoplasmosis     BCC 2/6/2017    Overweight with body mass index (BMI) of 27 to 27.9 in adult 08/03/2022    Panic disorder 02/28/2019    PMR (polymyalgia rheumatica)     Rhinovirus infection 06/09/2024       Past Surgical History:   Past Surgical History:   Procedure Laterality Date    COLONOSCOPY  02/2009    COSMETIC SURGERY  1994    face lift    COSMETIC SURGERY  1993    TUMMY TUCK    COSMETIC SURGERY  1980    Birth kenny removal    ELBOW PROCEDURE Left     x2    EYE SURGERY  7/20 and 7/25/2023    Removed cataracts and incerted lenses in bot eyes    THROAT SURGERY      remote removal of growth from throat    TONSILLECTOMY  1964       Family History:   Family History   Problem Relation Age of Onset    Osteoporosis Mother     Stroke Mother         Alzhiemers    Osteoporosis Father     Migraines Father     Hearing loss Father     Stroke Father         TIA    Brain cancer Sister     Stroke Sister         CVA    Stroke Brother         CVA    Breast cancer Maternal Grandmother 90    Stroke Paternal Grandmother         CVA    Arthritis Paternal Grandmother         Ostioperosis    Stroke Paternal Aunt         TIA    Stroke Sister         CVA    Stroke Sister         TIA    Stroke Brother         CVA    Stroke Sister         Glioblastoma    Other Sister         Rheumatoid Arthritis    Other Brother         no issues    Other Daughter          no issues    Other Sister         no issues    Ovarian cancer Neg Hx     Endometrial cancer Neg Hx        Social History:   Social History     Socioeconomic History    Marital status:    Tobacco Use    Smoking status: Every Day     Current packs/day: 0.50     Average packs/day: 0.5 packs/day for 51.5 years (25.8 ttl pk-yrs)     Types: Cigarettes     Start date: 1974     Passive exposure: Current    Smokeless tobacco: Never    Tobacco comments:     patient refused smoking literature. Pt uses nicotine patch in addition   Vaping Use    Vaping status: Never Used   Substance and Sexual Activity    Alcohol use: Yes     Alcohol/week: 2.0 standard drinks of alcohol     Types: 2 Shots of liquor per week     Comment: 2 drinks per night    Drug use: No    Sexual activity: Not Currently     Partners: Male       Medications:   Current Outpatient Medications:     predniSONE (DELTASONE) 2.5 MG tablet, Take 1 tablet by mouth Daily., Disp: , Rfl:     Actemra 80 MG/4ML solution injection, , Disp: , Rfl:     albuterol sulfate HFA (Ventolin HFA) 108 (90 Base) MCG/ACT inhaler, Inhale 2 puffs Every 4 (Four) Hours As Needed for Wheezing., Disp: 18 g, Rfl: 5    aspirin 325 MG tablet, Take 1 tablet by mouth Daily., Disp: 90 tablet, Rfl: 3    Coenzyme Q10 (COQ-10) 200 MG capsule, Take 1 tablet by mouth Daily., Disp: , Rfl:     cyanocobalamin (VITAMIN B-12) 500 MCG tablet, Take 1 tablet by mouth Daily., Disp: 90 tablet, Rfl: 3    cyclobenzaprine (FLEXERIL) 10 MG tablet, Take 1 tablet by mouth At Night As Needed for Muscle Spasms., Disp: 30 tablet, Rfl: 0    donepezil (Aricept) 10 MG tablet, Take 1 tablet by mouth Every Night for 90 days., Disp: 30 tablet, Rfl: 2    ezetimibe (ZETIA) 10 MG tablet, Take 1 tablet by mouth Every Night., Disp: 90 tablet, Rfl: 3    famotidine (PEPCID) 20 MG tablet, Take 1 tablet by mouth 2 (Two) Times a Day. (Patient taking differently: Take 1 tablet by mouth 2 (Two) Times a Day As Needed for Heartburn.),  "Disp: 180 tablet, Rfl: 3    Fluticasone-Umeclidin-Vilant (Trelegy Ellipta) 100-62.5-25 MCG/ACT inhaler, Inhale 1 puff Daily., Disp: 3 each, Rfl: 3    folic acid (FOLVITE) 1 MG tablet, Take 1 tablet by mouth Daily., Disp: 90 tablet, Rfl: 3    ibuprofen (ADVIL,MOTRIN) 800 MG tablet, Take 1 tablet by mouth Every 6 (Six) Hours As Needed for Mild Pain., Disp: 60 tablet, Rfl: 0    Lactobacillus-Inulin ([facility] bLife PO), Take 1 capsule by mouth Daily., Disp: , Rfl:     montelukast (SINGULAIR) 10 MG tablet, Take 1 tablet by mouth Every Night., Disp: 90 tablet, Rfl: 1    omeprazole (priLOSEC) 40 MG capsule, Take 1 capsule by mouth Daily., Disp: 90 capsule, Rfl: 3    rosuvastatin (CRESTOR) 40 MG tablet, Take 1 tablet by mouth Every Night., Disp: 90 tablet, Rfl: 3    zoledronic acid (RECLAST) 5 MG/100ML solution infusion, , Disp: , Rfl:     Allergies:   Allergies   Allergen Reactions    Bactrim [Sulfamethoxazole-Trimethoprim] Other (See Comments)     Hypokalemia, hyponatremia    Codeine GI Intolerance    Atorvastatin Unknown (See Comments)     Lipitor      Bupropion Hcl [Bupropion] Unknown (See Comments)     wellbutrin    Calcium Unknown (See Comments)     unknown       I reviewed the patient's chief complaint, history of present illness, review of systems, past medical history, surgical history, family history, social history, medications and allergy list.     Objective    Vital Signs:   Vitals:    07/14/25 1324   BP: 132/78   Pulse: 91   Temp: 98.1 °F (36.7 °C)   Weight: 53.5 kg (118 lb)   Height: 154.9 cm (61\")   PainSc: 2      Body mass index is 22.3 kg/m².   Defer to PCP      Physical Exam:  Physical Exam  Constitutional:       Appearance: Normal appearance.   HENT:      Head: Normocephalic and atraumatic.   Eyes:      Conjunctiva/sclera: Conjunctivae normal.      Pupils: Pupils are equal, round, and reactive to light.   Cardiovascular:      Rate and Rhythm: Normal rate.   Pulmonary:      Effort: Pulmonary " effort is normal.   Musculoskeletal:         General: Normal range of motion.      Cervical back: Normal range of motion.      Comments: + kyphosis  OA changes hands/wrists  NO synovitis   Skin:     General: Skin is warm and dry.      Findings: Bruising present.   Neurological:      General: No focal deficit present.      Mental Status: She is alert and oriented to person, place, and time.   Psychiatric:         Mood and Affect: Mood normal.         Behavior: Behavior normal.         Thought Content: Thought content normal.         Judgment: Judgment normal.              Assessment / Plan      Assessment & Plan  Rheumatoid arthritis with positive rheumatoid factor, involving unspecified site  * 2/16/2016 her IgA RF and IgG RF tests were positive.  * Medications/treatments tried include: Leflunomide, Actemra, prednisone, methocarbamol, Tylenol PRN.    Continue 2.5 mg/day of prednisone.   She has previously been diagnosed with PMR/GCA by Dr. Navarrete.   Continue Actemra. She is tolerating it well. Actemra has been used to treat both RA and PMR/GCA.   She is considering an experimental biologic therapy with neurology for Alzheimer's. We do not recommend dual biologic therapy. We discussed she will need to stop Actemra infusions if she proceeds with neurology infusion.   Could consider addition of DMARD if need to stop Actemra.  She has a history of histoplasmosis that was identified by her eye doctor. We referred her to infectious disease to see if it was safe to take a biologic agent and they did clear her.  No swelling on exam. No significant morning stiffness. Prognosis seems good.  Labs 5/1/25 stable  RTC 4 months with DEXA  Immunodeficiency due to treatment with immunosuppressive medication  *Actemra for RA and PMR/GCA started 5/18/2016  *QTB and hepatitis panel negative 12/6/24    Hold if she develops infection.   Avoid live vaccines while on this medication.   No recent serious infections  No infusion  reactions  Long term current use of systemic steroids  Prednisone 2.5 mg/day PO for rheumatoid arthritis    Attempts to taper have been unsuccessful/resulted in more pain/swelling/stiffness.  Discussed risks of long-term steroid use including avascular necrosis, osteoporosis, fractures, ulcers, infection, diabetes, cataracts, weight gain  Giant cell arteritis with polymyalgia rheumatica  * Per the referral paper work the patient has Retinal arteritis and is followed by ophthalmology  * She was diagnosed Fall of 2015.  * Medications/treatments tried include: Leflunomide, Actemra, prednisone    1. She is on 2.5 mg/day of prednisone. Attempts to taper off have failed.   2. Continue Actemra as a steroid sparing agent as above.   3. No symptoms to suggest temporal arteritis today  Osteoporosis, unspecified osteoporosis type, unspecified pathological fracture presence  *DXA 1/31/2017 Showed that the patient has osteoporosis. Dr. Navarrete's office sent me a note stating that they were leaving the management of this up to me  * She was on Fosamax in the past  *DXA 3/13/19 L femoral neck -1.9, lumbar spine -2.5  *DXA 3/17/21 L femoral neck -1.7, lumbar spine -1.3  *DXA 6/6/23 L femoral neck -2.1, left total hip -0.6, lumbar spine -1.0.    1. Calcium and vitamin D supplements should be taken unless otherwise contraindicated.   2. DXA scan should be monitored approximately every 2 years. Repeat DEXA due 6/2025. We will order this to be done with next OV  3. She has a history of GERD. We are therefore going to avoid oral bisphosphonates.   4. 3/2017 she got her 1st dose of IV Reclast and tolerated this well. This is an IV medication given once/year. Her most recent dose was 8/30/21. This was dose #5. We have her presently on a drug holiday at this point as long term use of bisphosphonates is associated with a higher risk for atypical fractures.  Primary osteoarthritis involving multiple joints  1. She has done PT for her back.    2. She takes Tylenol or Advil OTC PRN  3. She takes Flexeril as needed from other provider      Discussed plan of care in detail with the patient today.  Patient verbalized understanding and agrees.    I confirm accuracy of unchanged data/findings which have been carried forward from previous visit.  I have updated appropriately those that have changed.    Follow Up:   Return in about 4 months (around 11/14/2025) for Dr. Swift, with DEXA.        DEVI White   [facility]

## 2025-07-11 NOTE — ASSESSMENT & PLAN NOTE
Prednisone 2.5 mg/day PO for rheumatoid arthritis    Attempts to taper have been unsuccessful/resulted in more pain/swelling/stiffness.  Discussed risks of long-term steroid use including avascular necrosis, osteoporosis, fractures, ulcers, infection, diabetes, cataracts, weight gain

## 2025-07-11 NOTE — ASSESSMENT & PLAN NOTE
* 2/16/2016 her IgA RF and IgG RF tests were positive.  * Medications/treatments tried include: Leflunomide, Actemra, prednisone, methocarbamol, Tylenol PRN.    Continue 2.5 mg/day of prednisone.   She has previously been diagnosed with PMR/GCA by Dr. Navarrete.   Continue Actemra. She is tolerating it well. Actemra has been used to treat both RA and PMR/GCA.   She is considering an experimental biologic therapy with neurology for Alzheimer's. We do not recommend dual biologic therapy. We discussed she will need to stop Actemra infusions if she proceeds with neurology infusion.   Could consider addition of DMARD if need to stop Actemra.  She has a history of histoplasmosis that was identified by her eye doctor. We referred her to infectious disease to see if it was safe to take a biologic agent and they did clear her.  No swelling on exam. No significant morning stiffness. Prognosis seems good.  Labs 5/1/25 stable  RTC 4 months with DEXA

## 2025-07-11 NOTE — ASSESSMENT & PLAN NOTE
* Per the referral paper work the patient has Retinal arteritis and is followed by ophthalmology  * She was diagnosed Fall of 2015.  * Medications/treatments tried include: Leflunomide, Actemra, prednisone    1. She is on 2.5 mg/day of prednisone. Attempts to taper off have failed.   2. Continue Actemra as a steroid sparing agent as above.   3. No symptoms to suggest temporal arteritis today

## 2025-07-11 NOTE — ASSESSMENT & PLAN NOTE
1. She has done PT for her back.   2. She takes Tylenol or Advil OTC PRN  3. She takes Flexeril as needed from other provider

## 2025-07-11 NOTE — ASSESSMENT & PLAN NOTE
*DXA 1/31/2017 Showed that the patient has osteoporosis. Dr. Navarrete's office sent me a note stating that they were leaving the management of this up to me  * She was on Fosamax in the past  *DXA 3/13/19 L femoral neck -1.9, lumbar spine -2.5  *DXA 3/17/21 L femoral neck -1.7, lumbar spine -1.3  *DXA 6/6/23 L femoral neck -2.1, left total hip -0.6, lumbar spine -1.0.    1. Calcium and vitamin D supplements should be taken unless otherwise contraindicated.   2. DXA scan should be monitored approximately every 2 years. Repeat DEXA due 6/2025. We will order this to be done with next OV  3. She has a history of GERD. We are therefore going to avoid oral bisphosphonates.   4. 3/2017 she got her 1st dose of IV Reclast and tolerated this well. This is an IV medication given once/year. Her most recent dose was 8/30/21. This was dose #5. We have her presently on a drug holiday at this point as long term use of bisphosphonates is associated with a higher risk for atypical fractures.

## 2025-07-14 ENCOUNTER — OFFICE VISIT (OUTPATIENT)
Age: 75
End: 2025-07-14
Payer: MEDICARE

## 2025-07-14 ENCOUNTER — TELEPHONE (OUTPATIENT)
Dept: NEUROLOGY | Facility: CLINIC | Age: 75
End: 2025-07-14

## 2025-07-14 ENCOUNTER — OFFICE VISIT (OUTPATIENT)
Dept: NEUROLOGY | Facility: CLINIC | Age: 75
End: 2025-07-14
Payer: MEDICARE

## 2025-07-14 VITALS
DIASTOLIC BLOOD PRESSURE: 78 MMHG | TEMPERATURE: 98.1 F | HEIGHT: 61 IN | BODY MASS INDEX: 22.28 KG/M2 | SYSTOLIC BLOOD PRESSURE: 132 MMHG | WEIGHT: 118 LBS | HEART RATE: 91 BPM

## 2025-07-14 VITALS
HEIGHT: 61 IN | HEART RATE: 69 BPM | SYSTOLIC BLOOD PRESSURE: 140 MMHG | OXYGEN SATURATION: 91 % | BODY MASS INDEX: 23.2 KG/M2 | DIASTOLIC BLOOD PRESSURE: 70 MMHG

## 2025-07-14 DIAGNOSIS — Z79.60 IMMUNODEFICIENCY DUE TO TREATMENT WITH IMMUNOSUPPRESSIVE MEDICATION: ICD-10-CM

## 2025-07-14 DIAGNOSIS — T45.1X5A IMMUNODEFICIENCY DUE TO TREATMENT WITH IMMUNOSUPPRESSIVE MEDICATION: ICD-10-CM

## 2025-07-14 DIAGNOSIS — Z79.52 LONG TERM CURRENT USE OF SYSTEMIC STEROIDS: ICD-10-CM

## 2025-07-14 DIAGNOSIS — G31.84 MCI (MILD COGNITIVE IMPAIRMENT): ICD-10-CM

## 2025-07-14 DIAGNOSIS — M05.9 RHEUMATOID ARTHRITIS WITH POSITIVE RHEUMATOID FACTOR, INVOLVING UNSPECIFIED SITE: Primary | ICD-10-CM

## 2025-07-14 DIAGNOSIS — M81.0 OSTEOPOROSIS, UNSPECIFIED OSTEOPOROSIS TYPE, UNSPECIFIED PATHOLOGICAL FRACTURE PRESENCE: ICD-10-CM

## 2025-07-14 DIAGNOSIS — D84.821 IMMUNODEFICIENCY DUE TO TREATMENT WITH IMMUNOSUPPRESSIVE MEDICATION: ICD-10-CM

## 2025-07-14 DIAGNOSIS — R53.82 CHRONIC FATIGUE: ICD-10-CM

## 2025-07-14 DIAGNOSIS — M31.5 GIANT CELL ARTERITIS WITH POLYMYALGIA RHEUMATICA: ICD-10-CM

## 2025-07-14 DIAGNOSIS — M15.0 PRIMARY OSTEOARTHRITIS INVOLVING MULTIPLE JOINTS: ICD-10-CM

## 2025-07-14 RX ORDER — DONEPEZIL HYDROCHLORIDE 10 MG/1
10 TABLET, FILM COATED ORAL NIGHTLY
Qty: 30 TABLET | Refills: 2 | Status: SHIPPED | OUTPATIENT
Start: 2025-07-14 | End: 2025-10-12

## 2025-07-14 RX ORDER — PREDNISONE 2.5 MG/1
2.5 TABLET ORAL DAILY
COMMUNITY

## 2025-07-14 NOTE — PROGRESS NOTES
Neuro Office Visit      Encounter Date: 2025   Patient Name: Irma Pedraza  : 1950   MRN: [id number]   PCP:  Eve Navarrete MD     Chief Complaint:    Chief Complaint   Patient presents with    Mild cognitive impairment     Follow up       History of Present Illness: Irma Pedraza is a 75 y.o. female who is here today in Neurology for  cognitive decline    Per Stroke Clinic:   History of Present Illness: Irma Pedraza is a 74 y.o. female who is here today to establish care.  Patient has prior medical history significant for hypertension, hyperlipidemia, tobacco abuse, COPD, chronic back pain and rheumatoid arthritis.  She presented to [facility] ED on 2024 with right facial droop, slurred speech and right arm weakness.  NIH was noted to be a 5.  CT of the head was negative for acute intracranial abnormality.  TNK was given.  CTA of the head and neck demonstrates bilateral carotid bulb atherosclerosis.  No LVO identified.  MRI of the brain revealed a right MCA territory infarct, cortical infarct surrounding the left central sulcus.  Carotid ultrasound was completed which shows less than 50% stenosis in bilateral ICA.  Echocardiogram shows EF of 61 to 65%, normal left atrial cavity and negative saline test.  Etiology felt to be cardioembolic versus atheroembolic.  Recommended to discharge on aspirin 325 mg, rosuvastatin 40 mg and Zetia for secondary stroke prevention.  He was recommended to complete an outpatient Holter monitor which the results are still pending.  Encouraged to stop smoking.     Clinic visit 2024: Patient presents to clinic today accompanied by her daughter.  She reports that she has been doing well overall.  No new or worsening strokelike symptoms.  She reports that she has been struggling with brain fog and some forgetfulness.  Her daughter shares that she was having some forgetfulness prior to the stroke.  Reviewed patient's workup, imaging, possible etiologies and  treatment plan with patient and family.  Answered all questions.  Patient is taking her medication without issue or side effect.  She just recently completed her Holter monitor and will be sending that back today or tomorrow.  Explained to the patient that if any atrial fibrillation was found that we would recommend Eliquis with a baby aspirin for secondary stroke prevention.  If no atrial fibrillation is found we will discuss at that time if any further cardiac workup is warranted.     Clinic visit 7/24/2024: Patient presents for routine follow-up today.  Her daughter is unable to be present for the appointment so she is listening on speaker phone.  Patient denies any new or worsening strokelike symptoms.  She just returned from a trip to [place] in the [facility].  She enjoyed her time with her family.  She has been taking her medications without issues or side effects.  We reviewed the results of her cardiac monitor which were negative for atrial fibrillation.  She is already had follow-up with Dr. Cabrera who did not recommend any further long-term monitoring.  I am in agreement with this plan as patient does have some traditional risk factors for possible small vessel disease or atheroemboli as a etiology.  We discussed the need for good blood pressure control, optimizing cholesterol and stopping smoking.  Should she experience heart palpitations I have asked her to notify our office or Dr. Isabel's office for further workup at that time  _______________________________________________________________________________________________________________________  Initial neurology clinic visit 11/20/2024:  Irma Pedraza is a 75 y.o. female who comes to clinic today for evaluation of cognitive impairment. She has noted symptoms since at least 2 years marked initially by forgetfulness  and repetitiveness . This has gradually worsened  over time. Additional symptoms have included impairments in short term memory .  There have been no associated  symptoms of anxiety  and depression . She denies  impairments in ADL's. She manages her medications  and finances. She continues to drive.  . She is currently residing at home with . Her daughter noted that she was missing some appointments previously but she feels this has improved since utilizing iPad.     Family History: Mother with Alzheimer's disease, brother may have Alzheimer's disease  Personal Neurological History: CVA in April 2024  Education & Work History: , 1 year college   Psychological History: No anxiety or depression   Sleep Habits & History: Sleeping well overall per patient, her daughter feels that she has a hard time falling asleep  Chronic Pain: Arthritis - does take infusions for arthritis   Hearing or Vision Impairments: Tinnitus in ears, R >L  Personal History of Cancer: None    Follow up visit 1/15/2025:  Irma Pedraza returns to neurology clinic for continued evaluation of memory loss. Lab work from 1120/2024 showed RPR nonreactive, free T4 normal at 1.24, TSH was normal at 1.030, methylmalonic acid level was normal at 217, folate was 12.90, vitamin B12 was 409, homocystine was elevated at 19. Donepezil 5 mg was started at last clinic visit for cognitive protection. PCP also started Memantine. She had picked up Donepezil but has not been taking Donepezil. She uses a pill box but did lose it previously. She does live at home with her . She is in clinic today with her daughter who voices concern regarding medication consistency and keeping up with appointments. Overall she feels that her memory change is mild and has been stable since last clinic visit. No acute concerns from last visit to today.       Follow up visit 4/16/2025:  Irma Pedraza returns to neurology clinic for ongoing evaluation. She returns today with her daughter.  She was to start Memantine at last visit. Phosphorylated Tau 217 on 3/28/2025 was elevated at  0.58.  Her  fell in February and broke his hip, eventually went to hospital and had hip replacement, then was sent to [facility], he had some post hospital delusion, then went to [place], and has had several subsequent falls. She had COVID-19 in the interim as well. She has been helping to provide care for her  and has been physically/emotionally exhausted. Her daughter feels like this has taken a significant toll on [place], she feels like her memory has been worse in the wake of her 's accident. She has gotten locked out of the house several times, she has had difficulty finding her car coming in/out of the hospital. She also reports that several family members have voiced their concern regarding her memory and that she has started to become mildly paranoid about her neighbor (her neighbor had been given a key years ago in case it was ever needed and now she is concerned that her neighbor is going to get into the house). Not getting lost while driving, sleeping well at night. Mood has been okay. No recurrence of stroke symptoms. She does have appointment with stroke clinic next week. She has been off of all medication for 1-2 months as she did not keep up with it while caring for her .     Current visit 7/14/2025:  Irma Pedraza returns for follow up.  She is in clinic today with her daughter.  Last visit she was switched from memantine to donepezil for ease of titration schedule.  Phosphorylated tau 217 was elevated for which referral to [place] [facility] was placed along with ordering Amyvid amyloid PET scan. PET scan was performed on 5/21/2025 which demonstrated significant beta amyloid deposition in the cerebral cortex. Centiloid score of 89.3 (more than 50) strongly suggests significant amyloid burden.  She was seen by [facility] on 6/5/2025 with Dr. Michael, felt that since she is taking Actemra that she would not be appropriate for antiamyloid medication as she is already  on monoclonal antibody.  MoCA score at that visit was 25 out of 30.  It was felt that she had MCI probable AD.  Plan was to likely hold off on monoclonal antibody given her current Actemra infusions.  If they did decide to proceed with further treatment for Alzheimer's such as monoclonal antibody they would have follow-up MRI and APOE testing at that time to determine eligibility. She has rheumatology appointment today to further discuss treatment options. She is living at home with her , managing medications well overall. Memory has been stable. Daughter feels that she has been stable. Some repetition in her stories. She is driving, denies accidents or getting lost while driving.     Subjective      Review of Systems   Constitutional: Negative.    HENT: Negative.     Eyes: Negative.    Respiratory: Negative.     Cardiovascular: Negative.    Gastrointestinal: Negative.    Genitourinary: Negative.    Musculoskeletal:  Positive for arthralgias.   Skin: Negative.    Neurological:         Mild memory loss     Psychiatric/Behavioral:  Positive for sleep disturbance.           Past Medical History:   Past Medical History:   Diagnosis Date    Adenomyomatosis of gallbladder 2009    by ultrasound, Dr. Yomi Tai 2009    Arthritis     Arthritis of lumbosacral spine     Asthma     Cataract     Remove 7-20 and 7-25 2023    Chronic hoarseness     Class 1 obesity due to excess calories with serious comorbidity and body mass index (BMI) of 30.0 to 30.9 in adult 07/16/2020 2/1/2021 Eve Navarrete MD      Closed displaced fracture of shaft of right clavicle with malunion     Diverticulitis 02/28/2019    Diverticulitis large intestine w/o perforation or abscess w/bleeding 06/05/2024    Diverticulosis     Years ago, mot sure of date    Dyslipidemia 04/23/2024    GCA (giant cell arteritis)     GERD (gastroesophageal reflux disease)     HL (hearing loss) 2022    Hyperlipidemia Unk    Low back pain ?    Medicare annual  wellness visit, subsequent 02/01/2021    Lloyd's neuroma of left foot     surgery    Ocular histoplasmosis     BCC 2/6/2017    Overweight with body mass index (BMI) of 27 to 27.9 in adult 08/03/2022    Panic disorder 02/28/2019    PMR (polymyalgia rheumatica)     Rhinovirus infection 06/09/2024       Past Surgical History:   Past Surgical History:   Procedure Laterality Date    COLONOSCOPY  02/2009    COSMETIC SURGERY  1994    face lift    COSMETIC SURGERY  1993    TUMMY TUCK    COSMETIC SURGERY  1980    Birth kenny removal    ELBOW PROCEDURE Left     x2    EYE SURGERY  7/20 and 7/25/2023    Removed cataracts and incerted lenses in bot eyes    THROAT SURGERY      remote removal of growth from throat    TONSILLECTOMY  1964       Family History:   Family History   Problem Relation Age of Onset    Osteoporosis Mother     Stroke Mother         Alzhiemers    Osteoporosis Father     Migraines Father     Hearing loss Father     Stroke Father         TIA    Brain cancer Sister     Stroke Sister         CVA    Stroke Brother         CVA    Breast cancer Maternal Grandmother 90    Stroke Paternal Grandmother         CVA    Arthritis Paternal Grandmother         Ostioperosis    Stroke Paternal Aunt         TIA    Stroke Sister         CVA    Stroke Sister         TIA    Stroke Brother         CVA    Stroke Sister         Glioblastoma    Other Sister         Rheumatoid Arthritis    Other Brother         no issues    Other Daughter         no issues    Other Sister         no issues    Ovarian cancer Neg Hx     Endometrial cancer Neg Hx        Social History:   Social History     Socioeconomic History    Marital status:    Tobacco Use    Smoking status: Every Day     Current packs/day: 0.50     Average packs/day: 0.5 packs/day for 51.5 years (25.8 ttl pk-yrs)     Types: Cigarettes     Start date: 1974     Passive exposure: Current    Smokeless tobacco: Never    Tobacco comments:     patient refused smoking literature. Pt  uses nicotine patch in addition   Vaping Use    Vaping status: Never Used   Substance and Sexual Activity    Alcohol use: Yes     Alcohol/week: 2.0 standard drinks of alcohol     Types: 2 Shots of liquor per week     Comment: 2 drinks per night    Drug use: No    Sexual activity: Not Currently     Partners: Male       Medications:     Current Outpatient Medications:     Actemra 80 MG/4ML solution injection, , Disp: , Rfl:     albuterol sulfate HFA (Ventolin HFA) 108 (90 Base) MCG/ACT inhaler, Inhale 2 puffs Every 4 (Four) Hours As Needed for Wheezing., Disp: 18 g, Rfl: 5    aspirin 325 MG tablet, Take 1 tablet by mouth Daily., Disp: 90 tablet, Rfl: 3    Coenzyme Q10 (COQ-10) 200 MG capsule, Take 1 tablet by mouth Daily., Disp: , Rfl:     cyanocobalamin (VITAMIN B-12) 500 MCG tablet, Take 1 tablet by mouth Daily., Disp: 90 tablet, Rfl: 3    cyclobenzaprine (FLEXERIL) 10 MG tablet, Take 1 tablet by mouth At Night As Needed for Muscle Spasms., Disp: 30 tablet, Rfl: 0    donepezil (Aricept) 10 MG tablet, Take 1 tablet by mouth Every Night for 90 days., Disp: 30 tablet, Rfl: 2    ezetimibe (ZETIA) 10 MG tablet, Take 1 tablet by mouth Every Night., Disp: 90 tablet, Rfl: 3    famotidine (PEPCID) 20 MG tablet, Take 1 tablet by mouth 2 (Two) Times a Day. (Patient taking differently: Take 1 tablet by mouth 2 (Two) Times a Day As Needed for Heartburn.), Disp: 180 tablet, Rfl: 3    Fluticasone-Umeclidin-Vilant (Trelegy Ellipta) 100-62.5-25 MCG/ACT inhaler, Inhale 1 puff Daily., Disp: 3 each, Rfl: 3    folic acid (FOLVITE) 1 MG tablet, Take 1 tablet by mouth Daily., Disp: 90 tablet, Rfl: 3    ibuprofen (ADVIL,MOTRIN) 800 MG tablet, Take 1 tablet by mouth Every 6 (Six) Hours As Needed for Mild Pain., Disp: 60 tablet, Rfl: 0    Lactobacillus-Inulin ([facility]), Take 1 capsule by mouth Daily., Disp: , Rfl:     montelukast (SINGULAIR) 10 MG tablet, Take 1 tablet by mouth Every Night., Disp: 90 tablet, Rfl: 1     "omeprazole (priLOSEC) 40 MG capsule, Take 1 capsule by mouth Daily., Disp: 90 capsule, Rfl: 3    rosuvastatin (CRESTOR) 40 MG tablet, Take 1 tablet by mouth Every Night., Disp: 90 tablet, Rfl: 3    zoledronic acid (RECLAST) 5 MG/100ML solution infusion, , Disp: , Rfl:     predniSONE (DELTASONE) 2.5 MG tablet, Take 1 tablet by mouth Daily., Disp: , Rfl:     Allergies:   Allergies   Allergen Reactions    Bactrim [Sulfamethoxazole-Trimethoprim] Other (See Comments)     Hypokalemia, hyponatremia    Codeine GI Intolerance    Atorvastatin Unknown (See Comments)     Lipitor      Bupropion Hcl [Bupropion] Unknown (See Comments)     wellbutrin    Calcium Unknown (See Comments)     unknown       Objective     Objective:    /70 (BP Location: Left arm, Patient Position: Sitting)   Pulse 69   Ht 154.9 cm (61\")   SpO2 (!) 89%   BMI 23.20 kg/m²       Physical Exam  Vitals reviewed.   Constitutional:       Appearance: Normal appearance.   HENT:      Head: Normocephalic and atraumatic.      Mouth/Throat:      Mouth: Mucous membranes are moist.      Pharynx: Oropharynx is clear.   Pulmonary:      Effort: Pulmonary effort is normal. No respiratory distress.   Skin:     General: Skin is warm and dry.   Neurological:      Mental Status: She is alert.          Neurology Exam:    General apperance: NAD.     Mental status: Alert, awake and oriented to person, year, season, day, state, county, city, hospital, floor.  Disoriented to date and month.    Fund of knowledge:  Mildly limited.     Language and Speech: No aphasia or dysarthria.    Naming , Repetition and Comprehension:  Can name objects, repeat a sentence and follow commands. Speech is clear and fluent with good repetition, comprehension, and naming.    Cranial Nerves:   CN II: Visual fields are full. Intact. Pupils - PERRLA  CN III, IV and VI: Extraocular movements are intact. Normal saccades.   CN V: Facial sensation is intact.   CN VII: Muscles of facial expression " "reveal no asymmetry. Intact.   CN VIII: Hearing is intact.   CN IX and X: Palate elevates symmetrically. Intact  CN XI: Shoulder shrug is intact.   CN XII: Tongue is midline without evidence of atrophy or fasciculation.     Motor:  Right UE muscle strength 5/5. Normal tone.     Left UE muscle strength 5/5. Normal tone.      Right LE muscle strength 5/5. Normal tone.     Left LE muscle strength 5/5. Normal tone.      Sensory: Normal light touch sensation bilaterally.    DTRs: 2+ bilaterally in upper and lower extremities.    Coordination: Normal finger-to-nose    Gait: Normal for age. No assistive device      MMSE 25/30, recall 1/3      Results:   Imaging:   CT Head Without Contrast    Result Date: 8/11/2024  1. Large left parietal scalp hematoma. 2. No intracranial acute abnormalities. Images reviewed, interpreted, dictated and electronically signed by Broderick Blanco MD Voice transcription technology ([employer]) is used for the dictation of this note and \"sound-alike\" words might be erroneously placed despite reviewing this note for accuracy. Errors in dictation may reflect use of voice recognition software and not all errors in transcription may have been detected prior to signing.    XR Spine Lumbar AP & Lateral    Result Date: 8/11/2024  Degenerative changes with no subluxation or fracture. Images reviewed, interpreted, dictated and electronically signed by Broderick Blanco MD Voice transcription technology (Power [employer]) is used for the dictation of this note and \"sound-alike\" words might be erroneously placed despite reviewing this note for accuracy. Errors in dictation may reflect use of voice recognition software and not all errors in transcription may have been detected prior to signing.       Labs:   Lab Results   Component Value Date    GLUCOSE 116 (H) 11/20/2024    BUN 14 11/20/2024    CREATININE 0.98 11/20/2024     11/20/2024    K 3.7 11/20/2024    CL 98 11/20/2024    CALCIUM 10.1 11/20/2024    " PROTEINTOT 6.9 11/20/2024    ALBUMIN 4.8 11/20/2024    ALT 28 11/20/2024    AST 29 11/20/2024    ALKPHOS 64 11/20/2024    BILITOT 1.3 (H) 11/20/2024    GLOB 2.1 11/20/2024    AGRATIO 2.3 11/20/2024    BCR 14.3 11/20/2024    ANIONGAP 13.1 11/20/2024    EGFR 60.7 11/20/2024         Assessment / Plan      Assessment/Plan:   Diagnoses and all orders for this visit:    1. MCI (mild cognitive impairment)  -     donepezil (Aricept) 10 MG tablet; Take 1 tablet by mouth Every Night for 90 days.  Dispense: 30 tablet; Refill: 2      [place] returns to neurology clinic, MMSE today 25 out of 30 with 1 out of 3 for delayed recall.  She has been tolerating donepezil 5 mg well, denies any side effects, given her recent Amyvid amyloid PET scan results strongly suggestive of AD we have elected to increase donepezil to 10 mg nightly today.  She is to monitor for any side effects with this medication.  She has follow-up appointment today with her rheumatologist to discuss if monoclonal antibodies would be safe with Actemra or not.  We discussed that pending their decision, she is to update [facility], if she does end up moving forward with infusions in the future they have advised that she first have updated MRI brain and APO E testing which I think is reasonable, if she does end up moving forward with infusions I would advise that she transition her memory care to [facility], if she does not move forward with infusions then she can continue with this office. Recommend that she also continue to see stroke clinic for secondary stroke prevention. Will plan to see back in clinic in 3 months or sooner for any questions or concerns.         Follow Up:   Return in about 3 months (around 10/14/2025).    I spent 35  minutes face to face with the patient. I personally spent 50 percent of this time counseling and discussing diagnosis, diagnostic testing, evaluation, treatment options, and management .       During this visit the  following were done:  Labs Reviewed []    Labs Ordered []    Radiology Reports Reviewed [x]    Radiology Ordered []    PCP Records Reviewed []    Referring Provider Records Reviewed []    ER Records Reviewed []    Hospital Records Reviewed []    History Obtained From Family []    Radiology Images Reviewed []    Other Reviewed []    Records Requested []      DEVI Bradshaw  [facility]  [address] [address] [phone]

## 2025-07-17 ENCOUNTER — TELEPHONE (OUTPATIENT)
Dept: NEUROLOGY | Facility: CLINIC | Age: 75
End: 2025-07-17
Payer: MEDICARE

## 2025-07-17 NOTE — TELEPHONE ENCOUNTER
At [place]'s infusion today, they were told they can do the [place] infusion at that location and was recommended to look into Dr. Ocampo with [facility]. They said any of the providers with [facility] would be okay if you thought they were. Luanne just thought it might be easier due to closer location for her mother and her being able to get her back and forth.

## 2025-07-18 NOTE — TELEPHONE ENCOUNTER
Left a message relaying what was said and that they just need to call us and let us know which route they choose.

## 2025-07-29 ENCOUNTER — TELEPHONE (OUTPATIENT)
Dept: NEUROLOGY | Facility: CLINIC | Age: 75
End: 2025-07-29
Payer: MEDICARE

## 2025-07-29 DIAGNOSIS — G31.84 MILD COGNITIVE IMPAIRMENT: Primary | ICD-10-CM

## 2025-07-29 NOTE — TELEPHONE ENCOUNTER
Caller: VERONA REYNA    Relationship: DAUGHTER    Best call back number: [phone]         Who are you requesting to speak with (clinical staff, provider,  specific staff member): HUMBERTO LOWE  What was the call regarding: INQUIRING ABOUT REFERRAL FOR INFUSIONS AND WANTED THEM DONE IN [place]     THEY WOULD LIKE CALL BACK REGARDING THE STATUS

## 2025-07-31 ENCOUNTER — TELEPHONE (OUTPATIENT)
Dept: NEUROLOGY | Facility: CLINIC | Age: 75
End: 2025-07-31
Payer: MEDICARE

## 2025-07-31 NOTE — TELEPHONE ENCOUNTER
Luanne called and let me know what Dionisio had said to some of her questions. Let her know the referral has been placed.    Patient arrived to ED c/o BLE edema. Per patient this is new. Patient currently c/o back pain, requesting Dilaudid. BLE edema noted. No SOB, dypnea, chest pain at this time.

## 2025-07-31 NOTE — TELEPHONE ENCOUNTER
Placed referral to Dr. Ruben Ocampo with [facility] per patient/family request on behalf of DEVI Ratliff.

## 2025-08-28 ENCOUNTER — TELEPHONE (OUTPATIENT)
Age: 75
End: 2025-08-28
Payer: MEDICARE